# Patient Record
Sex: MALE | Race: WHITE | NOT HISPANIC OR LATINO | Employment: OTHER | URBAN - METROPOLITAN AREA
[De-identification: names, ages, dates, MRNs, and addresses within clinical notes are randomized per-mention and may not be internally consistent; named-entity substitution may affect disease eponyms.]

---

## 2017-02-24 DIAGNOSIS — I25.10 CORONARY ARTERY DISEASE INVOLVING NATIVE CORONARY ARTERY OF NATIVE HEART WITHOUT ANGINA PECTORIS: ICD-10-CM

## 2017-02-24 RX ORDER — NITROGLYCERIN 0.4 MG/1
0.4 TABLET SUBLINGUAL EVERY 5 MIN PRN
Qty: 25 TABLET | Refills: 3 | Status: SHIPPED | OUTPATIENT
Start: 2017-02-24 | End: 2017-08-28

## 2017-02-24 NOTE — TELEPHONE ENCOUNTER
Received refill request for:  Nitro  Last OV was: 6/22/2016 with Dr. Jolley  F/U scheduled: orders in Epic for 6/2017  New script sent to: [facility]

## 2017-05-12 DIAGNOSIS — I25.118 ATHEROSCLEROSIS OF NATIVE CORONARY ARTERY OF NATIVE HEART WITH OTHER FORM OF ANGINA PECTORIS (H): ICD-10-CM

## 2017-05-12 RX ORDER — EZETIMIBE 10 MG/1
10 TABLET ORAL DAILY
Qty: 90 TABLET | Refills: 0 | Status: SHIPPED | OUTPATIENT
Start: 2017-05-12 | End: 2017-08-11

## 2017-05-12 NOTE — TELEPHONE ENCOUNTER
LOV 6/22/16. Future apt 6/29/17. RN refilled rx for sufficient amount of medication until apt on 6/29/17 with Dr. Jolley for further refills.

## 2017-05-31 ENCOUNTER — TRANSFERRED RECORDS (OUTPATIENT)
Dept: HEALTH INFORMATION MANAGEMENT | Facility: CLINIC | Age: 72
End: 2017-05-31

## 2017-06-21 ENCOUNTER — TRANSFERRED RECORDS (OUTPATIENT)
Dept: HEALTH INFORMATION MANAGEMENT | Facility: CLINIC | Age: 72
End: 2017-06-21

## 2017-06-28 ENCOUNTER — PRE VISIT (OUTPATIENT)
Dept: CARDIOLOGY | Facility: CLINIC | Age: 72
End: 2017-06-28

## 2017-06-29 ENCOUNTER — OFFICE VISIT (OUTPATIENT)
Dept: CARDIOLOGY | Facility: CLINIC | Age: 72
End: 2017-06-29
Attending: INTERNAL MEDICINE
Payer: COMMERCIAL

## 2017-06-29 VITALS
WEIGHT: 241 LBS | HEART RATE: 59 BPM | HEIGHT: 70 IN | DIASTOLIC BLOOD PRESSURE: 70 MMHG | BODY MASS INDEX: 34.5 KG/M2 | SYSTOLIC BLOOD PRESSURE: 120 MMHG | OXYGEN SATURATION: 95 %

## 2017-06-29 DIAGNOSIS — E78.5 HYPERLIPIDEMIA LDL GOAL <70: ICD-10-CM

## 2017-06-29 DIAGNOSIS — I10 BENIGN ESSENTIAL HYPERTENSION: Primary | ICD-10-CM

## 2017-06-29 DIAGNOSIS — I25.10 CORONARY ARTERY DISEASE INVOLVING NATIVE CORONARY ARTERY OF NATIVE HEART WITHOUT ANGINA PECTORIS: ICD-10-CM

## 2017-06-29 DIAGNOSIS — E66.09 NON MORBID OBESITY DUE TO EXCESS CALORIES: Chronic | ICD-10-CM

## 2017-06-29 LAB
ALT SERPL W P-5'-P-CCNC: 29 U/L (ref 0–70)
ANION GAP SERPL CALCULATED.3IONS-SCNC: 3 MMOL/L (ref 3–14)
BUN SERPL-MCNC: 17 MG/DL (ref 7–30)
CALCIUM SERPL-MCNC: 8.6 MG/DL (ref 8.5–10.1)
CHLORIDE SERPL-SCNC: 108 MMOL/L (ref 94–109)
CHOLEST SERPL-MCNC: 107 MG/DL
CO2 SERPL-SCNC: 28 MMOL/L (ref 20–32)
CREAT SERPL-MCNC: 0.69 MG/DL (ref 0.66–1.25)
GFR SERPL CREATININE-BSD FRML MDRD: NORMAL ML/MIN/1.7M2
GLUCOSE SERPL-MCNC: 99 MG/DL (ref 70–99)
HDLC SERPL-MCNC: 51 MG/DL
LDLC SERPL CALC-MCNC: 34 MG/DL
NONHDLC SERPL-MCNC: 56 MG/DL
POTASSIUM SERPL-SCNC: 4.4 MMOL/L (ref 3.4–5.3)
SODIUM SERPL-SCNC: 139 MMOL/L (ref 133–144)
TRIGL SERPL-MCNC: 111 MG/DL

## 2017-06-29 PROCEDURE — 80048 BASIC METABOLIC PNL TOTAL CA: CPT | Performed by: INTERNAL MEDICINE

## 2017-06-29 PROCEDURE — 84460 ALANINE AMINO (ALT) (SGPT): CPT | Performed by: INTERNAL MEDICINE

## 2017-06-29 PROCEDURE — 99214 OFFICE O/P EST MOD 30 MIN: CPT | Performed by: INTERNAL MEDICINE

## 2017-06-29 PROCEDURE — 80061 LIPID PANEL: CPT | Performed by: INTERNAL MEDICINE

## 2017-06-29 PROCEDURE — 36415 COLL VENOUS BLD VENIPUNCTURE: CPT | Performed by: INTERNAL MEDICINE

## 2017-06-29 NOTE — MR AVS SNAPSHOT
After Visit Summary   6/29/2017    Terrence Murillo    MRN: [id number]           Patient Information     Date Of Birth          1945        Visit Information        Provider Department      6/29/2017 9:00 AM Eagle Jolley MD [facility] [place]        Today's Diagnoses     Benign essential hypertension    -  1    Coronary artery disease involving native coronary artery of native heart without angina pectoris        Hyperlipidemia LDL goal <70        Non morbid obesity due to excess calories           Follow-ups after your visit        Additional Services     Follow-Up with Cardiologist                 Your next 10 appointments already scheduled     Jul 18, 2017  1:30 PM CDT   NEW NEURO with Kendell Herndon MD   Eye Clinic ([facility])    [address]  [address]  [address]  [facility] [facility] [address] [phone]              Future tests that were ordered for you today     Open Future Orders        Priority Expected Expires Ordered    Lipid Profile Routine 6/29/2018 8/3/2018 6/29/2017    Follow-Up with Cardiologist Routine 6/29/2018 11/11/2018 6/29/2017    Basic metabolic panel Routine 6/29/2018 8/3/2018 6/29/2017            Who to contact     If you have questions or need follow up information about today's clinic visit or your schedule please contact [facility] directly at [phone].  Normal or non-critical lab and imaging results will be communicated to you by MyChart, letter or phone within 4 business days after the clinic has received the results. If you do not hear from us within 7 days, please contact the clinic through MyChart or phone. If you have a critical or abnormal lab result, we will notify you by phone as soon as possible.  Submit refill requests through [employer] or call your pharmacy and they will forward the refill request to us. Please allow 3 business days for  "your refill to be completed.          Additional Information About Your Visit        [employer] Information     [employer] lets you send messages to your doctor, view your test results, renew your prescriptions, schedule appointments and more. To sign up, go to [email or web address] . Click on \"Log in\" on the left side of the screen, which will take you to the Welcome page. Then click on \"Sign up Now\" on the right side of the page.     You will be asked to enter the access code listed below, as well as some personal information. Please follow the directions to create your username and password.     Your access code is: [email or web address]  Expires: 2017 10:26 AM     Your access code will  in 90 days. If you need help or a new code, please call your [place] clinic or [phone].        Care EveryWhere ID     This is your Care EveryWhere ID. This could be used by other organizations to access your [place] medical records  [id number]        Your Vitals Were     Pulse Height Pulse Oximetry BMI (Body Mass Index)          59 1.778 m (5' 10\") 95% 34.58 kg/m2         Blood Pressure from Last 3 Encounters:   17 120/70   16 126/66   16 116/68    Weight from Last 3 Encounters:   17 109.3 kg (241 lb)   16 102.5 kg (226 lb)   16 107 kg (236 lb)              We Performed the Following     Follow-Up with Cardiologist        Primary Care Provider Office Phone # Fax #    Alexy Hernandez [phone]       [facility] [address] [address]        Equal Access to Services     [facility] : Jorden Johnson, linh rodriguez, ce kaalmakirk chicas, faustino banks. So [facility] [phone].    ATENCIÓN: Si habla español, tiene a herndon disposición servicios gratuitos de asistencia lingüística. Llame al [phone].    We comply with applicable federal civil rights laws and [place] laws. We do not " discriminate on the basis of race, color, national origin, age, disability sex, sexual orientation or gender identity.            Thank you!     Thank you for choosing [facility] AT [place]  for your care. Our goal is always to provide you with excellent care. Hearing back from our patients is one way we can continue to improve our services. Please take a few minutes to complete the written survey that you may receive in the mail after your visit with us. Thank you!             Your Updated Medication List - Protect others around you: Learn how to safely use, store and throw away your medicines at www.disposemymeds.org.          This list is accurate as of: 6/29/17 10:26 AM.  Always use your most recent med list.                   Brand Name Dispense Instructions for use Diagnosis    acetaminophen 500 MG tablet    TYLENOL     Take 500 mg by mouth daily        aspirin 81 MG tablet      Take 1 tablet by mouth daily.        ezetimibe 10 MG tablet    ZETIA    90 tablet    Take 1 tablet (10 mg) by mouth daily    Atherosclerosis of native coronary artery of native heart with other form of angina pectoris (H)       glucosamine 500 MG Caps      Take 500 mg by mouth 2 times daily        ibuprofen 200 MG tablet    ADVIL/MOTRIN     Take 400 mg by mouth At Bedtime        metoprolol 25 MG tablet    LOPRESSOR    180 tablet    Take 1 tablet (25 mg) by mouth 2 times daily    CAD (coronary artery disease), Hyperlipidemia LDL goal <70, Benign essential hypertension, Coronary artery disease involving native coronary artery of native heart without angina pectoris       Multi-vitamin Tabs tablet   Generic drug:  multivitamin, therapeutic with minerals      Take 1 tablet by mouth daily.        nitroGLYcerin 0.4 MG sublingual tablet    NITROSTAT    25 tablet    Place 1 tablet (0.4 mg) under the tongue every 5 minutes as needed May repeat X 2. If no relief after 3 tablets call 911    Coronary artery disease  involving native coronary artery of native heart without angina pectoris       ramipril 5 MG capsule    ALTACE    90 capsule    Take 1 capsule (5 mg) by mouth daily    Benign essential hypertension, CAD (coronary artery disease), Hyperlipidemia LDL goal <70, Coronary artery disease involving native coronary artery of native heart without angina pectoris       rosuvastatin 40 MG tablet    CRESTOR    90 tablet    Take 1 tablet (40 mg) by mouth daily    CAD (coronary artery disease), Hyperlipidemia LDL goal <70, Benign essential hypertension, Coronary artery disease involving native coronary artery of native heart without angina pectoris       SAW PALMETTO COMPLEX PO      Take 1 tablet by mouth daily

## 2017-06-29 NOTE — LETTER
2017    Alexy Hernandez MD  [facility]   [address] [place] [address]    RE: Terrence Murillo       Dear Colleague,    I had the pleasure of seeing Terrence Murillo in the [facility].    HPI and Plan:   Terrence is a very nice 71-year-old gentleman with past medical history significant for a DCA atherectomy in .  He developed restenosis and underwent 2 more interventions by balloon angioplasty.   In  two Xience drug-eluting stents were placed in his proximal and mid left anterior descending artery.       Psychosocially, Terrence has had a tough go of it.  His son, Doug,  suddenly in  in what sounds like a cardiac arrest, possible heart attack. Interestingly his dad  suddenly of apparent myocardial infarction at age 44. Also his grandmother  suddenly at age 21 of uncertain reasons.  His wife is dealing with pulmonary hypertension followed through the [facility] system.  He has arthritis problems which have interfered with his ability to exercise.  Over time, Terrence has gained a significant amount weight     is unfortunately not exercising at all, previously he was an avid runner.   In 2016, Terrence was having some exertional chest discomfort.  Stress nuclear scan did not appear to demonstrate any significant area of ischemia.  He continued to have symptoms.  We brought him to the Cardiac Catheterization Lab  and this demonstrated an 80% stenosis with JADE grade 2 flow in a small ramus intermedius branch, new from .  He also had a jailed diagonal that had a 30%-50% ostial narrowing.  There was a tiny branch.  This vessel had also had an 80%-90% stenosis, although this was present in .  He had mild anterior wall hypokinesia.  Ejection fraction was estimated to be 50%-55% with a left ventricular end-diastolic pressure of 13.  we decided to treat him medically and he did quite well with medical management.    Terrence returns to clinic and  continues to do well he has no exertional chest, arm,  neck or jaw discomfort. No dyspnea on exertion, orthopnea or PND, no palpitations, lightheadedness, syncope or near-syncope. He unfortunately is not exercising and gained back all the weight he had lost the previous year.  He notes no side effects or problems with his current medical regimen. We again talked about son Doug and his family as well as his family history of cardiac sudden death. He reports that all of Doug's kids have been seen by a pediatric cardiologist.    Assessment and plan. Terrence has no symptoms to suggest angina. He has no symptoms to suggest heart failure or significant arrhythmia.    Blood pressure is very well controlled today. Next    Fasting lipid profiles outstanding on the combination of Crestor and Zetia with an LDL of 31 and HDL of 55.    His weight unfortunately is up to 241 pounds. Body mass index of 34.7. We again talked about importance of getting back to his regular sinus regiment portion control and weight loss. I've given him referral to my wife for health coaching.    I will plan on seeing him back next year.    Orders Placed This Encounter   Procedures     Basic metabolic panel     Lipid Profile     Follow-Up with Cardiologist       No orders of the defined types were placed in this encounter.      There are no discontinued medications.      Encounter Diagnoses   Name Primary?     Coronary artery disease involving native coronary artery of native heart without angina pectoris      Benign essential hypertension Yes     Hyperlipidemia LDL goal <70      Non morbid obesity due to excess calories        CURRENT MEDICATIONS:  Current Outpatient Prescriptions   Medication Sig Dispense Refill     ezetimibe (ZETIA) 10 MG tablet Take 1 tablet (10 mg) by mouth daily 90 tablet 0     nitroglycerin (NITROSTAT) 0.4 MG sublingual tablet Place 1 tablet (0.4 mg) under the tongue every 5 minutes as needed May repeat X 2. If no relief after 3  tablets call 911 25 tablet 3     metoprolol (LOPRESSOR) 25 MG tablet Take 1 tablet (25 mg) by mouth 2 times daily 180 tablet 3     rosuvastatin (CRESTOR) 40 MG tablet Take 1 tablet (40 mg) by mouth daily 90 tablet 3     ramipril (ALTACE) 5 MG capsule Take 1 capsule (5 mg) by mouth daily 90 capsule 3     acetaminophen (TYLENOL) 500 MG tablet Take 500 mg by mouth daily       ibuprofen (ADVIL,MOTRIN) 200 MG tablet Take 400 mg by mouth At Bedtime       Zn-Pyg Afri-Nettle-Saw Palmet (SAW PALMETTO COMPLEX PO) Take 1 tablet by mouth daily       aspirin 81 MG tablet Take 1 tablet by mouth daily.       Glucosamine 500 MG CAPS Take 500 mg by mouth 2 times daily        Multiple Vitamin (MULTI-VITAMIN) per tablet Take 1 tablet by mouth daily.         ALLERGIES     Allergies   Allergen Reactions     Cardizem [Diltiazem Hcl] Itching       PAST MEDICAL HISTORY:  Past Medical History:   Diagnosis Date     Chest pain      Coronary artery disease     cardiac cath 2016: medical management; cath 2010: SUSANA x2 to LAD; cath 1993: PTCA to LAD; cath 1992: PTCA to LAD, 1992: atherectomy of LAD     Hx of angiography 4-    no stenosis greater than 25%-rec. medical management     Hyperlipidaemia      Hypertension      Obese      Sleep apnea        PAST SURGICAL HISTORY:  Past Surgical History:   Procedure Laterality Date     HEART CATH, ANGIOPLASTY  1992    LAD  atherectomy with a DVI device      HEART CATH, ANGIOPLASTY  4-28-10    PTCA and stent proximal and mid LAD (2) stents Xience       FAMILY HISTORY:  Family History   Problem Relation Age of Onset     C.A.D. Father      HEART DISEASE Son 37     enlarged heart     HEART DISEASE Mother      Heart Surgery Mother      open heart, passed 10 days after     Family History Negative Maternal Grandmother      Family History Negative Maternal Grandfather      Family History Negative Paternal Grandmother      Family History Negative Paternal Grandfather      Family History Negative Brother   "    Alzheimer Disease Brother      Family History Negative Sister      Family History Negative Daughter      Family History Negative Son      Family History Negative Daughter        SOCIAL HISTORY:  Social History     Social History     Marital status:      Spouse name: N/A     Number of children: N/A     Years of education: N/A     Social History Main Topics     Smoking status: Former Smoker     Types: Cigarettes     Quit date: 1/1/1970     Smokeless tobacco: None      Comment: Social smoking only     Alcohol use Yes      Comment: 1-2 cocktails daily      Drug use: None     Sexual activity: Not Asked     Other Topics Concern     Parent/Sibling W/ Cabg, Mi Or Angioplasty Before 65f 55m? No     Caffeine Concern Yes     coffee      Sleep Concern No     Stress Concern No     Weight Concern Yes     Weight decrease 10 lbs     Special Diet Yes     Mediterranean diet     Exercise Yes     Walking 5 days per week 35- 60 minutes     Seat Belt Yes     Social History Narrative       Review of Systems:  Skin:  Negative       Eyes:  Positive for glasses Hx of cataracts currently has issues seeing the far outside of vision appointment Neuro-Opthamologist   ENT:  Positive for hearing loss hearing aids  Respiratory:  Positive for sleep apnea;CPAP     Cardiovascular:  Negative      Gastroenterology: Negative      Genitourinary:  Positive for incontinence    Musculoskeletal:  Positive for back pain right lower back pain from sitting long periods of time   Neurologic:  Negative      Psychiatric:  Negative      Heme/Lymph/Imm:  Negative      Endocrine:  Negative        Physical Exam:  Vitals: /70 (BP Location: Right arm, Patient Position: Sitting, Cuff Size: Adult Regular)  Pulse 59  Ht 1.778 m (5' 10\")  Wt 109.3 kg (241 lb)  SpO2 95%  BMI 34.58 kg/m2    Constitutional:  cooperative, alert and oriented, well developed, well nourished, in no acute distress obese      Skin:  warm and dry to the touch, no apparent skin " lesions or masses noted        Head:  normocephalic, no masses or lesions        Eyes:  pupils equal and round;conjunctivae and lids unremarkable;sclera white;no xanthalasma;no nystagmus        ENT:  no pallor or cyanosis, dentition good        Neck:  carotid pulses are full and equal bilaterally;no carotid bruit        Chest:  normal breath sounds, clear to auscultation, normal A-P diameter, normal symmetry, normal respiratory excursion, no use of accessory muscles          Cardiac: regular rhythm;normal S1 and S2;no S3 or S4;no murmurs, gallops or rubs detected                  Abdomen:    obese      Vascular: pulses full and equal                                        Extremities and Back:  no edema;no spinal abnormalities noted;normal muscle strength and tone              Neurological:  affect appropriate, oriented to time, person and place;no gross motor deficits;affect appropriate        Thank you for allowing me to participate in the care of your patient.    Sincerely,     Eagle Jolley MD     [facility]

## 2017-06-29 NOTE — PROGRESS NOTES
HPI and Plan:   Terrence is a very nice 71-year-old gentleman with past medical history significant for a DCA atherectomy in .  He developed restenosis and underwent 2 more interventions by balloon angioplasty.   In  two Xience drug-eluting stents were placed in his proximal and mid left anterior descending artery.       Psychosocially, Terrence has had a tough go of it.  His son, Doug,  suddenly in  in what sounds like a cardiac arrest, possible heart attack. Interestingly his dad  suddenly of apparent myocardial infarction at age 44. Also his grandmother  suddenly at age 21 of uncertain reasons.  His wife is dealing with pulmonary hypertension followed through the [facility] system.  He has arthritis problems which have interfered with his ability to exercise.  Over time, Terrence has gained a significant amount weight    is unfortunately not exercising at all, previously he was an avid runner.   In 2016, Terrence was having some exertional chest discomfort.  Stress nuclear scan did not appear to demonstrate any significant area of ischemia.  He continued to have symptoms.  We brought him to the Cardiac Catheterization Lab  and this demonstrated an 80% stenosis with JADE grade 2 flow in a small ramus intermedius branch, new from .  He also had a jailed diagonal that had a 30%-50% ostial narrowing.  There was a tiny branch.  This vessel had also had an 80%-90% stenosis, although this was present in .  He had mild anterior wall hypokinesia.  Ejection fraction was estimated to be 50%-55% with a left ventricular end-diastolic pressure of 13.  we decided to treat him medically and he did quite well with medical management.    Terrence returns to clinic and continues to do well he has no exertional chest, arm,  neck or jaw discomfort. No dyspnea on exertion, orthopnea or PND, no palpitations, lightheadedness, syncope or near-syncope. He unfortunately is not exercising and gained back all the weight he had  lost the previous year.  He notes no side effects or problems with his current medical regimen. We again talked about son Doug and his family as well as his family history of cardiac sudden death. He reports that all of Doug's kids have been seen by a pediatric cardiologist.    Assessment and plan. Terrence has no symptoms to suggest angina. He has no symptoms to suggest heart failure or significant arrhythmia.    Blood pressure is very well controlled today. Next    Fasting lipid profiles outstanding on the combination of Crestor and Zetia with an LDL of 31 and HDL of 55.    His weight unfortunately is up to 241 pounds. Body mass index of 34.7. We again talked about importance of getting back to his regular sinus regiment portion control and weight loss. I've given him referral to my wife for health coaching.    I will plan on seeing him back next year.    Orders Placed This Encounter   Procedures     Basic metabolic panel     Lipid Profile     Follow-Up with Cardiologist       No orders of the defined types were placed in this encounter.      There are no discontinued medications.      Encounter Diagnoses   Name Primary?     Coronary artery disease involving native coronary artery of native heart without angina pectoris      Benign essential hypertension Yes     Hyperlipidemia LDL goal <70      Non morbid obesity due to excess calories        CURRENT MEDICATIONS:  Current Outpatient Prescriptions   Medication Sig Dispense Refill     ezetimibe (ZETIA) 10 MG tablet Take 1 tablet (10 mg) by mouth daily 90 tablet 0     nitroglycerin (NITROSTAT) 0.4 MG sublingual tablet Place 1 tablet (0.4 mg) under the tongue every 5 minutes as needed May repeat X 2. If no relief after 3 tablets call 911 25 tablet 3     metoprolol (LOPRESSOR) 25 MG tablet Take 1 tablet (25 mg) by mouth 2 times daily 180 tablet 3     rosuvastatin (CRESTOR) 40 MG tablet Take 1 tablet (40 mg) by mouth daily 90 tablet 3     ramipril (ALTACE) 5 MG capsule Take  1 capsule (5 mg) by mouth daily 90 capsule 3     acetaminophen (TYLENOL) 500 MG tablet Take 500 mg by mouth daily       ibuprofen (ADVIL,MOTRIN) 200 MG tablet Take 400 mg by mouth At Bedtime       Zn-Pyg Afri-Nettle-Saw Palmet (SAW PALMETTO COMPLEX PO) Take 1 tablet by mouth daily       aspirin 81 MG tablet Take 1 tablet by mouth daily.       Glucosamine 500 MG CAPS Take 500 mg by mouth 2 times daily        Multiple Vitamin (MULTI-VITAMIN) per tablet Take 1 tablet by mouth daily.         ALLERGIES     Allergies   Allergen Reactions     Cardizem [Diltiazem Hcl] Itching       PAST MEDICAL HISTORY:  Past Medical History:   Diagnosis Date     Chest pain      Coronary artery disease     cardiac cath 2016: medical management; cath 2010: SUSANA x2 to LAD; cath 1993: PTCA to LAD; cath 1992: PTCA to LAD, 1992: atherectomy of LAD     Hx of angiography 4-    no stenosis greater than 25%-rec. medical management     Hyperlipidaemia      Hypertension      Obese      Sleep apnea        PAST SURGICAL HISTORY:  Past Surgical History:   Procedure Laterality Date     HEART CATH, ANGIOPLASTY  1992    LAD  atherectomy with a DVI device      HEART CATH, ANGIOPLASTY  4-28-10    PTCA and stent proximal and mid LAD (2) stents Xience       FAMILY HISTORY:  Family History   Problem Relation Age of Onset     C.A.D. Father      HEART DISEASE Son 37     enlarged heart     HEART DISEASE Mother      Heart Surgery Mother      open heart, passed 10 days after     Family History Negative Maternal Grandmother      Family History Negative Maternal Grandfather      Family History Negative Paternal Grandmother      Family History Negative Paternal Grandfather      Family History Negative Brother      Alzheimer Disease Brother      Family History Negative Sister      Family History Negative Daughter      Family History Negative Son      Family History Negative Daughter        SOCIAL HISTORY:  Social History     Social History     Marital status:  "     Spouse name: N/A     Number of children: N/A     Years of education: N/A     Social History Main Topics     Smoking status: Former Smoker     Types: Cigarettes     Quit date: 1/1/1970     Smokeless tobacco: None      Comment: Social smoking only     Alcohol use Yes      Comment: 1-2 cocktails daily      Drug use: None     Sexual activity: Not Asked     Other Topics Concern     Parent/Sibling W/ Cabg, Mi Or Angioplasty Before 65f 55m? No     Caffeine Concern Yes     coffee      Sleep Concern No     Stress Concern No     Weight Concern Yes     Weight decrease 10 lbs     Special Diet Yes     Mediterranean diet     Exercise Yes     Walking 5 days per week 35- 60 minutes     Seat Belt Yes     Social History Narrative       Review of Systems:  Skin:  Negative       Eyes:  Positive for glasses Hx of cataracts currently has issues seeing the far outside of vision appointment Neuro-Opthamologist   ENT:  Positive for hearing loss hearing aids  Respiratory:  Positive for sleep apnea;CPAP     Cardiovascular:  Negative      Gastroenterology: Negative      Genitourinary:  Positive for incontinence    Musculoskeletal:  Positive for back pain right lower back pain from sitting long periods of time   Neurologic:  Negative      Psychiatric:  Negative      Heme/Lymph/Imm:  Negative      Endocrine:  Negative        Physical Exam:  Vitals: /70 (BP Location: Right arm, Patient Position: Sitting, Cuff Size: Adult Regular)  Pulse 59  Ht 1.778 m (5' 10\")  Wt 109.3 kg (241 lb)  SpO2 95%  BMI 34.58 kg/m2    Constitutional:  cooperative, alert and oriented, well developed, well nourished, in no acute distress obese      Skin:  warm and dry to the touch, no apparent skin lesions or masses noted        Head:  normocephalic, no masses or lesions        Eyes:  pupils equal and round;conjunctivae and lids unremarkable;sclera white;no xanthalasma;no nystagmus        ENT:  no pallor or cyanosis, dentition good        Neck:  " carotid pulses are full and equal bilaterally;no carotid bruit        Chest:  normal breath sounds, clear to auscultation, normal A-P diameter, normal symmetry, normal respiratory excursion, no use of accessory muscles          Cardiac: regular rhythm;normal S1 and S2;no S3 or S4;no murmurs, gallops or rubs detected                  Abdomen:    obese      Vascular: pulses full and equal                                        Extremities and Back:  no edema;no spinal abnormalities noted;normal muscle strength and tone              Neurological:  affect appropriate, oriented to time, person and place;no gross motor deficits;affect appropriate              CC  Eagle Jolley MD  [facility]  [address]  [place] [address]

## 2017-07-07 DIAGNOSIS — H53.10 SUBJECTIVE VISUAL DISTURBANCE: ICD-10-CM

## 2017-07-07 DIAGNOSIS — H53.40 VISUAL FIELD DEFECT: ICD-10-CM

## 2017-07-18 ENCOUNTER — OFFICE VISIT (OUTPATIENT)
Dept: OPHTHALMOLOGY | Facility: CLINIC | Age: 72
End: 2017-07-18
Attending: OPHTHALMOLOGY
Payer: MEDICARE

## 2017-07-18 DIAGNOSIS — H53.10 SUBJECTIVE VISUAL DISTURBANCE: ICD-10-CM

## 2017-07-18 DIAGNOSIS — H53.40 VISUAL FIELD DEFECT: ICD-10-CM

## 2017-07-18 PROCEDURE — 92133 CPTRZD OPH DX IMG PST SGM ON: CPT | Mod: ZF | Performed by: OPHTHALMOLOGY

## 2017-07-18 PROCEDURE — 99214 OFFICE O/P EST MOD 30 MIN: CPT | Mod: ZF

## 2017-07-18 ASSESSMENT — VISUAL ACUITY
OS_CC: 20/25
METHOD: SNELLEN - LINEAR
OD_CC: 20/20
OS_CC+: +2
CORRECTION_TYPE: GLASSES
OD_CC+: -2

## 2017-07-18 ASSESSMENT — EXTERNAL EXAM - RIGHT EYE: OD_EXAM: NORMAL

## 2017-07-18 ASSESSMENT — CONF VISUAL FIELD
OD_NORMAL: 1
OS_NORMAL: 1

## 2017-07-18 ASSESSMENT — TONOMETRY
OS_IOP_MMHG: 14
OD_IOP_MMHG: 14
IOP_METHOD: TONOPEN

## 2017-07-18 ASSESSMENT — REFRACTION_WEARINGRX
OD_CYLINDER: +0.50
OD_AXIS: 167
OS_ADD: +2.50
OD_ADD: +2.50
OD_SPHERE: -0.25
OS_SPHERE: +0.25

## 2017-07-18 ASSESSMENT — CUP TO DISC RATIO
OS_RATIO: 0.2
OD_RATIO: 0.2

## 2017-07-18 ASSESSMENT — SLIT LAMP EXAM - LIDS
COMMENTS: NORMAL
COMMENTS: NORMAL

## 2017-07-18 NOTE — MR AVS SNAPSHOT
After Visit Summary   7/18/2017    Terrence Murillo    MRN: [id number]           Patient Information     Date Of Birth          1945        Visit Information        Provider Department      7/18/2017 1:30 PM Kendell Herndon MD [facility]        Today's Diagnoses     Subjective visual disturbance        Visual field defect           Follow-ups after your visit        Your next 10 appointments already scheduled     Jul 20, 2017  7:45 AM CDT   MR BRAIN W/O & W CONTRAST with RHMR1   [facility] ([facility])    [address] [facility] [id number] [phone]           Take your medicines as usual, unless your doctor tells you not to. Bring a list of your current medicines to your exam (including vitamins, minerals and over-the-counter drugs).  You will be given intravenous contrast for this exam. To prepare:   The day before your exam, drink extra fluids at least six 8-ounce glasses (unless your doctor tells you to restrict your fluids).   Have a blood test (creatinine test) within 30 days of your exam. Go to your clinic or Diagnostic Imaging Department for this test.  The MRI machine uses a strong magnet. Please wear clothes without metal (snaps, zippers). A sweatsuit works well, or we may give you a hospital gown.  Please remove any body piercings and hair extensions before you arrive. You will also remove watches, jewelry, hairpins, wallets, dentures, partial dental plates and hearing aids. You may wear contact lenses, and you may be able to wear your rings. We have a safe place to keep your personal items, but it is safer to leave them at home.   **IMPORTANT** THE INSTRUCTIONS BELOW ARE ONLY FOR THOSE PATIENTS WHO HAVE BEEN TOLD THEY WILL RECEIVE SEDATION OR GENERAL ANESTHESIA DURING THEIR MRI PROCEDURE:  IF YOU WILL RECEIVE SEDATION (take medicine to help you relax during your exam):   You must get the medicine from your doctor before you arrive. Bring the  medicine to the exam. Do not take it at home.   Arrive one hour early. Bring someone who can take you home after the test. Your medicine will make you sleepy. After the exam, you may not drive, take a bus or take a taxi by yourself.   No eating 8 hours before your exam. You may have clear liquids up until 4 hours before your exam. (Clear liquids include water, clear tea, black coffee and fruit juice without pulp.)  IF YOU WILL RECEIVE ANESTHESIA (be asleep for your exam):   Arrive 1 1/2 hours early. Bring someone who can take you home after the test. You may not drive, take a bus or take a taxi by yourself.   No eating 8 hours before your exam. You may have clear liquids up until 4 hours before your exam. (Clear liquids include water, clear tea, black coffee and fruit juice without pulp.)  Please call the Imaging Department at your exam site with any questions.              Future tests that were ordered for you today     Open Future Orders        Priority Expected Expires Ordered    MR Brain w/o & w Contrast Routine  7/18/2018 7/18/2017            Who to contact     Please call your clinic at [phone] to:    Ask questions about your health    Make or cancel appointments    Discuss your medicines    Learn about your test results    Speak to your doctor   If you have compliments or concerns about an experience at your clinic, or if you wish to file a complaint, please contact [facility] Patient Relations at [phone] or email us at [email or web address]         Additional Information About Your Visit        [employer] Information     [employer] gives you secure access to your electronic health record. If you see a primary care provider, you can also send messages to your care team and make appointments. If you have questions, please call your primary care clinic.  If you do not have a primary care provider, please call [phone] and they will assist you.      [email or web address] is an  electronic gateway that provides easy, online access to your medical records. With [employer], you can request a clinic appointment, read your test results, renew a prescription or communicate with your care team.     To access your existing account, please contact your [facility] or call [phone] for assistance.        Care EveryWhere ID     This is your Care EveryWhere ID. This could be used by other organizations to access your [place] medical records  [id number]         Blood Pressure from Last 3 Encounters:   06/29/17 120/70   06/22/16 126/66   05/04/16 116/68    Weight from Last 3 Encounters:   06/29/17 109.3 kg (241 lb)   06/22/16 102.5 kg (226 lb)   05/04/16 107 kg (236 lb)              We Performed the Following     DILATED FUNDUS EXAM     IOP Measurement     OCT Optic Nerve RNFL Spectralis OU (both eyes)        Primary Care Provider Office Phone # Fax #    Alexy Hernandez [phone]       [facility] [address] [facility] [address]        Equal Access to Services     [facility]: Hadii aad ku hadasho Soomaali, waaxda luqadaha, qaybta kaalmada adeegyada, faustino lopez . So [facility] [phone].    ATENCIÓN: Si habla español, tiene a herndon disposición servicios gratuitos de asistencia lingüística. Llame al [phone].    We comply with applicable federal civil rights laws and [place] laws. We do not discriminate on the basis of race, color, national origin, age, disability sex, sexual orientation or gender identity.            Thank you!     Thank you for choosing [facility]  for your care. Our goal is always to provide you with excellent care. Hearing back from our patients is one way we can continue to improve our services. Please take a few minutes to complete the written survey that you may receive in the mail after your visit with us. Thank you!             Your Updated Medication List - Protect  others around you: Learn how to safely use, store and throw away your medicines at www.disposemymeds.org.          This list is accurate as of: 7/18/17  3:30 PM.  Always use your most recent med list.                   Brand Name Dispense Instructions for use Diagnosis    acetaminophen 500 MG tablet    TYLENOL     Take 500 mg by mouth daily        aspirin 81 MG tablet      Take 1 tablet by mouth daily.        ezetimibe 10 MG tablet    ZETIA    90 tablet    Take 1 tablet (10 mg) by mouth daily    Atherosclerosis of native coronary artery of native heart with other form of angina pectoris (H)       glucosamine 500 MG Caps      Take 500 mg by mouth 2 times daily        ibuprofen 200 MG tablet    ADVIL/MOTRIN     Take 400 mg by mouth At Bedtime        metoprolol 25 MG tablet    LOPRESSOR    180 tablet    Take 1 tablet (25 mg) by mouth 2 times daily    CAD (coronary artery disease), Hyperlipidemia LDL goal <70, Benign essential hypertension, Coronary artery disease involving native coronary artery of native heart without angina pectoris       Multi-vitamin Tabs tablet   Generic drug:  multivitamin, therapeutic with minerals      Take 1 tablet by mouth daily.        nitroGLYcerin 0.4 MG sublingual tablet    NITROSTAT    25 tablet    Place 1 tablet (0.4 mg) under the tongue every 5 minutes as needed May repeat X 2. If no relief after 3 tablets call 911    Coronary artery disease involving native coronary artery of native heart without angina pectoris       ramipril 5 MG capsule    ALTACE    90 capsule    Take 1 capsule (5 mg) by mouth daily    Benign essential hypertension, CAD (coronary artery disease), Hyperlipidemia LDL goal <70, Coronary artery disease involving native coronary artery of native heart without angina pectoris       rosuvastatin 40 MG tablet    CRESTOR    90 tablet    Take 1 tablet (40 mg) by mouth daily    CAD (coronary artery disease), Hyperlipidemia LDL goal <70, Benign essential hypertension, Coronary  artery disease involving native coronary artery of native heart without angina pectoris       SAW PALMETTO COMPLEX PO      Take 1 tablet by mouth daily

## 2017-07-18 NOTE — LETTER
2017         RE:  :  MRN: Terrence Murillo  1945  [id number]     Dear Dr. Trevino,    Thank you for asking me to see your very pleasant patient, Terrence Murillo, in neuro-ophthalmic consultation.  I would like to thank you for sending your records and I have summarized them in the history of present illness. He presented with his spouse who provided additional history.  My assessment and plan are below.  For further details, please see my attached clinic note.        Assessment & Plan     Terrence Murillo is a 72 year old male with the following diagnoses:   1. Subjective visual disturbance    2. 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 No symptoms of pituitary excess or deficiency. The visual field defect is concerning for compression of the chiasm. His ganglion cell layer  Analysis shows nasal loss both eyes suggesting a longstanding bitemporal hemianopia.      Plan for MRI with and without contrast brain and orbits. If this shows compression then will refer to neurosurgery.        Again, thank you for allowing me to participate in the care of your patient.      Sincerely,    Kendell Herndon MD  Professor, Neuro-Ophthalmology  Department of Ophthalmology and Visual Neurosciences  [facility]      CC: Lenard Trevino  [facility]   [facility] [address]  [facility] [address]  VIA Facsimile: [phone]     Alexy Hernandez MD  [facility]  [address] [facility] [address]  VIA Facsimile: [phone]       DX = bitemporal hemianopia, ganglion cell layer loss

## 2017-07-18 NOTE — PROGRESS NOTES
Assessment & Plan     Terrence Murillo is a 72 year old male with the following diagnoses:   1. Subjective visual disturbance    2. 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 No symptoms of pituitary excess or deficiency. The visual field defect is concerning for compression of the chiasm. His ganglion cell layer  Analysis shows nasal loss both eyes suggesting a longstanding bitemporal hemianopia.      Plan for MRI with and without contrast brain and orbits. If this shows compression then will refer to neurosurgery.         Attending Physician Attestation:  Complete documentation of historical and exam elements from today's encounter can be found in the full encounter summary report (not reduplicated in this progress note).  I personally obtained the chief complaint(s) and history of present illness.  I confirmed and edited as necessary the review of systems, past medical/surgical history, family history, social history, and examination findings as documented by others; and I examined the patient myself.  I personally reviewed the relevant tests, images, and reports as documented above.  I formulated and edited as necessary the assessment and plan and discussed the findings and management plan with the patient and family. - Kendell Herndon MD

## 2017-07-18 NOTE — NURSING NOTE
Chief Complaints and History of Present Illnesses   Patient presents with     Neurologic Problem     temporal vision loss     HPI    Symptoms:              Comments:  Terrence is a 72 year old male presenting with a history of:    Closes one eye to read.   Bitemporal field loss OU. Onset 6 months ago. Noticed while reading.   No improvement since onset.   History of coronary artery disease, and is well-controlled with medications.   No hypertension or diabetes.   Has never had a brain MRI done.   No associated headaches or dizziness.     Xander AMIN 2:18 PM July 18, 2017

## 2017-07-20 ENCOUNTER — TELEPHONE (OUTPATIENT)
Dept: OPHTHALMOLOGY | Facility: CLINIC | Age: 72
End: 2017-07-20

## 2017-07-20 ENCOUNTER — HOSPITAL ENCOUNTER (OUTPATIENT)
Dept: MRI IMAGING | Facility: CLINIC | Age: 72
Discharge: HOME | End: 2017-07-20
Attending: OPHTHALMOLOGY | Admitting: OPHTHALMOLOGY
Payer: MEDICARE

## 2017-07-20 DIAGNOSIS — H53.40 VISUAL FIELD DEFECT: ICD-10-CM

## 2017-07-20 DIAGNOSIS — H53.10 SUBJECTIVE VISUAL DISTURBANCE: ICD-10-CM

## 2017-07-20 PROCEDURE — A9585 GADOBUTROL INJECTION: HCPCS | Performed by: RADIOLOGY

## 2017-07-20 PROCEDURE — 25000128 H RX IP 250 OP 636: Performed by: RADIOLOGY

## 2017-07-20 PROCEDURE — 70553 MRI BRAIN STEM W/O & W/DYE: CPT

## 2017-07-20 RX ORDER — GADOBUTROL 604.72 MG/ML
15 INJECTION INTRAVENOUS ONCE
Status: COMPLETED | OUTPATIENT
Start: 2017-07-20 | End: 2017-07-20

## 2017-07-20 RX ADMIN — GADOBUTROL 11 ML: 604.72 INJECTION INTRAVENOUS at 08:37

## 2017-07-20 NOTE — TELEPHONE ENCOUNTER
Patient was seen in clinic on 7/18/17 for concerns of visual field changes. MRI was obtained and called the patient to discuss the results.  He is unavailable until Monday afternoon at the earliest and will call again on Monday to discuss the results of the MRI with him. If he is unavailable on Monday then I will call him again on Tuesday.    Kendell Pace MD  Ophthalmology, PGY-3

## 2017-07-24 ENCOUNTER — TELEPHONE (OUTPATIENT)
Dept: OPHTHALMOLOGY | Facility: CLINIC | Age: 72
End: 2017-07-24

## 2017-07-24 DIAGNOSIS — D35.2 PITUITARY ADENOMA (H): Primary | ICD-10-CM

## 2017-07-24 NOTE — TELEPHONE ENCOUNTER
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 I answered his questions as I was able and deferred specific questions about neurosurgery to the neurosurgeon that will see him in consultation.     Kendell Pace MD  Ophthalmology, PGY-3

## 2017-08-01 ASSESSMENT — ENCOUNTER SYMPTOMS
MYALGIAS: 1
LOSS OF CONSCIOUSNESS: 0
SPEECH CHANGE: 0
NECK PAIN: 0
BACK PAIN: 1
DISTURBANCES IN COORDINATION: 0
NUMBNESS: 1
MUSCLE CRAMPS: 0
ARTHRALGIAS: 0
HEADACHES: 0
WEAKNESS: 0
PARALYSIS: 0
DIZZINESS: 0
MEMORY LOSS: 0
STIFFNESS: 0
TINGLING: 1
SEIZURES: 0
MUSCLE WEAKNESS: 0
TREMORS: 0
JOINT SWELLING: 0

## 2017-08-03 ENCOUNTER — OFFICE VISIT (OUTPATIENT)
Dept: NEUROSURGERY | Facility: CLINIC | Age: 72
End: 2017-08-03

## 2017-08-03 ENCOUNTER — OFFICE VISIT (OUTPATIENT)
Dept: OTOLARYNGOLOGY | Facility: CLINIC | Age: 72
End: 2017-08-03

## 2017-08-03 VITALS — WEIGHT: 243 LBS | BODY MASS INDEX: 34.79 KG/M2 | HEIGHT: 70 IN

## 2017-08-03 VITALS — HEIGHT: 70 IN

## 2017-08-03 DIAGNOSIS — D49.7 PITUITARY TUMOR: Primary | ICD-10-CM

## 2017-08-03 DIAGNOSIS — D49.7 PITUITARY NEOPLASM: Primary | ICD-10-CM

## 2017-08-03 LAB
ANION GAP SERPL CALCULATED.3IONS-SCNC: 8 MMOL/L (ref 3–14)
BUN SERPL-MCNC: 18 MG/DL (ref 7–30)
CALCIUM SERPL-MCNC: 9.2 MG/DL (ref 8.5–10.1)
CHLORIDE SERPL-SCNC: 104 MMOL/L (ref 94–109)
CO2 SERPL-SCNC: 24 MMOL/L (ref 20–32)
CORTIS SERPL-MCNC: 6.6 UG/DL (ref 4–22)
CREAT SERPL-MCNC: 0.79 MG/DL (ref 0.66–1.25)
FSH SERPL-ACNC: 9.4 IU/L (ref 0.7–10.8)
GFR SERPL CREATININE-BSD FRML MDRD: NORMAL ML/MIN/1.7M2
GLUCOSE SERPL-MCNC: 93 MG/DL (ref 70–99)
LH SERPL-ACNC: 4.2 IU/L (ref 3.1–34.6)
POTASSIUM SERPL-SCNC: 4.5 MMOL/L (ref 3.4–5.3)
PROLACTIN SERPL-MCNC: 6 UG/L (ref 2–18)
SODIUM SERPL-SCNC: 136 MMOL/L (ref 133–144)
T4 FREE SERPL-MCNC: 0.69 NG/DL (ref 0.76–1.46)
TSH SERPL DL<=0.005 MIU/L-ACNC: 2.54 MU/L (ref 0.4–4)

## 2017-08-03 ASSESSMENT — PAIN SCALES - GENERAL
PAINLEVEL: NO PAIN (0)
PAINLEVEL: NO PAIN (0)

## 2017-08-03 NOTE — MR AVS SNAPSHOT
After Visit Summary   8/3/2017    Terrence Murillo    MRN: [id number]           Patient Information     Date Of Birth          1945        Visit Information        Provider Department      8/3/2017 3:00 PM Lara Mckeon MD [facility] Ear Nose and Throat        Care Instructions    1.  You were seen in the ENT Clinic today by Dr. Mckeon.  If you have questions or concerns after your appointment please call, [phone].  Press option #1 for scheduling related needs.  Press option #3 for Nurse advice.  2.  Plan is to move forward with surgery. You will be called with dates and times for this procedure.  3. RN Care Coordinator is Lyela, [phone]  4. After surgery, please follow the restrictions below.    ENDOSCOPIC SINUS SURGERY PATIENT INSTRUCTIONS    1.  The typical hospital stay is 3-5 days.  Expect nasal packing for 3-4 days, this will be removed in the hospital.    2. You will have nasal splints in place for 3-4 weeks after surgery. These will be removed at your first post op appointment in the ENT Clinic.  Use nasal saline as much as needed at home to keep your sinuses clear.    3.  Nasal precautions will remain in place for 4-6 weeks after surgery.  No nose blowing, no lifting greater than 10 pounds, no straws when drinking liquids and bend with your knees when picking something up.  If you have to sneeze, try to open your mouth when doing so. Use stool softeners while on narcotics.  NO CPAP until you are cleared by Dr. Muniz  4.  Normal nasal drainage will be mucous or blood tinged. If you experience clear, watery drainage that drips consistently from your nose please call the clinic. Phone numbers are below.  5.  Other reasons to call the clinic are fever, increased pain, uncontrolled headaches or visual changes.    Contact information during business hours is [phone]. Option #3 for the Triage Nurse.    After hours call the hospital  at  "[phone] and ask to speak with the ENT Resident on call.                 Follow-ups after your visit        Who to contact     Please call your clinic at [phone] to:    Ask questions about your health    Make or cancel appointments    Discuss your medicines    Learn about your test results    Speak to your doctor   If you have compliments or concerns about an experience at your clinic, or if you wish to file a complaint, please contact [facility] Patient Relations at [phone] or email us at [email or web address]         Additional Information About Your Visit        [employer] Information     [employer] gives you secure access to your electronic health record. If you see a primary care provider, you can also send messages to your care team and make appointments. If you have questions, please call your primary care clinic.  If you do not have a primary care provider, please call [phone] and they will assist you.      [employer] is an electronic gateway that provides easy, online access to your medical records. With [employer], you can request a clinic appointment, read your test results, renew a prescription or communicate with your care team.     To access your existing account, please contact your [facility] Clinic or call [phone] for assistance.        Care EveryWhere ID     This is your Care EveryWhere ID. This could be used by other organizations to access your [place] medical records  [id number]        Your Vitals Were     Height BMI (Body Mass Index)                1.778 m (5' 10\") 34.87 kg/m2           Blood Pressure from Last 3 Encounters:   06/29/17 120/70   06/22/16 126/66   05/04/16 116/68    Weight from Last 3 Encounters:   08/03/17 110.2 kg (243 lb)   06/29/17 109.3 kg (241 lb)   06/22/16 102.5 kg (226 lb)              Today, you had the following     No orders found for display       Primary Care Provider Office Phone # Fax #    " Alexy Hernandez [phone]       [facility] [address] [place] [address]        Equal Access to Services     PRANAVTHERESA ELE : Hadzeeshan amado varghese praful Johnson, waaxda luqadaha, qaybta kaalmada ademariahda, faustino collado laJanahipolito banks. So [facility] [phone].    ATENCIÓN: Si habla español, tiene a hrendon disposición servicios gratuitos de asistencia lingüística. Llame al [phone].    We comply with applicable federal civil rights laws and [place] laws. We do not discriminate on the basis of race, color, national origin, age, disability sex, sexual orientation or gender identity.            Thank you!     Thank you for choosing [facility] EAR [facility]SE [facility]  for your care. Our goal is always to provide you with excellent care. Hearing back from our patients is one way we can continue to improve our services. Please take a few minutes to complete the written survey that you may receive in the mail after your visit with us. Thank you!             Your Updated Medication List - Protect others around you: Learn how to safely use, store and throw away your medicines at www.disposemymeds.org.          This list is accurate as of: 8/3/17  3:18 PM.  Always use your most recent med list.                   Brand Name Dispense Instructions for use Diagnosis    acetaminophen 500 MG tablet    TYLENOL     Take 500 mg by mouth daily        aspirin 81 MG tablet      Take 1 tablet by mouth daily.        ezetimibe 10 MG tablet    ZETIA    90 tablet    Take 1 tablet (10 mg) by mouth daily    Atherosclerosis of native coronary artery of native heart with other form of angina pectoris (H)       glucosamine 500 MG Caps      Take 500 mg by mouth 2 times daily        ibuprofen 200 MG tablet    ADVIL/MOTRIN     Take 400 mg by mouth At Bedtime        metoprolol 25 MG tablet    LOPRESSOR    180 tablet    Take 1 tablet (25 mg) by mouth 2 times daily    CAD (coronary artery disease),  Hyperlipidemia LDL goal <70, Benign essential hypertension, Coronary artery disease involving native coronary artery of native heart without angina pectoris       Multi-vitamin Tabs tablet   Generic drug:  multivitamin, therapeutic with minerals      Take 1 tablet by mouth daily.        nitroGLYcerin 0.4 MG sublingual tablet    NITROSTAT    25 tablet    Place 1 tablet (0.4 mg) under the tongue every 5 minutes as needed May repeat X 2. If no relief after 3 tablets call 911    Coronary artery disease involving native coronary artery of native heart without angina pectoris       ramipril 5 MG capsule    ALTACE    90 capsule    Take 1 capsule (5 mg) by mouth daily    Benign essential hypertension, CAD (coronary artery disease), Hyperlipidemia LDL goal <70, Coronary artery disease involving native coronary artery of native heart without angina pectoris       rosuvastatin 40 MG tablet    CRESTOR    90 tablet    Take 1 tablet (40 mg) by mouth daily    CAD (coronary artery disease), Hyperlipidemia LDL goal <70, Benign essential hypertension, Coronary artery disease involving native coronary artery of native heart without angina pectoris       SAW PALMETTO COMPLEX PO      Take 1 tablet by mouth daily

## 2017-08-03 NOTE — LETTER
8/3/2017      RE: Terrence Murillo  [address] [address]       Dear Dr. Hernandez:    We saw Mr. Terrence Murillo today in Pituitary Clinic today at Dr. Herndon's request. He is a right-handed 72 year old man with longstanding bitemporal hemianopsia diagnosed on a recent examination by Dr. Herndon. In retrospect, he believes he has had peripheral vision loss dating back at least two years. He noticed being unable to see kwaku pigeons when trap shooting with his grandson. Based on the visual field deficit, he had an MRI of the brain on 2017 which showed a pituitary mass. Currently, he is doing well otherwise. He seems to drive without difficulty. His visual field deficits are more obvious to him when he closes one eye while reading. He believes his right eye is worse than his left. He thinks his hearing is also worsening. He has dysgeusia where food occasionally tastes more bitter than normal. He has not had any alteration in smell, but he does have increased congestion. He has been gaining weight and his appetite remains normal. His energy-level is not like it used to be, but he is not abnormally fatigued. He continues to hunt and fish. His libido is at baseline and he has had erectile dysfunction for about 10 years. He feels like his balance is worsening but he does not have numbness or tingling in his legs. He does not experience light-headedness or vertigo. He remains on daily aspirin.     MEDICAL HISTORY:  1. Multiple stent placements in the coronary arteries - no MI  2. Left knee and right shoulder arthroplasty  3. Appendectomy  4. Hearing aids for 13 years  5. Bilateral cataract surgery    SOCIAL HISTORY: He is  with three living children. His youngest son  suddenly six years ago at the age of 37 from hypertrophic cardiomyopathy. They live in [place]. 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 He consumes 2 alcoholic drinks daily.     PHYSICAL EXAM: On exam, he  appears well. He is obese. Extraocular movements intact. We did not repeat confrontation visual fields since he had formal visual field testing recently with Dr. Herndon. He moves upper and lower extremities equally and with good coordination. Fluent and coherent speech. Gross neurological examination is otherwise normal.    REVIEW OF STUDIES: We reviewed his recent MRI. This shows a 3 cm enhancing mass in the sella with suprasellar extension. There is mild extension into the right parasellar space. There is expansion of the sellar floor, consistent with pituitary macroadenoma. The pituitary gland appears displaced superiorly, posteriorly and to the left.    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 We agree with Dr. Herndon that surgical resection is indicated. The majority of this 60 minute visit was spent discussing the management of pituitary tumors. The primary goal of surgery is to prevent further decline in vision. Perhaps he will regain some of his lost vision. We dicussed the endoscopic transnasal approach in detail. We went over the risks of death, carotid injury (stroke), hypopituitarism, CSF leak, infection, hemorrhage, blindness, or need for reoperation and he agrees to proceed. For completion, we will check a pituitary panel on his way out and arrange for him to meet with our skull base surgery partner from ENT, Dr. Muniz, to discuss the sinonasal aspects of surgery. We will plan on surgery in the next few weeks and work around some hunting trips that he has planned in the middle of Fall.     Please do not hesitate to contact us with questions. We will keep you informed of his progress.     MD LYLA MARSHALL, Guillaume Nichols, am serving as a scribe to document services personally performed by Amanda Bates MD, based upon my observations and the provider's statements to me. All documentation has been reviewed by the aforementioned doctor prior  to being entered into the official medical record.    I, Amanda Bates, attest that above named individual is acting in scribe capacity, has observed my performance of the services and has documented them in accordance with my direction. The documentation recorded by the scribe accurately reflects the service I personally performed and the decisions made by me.    Amanda Bates MD

## 2017-08-03 NOTE — NURSING NOTE
Relevant Diagnosis: pituitary adenoma  Teaching Topic: pre op teaching for EEA of pituitary adenoma    Person(s) involved in teaching:  Patient and his wife     Teaching Concerns Addressed:  Pre op teaching included the need for an H&P, NPO status pre op, hospital routines, expected recovery, activity  restrictions, antimicrobial scrub, s/s of infection, pain control methods and the importance of follow up appointments.  The patient voiced an understanding of all instructions and will call with questions.     Motivation Level:  Asks Questions:   Yes  Eager to Learn:   Yes  Cooperative:   Yes  Receptive (willing/able to accept information):   Yes     Patient  demonstrates understanding of the following:  Reason for the appointment, diagnosis and treatment plan:   Yes  Knowledge of proper use of medications and conditions for which they are ordered (with special attention to potential side effects or drug interactions):   Yes  Which situations necessitate calling provider and whom to contact:   Yes        Proper use and care of  (medical equip, care aids, etc.):   NA  Nutritional needs and diet plan:   Yes  Pain management techniques:   Yes  Patient instructed on hand hygiene:  Yes  How and/when to access community resources:   NA     Infection Prevention:  Patient   demonstrates understanding of the following:  Surgical procedure site care taught   Signs and symptoms of infection taught Yes  Wound care taught Yes     Instructional Materials Used/Given: Pre op booklet.

## 2017-08-03 NOTE — NURSING NOTE
Chief Complaint   Patient presents with     Consult     PIUITARY ADENOMA    Cosmo Grimaldo, CMA

## 2017-08-03 NOTE — PATIENT INSTRUCTIONS
1. Please complete you lab work today on the first floor.     2. Dr. Muniz in ENT will see you in clinic today around 2:30pm. He is located on the 4th floor.     3. We will plan for surgery and have Emily contact you to arrange a date and time.     4. Please call JORDY Ortiz Care Coordinator with further questions or concerns. [phone] sukhwinder

## 2017-08-03 NOTE — PROGRESS NOTES
Dear Dr. Hernandez:    We saw Mr. Terrence Murillo today in Pituitary Clinic today at Dr. Herndon's request. He is a right-handed 72 year old man with longstanding bitemporal hemianopsia diagnosed on a recent examination by Dr. Herndon. In retrospect, he believes he has had peripheral vision loss dating back at least two years. He noticed being unable to see kwaku pigeons when trap shooting with his grandson. Based on the visual field deficit, he had an MRI of the brain on 2017 which showed a pituitary mass. Currently, he is doing well otherwise. He seems to drive without difficulty. His visual field deficits are more obvious to him when he closes one eye while reading. He believes his right eye is worse than his left. He thinks his hearing is also worsening. He has dysgeusia where food occasionally tastes more bitter than normal. He has not had any alteration in smell, but he does have increased congestion. He has been gaining weight and his appetite remains normal. His energy-level is not like it used to be, but he is not abnormally fatigued. He continues to hunt and fish. His libido is at baseline and he has had erectile dysfunction for about 10 years. He feels like his balance is worsening but he does not have numbness or tingling in his legs. He does not experience light-headedness or vertigo. He remains on daily aspirin.     MEDICAL HISTORY:  1. Multiple stent placements in the coronary arteries - no MI  2. Left knee and right shoulder arthroplasty  3. Appendectomy  4. Hearing aids for 13 years  5. Bilateral cataract surgery    SOCIAL HISTORY: He is  with three living children. His youngest son  suddenly six years ago at the age of 37 from hypertrophic cardiomyopathy. They live in [place]. 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 He consumes 2 alcoholic drinks daily.     PHYSICAL EXAM: On exam, he appears well. He is obese. Extraocular movements intact. We did not repeat confrontation  visual fields since he had formal visual field testing recently with Dr. Herndon. He moves upper and lower extremities equally and with good coordination. Fluent and coherent speech. Gross neurological examination is otherwise normal.    REVIEW OF STUDIES: We reviewed his recent MRI. This shows a 3 cm enhancing mass in the sella with suprasellar extension. There is mild extension into the right parasellar space. There is expansion of the sellar floor, consistent with pituitary macroadenoma. The pituitary gland appears displaced superiorly, posteriorly and to the left.    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 We agree with Dr. Herndon that surgical resection is indicated. The majority of this 60 minute visit was spent discussing the management of pituitary tumors. The primary goal of surgery is to prevent further decline in vision. Perhaps he will regain some of his lost vision. We dicussed the endoscopic transnasal approach in detail. We went over the risks of death, carotid injury (stroke), hypopituitarism, CSF leak, infection, hemorrhage, blindness, or need for reoperation and he agrees to proceed. For completion, we will check a pituitary panel on his way out and arrange for him to meet with our skull base surgery partner from ENT, Dr. Muniz, to discuss the sinonasal aspects of surgery. We will plan on surgery in the next few weeks and work around some hunting trips that he has planned in the middle of Fall.     Please do not hesitate to contact us with questions. We will keep you informed of his progress.     MD LYLA MARSHALL Tim Suek, am serving as a scribe to document services personally performed by Amanda Bates MD, based upon my observations and the provider's statements to me. All documentation has been reviewed by the aforementioned doctor prior to being entered into the official medical record.    I, Amanda Bates, attest that  above named individual is acting in scribe capacity, has observed my performance of the services and has documented them in accordance with my direction. The documentation recorded by the scribe accurately reflects the service I personally performed and the decisions made by me.

## 2017-08-03 NOTE — NURSING NOTE
Chief Complaint   Patient presents with     Consult     pituitary adenoma     Delia Lofton Medical Assistant

## 2017-08-03 NOTE — LETTER
8/3/2017       RE: Terrence Murillo  [address] [facility] [address]     Dear Colleague,    Thank you for referring your patient, Terrence Murillo, to the [facility] EAR NOSE AND THROAT at [facility]. Please see a copy of my visit note below.    Dear Dr. Bates:    I had the pleasure of meeting Terrence Murillo in consultation today at the [facility] Otolaryngology [facility] at your request.     History of Present Illness: 72-year-old gentleman. The patient is here in the otolaryngology clinic for evaluation of recently diagnosed pituitary microadenoma. The patient states that for the last 2 years he has been noticing a decrease of his visual fields. This has been progressively getting worse. Because of previous he saw his optometrist which refer him to Dr. Herndon in the neuro ophthalmology service for further evaluation. MRI demonstrated a large pituitary microadenoma with extensive suprasellar extension and compression of the chiasm. The patient denies headaches. No diplopia. The patient was evaluated by Dr. Bates in the neurosurgery clinic in his recommendation was to resect this adenoma.    MEDICATIONS:     Current Outpatient Prescriptions   Medication Sig Dispense Refill     ezetimibe (ZETIA) 10 MG tablet Take 1 tablet (10 mg) by mouth daily 90 tablet 0     nitroglycerin (NITROSTAT) 0.4 MG sublingual tablet Place 1 tablet (0.4 mg) under the tongue every 5 minutes as needed May repeat X 2. If no relief after 3 tablets call 911 25 tablet 3     metoprolol (LOPRESSOR) 25 MG tablet Take 1 tablet (25 mg) by mouth 2 times daily 180 tablet 3     rosuvastatin (CRESTOR) 40 MG tablet Take 1 tablet (40 mg) by mouth daily 90 tablet 3     ramipril (ALTACE) 5 MG capsule Take 1 capsule (5 mg) by mouth daily 90 capsule 3     acetaminophen (TYLENOL) 500 MG tablet Take 500 mg by mouth daily       ibuprofen (ADVIL,MOTRIN) 200 MG tablet Take 400 mg by mouth At Bedtime       Zn-Pyg  Afri-Nettle-Saw Palmet (SAW PALMETTO COMPLEX PO) Take 1 tablet by mouth daily       aspirin 81 MG tablet Take 1 tablet by mouth daily.       Glucosamine 500 MG CAPS Take 500 mg by mouth 2 times daily        Multiple Vitamin (MULTI-VITAMIN) per tablet Take 1 tablet by mouth daily.         ALLERGIES:    Allergies   Allergen Reactions     Cardizem [Diltiazem Hcl] Itching       HABITS/SOCIAL HISTORY: Patient is retired, he doen't smoke. He drinks 2 glasses of wine every day    PAST MEDICAL HISTORY:   Past Medical History:   Diagnosis Date     Chest pain      Coronary artery disease     cardiac cath 2016: medical management; cath 2010: SUSANA x2 to LAD; cath 1993: PTCA to LAD; cath 1992: PTCA to LAD, 1992: atherectomy of LAD     Hx of angiography 4-    no stenosis greater than 25%-rec. medical management     Hyperlipidaemia      Hypertension      Obese      Sleep apnea         FAMILY HISTORY:    Family History   Problem Relation Age of Onset     C.A.D. Father      HEART DISEASE Son 37     enlarged heart     HEART DISEASE Mother      Heart Surgery Mother      open heart, passed 10 days after     Family History Negative Maternal Grandmother      Family History Negative Maternal Grandfather      Family History Negative Paternal Grandmother      Family History Negative Paternal Grandfather      Family History Negative Brother      Alzheimer Disease Brother      Family History Negative Sister      Family History Negative Daughter      Family History Negative Son      Family History Negative Daughter        REVIEW OF SYSTEMS:  12 point ROS was negative other than the symptoms noted above in the HPI.    PHYSICAL EXAMINATION:      Constitutional:  The patient was  well-groomed, and in no acute distress.     Skin: Normal:  warm and pink without rash   Neurologic: Alert and oriented x 3.  CN's III-XII within normal limits.  Voice normal.    Psychiatric: The patient's affect was calm, cooperative, and appropriate.      Communication:  Normal; communicates verbally, normal voice quality.   Respiratory: Breathing comfortably without stridor or exertion of accessory muscles.    Head/Face:  Normocephalic and atraumatic.  No lesions or scars. No sinus tenderness.    Salivary glands -  Normal size, no tenderness, swelling, or palpable masses   Eyes: Pupils were equal and reactive.  Extraocular movement intact.     Ears: Pinnae and tragus non-tender. Patient is wearing bilateral hearing aids.   Nose: Sinuses were non-tender.  Anterior rhinoscopy revealed midline septum and absence of purulence or polyps.     Oral Cavity: Normal tongue, floor of mouth, buccal mucosa, and palate.  No lesions or masses on inspection or palpation.     Oropharynx: Normal mucosa, palate symmetric with normal elevation. No abnormal lymph tissue in the oropharynx.             Neck: Supple with normal laryngeal and tracheal landmarks.  The parotid beds were without masses.  No palpable thyroid.  Normal range of motion   Lymphatic: There is no palpable lymphadenopathy in the neck.              Nasal Endoscopy:  Consent for fiberoptic laryngoscopy was obtained, and we confirmed correctness of procedure and identity of patient. I used a 0  nasal endoscope. A sprayed the nose with lidocaine. I advanced the scope through the left nostril. The septum is in the midline inferior and middle turbinate are normal. The nasopharynx is normal. I do not see any evidence of masses or  lesions . Next I advanced the scope through the right nostril. The septum is in the midline, inferior and middle turbinate are normal. No masses or lesions.      MRI Brain: IMPRESSION:  1. Mass in the sella and extending into the suprasellar region. This  has the appearance of a pituitary adenoma.  2. 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 We discussed  today the transnasal ectopic approach to resect this tumor. I answered all his questions. I explained to him the risks and benefits of the procedure. We will be scheduling him for surgery in the next few weeks.    Thank you very much for the opportunity to participate in the care of your patient.      Lara Mckeon M.D.  Otolaryngology- Head & Neck Surgery  [phone]

## 2017-08-03 NOTE — PATIENT INSTRUCTIONS
1.  You were seen in the ENT Clinic today by Dr. Mckeon.  If you have questions or concerns after your appointment please call, [phone].  Press option #1 for scheduling related needs.  Press option #3 for Nurse advice.  2.  Plan is to move forward with surgery. You will be called with dates and times for this procedure.  3. RN Care Coordinator is Leyla, [phone]  4. After surgery, please follow the restrictions below.    ENDOSCOPIC SINUS SURGERY PATIENT INSTRUCTIONS    1.  The typical hospital stay is 3-5 days.  Expect nasal packing for 3-4 days, this will be removed in the hospital.    2. You will have nasal splints in place for 3-4 weeks after surgery. These will be removed at your first post op appointment in the ENT Clinic.  Use nasal saline as much as needed at home to keep your sinuses clear.    3.  Nasal precautions will remain in place for 4-6 weeks after surgery.  No nose blowing, no lifting greater than 10 pounds, no straws when drinking liquids and bend with your knees when picking something up.  If you have to sneeze, try to open your mouth when doing so. Use stool softeners while on narcotics.  NO CPAP until you are cleared by Dr. Muniz  4.  Normal nasal drainage will be mucous or blood tinged. If you experience clear, watery drainage that drips consistently from your nose please call the clinic. Phone numbers are below.  5.  Other reasons to call the clinic are fever, increased pain, uncontrolled headaches or visual changes.    Contact information during business hours is [phone]. Option #3 for the Triage Nurse.    After hours call the hospital  at [phone] and ask to speak with the ENT Resident on call.

## 2017-08-03 NOTE — MR AVS SNAPSHOT
After Visit Summary   8/3/2017    Terrence Murillo    MRN: [id number]           Patient Information     Date Of Birth          1945        Visit Information        Provider Department      8/3/2017 11:30 AM Amanda Bates MD [facility] Neurosurgery        Today's Diagnoses     Pituitary tumor    -  1      Care Instructions    1. Please complete you lab work today on the first floor.     2. Dr. Muniz in ENT will see you in clinic today around 2:30pm. He is located on the 4th floor.     3. We will plan for surgery and have Emily contact you to arrange a date and time.     4. Please call JORDY Ortiz Care Coordinator with further questions or concerns. [phone] sukhwinder          Follow-ups after your visit        Who to contact     Please call your clinic at [phone] to:    Ask questions about your health    Make or cancel appointments    Discuss your medicines    Learn about your test results    Speak to your doctor   If you have compliments or concerns about an experience at your clinic, or if you wish to file a complaint, please contact [facility] Patient Relations at [phone] or email us at [email or web address]         Additional Information About Your Visit        MyChart Information     [employer] gives you secure access to your electronic health record. If you see a primary care provider, you can also send messages to your care team and make appointments. If you have questions, please call your primary care clinic.  If you do not have a primary care provider, please call [phone] and they will assist you.      [employer] is an electronic gateway that provides easy, online access to your medical records. With [employer], you can request a clinic appointment, read your test results, renew a prescription or communicate with your care team.     To access your existing account, please contact your [facility] Clinic or call  "[phone] for assistance.        Care EveryWhere ID     This is your Care EveryWhere ID. This could be used by other organizations to access your [place] medical records  [id number]        Your Vitals Were     Height                   1.778 m (5' 10\")            Blood Pressure from Last 3 Encounters:   06/29/17 120/70   06/22/16 126/66   05/04/16 116/68    Weight from Last 3 Encounters:   08/03/17 110.2 kg (243 lb)   06/29/17 109.3 kg (241 lb)   06/22/16 102.5 kg (226 lb)              We Performed the Following     Adrenal corticotropin (ACTH) [ZMI318]     Alpha Subunit Pituitary Tumor Marker [VLO3373]     Basic metabolic panel [LAB15]     Cortisol [LAB61]     Follicle stimulating hormone (FSH) [LAB86]     Human growth hormone [IKY998]     Insulin growth factor 1     Luteinizing hormone (LH) [LAB87]     Prolactin [CXX911]     T4 free [YYJ205]     Testosterone total [HKL7806]     TSH [DZI468]        Primary Care Provider Office Phone # Fax #    Alexy Hernandez [phone]       [facility] [address] [facility] [address]        Equal Access to Services     KENNETH MARLOW : Hadii aad ku hadasho Soomaali, waaxda luqadaha, qaybta kaalmada adeegyada, faustino banks. So [facility] [phone].    ATENCIÓN: Si habla español, tiene a herndon disposición servicios gratuitos de asistencia lingüística. Llame al [phone].    We comply with applicable federal civil rights laws and [place] laws. We do not discriminate on the basis of race, color, national origin, age, disability sex, sexual orientation or gender identity.            Thank you!     Thank you for choosing [facility]  for your care. Our goal is always to provide you with excellent care. Hearing back from our patients is one way we can continue to improve our services. Please take a few minutes to complete the written survey that you may receive in the mail after your visit with us. " Thank you!             Your Updated Medication List - Protect others around you: Learn how to safely use, store and throw away your medicines at www.disposemymeds.org.          This list is accurate as of: 8/3/17  8:17 PM.  Always use your most recent med list.                   Brand Name Dispense Instructions for use Diagnosis    acetaminophen 500 MG tablet    TYLENOL     Take 500 mg by mouth daily        aspirin 81 MG tablet      Take 1 tablet by mouth daily.        ezetimibe 10 MG tablet    ZETIA    90 tablet    Take 1 tablet (10 mg) by mouth daily    Atherosclerosis of native coronary artery of native heart with other form of angina pectoris (H)       glucosamine 500 MG Caps      Take 500 mg by mouth 2 times daily        ibuprofen 200 MG tablet    ADVIL/MOTRIN     Take 400 mg by mouth At Bedtime        metoprolol 25 MG tablet    LOPRESSOR    180 tablet    Take 1 tablet (25 mg) by mouth 2 times daily    CAD (coronary artery disease), Hyperlipidemia LDL goal <70, Benign essential hypertension, Coronary artery disease involving native coronary artery of native heart without angina pectoris       Multi-vitamin Tabs tablet   Generic drug:  multivitamin, therapeutic with minerals      Take 1 tablet by mouth daily.        nitroGLYcerin 0.4 MG sublingual tablet    NITROSTAT    25 tablet    Place 1 tablet (0.4 mg) under the tongue every 5 minutes as needed May repeat X 2. If no relief after 3 tablets call 911    Coronary artery disease involving native coronary artery of native heart without angina pectoris       ramipril 5 MG capsule    ALTACE    90 capsule    Take 1 capsule (5 mg) by mouth daily    Benign essential hypertension, CAD (coronary artery disease), Hyperlipidemia LDL goal <70, Coronary artery disease involving native coronary artery of native heart without angina pectoris       rosuvastatin 40 MG tablet    CRESTOR    90 tablet    Take 1 tablet (40 mg) by mouth daily    CAD (coronary artery disease),  Hyperlipidemia LDL goal <70, Benign essential hypertension, Coronary artery disease involving native coronary artery of native heart without angina pectoris       SAW PALMETTO COMPLEX PO      Take 1 tablet by mouth daily

## 2017-08-04 LAB
A-PGH SER-MCNC: 0.2
ACTH PLAS-MCNC: 33 PG/ML
GH SERPL-MCNC: <0.1 UG/L (ref 0–3)
IGF-I BLD-MCNC: 108 NG/ML (ref 24–236)

## 2017-08-04 NOTE — PROGRESS NOTES
Dear Dr. Bates:    I had the pleasure of meeting Terrence Murillo in consultation today at the [facility] Otolaryngology Clinic at your request.     History of Present Illness: 72-year-old gentleman. The patient is here in the otolaryngology clinic for evaluation of recently diagnosed pituitary microadenoma. The patient states that for the last 2 years he has been noticing a decrease of his visual fields. This has been progressively getting worse. Because of previous he saw his optometrist which refer him to Dr. Herndon in the neuro ophthalmology service for further evaluation. MRI demonstrated a large pituitary microadenoma with extensive suprasellar extension and compression of the chiasm. The patient denies headaches. No diplopia. The patient was evaluated by Dr. Bates in the neurosurgery clinic in his recommendation was to resect this adenoma.    MEDICATIONS:     Current Outpatient Prescriptions   Medication Sig Dispense Refill     ezetimibe (ZETIA) 10 MG tablet Take 1 tablet (10 mg) by mouth daily 90 tablet 0     nitroglycerin (NITROSTAT) 0.4 MG sublingual tablet Place 1 tablet (0.4 mg) under the tongue every 5 minutes as needed May repeat X 2. If no relief after 3 tablets call 911 25 tablet 3     metoprolol (LOPRESSOR) 25 MG tablet Take 1 tablet (25 mg) by mouth 2 times daily 180 tablet 3     rosuvastatin (CRESTOR) 40 MG tablet Take 1 tablet (40 mg) by mouth daily 90 tablet 3     ramipril (ALTACE) 5 MG capsule Take 1 capsule (5 mg) by mouth daily 90 capsule 3     acetaminophen (TYLENOL) 500 MG tablet Take 500 mg by mouth daily       ibuprofen (ADVIL,MOTRIN) 200 MG tablet Take 400 mg by mouth At Bedtime       Zn-Pyg Afri-Nettle-Saw Palmet (SAW PALMETTO COMPLEX PO) Take 1 tablet by mouth daily       aspirin 81 MG tablet Take 1 tablet by mouth daily.       Glucosamine 500 MG CAPS Take 500 mg by mouth 2 times daily        Multiple Vitamin (MULTI-VITAMIN) per tablet Take 1 tablet by mouth daily.          ALLERGIES:    Allergies   Allergen Reactions     Cardizem [Diltiazem Hcl] Itching       HABITS/SOCIAL HISTORY: Patient is retired, he doen't smoke. He drinks 2 glasses of wine every day    PAST MEDICAL HISTORY:   Past Medical History:   Diagnosis Date     Chest pain      Coronary artery disease     cardiac cath 2016: medical management; cath 2010: SUSANA x2 to LAD; cath 1993: PTCA to LAD; cath 1992: PTCA to LAD, 1992: atherectomy of LAD     Hx of angiography 4-    no stenosis greater than 25%-rec. medical management     Hyperlipidaemia      Hypertension      Obese      Sleep apnea         FAMILY HISTORY:    Family History   Problem Relation Age of Onset     C.A.D. Father      HEART DISEASE Son 37     enlarged heart     HEART DISEASE Mother      Heart Surgery Mother      open heart, passed 10 days after     Family History Negative Maternal Grandmother      Family History Negative Maternal Grandfather      Family History Negative Paternal Grandmother      Family History Negative Paternal Grandfather      Family History Negative Brother      Alzheimer Disease Brother      Family History Negative Sister      Family History Negative Daughter      Family History Negative Son      Family History Negative Daughter        REVIEW OF SYSTEMS:  12 point ROS was negative other than the symptoms noted above in the HPI.    PHYSICAL EXAMINATION:      Constitutional:  The patient was  well-groomed, and in no acute distress.     Skin: Normal:  warm and pink without rash   Neurologic: Alert and oriented x 3.  CN's III-XII within normal limits.  Voice normal.    Psychiatric: The patient's affect was calm, cooperative, and appropriate.     Communication:  Normal; communicates verbally, normal voice quality.   Respiratory: Breathing comfortably without stridor or exertion of accessory muscles.    Head/Face:  Normocephalic and atraumatic.  No lesions or scars. No sinus tenderness.    Salivary glands -  Normal size, no  tenderness, swelling, or palpable masses   Eyes: Pupils were equal and reactive.  Extraocular movement intact.     Ears: Pinnae and tragus non-tender. Patient is wearing bilateral hearing aids.   Nose: Sinuses were non-tender.  Anterior rhinoscopy revealed midline septum and absence of purulence or polyps.     Oral Cavity: Normal tongue, floor of mouth, buccal mucosa, and palate.  No lesions or masses on inspection or palpation.     Oropharynx: Normal mucosa, palate symmetric with normal elevation. No abnormal lymph tissue in the oropharynx.             Neck: Supple with normal laryngeal and tracheal landmarks.  The parotid beds were without masses.  No palpable thyroid.  Normal range of motion   Lymphatic: There is no palpable lymphadenopathy in the neck.              Nasal Endoscopy:  Consent for fiberoptic laryngoscopy was obtained, and we confirmed correctness of procedure and identity of patient. I used a 0  nasal endoscope. A sprayed the nose with lidocaine. I advanced the scope through the left nostril. The septum is in the midline inferior and middle turbinate are normal. The nasopharynx is normal. I do not see any evidence of masses or  lesions . Next I advanced the scope through the right nostril. The septum is in the midline, inferior and middle turbinate are normal. No masses or lesions.      MRI Brain: IMPRESSION:  1. Mass in the sella and extending into the suprasellar region. This  has the appearance of a pituitary adenoma.  2. 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 We discussed today the transnasal ectopic approach to resect this tumor. I answered all his questions. I explained to him the risks and benefits of the procedure. We will be scheduling him for surgery in the next few weeks.    Thank you very much for the opportunity to participate in the care of  your patient.      Lara Mckeon M.D.  Otolaryngology- Head & Neck Surgery  [phone]

## 2017-08-05 LAB — TESTOST SERPL-MCNC: 132 NG/DL (ref 240–950)

## 2017-08-09 DIAGNOSIS — D35.3 BENIGN NEOPLASM OF PITUITARY GLAND AND CRANIOPHARYNGEAL DUCT (POUCH) (H): Primary | ICD-10-CM

## 2017-08-09 DIAGNOSIS — D35.2 BENIGN NEOPLASM OF PITUITARY GLAND AND CRANIOPHARYNGEAL DUCT (POUCH) (H): Primary | ICD-10-CM

## 2017-08-11 DIAGNOSIS — D49.7 PITUITARY TUMOR: Primary | ICD-10-CM

## 2017-08-11 DIAGNOSIS — I25.118 ATHEROSCLEROSIS OF NATIVE CORONARY ARTERY OF NATIVE HEART WITH OTHER FORM OF ANGINA PECTORIS (H): ICD-10-CM

## 2017-08-11 RX ORDER — EZETIMIBE 10 MG/1
10 TABLET ORAL DAILY
Qty: 90 TABLET | Refills: 3 | Status: SHIPPED | OUTPATIENT
Start: 2017-08-11 | End: 2017-08-14

## 2017-08-14 DIAGNOSIS — I25.118 ATHEROSCLEROSIS OF NATIVE CORONARY ARTERY OF NATIVE HEART WITH OTHER FORM OF ANGINA PECTORIS (H): ICD-10-CM

## 2017-08-14 RX ORDER — EZETIMIBE 10 MG/1
10 TABLET ORAL DAILY
Qty: 90 TABLET | Refills: 3 | Status: SHIPPED | OUTPATIENT
Start: 2017-08-14 | End: 2018-07-31

## 2017-08-22 RX ORDER — CEFAZOLIN SODIUM 1 G/3ML
1 INJECTION, POWDER, FOR SOLUTION INTRAMUSCULAR; INTRAVENOUS SEE ADMIN INSTRUCTIONS
Status: CANCELLED | OUTPATIENT
Start: 2017-08-22

## 2017-08-22 RX ORDER — CEFAZOLIN SODIUM 2 G/100ML
2 INJECTION, SOLUTION INTRAVENOUS
Status: CANCELLED | OUTPATIENT
Start: 2017-08-22

## 2017-08-28 DIAGNOSIS — I25.10 CORONARY ARTERY DISEASE INVOLVING NATIVE CORONARY ARTERY OF NATIVE HEART WITHOUT ANGINA PECTORIS: ICD-10-CM

## 2017-08-28 RX ORDER — NITROGLYCERIN 0.4 MG/1
0.4 TABLET SUBLINGUAL EVERY 5 MIN PRN
Qty: 25 TABLET | Refills: 1 | Status: SHIPPED | OUTPATIENT
Start: 2017-08-28 | End: 2018-05-21

## 2017-09-07 DIAGNOSIS — E78.5 HYPERLIPIDEMIA LDL GOAL <70: ICD-10-CM

## 2017-09-07 DIAGNOSIS — I10 BENIGN ESSENTIAL HYPERTENSION: ICD-10-CM

## 2017-09-07 DIAGNOSIS — I25.10 CORONARY ARTERY DISEASE INVOLVING NATIVE CORONARY ARTERY OF NATIVE HEART WITHOUT ANGINA PECTORIS: Chronic | ICD-10-CM

## 2017-09-07 RX ORDER — RAMIPRIL 5 MG/1
5 CAPSULE ORAL DAILY
Qty: 90 CAPSULE | Refills: 3 | Status: SHIPPED | OUTPATIENT
Start: 2017-09-07 | End: 2018-09-04

## 2017-09-07 NOTE — TELEPHONE ENCOUNTER
Received refill request for:  Ramipril   Last OV was: 6/29/17 w/   Labs/EKG: BMP on 8/3/17   F/U scheduled: Orders in EPIC for 6/2018 f/u w/    New script sent to: [facility]

## 2017-09-11 DIAGNOSIS — E78.5 HYPERLIPIDEMIA LDL GOAL <70: ICD-10-CM

## 2017-09-11 DIAGNOSIS — I10 BENIGN ESSENTIAL HYPERTENSION: ICD-10-CM

## 2017-09-11 DIAGNOSIS — I25.10 CORONARY ARTERY DISEASE INVOLVING NATIVE CORONARY ARTERY OF NATIVE HEART WITHOUT ANGINA PECTORIS: Chronic | ICD-10-CM

## 2017-09-11 DIAGNOSIS — I25.10 CAD (CORONARY ARTERY DISEASE): ICD-10-CM

## 2017-09-11 RX ORDER — METOPROLOL TARTRATE 25 MG/1
25 TABLET, FILM COATED ORAL 2 TIMES DAILY
Qty: 180 TABLET | Refills: 3 | Status: SHIPPED | OUTPATIENT
Start: 2017-09-11 | End: 2018-09-04

## 2017-09-11 NOTE — TELEPHONE ENCOUNTER
Received refill request for:  Metoprolol tartrate  Last OV was: 6/29/2017 with Dr. Jolley  Labs/EKG: n/a  F/U scheduled: orders in Epic for 6/2018  New script sent to: [facility]

## 2017-09-26 DIAGNOSIS — E78.5 HYPERLIPIDEMIA LDL GOAL <70: ICD-10-CM

## 2017-09-26 DIAGNOSIS — I25.10 CAD (CORONARY ARTERY DISEASE): ICD-10-CM

## 2017-09-26 DIAGNOSIS — I25.10 CORONARY ARTERY DISEASE INVOLVING NATIVE CORONARY ARTERY OF NATIVE HEART WITHOUT ANGINA PECTORIS: Chronic | ICD-10-CM

## 2017-09-26 DIAGNOSIS — I10 BENIGN ESSENTIAL HYPERTENSION: ICD-10-CM

## 2017-09-26 RX ORDER — ROSUVASTATIN CALCIUM 40 MG/1
40 TABLET, COATED ORAL DAILY
Qty: 90 TABLET | Refills: 2 | Status: SHIPPED | OUTPATIENT
Start: 2017-09-26 | End: 2018-03-23

## 2017-10-23 ENCOUNTER — ALLIED HEALTH/NURSE VISIT (OUTPATIENT)
Dept: SURGERY | Facility: CLINIC | Age: 72
End: 2017-10-23

## 2017-10-23 ENCOUNTER — ANESTHESIA EVENT (OUTPATIENT)
Dept: SURGERY | Facility: CLINIC | Age: 72
DRG: 614 | End: 2017-10-23
Payer: MEDICARE

## 2017-10-23 ENCOUNTER — OFFICE VISIT (OUTPATIENT)
Dept: SURGERY | Facility: CLINIC | Age: 72
End: 2017-10-23

## 2017-10-23 VITALS
DIASTOLIC BLOOD PRESSURE: 78 MMHG | RESPIRATION RATE: 16 BRPM | SYSTOLIC BLOOD PRESSURE: 154 MMHG | TEMPERATURE: 98.5 F | HEIGHT: 70 IN | BODY MASS INDEX: 34.86 KG/M2 | WEIGHT: 243.5 LBS | HEART RATE: 58 BPM | OXYGEN SATURATION: 94 %

## 2017-10-23 DIAGNOSIS — Z01.818 PREOP GENERAL PHYSICAL EXAM: Primary | ICD-10-CM

## 2017-10-23 DIAGNOSIS — Z01.818 PREOP GENERAL PHYSICAL EXAM: ICD-10-CM

## 2017-10-23 LAB
ANION GAP SERPL CALCULATED.3IONS-SCNC: 5 MMOL/L (ref 3–14)
BUN SERPL-MCNC: 20 MG/DL (ref 7–30)
CALCIUM SERPL-MCNC: 8.9 MG/DL (ref 8.5–10.1)
CHLORIDE SERPL-SCNC: 105 MMOL/L (ref 94–109)
CO2 SERPL-SCNC: 27 MMOL/L (ref 20–32)
CREAT SERPL-MCNC: 0.86 MG/DL (ref 0.66–1.25)
ERYTHROCYTE [DISTWIDTH] IN BLOOD BY AUTOMATED COUNT: 12.1 % (ref 10–15)
GFR SERPL CREATININE-BSD FRML MDRD: 88 ML/MIN/1.7M2
GLUCOSE SERPL-MCNC: 83 MG/DL (ref 70–99)
HCT VFR BLD AUTO: 37.4 % (ref 40–53)
HGB BLD-MCNC: 12.3 G/DL (ref 13.3–17.7)
INR PPP: 1 (ref 0.86–1.14)
MCH RBC QN AUTO: 31.9 PG (ref 26.5–33)
MCHC RBC AUTO-ENTMCNC: 32.9 G/DL (ref 31.5–36.5)
MCV RBC AUTO: 97 FL (ref 78–100)
PLATELET # BLD AUTO: 268 10E9/L (ref 150–450)
POTASSIUM SERPL-SCNC: 4.6 MMOL/L (ref 3.4–5.3)
RBC # BLD AUTO: 3.85 10E12/L (ref 4.4–5.9)
SODIUM SERPL-SCNC: 137 MMOL/L (ref 133–144)
WBC # BLD AUTO: 6 10E9/L (ref 4–11)

## 2017-10-23 NOTE — ANESTHESIA PREPROCEDURE EVALUATION
Anesthesia Evaluation     . Pt has had prior anesthetic. Type: General and MAC           ROS/MED HX    ENT/Pulmonary:     (+)sleep apnea, uses CPAP , . .    Neurologic:  - neg neurologic ROS     Cardiovascular:     (+) hypertension--CAD, --stent,2010  2 Drug Eluting Stent .. Pt has received instructions: Instructions Given to patient: hold asa. . . :. . Previous cardiac testing date:results:Stress Testdate:3/2016 results:ECG reviewed date:10/23/2017 results:Cath date: 4/20/2016 results:          METS/Exercise Tolerance:  >4 METS   Hematologic:  - neg hematologic  ROS       Musculoskeletal:  - neg musculoskeletal ROS       GI/Hepatic:  - neg GI/hepatic ROS       Renal/Genitourinary:  - ROS Renal section negative       Endo:     (+) Obesity, .      Psychiatric:  - neg psychiatric ROS       Infectious Disease:  - neg infectious disease ROS       Malignancy:      - no malignancy   Other:    (+) No chance of pregnancy C-spine cleared: N/A, no H/O Chronic Pain,no other significant disability   - neg other ROS                 Physical Exam  Normal systems: cardiovascular, pulmonary and dental    Airway   Mallampati: II  TM distance: >3 FB  Neck ROM: full    Dental     Cardiovascular   Rhythm and rate: regular and normal      Pulmonary     Other findings:     Stress test 3/23/2016  Impression  1.  Myocardial perfusion imaging using single isotope technique  demonstrated normal perfusion.   2. Gated images demonstrated normal LV cavity size and systolic  function.  The left ventricular systolic function is 64%.  3. Compared to the prior study from November 16, 2010, no clinically  important changes.        Cath 4/20/2016  Conclusion:   1. Scattered mild disease in a dominant right coronary artery. No  stenosis greater than 25%.  2. Previously placed LAD stents are widely patent. There is a jailed  diagonal that has a 30-50% ostial narrowing. There is a tiny branch of  this diagonal that has an 80-90% stenosis, this was  present in 2010.  3. 30-50% first obtuse marginal stenosis with an FFR of 0.92.  4. Tiny ramus intermedius branch with 80% stenosis with JADE grade 2  flow. This is new from 2010.  5. Mild distal anterior wall hypokinesia. Overall ejection fraction  estimated to be 50-55%. Left ventricular end-diastolic pressure of 13  mmHg.      Lab Results      Component                Value               Date                      WBC                      6.0                 10/23/2017            Lab Results      Component                Value               Date                      RBC                      3.85                10/23/2017            Lab Results      Component                Value               Date                      HGB                      12.3                10/23/2017            Lab Results      Component                Value               Date                      HCT                      37.4                10/23/2017            No components found for: MCT  Lab Results      Component                Value               Date                      MCV                      97                  10/23/2017            Lab Results      Component                Value               Date                      MCH                      31.9                10/23/2017            Lab Results      Component                Value               Date                      MCHC                     32.9                10/23/2017            Lab Results      Component                Value               Date                      RDW                      12.1                10/23/2017            Lab Results      Component                Value               Date                      PLT                      268                 10/23/2017              Last Basic Metabolic Panel:  Lab Results      Component                Value               Date                      NA                       137                 10/23/2017             Lab  Results      Component                Value               Date                      POTASSIUM                4.6                 10/23/2017            Lab Results      Component                Value               Date                      CHLORIDE                 105                 10/23/2017            Lab Results      Component                Value               Date                      ELPIDIO                      8.9                 10/23/2017            Lab Results      Component                Value               Date                      CO2                      27                  10/23/2017            Lab Results      Component                Value               Date                      BUN                      20                  10/23/2017            Lab Results      Component                Value               Date                      CR                       0.86                10/23/2017            Lab Results      Component                Value               Date                      GLC                      83                  10/23/2017                INR   1.00    10/23/2017           PAC Discussion and Assessment    ASA Classification: 3  Case is suitable for: [place]  Anesthetic techniques and relevant risks discussed:   Invasive monitoring and risk discussed: Yes  Types:   Possibility and Risk of blood transfusion discussed: Yes  NPO instructions given:   Additional anesthetic preparation and risks discussed:   Needs early admission to pre-op area:   Other:     PAC Resident/NP Anesthesia Assessment:  Terrence Murillo is a 70 yo male scheduled for Stealth Guided Endoscopic Transnasal Craniotomy for Tumor, Possible Lumbar Drain on 11/3/2017 by Dr. Bates and Mike. PAC referral by Dr. Bates for assessment and optimization of anesthesia. Previous anesthesia without complications.    1) Cardiac: DCA atherectomy in 1992. He developed restenosis and underwent 2 more interventions by balloon  angioplasty. SUSANA to proximal and mid left anterior descending artery in 2010. Cath 2016 with medical management of CAD. He has mild anterior wall hypokinesia.  Ejection fraction was estimated to be 50%-55% with a left ventricular end-diastolic pressure of 13. Negative stress test 3/2016.  2) Pulmonary: Light smoker for a few years in 1970s, but has quit for over 40 years. DANISH and uses CPAP.  3) Endo: BMI 35. Pituitary microadenoma, he has been noticing a decrease of his visual fields for the last couple years. This has been progressively getting worse. Optometrist referred him to neuro ophthalmology and MRI demonstrated a large pituitary microadenoma with extensive suprasellar extension and compression of the chiasm. The patient denies headaches. No diplopia.        Pt also evaluated by Dr. Braswell. Please see recommendations below. Labs drawn today.  I spent 20 minutes face to face with pt, assessing, examining, and educating        Reviewed and Signed by PAC Mid-Level Provider/Resident  Mid-Level Provider/Resident: IAN Tamez  Date: 10/23/2017  Time: 1230    Attending Anesthesiologist Anesthesia Assessment:  I have examined the patient and reviewed the medical record.  I have discussed the patient with the LUISA and concur with her assessment  The patient is scheduled for stealth guided trans nasal pituitary tumor resection.  The tumor has presented with central visual deficits.  It is not humerally active.  The patient has a cardiac history.  He had PCI of LAD lesions which re stenosed and were subsequently treated with deployment in 2010 of 2 SUSANA in the LAD.  After c/o mild exertional chest pain he had a negative stress test in 4/2016 followed by a coronary angiogram demonstrating widely patent stents, normal LF function and mild anterior wall hypokinesis.  He was placed on aggressive medical management and has done well since.  He is active 35 minutes a day over 4 METs without symptoms  His medical history  is o/w remarkable for DANISH for which he uses CPAP  He has had GA for right shoulder and left knee surgery without problems.    PE:  Pleasant male who is markedly [place]. MPC 2 with thick neck, 4 FBTMD and limited extension.  Lungs clear.  CVS  RRR without murmur    No further testing necessary per protocol.  Final plan per attending anesthesiologist the day of surgery.      Reviewed and Signed by PAC Anesthesiologist  Anesthesiologist: Prasanth Walker MD  Date: 10/23/2017  Time:   Pass/Fail:   Disposition:     PAC Pharmacist Assessment:        Pharmacist:   Date:   Time:      Anesthesia Plan      History & Physical Review  History and physical reviewed and following examination; no interval change.    ASA Status:  3 .    NPO Status:  > 8 hours    Plan for General and ETT with Intravenous induction. Maintenance will be Balanced.    PONV prophylaxis:  Ondansetron (or other 5HT-3) and Dexamethasone or Solumedrol  Additional equipment: Arterial Line and 2nd IV      Postoperative Care  Postoperative pain management:  IV analgesics and Oral pain medications.      Consents  Anesthetic plan, risks, benefits and alternatives discussed with:  Patient.  Use of blood products discussed: Yes.   Use of blood products discussed with Patient.  Consented to blood products.  .                          .

## 2017-10-23 NOTE — MR AVS SNAPSHOT
After Visit Summary   10/23/2017    Terrence Murillo    MRN: [id number]           Patient Information     Date Of Birth          1945        Visit Information        Provider Department      10/23/2017 12:00 PM Rn, [facility]        Care Instructions    Preparing for Your Surgery      Name:  Terrence Murillo   MRN:  [id number]   :  1945   Today's Date:  10/23/2017     Arriving for surgery:  Surgery date:  11/3/17  Arrival time:  5:00 am  Please come to:       [facility] [address]  [address] [place]  [address]    -   parking is available in front of the hospital from 5:15 am to 8:00 pm    -  Stop at the Information Desk in the lobby    -   Inform the information person that you are here for surgery. An escort to 3c will be provided. If you would not like an escort, please proceed to 3C on the 3rd floor. [phone]     What can I eat or drink?  -  You may have solid food or milk products until 8 hours prior to your surgery.  Stop 11:00 pm  17  -  You may have water, apple juice or 7up/Sprite until 2 hours prior to your surgery.  Stop 5:00 am  11/3/17    Which medicines can I take?         Stop aspirin 5 days before surgery, stop advil, ibuprofen 1 - 2 days before surgery.       Stop vitamins and supplements 7 days before surgery.    -  Do NOT take these medications in the morning, the day of surgery:  11/3/17         altace         -  Please take these medications the day of surgery: 11/3/17         metoprolol  zetia    How do I prepare myself?  -  Take two showers: one the night before surgery; and one the morning of surgery.         Use Scrubcare or Hibiclens to wash from neck down.  You may use your own shampoo and conditioner. No other hair products.   -  Do NOT use lotion, powder, deodorant, or antiperspirant the day of your surgery.  -  Do NOT wear any makeup, fingernail polish or jewelry.  -Do not  bring your own medications to the hospital, except for inhalers and eye drops.  -  Bring your ID and insurance card.    Questions or Concerns:  If you have questions or concerns, please call the  Preoperative Assessment Center, Monday-Friday 7AM-7PM:  [phone]  AFTER YOUR SURGERY  Breathing exercises   Breathing exercises help you recover faster. Take deep breaths and let the air out slowly. This will:     Help you wake up after surgery.    Help prevent complications like pneumonia.  Preventing complications will help you go home sooner.   We may give you a breathing device (incentive spirometer) to encourage you to breathe deeply.   Nausea and vomiting   You may feel sick to your stomach after surgery; if so, let your nurse know.    Pain control:  After surgery, you may have pain. Our goal is to help you manage your pain. Pain medicine will help you feel comfortable enough to do activities that will help you heal.  These activities may include breathing exercises, walking and physical therapy.   To help your health care team treat your pain we will ask: 1) If you have pain  2) where it is located 3) describe your pain in your words  Methods of pain control include medications given by mouth, vein or by nerve block for some surgeries.  We may give you a pain control pump that will:  1) Deliver the medicine through a tube placed in your vein  2) Control the amount of medicine you receive  3) Allow you to push a button to deliver a dose of pain medicine  Sequential Compression Device (SCD) or Pneumo Boots:  You may need to wear SCD S on your legs or feet. These are wraps connected to a machine that pumps in air and releases it. The repeated pumping helps prevent blood clots from forming.                     Follow-ups after your visit        Your next 10 appointments already scheduled     Oct 23, 2017 12:00 PM CDT   (Arrive by 11:45 AM)   PAC RN ASSESSMENT with Lisha Pac Rn   [facility]  ([facility])    [address]  [address] [facility] [address]   [phone]            Oct 23, 2017 12:30 PM CDT   (Arrive by 12:15 PM)   PAC Anesthesia Consult with  Pac Anesthesiologist   [facility] Preoperative [facility] Center ([facility])    [address] [place] [address]  [address] [facility] [address]   [phone]            Oct 23, 2017 12:45 PM CDT   LAB with  LAB   [facility] ([facility])    [address] [place] [address]  [address] [facility] [address]   [phone]           Patient must bring picture ID. Patient should be prepared to give a urine specimen  Please do not eat 10-12 hours before your appointment if you are coming in fasting for labs on lipids, cholesterol, or glucose (sugar). Pregnant women should follow their Care Team instructions. Water with medications is okay. Do not drink coffee or other fluids. If you have concerns about taking  your medications, please ask at office or if scheduling via [employer], send a message by clicking on Secure Messaging, Message Your Care Team.            Nov 03, 2017  6:00 AM CDT   CT HEAD W/O CONTRAST with UUCT1   [facility], [place], [place] ([facility], [facility])    [address] [facility] [address] [phone]           Please bring any scans or X-rays taken at other hospitals, if similar tests were done. Also bring a list of your medicines, including vitamins, minerals and over-the-counter drugs. It is safest to leave personal items at home.  Be sure to tell your doctor:   If you have any allergies.   If there s any chance you are pregnant.   If you are breastfeeding.   If you have any special needs.  You do not need to do anything special to prepare.  Please wear loose clothing, such as a sweat suit or jogging clothes. Avoid snaps, zippers and other metal. We may ask you to undress and put on a hospital gown.            Nov  03, 2017  6:30 AM CDT   MR BRAIN W CONTRAST with UUMR1   [facility], [place], [place] ([facility])    [address] [facility] [address] [phone]           Take your medicines as usual, unless your doctor tells you not to. Bring a list of your current medicines to your exam (including vitamins, minerals and over-the-counter drugs).  You will be given intravenous contrast for this exam. To prepare:   The day before your exam, drink extra fluids at least six 8-ounce glasses (unless your doctor tells you to restrict your fluids).   Have a blood test (creatinine test) within 30 days of your exam. Go to your clinic or Diagnostic Imaging Department for this test.  The MRI machine uses a strong magnet. Please wear clothes without metal (snaps, zippers). A sweatsuit works well, or we may give you a hospital gown.  Please remove any body piercings and hair extensions before you arrive. You will also remove watches, jewelry, hairpins, wallets, dentures, partial dental plates and hearing aids. You may wear contact lenses, and you may be able to wear your rings. We have a safe place to keep your personal items, but it is safer to leave them at home.   **IMPORTANT** THE INSTRUCTIONS BELOW ARE ONLY FOR THOSE PATIENTS WHO HAVE BEEN TOLD THEY WILL RECEIVE SEDATION OR GENERAL ANESTHESIA DURING THEIR MRI PROCEDURE:  IF YOU WILL RECEIVE SEDATION (take medicine to help you relax during your exam):   You must get the medicine from your doctor before you arrive. Bring the medicine to the exam. Do not take it at home.   Arrive one hour early. Bring someone who can take you home after the test. Your medicine will make you sleepy. After the exam, you may not drive, take a bus or take a taxi by yourself.   No eating 8 hours before your exam. You may have clear liquids up until 4 hours before your exam. (Clear liquids include water, clear tea, black coffee and fruit juice without  pulp.)  IF YOU WILL RECEIVE ANESTHESIA (be asleep for your exam):   Arrive 1 1/2 hours early. Bring someone who can take you home after the test. You may not drive, take a bus or take a taxi by yourself.   No eating 8 hours before your exam. You may have clear liquids up until 4 hours before your exam. (Clear liquids include water, clear tea, black coffee and fruit juice without pulp.)  Please call the Imaging Department at your exam site with any questions.            Nov 03, 2017   Procedure with Amanda Bates MD   [facility], [place], Same Day Surgery (--)    [address] [facility] [id number] [phone]            Nov 30, 2017  2:20 PM CST   (Arrive by 2:05 PM)   Return Visit with Lara Mckeon MD   [facility] ([facility])    [address] [address] [phone]              Future tests that were ordered for you today     Open Future Orders        Priority Expected Expires Ordered    ABO/Rh type and screen Routine 10/23/2017 11/22/2017 10/23/2017    Basic metabolic panel Routine 10/23/2017 11/22/2017 10/23/2017    CBC with platelets Routine 10/23/2017 11/22/2017 10/23/2017    INR Routine 10/23/2017 11/22/2017 10/23/2017            Who to contact     Please call your clinic at [phone] to:    Ask questions about your health    Make or cancel appointments    Discuss your medicines    Learn about your test results    Speak to your doctor   If you have compliments or concerns about an experience at your clinic, or if you wish to file a complaint, please contact [facility] Patient Relations at [phone] or email us at [email or web address]         Additional Information About Your Visit        MyChart Information     [employer] gives you secure access to your electronic health record. If you see a primary care provider, you can also send messages to your care team and  make appointments. If you have questions, please call your primary care clinic.  If you do not have a primary care provider, please call [phone] and they will assist you.      [employer] is an electronic gateway that provides easy, online access to your medical records. With [employer], you can request a clinic appointment, read your test results, renew a prescription or communicate with your care team.     To access your existing account, please contact your [facility] or call [phone] for assistance.        Care EveryWhere ID     This is your Care EveryWhere ID. This could be used by other organizations to access your [facility] medical records  [id number]         Blood Pressure from Last 3 Encounters:   10/23/17 154/78   06/29/17 120/70   06/22/16 126/66    Weight from Last 3 Encounters:   10/23/17 110.5 kg (243 lb 8 oz)   08/03/17 110.2 kg (243 lb)   06/29/17 109.3 kg (241 lb)              Today, you had the following     No orders found for display         Today's Medication Changes          These changes are accurate as of: 10/23/17 11:57 AM.  If you have any questions, ask your nurse or doctor.               These medicines have changed or have updated prescriptions.        Dose/Directions    ezetimibe 10 MG tablet   Commonly known as:  ZETIA   This may have changed:  when to take this   Used for:  Atherosclerosis of native coronary artery of native heart with other form of angina pectoris (H)        Dose:  10 mg   Take 1 tablet (10 mg) by mouth daily   Quantity:  90 tablet   Refills:  3       ramipril 5 MG capsule   Commonly known as:  ALTACE   This may have changed:  when to take this   Used for:  Benign essential hypertension, Hyperlipidemia LDL goal <70, Coronary artery disease involving native coronary artery of native heart without angina pectoris        Dose:  5 mg   Take 1 capsule (5 mg) by mouth daily   Quantity:  90 capsule   Refills:  3       rosuvastatin 40 MG  tablet   Commonly known as:  CRESTOR   This may have changed:  when to take this   Used for:  CAD (coronary artery disease), Hyperlipidemia LDL goal <70, Benign essential hypertension, Coronary artery disease involving native coronary artery of native heart without angina pectoris        Dose:  40 mg   Take 1 tablet (40 mg) by mouth daily   Quantity:  90 tablet   Refills:  2                Primary Care Provider Office Phone # Fax #    Alexy Hernandez [phone]       [facility] [address] [facility] [address]        Equal Access to Services     [facility]: Hadii aad ku hadasho Soomaali, waaxda luqadaha, qaybta kaalmada adeegyada, waxay lindain hayaaivis lopez . So [facility] [phone].    ATENCIÓN: Si habla español, tiene a herndon disposición servicios gratuitos de asistencia lingüística. [facility] [phone].    We comply with applicable federal civil rights laws and [place] laws. We do not discriminate on the basis of race, color, national origin, age, disability, sex, sexual orientation, or gender identity.            Thank you!     Thank you for choosing [facility] PREOPERATIVE [facility]ESSMENT [facility]  for your care. Our goal is always to provide you with excellent care. Hearing back from our patients is one way we can continue to improve our services. Please take a few minutes to complete the written survey that you may receive in the mail after your visit with us. Thank you!             Your Updated Medication List - Protect others around you: Learn how to safely use, store and throw away your medicines at www.disposemymeds.org.          This list is accurate as of: 10/23/17 11:57 AM.  Always use your most recent med list.                   Brand Name Dispense Instructions for use Diagnosis    acetaminophen 500 MG tablet    TYLENOL     Take 500 mg by mouth every morning        aspirin 81 MG tablet      Take 1 tablet by mouth every evening        ezetimibe 10  MG tablet    ZETIA    90 tablet    Take 1 tablet (10 mg) by mouth daily    Atherosclerosis of native coronary artery of native heart with other form of angina pectoris (H)       glucosamine 500 MG Caps      Take 500 mg by mouth 2 times daily        ibuprofen 200 MG tablet    ADVIL/MOTRIN     Take 400 mg by mouth At Bedtime        metoprolol 25 MG tablet    LOPRESSOR    180 tablet    Take 1 tablet (25 mg) by mouth 2 times daily    CAD (coronary artery disease), Hyperlipidemia LDL goal <70, Benign essential hypertension, Coronary artery disease involving native coronary artery of native heart without angina pectoris       Multi-vitamin Tabs tablet   Generic drug:  multivitamin, therapeutic with minerals      Take 1 tablet by mouth every morning        nitroGLYcerin 0.4 MG sublingual tablet    NITROSTAT    25 tablet    Place 1 tablet (0.4 mg) under the tongue every 5 minutes as needed May repeat X 2. If no relief after 3 tablets call 911    Coronary artery disease involving native coronary artery of native heart without angina pectoris       ramipril 5 MG capsule    ALTACE    90 capsule    Take 1 capsule (5 mg) by mouth daily    Benign essential hypertension, Hyperlipidemia LDL goal <70, Coronary artery disease involving native coronary artery of native heart without angina pectoris       rosuvastatin 40 MG tablet    CRESTOR    90 tablet    Take 1 tablet (40 mg) by mouth daily    CAD (coronary artery disease), Hyperlipidemia LDL goal <70, Benign essential hypertension, Coronary artery disease involving native coronary artery of native heart without angina pectoris       SAW PALMETTO COMPLEX PO      Take 1 tablet by mouth 2 times daily

## 2017-10-23 NOTE — H&P
Pre-Operative H & P     CC:  Preoperative exam to assess for increased cardiopulmonary risk while undergoing surgery and anesthesia.    Date of Encounter: 10/23/2017  Primary Care Physician:  Alexy Hernandez    HPI  Terrence Murillo is a 72 year old male who presents for pre-operative H & P in preparation for Stealth Guided Endoscopic Transnasal Craniotomy for Tumor, Possible Lumbar Drain with Dr. Bates and Mike on 11/3/2017 at [facility].     History is obtained from the patient.   Pituitary microadenoma, he has been noticing a decrease of his visual fields for the last couple years. This has been progressively getting worse. Optometrist referred him to neuro ophthalmology and MRI demonstrated a large pituitary microadenoma with extensive suprasellar extension and compression of the chiasm. The patient denies headaches. No diplopia. Eavaluated by Dr. Bates and above procedure planned.      Past Medical History  Past Medical History:   Diagnosis Date     Coronary artery disease     cardiac cath 2016: medical management; cath 2010: SUSANA x2 to LAD; cath 1993: PTCA to LAD; cath 1992: PTCA to LAD, 1992: atherectomy of LAD     Hx of angiography 4-    no stenosis greater than 25%-rec. medical management     Hyperlipidaemia      Hypertension      Obese      Sleep apnea     CPAP       Past Surgical History  Past Surgical History:   Procedure Laterality Date     APPENDECTOMY OPEN       ARTHROPLASTY KNEE Left      ARTHROPLASTY SHOULDER Right      HEART CATH, ANGIOPLASTY  1992    LAD  atherectomy with a DVI device      HEART CATH, ANGIOPLASTY  4-28-10    PTCA and stent proximal and mid LAD (2) stents Xience       Hx of Blood transfusions/reactions: none    Hx of abnormal bleeding or anti-platelet use: asa, hold for 5 days    Menstrual history: No LMP for male patient.:     Steroid use in the last year: none    Personal or FH with difficulty with  Anesthesia:  None        Prior to Admission Medications  Current Outpatient Prescriptions   Medication Sig Dispense Refill     rosuvastatin (CRESTOR) 40 MG tablet Take 1 tablet (40 mg) by mouth daily (Patient taking differently: Take 40 mg by mouth every evening ) 90 tablet 2     metoprolol (LOPRESSOR) 25 MG tablet Take 1 tablet (25 mg) by mouth 2 times daily 180 tablet 3     ramipril (ALTACE) 5 MG capsule Take 1 capsule (5 mg) by mouth daily (Patient taking differently: Take 5 mg by mouth every morning ) 90 capsule 3     ezetimibe (ZETIA) 10 MG tablet Take 1 tablet (10 mg) by mouth daily (Patient taking differently: Take 10 mg by mouth every morning ) 90 tablet 3     acetaminophen (TYLENOL) 500 MG tablet Take 500 mg by mouth every morning        ibuprofen (ADVIL,MOTRIN) 200 MG tablet Take 400 mg by mouth At Bedtime       Zn-Pyg Afri-Nettle-Saw Palmet (SAW PALMETTO COMPLEX PO) Take 1 tablet by mouth 2 times daily        aspirin 81 MG tablet Take 1 tablet by mouth every evening        Glucosamine 500 MG CAPS Take 500 mg by mouth 2 times daily        Multiple Vitamin (MULTI-VITAMIN) per tablet Take 1 tablet by mouth every morning        nitroGLYcerin (NITROSTAT) 0.4 MG sublingual tablet Place 1 tablet (0.4 mg) under the tongue every 5 minutes as needed May repeat X 2. If no relief after 3 tablets call 911 25 tablet 1       Allergies  Allergies   Allergen Reactions     Cardizem [Diltiazem Hcl] Itching     No Clinical Screening - See Comments Hives       Social History  Social History     Social History     Marital status:      Spouse name: N/A     Number of children: N/A     Years of education: N/A     Occupational History     Not on file.     Social History Main Topics     Smoking status: Former Smoker     Packs/day: 0.20     Years: 5.00     Types: Cigarettes     Quit date: 1/1/1960     Smokeless tobacco: Never Used      Comment: Social smoking only     Alcohol use Yes      Comment: 1-2 cocktails daily       Drug use: Not on file     Sexual activity: Not on file     Other Topics Concern     Parent/Sibling W/ Cabg, Mi Or Angioplasty Before 65f 55m? No     Caffeine Concern Yes     coffee      Sleep Concern No     Stress Concern No     Weight Concern Yes     Weight decrease 10 lbs     Special Diet Yes     Mediterranean diet     Exercise Yes     Walking 5 days per week 35- 60 minutes     Seat Belt Yes     Social History Narrative       Family History  Family History   Problem Relation Age of Onset     C.A.D. Father      HEART DISEASE Son 37     enlarged heart     HEART DISEASE Mother      Heart Surgery Mother      open heart, passed 10 days after     Family History Negative Maternal Grandmother      Family History Negative Maternal Grandfather      Family History Negative Paternal Grandmother      Family History Negative Paternal Grandfather      Family History Negative Brother      Alzheimer Disease Brother      Family History Negative Sister      Family History Negative Daughter      Family History Negative Son      Family History Negative Daughter                Anesthesia Evaluation     . Pt has had prior anesthetic. Type: General and MAC           ROS/MED HX    ENT/Pulmonary:     (+)sleep apnea, uses CPAP , . .    Neurologic:  - neg neurologic ROS     Cardiovascular:     (+) hypertension--CAD, --stent,2010  2 Drug Eluting Stent .. Pt has received instructions: Instructions Given to patient: hold asa. . . :. . Previous cardiac testing       METS/Exercise Tolerance:  >4 METS   Hematologic:  - neg hematologic  ROS       Musculoskeletal:  - neg musculoskeletal ROS       GI/Hepatic:  - neg GI/hepatic ROS       Renal/Genitourinary:  - ROS Renal section negative       Endo:     (+) Obesity, .      Psychiatric:  - neg psychiatric ROS       Infectious Disease:  - neg infectious disease ROS       Malignancy:      - no malignancy   Other:    (+) No chance of pregnancy C-spine cleared: N/A, no H/O Chronic Pain,no other significant  "disability   - neg other ROS           Physical Exam  Normal systems: cardiovascular, pulmonary and dental    Airway   Mallampati: II  TM distance: >3 FB  Neck ROM: full    Dental   Normal    Cardiovascular   Rhythm and rate: regular and normal      Pulmonary   Clear to auscultation             The complete review of systems is negative other than noted in the HPI or here.   Temp: 98.5  F (36.9  C) Temp src: Oral BP: 154/78 Pulse: 58   Resp: 16 SpO2: 94 %         243 lbs 8 oz  5' 10\"   Body mass index is 34.94 kg/(m^2).       Physical Exam  Constitutional: Awake, alert, cooperative, no apparent distress, and appears stated age.  Eyes: Pupils equal, round and reactive to light, extra ocular muscles intact, sclera clear, conjunctiva normal.  HENT: Normocephalic, oral pharynx with moist mucus membranes, good dentition. [place], bilateral hearing aides. No goiter appreciated.   Respiratory: Clear to auscultation bilaterally, no crackles or wheezing.  Cardiovascular: Regular rate and rhythm, normal S1 and S2, and no murmur noted.  Carotids +2, no bruits. No edema. Palpable pulses to radial  DP and PT arteries.   GI: Normal bowel sounds, soft, non-distended, non-tender, no masses palpated, no hepatosplenomegaly.    Lymph/Hematologic: No cervical lymphadenopathy and no supraclavicular lymphadenopathy.  Genitourinary: deferred   Skin: Warm and dry.  No rashes at anticipated surgical site.   Musculoskeletal: Full ROM of neck. There is no redness, warmth, or swelling of the joints. Gross motor strength is normal.    Neurologic: Awake, alert, oriented to name, place and time. Cranial nerves II-XII are grossly intact. Gait is normal.   Neuropsychiatric: Calm, cooperative. Normal affect.     Labs: (personally reviewed)  Lab Results   Component Value Date    WBC 6.0 10/23/2017     Lab Results   Component Value Date    RBC 3.85 10/23/2017     Lab Results   Component Value Date    HGB 12.3 10/23/2017     Lab Results   Component Value " Date    HCT 37.4 10/23/2017     No components found for: MCT  Lab Results   Component Value Date    MCV 97 10/23/2017     Lab Results   Component Value Date    MCH 31.9 10/23/2017     Lab Results   Component Value Date    MCHC 32.9 10/23/2017     Lab Results   Component Value Date    RDW 12.1 10/23/2017     Lab Results   Component Value Date     10/23/2017     Last Basic Metabolic Panel:  Lab Results   Component Value Date     10/23/2017      Lab Results   Component Value Date    POTASSIUM 4.6 10/23/2017     Lab Results   Component Value Date    CHLORIDE 105 10/23/2017     Lab Results   Component Value Date    ELPIDIO 8.9 10/23/2017     Lab Results   Component Value Date    CO2 27 10/23/2017     Lab Results   Component Value Date    BUN 20 10/23/2017     Lab Results   Component Value Date    CR 0.86 10/23/2017     Lab Results   Component Value Date    GLC 83 10/23/2017       INR    1.00     10/23/2017        EKG: Personally reviewed but formal cardiology read pending: 10/23/2017 sinus bradycardia with 1st degree AV block        Stress test 3/23/2016  Impression  1.  Myocardial perfusion imaging using single isotope technique  demonstrated normal perfusion.   2. Gated images demonstrated normal LV cavity size and systolic  function.  The left ventricular systolic function is 64%.  3. Compared to the prior study from November 16, 2010, no clinically  important changes.      Cath 4/20/2016  Conclusion:   1. Scattered mild disease in a dominant right coronary artery. No  stenosis greater than 25%.  2. Previously placed LAD stents are widely patent. There is a jailed  diagonal that has a 30-50% ostial narrowing. There is a tiny branch of  this diagonal that has an 80-90% stenosis, this was present in 2010.  3. 30-50% first obtuse marginal stenosis with an FFR of 0.92.  4. Tiny ramus intermedius branch with 80% stenosis with JADE grade 2  flow. This is new from 2010.  5. Mild distal anterior wall hypokinesia.  Overall ejection fraction  estimated to be 50-55%. Left ventricular end-diastolic pressure of 13  mmHg.    ASSESSMENT and PLAN  Terrence Murillo is a 72 year old male scheduled to undergo Stealth Guided Endoscopic Transnasal Craniotomy for Tumor, Possible Lumbar Drain. He has the following specific operative considerations:   - RCRI : Intermediate serious cardiac risks.  0.9% risk of major adverse cardiac event.   - Anesthesia considerations:  Refer to PAC assessment in anesthesia records  - VTE risk: 3%  - DANISH # of risks = known DANISH with CPAP use.  - Post-op delirium risk: high risk due to age  - Risk of PONV score = 2.  If > 2, anti-emetic intervention recommended.      Previous anesthesia without complications.  1) Cardiac: DCA atherectomy in 1992. He developed restenosis and underwent 2 more interventions by balloon angioplasty. SUSANA to proximal and mid left anterior descending artery in 2010. Cath 2016 with medical management of CAD. He has mild anterior wall hypokinesia.  Ejection fraction was estimated to be 50%-55% with a left ventricular end-diastolic pressure of 13. Negative stress test 3/2016.  2) Pulmonary: Light smoker for a few years in 1970s, but has quit for over 40 years. DANISH and uses CPAP.  3) Endo: BMI 35. Pituitary microadenoma, he has been noticing a decrease of his visual fields for the last couple years. This has been progressively getting worse. Optometrist referred him to neuro ophthalmology and MRI demonstrated a large pituitary microadenoma with extensive suprasellar extension and compression of the chiasm. The patient denies headaches. No diplopia.  Pt optimized for surgery. AVS with information on surgery time/arrival time, meds and NPO status given by nursing staff    Patient was discussed with Dr Walker.    IAN Hu CNS  Preoperative Assessment Center  [facility]  Clinic and Surgery Center  Phone: [phone]  Fax: [phone]    Terrence Murillo is a  "72 year old male patient born on 1945.  1. Preop general physical exam      Past Medical History:   Diagnosis Date     Coronary artery disease     cardiac cath 2016: medical management; cath 2010: SUSANA x2 to LAD; cath 1993: PTCA to LAD; cath 1992: PTCA to LAD, 1992: atherectomy of LAD     Hx of angiography 4-    no stenosis greater than 25%-rec. medical management     Hyperlipidaemia      Hypertension      Obese      Sleep apnea     CPAP     Allergies   Allergen Reactions     Cardizem [Diltiazem Hcl] Itching     No Clinical Screening - See Comments Hives     Active Problems:    * No active hospital problems. *    Blood pressure 154/78, pulse 58, temperature 98.5  F (36.9  C), temperature source Oral, resp. rate 16, height 1.778 m (5' 10\"), weight 110.5 kg (243 lb 8 oz), SpO2 94 %.    NICU Admission  Maternal Medical History  Assessment & Plan    Praveen Tapia  10/23/2017    "

## 2017-10-23 NOTE — PATIENT INSTRUCTIONS
Preparing for Your Surgery      Name:  Terrence Murillo   MRN:  [id number]   :  1945   Today's Date:  10/23/2017     Arriving for surgery:  Surgery date:  11/3/17  Arrival time:  5:00 am  Please come to:       [facility] [address]  [address] [place]  [address]    -   parking is available in front of the hospital from 5:15 am to 8:00 pm    -  Stop at the Information Desk in the lobby    -   Inform the information person that you are here for surgery. An escort to 3c will be provided. If you would not like an escort, please proceed to 3C on the 3rd floor. [phone]     What can I eat or drink?  -  You may have solid food or milk products until 8 hours prior to your surgery.  Stop 11:00 pm  17  -  You may have water, apple juice or 7up/Sprite until 2 hours prior to your surgery.  Stop 5:00 am  11/3/17    Which medicines can I take?         Stop aspirin 5 days before surgery, stop advil, ibuprofen 1 - 2 days before surgery.       Stop vitamins and supplements 7 days before surgery.    -  Do NOT take these medications in the morning, the day of surgery:  11/3/17         altace         -  Please take these medications the day of surgery: 11/3/17         metoprolol  zetia    How do I prepare myself?  -  Take two showers: one the night before surgery; and one the morning of surgery.         Use Scrubcare or Hibiclens to wash from neck down.  You may use your own shampoo and conditioner. No other hair products.   -  Do NOT use lotion, powder, deodorant, or antiperspirant the day of your surgery.  -  Do NOT wear any makeup, fingernail polish or jewelry.  -Do not bring your own medications to the hospital, except for inhalers and eye drops.  -  Bring your ID and insurance card.    Questions or Concerns:  If you have questions or concerns, please call the  Preoperative Assessment Center, Monday-Friday 7AM-7PM:  [phone]  AFTER YOUR SURGERY  Breathing exercises    Breathing exercises help you recover faster. Take deep breaths and let the air out slowly. This will:     Help you wake up after surgery.    Help prevent complications like pneumonia.  Preventing complications will help you go home sooner.   We may give you a breathing device (incentive spirometer) to encourage you to breathe deeply.   Nausea and vomiting   You may feel sick to your stomach after surgery; if so, let your nurse know.    Pain control:  After surgery, you may have pain. Our goal is to help you manage your pain. Pain medicine will help you feel comfortable enough to do activities that will help you heal.  These activities may include breathing exercises, walking and physical therapy.   To help your health care team treat your pain we will ask: 1) If you have pain  2) where it is located 3) describe your pain in your words  Methods of pain control include medications given by mouth, vein or by nerve block for some surgeries.  We may give you a pain control pump that will:  1) Deliver the medicine through a tube placed in your vein  2) Control the amount of medicine you receive  3) Allow you to push a button to deliver a dose of pain medicine  Sequential Compression Device (SCD) or Pneumo Boots:  You may need to wear SCD S on your legs or feet. These are wraps connected to a machine that pumps in air and releases it. The repeated pumping helps prevent blood clots from forming.

## 2017-10-24 LAB
ABO + RH BLD: NORMAL
ABO + RH BLD: NORMAL
BLD GP AB SCN SERPL QL: NORMAL
BLOOD BANK CMNT PATIENT-IMP: NORMAL
BLOOD BANK CMNT PATIENT-IMP: NORMAL
INTERPRETATION ECG - MUSE: NORMAL
SPECIMEN EXP DATE BLD: NORMAL

## 2017-10-26 ENCOUNTER — CARE COORDINATION (OUTPATIENT)
Dept: NEUROSURGERY | Facility: CLINIC | Age: 72
End: 2017-10-26

## 2017-10-26 NOTE — PATIENT INSTRUCTIONS
Dear Terrence,     Enclosed you will find information you will need to prepare for your upcoming surgery at the [facility].  At this time, your surgery is scheduled for:    November 3, 2017 @ 7:30 AM                         You MUST arrive on the Surgery Admission Unit 3C at the [facility] N[facility] 5:30 AM that same day      You will receive a call from the Pre-op Staff 1-2 days prior to your surgery date to confirm the details of your surgery.       There are several important things you must do before that date.Please call Dr. Bates's RN Care Coordinator Amanda Metz @ [phone] within one or two days of receiving this information.  She will go over its contents and assist you in coordinating the necessary tasks prior to your scheduled surgery date.  Please be aware, several of these tasks are REQUIRED before surgery.  If not done in a timely manner, your surgery may be cancelled or delayed.           Feel free to call our clinic at the [facility] ([phone]) if you have questions.                                            Sincerely,        Amanda Metz, RN Care Coordinator  Department of Neurosurgery  [facility]

## 2017-10-30 ENCOUNTER — TELEPHONE (OUTPATIENT)
Dept: NEUROSURGERY | Facility: CLINIC | Age: 72
End: 2017-10-30

## 2017-10-31 ENCOUNTER — TELEPHONE (OUTPATIENT)
Dept: NEUROSURGERY | Facility: CLINIC | Age: 72
End: 2017-10-31

## 2017-11-01 ENCOUNTER — TELEPHONE (OUTPATIENT)
Dept: NEUROSURGERY | Facility: CLINIC | Age: 72
End: 2017-11-01

## 2017-11-01 NOTE — PATIENT INSTRUCTIONS
Pre-op Teaching    Procedure:endoscopic transnasal resection pituitary, possible lumbar drain   Date:11/3/17  Surgeon:Amanda Bates MD     Type and Screen Date:n/a  Type and Cross Date:(Needs to be within 3 days of surgery-otherwise order stat day of)        Pre-op folder with specific written instructions      Discussed pre-op routine and requirements to include:      Surgical procedure, post-op recovery and expectations, need for H&P, NPO prior to OR, pre-op antibacterial showers, pain control and importance of follow-up visits.  Surgery scheduling will coordinate OR time/date and update patient as appropriate.  3C will call with more instructions pre-op.   Ample time was provided for patient questions and in-depth discussion of topics of heightened interest.     A four ounce bottle of antibacterial soap solution was given to patient as well as specific instructions for use. Terrence verbalized understanding of instructions.  Approximately 20 minutes spent with patient and family discussing and reviewing.

## 2017-11-03 ENCOUNTER — HOSPITAL ENCOUNTER (OUTPATIENT)
Dept: CT IMAGING | Facility: CLINIC | Age: 72
DRG: 614 | End: 2017-11-03
Attending: NEUROLOGICAL SURGERY | Admitting: NEUROLOGICAL SURGERY
Payer: MEDICARE

## 2017-11-03 ENCOUNTER — ANESTHESIA (OUTPATIENT)
Dept: SURGERY | Facility: CLINIC | Age: 72
DRG: 614 | End: 2017-11-03
Payer: MEDICARE

## 2017-11-03 ENCOUNTER — HOSPITAL ENCOUNTER (OUTPATIENT)
Dept: MRI IMAGING | Facility: CLINIC | Age: 72
DRG: 614 | End: 2017-11-03
Attending: NEUROLOGICAL SURGERY | Admitting: NEUROLOGICAL SURGERY
Payer: MEDICARE

## 2017-11-03 ENCOUNTER — HOSPITAL ENCOUNTER (INPATIENT)
Facility: CLINIC | Age: 72
LOS: 3 days | Discharge: HOME | DRG: 614 | End: 2017-11-06
Attending: NEUROLOGICAL SURGERY | Admitting: NEUROLOGICAL SURGERY
Payer: MEDICARE

## 2017-11-03 DIAGNOSIS — D35.3 BENIGN NEOPLASM OF PITUITARY GLAND AND CRANIOPHARYNGEAL DUCT (POUCH) (H): ICD-10-CM

## 2017-11-03 DIAGNOSIS — I25.10 CORONARY ARTERY DISEASE INVOLVING NATIVE CORONARY ARTERY OF NATIVE HEART WITHOUT ANGINA PECTORIS: Chronic | ICD-10-CM

## 2017-11-03 DIAGNOSIS — D35.2 BENIGN NEOPLASM OF PITUITARY GLAND AND CRANIOPHARYNGEAL DUCT (POUCH) (H): ICD-10-CM

## 2017-11-03 DIAGNOSIS — D49.6 INTRACRANIAL TUMOR (H): Primary | ICD-10-CM

## 2017-11-03 LAB
BASE DEFICIT BLDA-SCNC: 0.8 MMOL/L
BASE DEFICIT BLDA-SCNC: 1.4 MMOL/L
BASE DEFICIT BLDA-SCNC: 2.3 MMOL/L
CA-I BLD-MCNC: 4.6 MG/DL (ref 4.4–5.2)
CA-I BLD-MCNC: 4.7 MG/DL (ref 4.4–5.2)
CA-I BLD-MCNC: 4.8 MG/DL (ref 4.4–5.2)
GLUCOSE BLD-MCNC: 123 MG/DL (ref 70–99)
GLUCOSE BLD-MCNC: 131 MG/DL (ref 70–99)
GLUCOSE BLD-MCNC: 141 MG/DL (ref 70–99)
GLUCOSE BLDC GLUCOMTR-MCNC: 112 MG/DL (ref 70–99)
GLUCOSE BLDC GLUCOMTR-MCNC: 127 MG/DL (ref 70–99)
GLUCOSE BLDC GLUCOMTR-MCNC: 139 MG/DL (ref 70–99)
HCO3 BLD-SCNC: 23 MMOL/L (ref 21–28)
HCO3 BLD-SCNC: 23 MMOL/L (ref 21–28)
HCO3 BLD-SCNC: 24 MMOL/L (ref 21–28)
HGB BLD-MCNC: 11.9 G/DL (ref 13.3–17.7)
HGB BLD-MCNC: 12 G/DL (ref 13.3–17.7)
HGB BLD-MCNC: 12.4 G/DL (ref 13.3–17.7)
MRSA DNA SPEC QL NAA+PROBE: NEGATIVE
O2/TOTAL GAS SETTING VFR VENT: 33 %
O2/TOTAL GAS SETTING VFR VENT: 48 %
O2/TOTAL GAS SETTING VFR VENT: 49 %
PCO2 BLD: 38 MM HG (ref 35–45)
PCO2 BLD: 41 MM HG (ref 35–45)
PCO2 BLD: 42 MM HG (ref 35–45)
PH BLD: 7.36 PH (ref 7.35–7.45)
PH BLD: 7.38 PH (ref 7.35–7.45)
PH BLD: 7.4 PH (ref 7.35–7.45)
PO2 BLD: 110 MM HG (ref 80–105)
PO2 BLD: 153 MM HG (ref 80–105)
PO2 BLD: 90 MM HG (ref 80–105)
POTASSIUM BLD-SCNC: 4.1 MMOL/L (ref 3.4–5.3)
POTASSIUM BLD-SCNC: 4.2 MMOL/L (ref 3.4–5.3)
POTASSIUM BLD-SCNC: 4.4 MMOL/L (ref 3.4–5.3)
SODIUM BLD-SCNC: 139 MMOL/L (ref 133–144)
SODIUM SERPL-SCNC: 140 MMOL/L (ref 133–144)
SPECIMEN SOURCE: NORMAL

## 2017-11-03 PROCEDURE — 70450 CT HEAD/BRAIN W/O DYE: CPT

## 2017-11-03 PROCEDURE — 25000128 H RX IP 250 OP 636: Performed by: STUDENT IN AN ORGANIZED HEALTH CARE EDUCATION/TRAINING PROGRAM

## 2017-11-03 PROCEDURE — 25000132 ZZH RX MED GY IP 250 OP 250 PS 637: Mod: GY | Performed by: STUDENT IN AN ORGANIZED HEALTH CARE EDUCATION/TRAINING PROGRAM

## 2017-11-03 PROCEDURE — 25000128 H RX IP 250 OP 636: Performed by: ANESTHESIOLOGY

## 2017-11-03 PROCEDURE — 25000566 ZZH SEVOFLURANE, EA 15 MIN: Performed by: NEUROLOGICAL SURGERY

## 2017-11-03 PROCEDURE — 71000016 ZZH RECOVERY PHASE 1 LEVEL 3 FIRST HR: Performed by: NEUROLOGICAL SURGERY

## 2017-11-03 PROCEDURE — 82330 ASSAY OF CALCIUM: CPT | Performed by: NEUROLOGICAL SURGERY

## 2017-11-03 PROCEDURE — 37000008 ZZH ANESTHESIA TECHNICAL FEE, 1ST 30 MIN: Performed by: NEUROLOGICAL SURGERY

## 2017-11-03 PROCEDURE — 25000125 ZZHC RX 250: Performed by: STUDENT IN AN ORGANIZED HEALTH CARE EDUCATION/TRAINING PROGRAM

## 2017-11-03 PROCEDURE — 87640 STAPH A DNA AMP PROBE: CPT | Performed by: NEUROLOGICAL SURGERY

## 2017-11-03 PROCEDURE — 36000074 ZZH SURGERY LEVEL 6 1ST 30 MIN - UMMC: Performed by: NEUROLOGICAL SURGERY

## 2017-11-03 PROCEDURE — 27210995 ZZH RX 272: Performed by: NEUROLOGICAL SURGERY

## 2017-11-03 PROCEDURE — 25000128 H RX IP 250 OP 636: Performed by: NEUROLOGICAL SURGERY

## 2017-11-03 PROCEDURE — 84295 ASSAY OF SERUM SODIUM: CPT | Performed by: NEUROLOGICAL SURGERY

## 2017-11-03 PROCEDURE — 25000125 ZZHC RX 250: Performed by: NEUROLOGICAL SURGERY

## 2017-11-03 PROCEDURE — 84295 ASSAY OF SERUM SODIUM: CPT | Performed by: STUDENT IN AN ORGANIZED HEALTH CARE EDUCATION/TRAINING PROGRAM

## 2017-11-03 PROCEDURE — 82803 BLOOD GASES ANY COMBINATION: CPT | Performed by: NEUROLOGICAL SURGERY

## 2017-11-03 PROCEDURE — 00000146 ZZHCL STATISTIC GLUCOSE BY METER IP

## 2017-11-03 PROCEDURE — 40000170 ZZH STATISTIC PRE-PROCEDURE ASSESSMENT II: Performed by: NEUROLOGICAL SURGERY

## 2017-11-03 PROCEDURE — A9270 NON-COVERED ITEM OR SERVICE: HCPCS | Mod: GY | Performed by: STUDENT IN AN ORGANIZED HEALTH CARE EDUCATION/TRAINING PROGRAM

## 2017-11-03 PROCEDURE — 82947 ASSAY GLUCOSE BLOOD QUANT: CPT | Performed by: NEUROLOGICAL SURGERY

## 2017-11-03 PROCEDURE — 40000275 ZZH STATISTIC RCP TIME EA 10 MIN

## 2017-11-03 PROCEDURE — 00U20JZ SUPPLEMENT DURA MATER WITH SYNTHETIC SUBSTITUTE, OPEN APPROACH: ICD-10-PCS | Performed by: NEUROLOGICAL SURGERY

## 2017-11-03 PROCEDURE — 88305 TISSUE EXAM BY PATHOLOGIST: CPT | Performed by: NEUROLOGICAL SURGERY

## 2017-11-03 PROCEDURE — 87641 MR-STAPH DNA AMP PROBE: CPT | Performed by: NEUROLOGICAL SURGERY

## 2017-11-03 PROCEDURE — A9270 NON-COVERED ITEM OR SERVICE: HCPCS | Performed by: NEUROLOGICAL SURGERY

## 2017-11-03 PROCEDURE — 0GB00ZZ EXCISION OF PITUITARY GLAND, OPEN APPROACH: ICD-10-PCS | Performed by: NEUROLOGICAL SURGERY

## 2017-11-03 PROCEDURE — 36000076 ZZH SURGERY LEVEL 6 EA 15 ADDTL MIN - UMMC: Performed by: NEUROLOGICAL SURGERY

## 2017-11-03 PROCEDURE — 84132 ASSAY OF SERUM POTASSIUM: CPT | Performed by: NEUROLOGICAL SURGERY

## 2017-11-03 PROCEDURE — 37000009 ZZH ANESTHESIA TECHNICAL FEE, EACH ADDTL 15 MIN: Performed by: NEUROLOGICAL SURGERY

## 2017-11-03 PROCEDURE — 88305 TISSUE EXAM BY PATHOLOGIST: CPT | Mod: 26 | Performed by: NEUROLOGICAL SURGERY

## 2017-11-03 PROCEDURE — C1762 CONN TISS, HUMAN(INC FASCIA): HCPCS | Performed by: NEUROLOGICAL SURGERY

## 2017-11-03 PROCEDURE — 27210794 ZZH OR GENERAL SUPPLY STERILE: Performed by: NEUROLOGICAL SURGERY

## 2017-11-03 PROCEDURE — 40000014 ZZH STATISTIC ARTERIAL MONITORING DAILY

## 2017-11-03 PROCEDURE — A9585 GADOBUTROL INJECTION: HCPCS | Performed by: NEUROLOGICAL SURGERY

## 2017-11-03 PROCEDURE — 70552 MRI BRAIN STEM W/DYE: CPT

## 2017-11-03 PROCEDURE — 71000017 ZZH RECOVERY PHASE 1 LEVEL 3 EA ADDTL HR: Performed by: NEUROLOGICAL SURGERY

## 2017-11-03 PROCEDURE — 20000004 ZZH R&B ICU UMMC

## 2017-11-03 DEVICE — GRAFT DUREPAIR DURA MATRIX 2X2" 62100: Type: IMPLANTABLE DEVICE | Site: BRAIN | Status: FUNCTIONAL

## 2017-11-03 RX ORDER — SODIUM CHLORIDE, SODIUM LACTATE, POTASSIUM CHLORIDE, AND CALCIUM CHLORIDE .6; .31; .03; .02 G/100ML; G/100ML; G/100ML; G/100ML
IRRIGANT IRRIGATION PRN
Status: DISCONTINUED | OUTPATIENT
Start: 2017-11-03 | End: 2017-11-03 | Stop reason: HOSPADM

## 2017-11-03 RX ORDER — HYDRALAZINE HYDROCHLORIDE 20 MG/ML
10-20 INJECTION INTRAMUSCULAR; INTRAVENOUS EVERY 30 MIN PRN
Status: DISCONTINUED | OUTPATIENT
Start: 2017-11-03 | End: 2017-11-06 | Stop reason: HOSPADM

## 2017-11-03 RX ORDER — GADOBUTROL 604.72 MG/ML
10 INJECTION INTRAVENOUS ONCE
Status: COMPLETED | OUTPATIENT
Start: 2017-11-03 | End: 2017-11-03

## 2017-11-03 RX ORDER — ONDANSETRON 2 MG/ML
4 INJECTION INTRAMUSCULAR; INTRAVENOUS EVERY 6 HOURS PRN
Status: DISCONTINUED | OUTPATIENT
Start: 2017-11-03 | End: 2017-11-04

## 2017-11-03 RX ORDER — SODIUM CHLORIDE, SODIUM LACTATE, POTASSIUM CHLORIDE, CALCIUM CHLORIDE 600; 310; 30; 20 MG/100ML; MG/100ML; MG/100ML; MG/100ML
INJECTION, SOLUTION INTRAVENOUS CONTINUOUS
Status: DISCONTINUED | OUTPATIENT
Start: 2017-11-03 | End: 2017-11-03 | Stop reason: HOSPADM

## 2017-11-03 RX ORDER — HYDROCORTISONE 20 MG/1
20 TABLET ORAL EVERY MORNING
Status: DISCONTINUED | OUTPATIENT
Start: 2017-11-05 | End: 2017-11-06 | Stop reason: HOSPADM

## 2017-11-03 RX ORDER — ONDANSETRON 4 MG/1
4 TABLET, ORALLY DISINTEGRATING ORAL EVERY 30 MIN PRN
Status: DISCONTINUED | OUTPATIENT
Start: 2017-11-03 | End: 2017-11-03 | Stop reason: HOSPADM

## 2017-11-03 RX ORDER — ONDANSETRON 4 MG/1
4 TABLET, ORALLY DISINTEGRATING ORAL EVERY 6 HOURS PRN
Status: DISCONTINUED | OUTPATIENT
Start: 2017-11-03 | End: 2017-11-04

## 2017-11-03 RX ORDER — LABETALOL HYDROCHLORIDE 5 MG/ML
10 INJECTION, SOLUTION INTRAVENOUS
Status: DISCONTINUED | OUTPATIENT
Start: 2017-11-03 | End: 2017-11-03 | Stop reason: HOSPADM

## 2017-11-03 RX ORDER — SODIUM CHLORIDE, SODIUM LACTATE, POTASSIUM CHLORIDE, CALCIUM CHLORIDE 600; 310; 30; 20 MG/100ML; MG/100ML; MG/100ML; MG/100ML
INJECTION, SOLUTION INTRAVENOUS CONTINUOUS PRN
Status: DISCONTINUED | OUTPATIENT
Start: 2017-11-03 | End: 2017-11-03

## 2017-11-03 RX ORDER — HYDROCORTISONE 20 MG/1
20 TABLET ORAL ONCE
Status: DISCONTINUED | OUTPATIENT
Start: 2017-11-05 | End: 2017-11-03

## 2017-11-03 RX ORDER — LABETALOL HYDROCHLORIDE 5 MG/ML
10-40 INJECTION, SOLUTION INTRAVENOUS EVERY 10 MIN PRN
Status: DISCONTINUED | OUTPATIENT
Start: 2017-11-03 | End: 2017-11-06 | Stop reason: HOSPADM

## 2017-11-03 RX ORDER — POLYETHYLENE GLYCOL 3350 17 G/17G
17 POWDER, FOR SOLUTION ORAL 2 TIMES DAILY
Status: DISCONTINUED | OUTPATIENT
Start: 2017-11-03 | End: 2017-11-06 | Stop reason: HOSPADM

## 2017-11-03 RX ORDER — LIDOCAINE 40 MG/G
CREAM TOPICAL
Status: DISCONTINUED | OUTPATIENT
Start: 2017-11-03 | End: 2017-11-06 | Stop reason: HOSPADM

## 2017-11-03 RX ORDER — HYDROCORTISONE 20 MG/1
40 TABLET ORAL ONCE
Status: COMPLETED | OUTPATIENT
Start: 2017-11-04 | End: 2017-11-04

## 2017-11-03 RX ORDER — HYDROCORTISONE 10 MG/1
10 TABLET ORAL EVERY EVENING
Status: DISCONTINUED | OUTPATIENT
Start: 2017-11-05 | End: 2017-11-06 | Stop reason: HOSPADM

## 2017-11-03 RX ORDER — LABETALOL HYDROCHLORIDE 5 MG/ML
INJECTION, SOLUTION INTRAVENOUS PRN
Status: DISCONTINUED | OUTPATIENT
Start: 2017-11-03 | End: 2017-11-03

## 2017-11-03 RX ORDER — LEVOTHYROXINE SODIUM ANHYDROUS 100 UG/5ML
25 INJECTION, POWDER, LYOPHILIZED, FOR SOLUTION INTRAVENOUS ONCE
Status: COMPLETED | OUTPATIENT
Start: 2017-11-03 | End: 2017-11-03

## 2017-11-03 RX ORDER — HYDRALAZINE HYDROCHLORIDE 20 MG/ML
INJECTION INTRAMUSCULAR; INTRAVENOUS PRN
Status: DISCONTINUED | OUTPATIENT
Start: 2017-11-03 | End: 2017-11-03

## 2017-11-03 RX ORDER — HYDRALAZINE HYDROCHLORIDE 20 MG/ML
5 INJECTION INTRAMUSCULAR; INTRAVENOUS ONCE
Status: COMPLETED | OUTPATIENT
Start: 2017-11-03 | End: 2017-11-03

## 2017-11-03 RX ORDER — PANTOPRAZOLE SODIUM 40 MG/1
40 TABLET, DELAYED RELEASE ORAL
Status: DISCONTINUED | OUTPATIENT
Start: 2017-11-04 | End: 2017-11-06 | Stop reason: HOSPADM

## 2017-11-03 RX ORDER — AMOXICILLIN 250 MG
2-3 CAPSULE ORAL 2 TIMES DAILY
Status: DISCONTINUED | OUTPATIENT
Start: 2017-11-04 | End: 2017-11-06 | Stop reason: HOSPADM

## 2017-11-03 RX ORDER — SODIUM CHLORIDE 9 MG/ML
INJECTION, SOLUTION INTRAVENOUS CONTINUOUS
Status: DISPENSED | OUTPATIENT
Start: 2017-11-03 | End: 2017-11-04

## 2017-11-03 RX ORDER — FENTANYL CITRATE 50 UG/ML
INJECTION, SOLUTION INTRAMUSCULAR; INTRAVENOUS PRN
Status: DISCONTINUED | OUTPATIENT
Start: 2017-11-03 | End: 2017-11-03

## 2017-11-03 RX ORDER — CEFTRIAXONE 1 G/1
1 INJECTION, POWDER, FOR SOLUTION INTRAMUSCULAR; INTRAVENOUS
Status: COMPLETED | OUTPATIENT
Start: 2017-11-03 | End: 2017-11-03

## 2017-11-03 RX ORDER — OXYCODONE HYDROCHLORIDE 5 MG/1
5-10 TABLET ORAL EVERY 4 HOURS PRN
Status: DISCONTINUED | OUTPATIENT
Start: 2017-11-03 | End: 2017-11-06 | Stop reason: HOSPADM

## 2017-11-03 RX ORDER — HYDROCORTISONE 20 MG/1
20 TABLET ORAL EVERY MORNING
Status: DISCONTINUED | OUTPATIENT
Start: 2017-11-06 | End: 2017-11-03

## 2017-11-03 RX ORDER — HYDROMORPHONE HYDROCHLORIDE 1 MG/ML
.3-.5 INJECTION, SOLUTION INTRAMUSCULAR; INTRAVENOUS; SUBCUTANEOUS EVERY 5 MIN PRN
Status: DISCONTINUED | OUTPATIENT
Start: 2017-11-03 | End: 2017-11-03 | Stop reason: HOSPADM

## 2017-11-03 RX ORDER — RAMIPRIL 5 MG/1
5 CAPSULE ORAL DAILY
Status: DISCONTINUED | OUTPATIENT
Start: 2017-11-03 | End: 2017-11-06 | Stop reason: HOSPADM

## 2017-11-03 RX ORDER — HYDROCORTISONE 10 MG/1
10 TABLET ORAL EVERY EVENING
Status: DISCONTINUED | OUTPATIENT
Start: 2017-11-06 | End: 2017-11-03

## 2017-11-03 RX ORDER — OXYMETAZOLINE HYDROCHLORIDE 0.05 G/100ML
SPRAY NASAL PRN
Status: DISCONTINUED | OUTPATIENT
Start: 2017-11-03 | End: 2017-11-03 | Stop reason: HOSPADM

## 2017-11-03 RX ORDER — ACETAMINOPHEN 325 MG/1
650 TABLET ORAL EVERY 4 HOURS PRN
Status: DISCONTINUED | OUTPATIENT
Start: 2017-11-06 | End: 2017-11-06 | Stop reason: HOSPADM

## 2017-11-03 RX ORDER — LIDOCAINE HYDROCHLORIDE 20 MG/ML
INJECTION, SOLUTION INFILTRATION; PERINEURAL PRN
Status: DISCONTINUED | OUTPATIENT
Start: 2017-11-03 | End: 2017-11-03

## 2017-11-03 RX ORDER — ROSUVASTATIN CALCIUM 10 MG/1
40 TABLET, COATED ORAL EVERY EVENING
Status: DISCONTINUED | OUTPATIENT
Start: 2017-11-03 | End: 2017-11-06 | Stop reason: HOSPADM

## 2017-11-03 RX ORDER — EZETIMIBE 10 MG/1
10 TABLET ORAL DAILY
Status: DISCONTINUED | OUTPATIENT
Start: 2017-11-04 | End: 2017-11-06 | Stop reason: HOSPADM

## 2017-11-03 RX ORDER — METOPROLOL TARTRATE 25 MG/1
25 TABLET, FILM COATED ORAL 2 TIMES DAILY
Status: DISCONTINUED | OUTPATIENT
Start: 2017-11-03 | End: 2017-11-06 | Stop reason: HOSPADM

## 2017-11-03 RX ORDER — PROPOFOL 10 MG/ML
INJECTION, EMULSION INTRAVENOUS CONTINUOUS PRN
Status: DISCONTINUED | OUTPATIENT
Start: 2017-11-03 | End: 2017-11-03

## 2017-11-03 RX ORDER — ONDANSETRON 2 MG/ML
4 INJECTION INTRAMUSCULAR; INTRAVENOUS EVERY 30 MIN PRN
Status: DISCONTINUED | OUTPATIENT
Start: 2017-11-03 | End: 2017-11-03 | Stop reason: HOSPADM

## 2017-11-03 RX ORDER — SODIUM CHLORIDE 9 MG/ML
INJECTION, SOLUTION INTRAVENOUS CONTINUOUS PRN
Status: DISCONTINUED | OUTPATIENT
Start: 2017-11-03 | End: 2017-11-03

## 2017-11-03 RX ORDER — NALOXONE HYDROCHLORIDE 0.4 MG/ML
.1-.4 INJECTION, SOLUTION INTRAMUSCULAR; INTRAVENOUS; SUBCUTANEOUS
Status: DISCONTINUED | OUTPATIENT
Start: 2017-11-03 | End: 2017-11-06 | Stop reason: HOSPADM

## 2017-11-03 RX ORDER — LIDOCAINE 40 MG/G
CREAM TOPICAL
Status: DISCONTINUED | OUTPATIENT
Start: 2017-11-03 | End: 2017-11-03 | Stop reason: HOSPADM

## 2017-11-03 RX ORDER — BUPIVACAINE HYDROCHLORIDE AND EPINEPHRINE 5; 5 MG/ML; UG/ML
INJECTION, SOLUTION PERINEURAL PRN
Status: DISCONTINUED | OUTPATIENT
Start: 2017-11-03 | End: 2017-11-03 | Stop reason: HOSPADM

## 2017-11-03 RX ORDER — PROPOFOL 10 MG/ML
INJECTION, EMULSION INTRAVENOUS PRN
Status: DISCONTINUED | OUTPATIENT
Start: 2017-11-03 | End: 2017-11-03

## 2017-11-03 RX ORDER — FENTANYL CITRATE 50 UG/ML
25-50 INJECTION, SOLUTION INTRAMUSCULAR; INTRAVENOUS
Status: DISCONTINUED | OUTPATIENT
Start: 2017-11-03 | End: 2017-11-03 | Stop reason: HOSPADM

## 2017-11-03 RX ORDER — HYDROCORTISONE 20 MG/1
40 TABLET ORAL ONCE
Status: DISCONTINUED | OUTPATIENT
Start: 2017-11-05 | End: 2017-11-03

## 2017-11-03 RX ORDER — HYDROMORPHONE HCL/0.9% NACL/PF 0.2MG/0.2
0.2 SYRINGE (ML) INTRAVENOUS
Status: DISCONTINUED | OUTPATIENT
Start: 2017-11-03 | End: 2017-11-06 | Stop reason: HOSPADM

## 2017-11-03 RX ORDER — AMOXICILLIN 250 MG
2 CAPSULE ORAL 2 TIMES DAILY
Status: DISCONTINUED | OUTPATIENT
Start: 2017-11-03 | End: 2017-11-03

## 2017-11-03 RX ORDER — HYDROCORTISONE 20 MG/1
20 TABLET ORAL ONCE
Status: COMPLETED | OUTPATIENT
Start: 2017-11-04 | End: 2017-11-04

## 2017-11-03 RX ORDER — ACETAMINOPHEN 325 MG/1
975 TABLET ORAL EVERY 8 HOURS
Status: DISCONTINUED | OUTPATIENT
Start: 2017-11-03 | End: 2017-11-06 | Stop reason: HOSPADM

## 2017-11-03 RX ADMIN — METOPROLOL TARTRATE 25 MG: 25 TABLET ORAL at 20:27

## 2017-11-03 RX ADMIN — SODIUM CHLORIDE: 9 INJECTION, SOLUTION INTRAVENOUS at 08:08

## 2017-11-03 RX ADMIN — SODIUM CHLORIDE: 9 INJECTION, SOLUTION INTRAVENOUS at 19:39

## 2017-11-03 RX ADMIN — LABETALOL HYDROCHLORIDE 10 MG: 5 INJECTION, SOLUTION INTRAVENOUS at 14:23

## 2017-11-03 RX ADMIN — HUMAN INSULIN 0.5 UNITS/HR: 100 INJECTION, SOLUTION SUBCUTANEOUS at 11:18

## 2017-11-03 RX ADMIN — ROSUVASTATIN CALCIUM 40 MG: 20 TABLET, FILM COATED ORAL at 19:39

## 2017-11-03 RX ADMIN — ROCURONIUM BROMIDE 10 MG: 10 INJECTION INTRAVENOUS at 12:01

## 2017-11-03 RX ADMIN — HYDRALAZINE HYDROCHLORIDE 5 MG: 20 INJECTION INTRAMUSCULAR; INTRAVENOUS at 16:52

## 2017-11-03 RX ADMIN — Medication 20 MG: at 21:03

## 2017-11-03 RX ADMIN — SODIUM CHLORIDE: 9 INJECTION, SOLUTION INTRAVENOUS at 10:45

## 2017-11-03 RX ADMIN — FENTANYL CITRATE 50 MCG: 50 INJECTION, SOLUTION INTRAMUSCULAR; INTRAVENOUS at 10:04

## 2017-11-03 RX ADMIN — SODIUM CHLORIDE, POTASSIUM CHLORIDE, SODIUM LACTATE AND CALCIUM CHLORIDE: 600; 310; 30; 20 INJECTION, SOLUTION INTRAVENOUS at 11:12

## 2017-11-03 RX ADMIN — LABETALOL HYDROCHLORIDE 15 MG: 5 INJECTION, SOLUTION INTRAVENOUS at 10:55

## 2017-11-03 RX ADMIN — LABETALOL HYDROCHLORIDE 20 MG: 5 INJECTION, SOLUTION INTRAVENOUS at 15:08

## 2017-11-03 RX ADMIN — ROCURONIUM BROMIDE 20 MG: 10 INJECTION INTRAVENOUS at 10:28

## 2017-11-03 RX ADMIN — HYDROMORPHONE HYDROCHLORIDE 0.4 MG: 1 INJECTION, SOLUTION INTRAMUSCULAR; INTRAVENOUS; SUBCUTANEOUS at 17:23

## 2017-11-03 RX ADMIN — HYDROMORPHONE HYDROCHLORIDE 0.6 MG: 1 INJECTION, SOLUTION INTRAMUSCULAR; INTRAVENOUS; SUBCUTANEOUS at 13:54

## 2017-11-03 RX ADMIN — LIDOCAINE HYDROCHLORIDE 100 MG: 20 INJECTION, SOLUTION INFILTRATION; PERINEURAL at 07:57

## 2017-11-03 RX ADMIN — LABETALOL HYDROCHLORIDE 10 MG: 5 INJECTION, SOLUTION INTRAVENOUS at 11:47

## 2017-11-03 RX ADMIN — SODIUM CHLORIDE, POTASSIUM CHLORIDE, SODIUM LACTATE AND CALCIUM CHLORIDE: 600; 310; 30; 20 INJECTION, SOLUTION INTRAVENOUS at 16:04

## 2017-11-03 RX ADMIN — PROPOFOL 25 MCG/KG/MIN: 10 INJECTION, EMULSION INTRAVENOUS at 15:02

## 2017-11-03 RX ADMIN — SODIUM CHLORIDE, POTASSIUM CHLORIDE, SODIUM LACTATE AND CALCIUM CHLORIDE: 600; 310; 30; 20 INJECTION, SOLUTION INTRAVENOUS at 07:26

## 2017-11-03 RX ADMIN — FENTANYL CITRATE 100 MCG: 50 INJECTION, SOLUTION INTRAMUSCULAR; INTRAVENOUS at 09:27

## 2017-11-03 RX ADMIN — GADOBUTROL 10 ML: 604.72 INJECTION INTRAVENOUS at 07:03

## 2017-11-03 RX ADMIN — ROCURONIUM BROMIDE 80 MG: 10 INJECTION INTRAVENOUS at 07:57

## 2017-11-03 RX ADMIN — ROCURONIUM BROMIDE 20 MG: 10 INJECTION INTRAVENOUS at 08:49

## 2017-11-03 RX ADMIN — SODIUM CHLORIDE: 9 INJECTION, SOLUTION INTRAVENOUS at 16:57

## 2017-11-03 RX ADMIN — ROCURONIUM BROMIDE 10 MG: 10 INJECTION INTRAVENOUS at 14:01

## 2017-11-03 RX ADMIN — PROPOFOL 50 MG: 10 INJECTION, EMULSION INTRAVENOUS at 09:27

## 2017-11-03 RX ADMIN — ROCURONIUM BROMIDE 20 MG: 10 INJECTION INTRAVENOUS at 11:26

## 2017-11-03 RX ADMIN — PROPOFOL 200 MG: 10 INJECTION, EMULSION INTRAVENOUS at 07:57

## 2017-11-03 RX ADMIN — HYDRALAZINE HYDROCHLORIDE 20 MG: 20 INJECTION INTRAMUSCULAR; INTRAVENOUS at 10:14

## 2017-11-03 RX ADMIN — ROCURONIUM BROMIDE 20 MG: 10 INJECTION INTRAVENOUS at 09:44

## 2017-11-03 RX ADMIN — FENTANYL CITRATE 100 MCG: 50 INJECTION, SOLUTION INTRAMUSCULAR; INTRAVENOUS at 07:57

## 2017-11-03 RX ADMIN — HYDROMORPHONE HYDROCHLORIDE 0.2 MG: 1 INJECTION, SOLUTION INTRAMUSCULAR; INTRAVENOUS; SUBCUTANEOUS at 18:25

## 2017-11-03 RX ADMIN — Medication 40 MG: at 22:03

## 2017-11-03 RX ADMIN — RAMIPRIL 5 MG: 5 CAPSULE ORAL at 20:27

## 2017-11-03 RX ADMIN — LABETALOL HYDROCHLORIDE 10 MG: 5 INJECTION, SOLUTION INTRAVENOUS at 10:41

## 2017-11-03 RX ADMIN — FENTANYL CITRATE 50 MCG: 50 INJECTION, SOLUTION INTRAMUSCULAR; INTRAVENOUS at 16:27

## 2017-11-03 RX ADMIN — LEVOTHYROXINE SODIUM ANHYDROUS 25 MCG: 100 INJECTION, POWDER, LYOPHILIZED, FOR SOLUTION INTRAVENOUS at 08:57

## 2017-11-03 RX ADMIN — FENTANYL CITRATE 25 MCG: 50 INJECTION, SOLUTION INTRAMUSCULAR; INTRAVENOUS at 16:03

## 2017-11-03 RX ADMIN — ONDANSETRON 4 MG: 2 INJECTION INTRAMUSCULAR; INTRAVENOUS at 17:00

## 2017-11-03 RX ADMIN — ROCURONIUM BROMIDE 20 MG: 10 INJECTION INTRAVENOUS at 12:36

## 2017-11-03 RX ADMIN — LABETALOL HYDROCHLORIDE 20 MG: 5 INJECTION, SOLUTION INTRAVENOUS at 15:15

## 2017-11-03 RX ADMIN — FENTANYL CITRATE 25 MCG: 50 INJECTION, SOLUTION INTRAMUSCULAR; INTRAVENOUS at 15:43

## 2017-11-03 RX ADMIN — PROCHLORPERAZINE EDISYLATE 5 MG: 5 INJECTION INTRAMUSCULAR; INTRAVENOUS at 18:10

## 2017-11-03 RX ADMIN — HYDRALAZINE HYDROCHLORIDE 5 MG: 20 INJECTION INTRAMUSCULAR; INTRAVENOUS at 16:40

## 2017-11-03 RX ADMIN — HYDROCORTISONE SODIUM SUCCINATE 100 MG: 100 INJECTION, POWDER, FOR SOLUTION INTRAMUSCULAR; INTRAVENOUS at 08:47

## 2017-11-03 RX ADMIN — FENTANYL CITRATE 100 MCG: 50 INJECTION, SOLUTION INTRAMUSCULAR; INTRAVENOUS at 09:39

## 2017-11-03 RX ADMIN — LABETALOL HYDROCHLORIDE 10 MG: 5 INJECTION, SOLUTION INTRAVENOUS at 14:11

## 2017-11-03 RX ADMIN — OXYCODONE HYDROCHLORIDE 10 MG: 5 TABLET ORAL at 19:40

## 2017-11-03 RX ADMIN — ACETAMINOPHEN 975 MG: 325 TABLET, FILM COATED ORAL at 19:38

## 2017-11-03 RX ADMIN — Medication 0.2 MG: at 21:40

## 2017-11-03 RX ADMIN — PROPOFOL 50 MG: 10 INJECTION, EMULSION INTRAVENOUS at 09:38

## 2017-11-03 RX ADMIN — LABETALOL HYDROCHLORIDE 10 MG: 5 INJECTION, SOLUTION INTRAVENOUS at 08:59

## 2017-11-03 RX ADMIN — HYDROMORPHONE HYDROCHLORIDE 0.3 MG: 1 INJECTION, SOLUTION INTRAMUSCULAR; INTRAVENOUS; SUBCUTANEOUS at 16:54

## 2017-11-03 RX ADMIN — CEFTRIAXONE 1 G: 1 INJECTION, POWDER, FOR SOLUTION INTRAMUSCULAR; INTRAVENOUS at 08:40

## 2017-11-03 ASSESSMENT — PAIN DESCRIPTION - DESCRIPTORS: DESCRIPTORS: ACHING;DISCOMFORT;POUNDING

## 2017-11-03 ASSESSMENT — VISUAL ACUITY
OU: GLASSES

## 2017-11-03 NOTE — IP AVS SNAPSHOT
MRN:[id number]                      After Visit Summary   11/3/2017    Terrence Murillo    MRN: [id number]           Thank you!     Thank you for choosing [facility] for your care. Our goal is always to provide you with excellent care. Hearing back from our patients is one way we can continue to improve our services. Please take a few minutes to complete the written survey that you may receive in the mail after you visit with us. Thank you!        Patient Information     Date Of Birth          1945        Designated Caregiver       Most Recent Value    Caregiver    Will someone help with your care after discharge? yes    Name of designated caregiver Xi    Phone number of caregiver [phone]    Caregiver address [address] [address]      About your hospital stay     You were admitted on:  November 3, 2017 You last received care in the:  [address] [facility]    You were discharged on:  2017        Reason for your hospital stay       Pituitary tumor resection                  Who to Call     For medical emergencies, please call 911.  For non-urgent questions about your medical care, please call your primary care provider or clinic, [phone]  For questions related to your surgery, please call your surgery clinic        Attending Provider     Provider Amanda Rodrigues MD Neurosurgery       Primary Care Provider Office Phone # Fax #    Alexy Hernandez [phone]       When to contact your care team       Call if you have any of the followin. Temperature greater than 101.5 F.   2. Any clear discharge from your nose or down the back of your throat  3. Any new weakness, numbness or altered mental status.  4. Increased urinary frequency or increased thirst.  5.  Worsening pain that is not improving with the pain medications you were prescribed.     Call [phone] or after 5:00 pm or on weekends call  [phone] and ask for the  neurosurgery resident on call. Thank You.                  After Care Instructions     Activity       After discharge, your activity restrictions are:   -We encourage short frequent walks, increasing as tolerated.  - No driving until you are seen in clinic and cleared by your neurosurgeon.   - No strenuous activity.  - No lifting more than 10 pounds until you are seen in clinic (a gallon of milk weighs approximately 8 pounds)  - Do not blow your nose until seen and evaluated in the ENT clinic.   -Nasal precautions: no straining, no nose blowing, sneeze and cough with mouth open   - Avoid straining and/or constipation. Please take the medicines you were prescribed to help prevent constipation.   - You are ok to shower, but do not soak your incisions. Do not submerge your head in water. Pat them dry if they get damp.   - No baths, hot tubs or pools for 4-6 weeks after surgery.     Nasal precautions: no straining, no nose blowing. Sneeze and cough with mouth open. Avoid straining. Please take medications prescribed for constipation. No use of straws.            Diet       Follow this diet upon discharge: Orders Placed This Encounter      Advance Diet as Tolerated: Regular Diet Adult                  Follow-up Appointments     Adult [facility] Follow-up and recommended labs and tests       You underwent surgery to remove a pituitary tumor by  Amanda Bates MD   - If you have not received the results of the pathology (diagnosis) at the time of discharge, our office will call you with these results as soon as they become available.     - You should follow up with Dr. Muniz in ENT clinic in 3 weeks for wound check and general post-operative care. You should call [phone] if you have not heard from the hospital within 7 days of discharge regarding your follow-up appointment.    - You should follow up with Dr. Santoro in Endocrinology clinic in 4 weeks for evaluation and management of  your hormones. You should call [phone] if you have not heard from the hospital within 7 days of discharge regarding your follow-up appointment.     - You should follow up with  Amanda Bates MD  in Neurosurgery clinic in 3 months for follow-up regarding your pituitary resection. You will need an MRI on the day of your follow-up appointment. You should call [phone] if you have not heard from the hospital within 7 days of discharge regarding your follow-up appointment.    -If you have a stitch in your lower back from the drain that was placed during surgery, this can be removed by your primary care doctor or in the ENT clinic at your first appointment at 2 weeks.     - If you are on a steroid medication (hydrocortisone or Cortef) please follow the detailed instructions with the prescription to slowly decrease the amount you are taking. Do not stop taking this medication unless instructed by your physicians.                  Your next 10 appointments already scheduled     Nov 16, 2017  1:20 PM CST   (Arrive by 1:05 PM)   Return Visit with Lara Mckeon MD   [facility] ([facility])    [address] [place] [address] [phone]              Future tests that were ordered for you     Sodium       Patient to have sodium blood draw on POD 7            MRI Brain w & w/o contrast                 Pending Results     Date and Time Order Name Status Description    11/3/2017 1242 Surgical pathology exam In process             Statement of Approval     Ordered          11/05/17 1418  I have reviewed and agree with all the recommendations and orders detailed in this document.  EFFECTIVE NOW     Approved and electronically signed by:  Alan Garcias MD             Admission Information     Date & Time Provider Department Dept. Phone    11/3/2017 Amanda Bates MD [address] [facility] [phone]      Your Vitals  "Were     Blood Pressure Pulse Temperature Respirations Height Weight    172/79 (BP Location: Left arm) 60 96.8  F (36  C) (Oral) 16 1.78 m (5' 10.08\") 110 kg (242 lb 8.1 oz)    Pulse Oximetry BMI (Body Mass Index)                93% 34.72 kg/m2          [facility] Information     [employer] gives you secure access to your electronic health record. If you see a primary care provider, you can also send messages to your care team and make appointments. If you have questions, please call your primary care clinic.  If you do not have a primary care provider, please call [phone] and they will assist you.        Care EveryWhere ID     This is your Care EveryWhere ID. This could be used by other organizations to access your [employer] medical records  [id number]        Equal Access to Services     KENNETH MARLOW : Jorden Johnson, linh rodriguez, ce chicas, faustino lopez . So [facility] [phone].    ATENCIÓN: Si habla español, tiene a herndon disposición servicios gratuitos de asistencia lingüística. Llame al [phone].    We comply with applicable federal civil rights laws and [place] laws. We do not discriminate on the basis of race, color, national origin, age, disability, sex, sexual orientation, or gender identity.               Review of your medicines      START taking        Dose / Directions    * hydrocortisone 20 MG tablet   Commonly known as:  CORTEF        Dose:  20 mg   Take 1 tablet (20 mg) by mouth every morning   Quantity:  1 tablet   Refills:  0       * hydrocortisone 10 MG tablet   Commonly known as:  CORTEF        Dose:  10 mg   Take 1 tablet (10 mg) by mouth every evening   Quantity:  1 tablet   Refills:  0       ondansetron 4 MG ODT tab   Commonly known as:  ZOFRAN-ODT        Dose:  4 mg   Take 1 tablet (4 mg) by mouth every 6 hours as needed for nausea or vomiting   Quantity:  40 tablet   Refills:  0       oxyCODONE IR 5 MG tablet   Commonly known " as:  ROXICODONE        Dose:  5 mg   Take 1 tablet (5 mg) by mouth every 6 hours as needed for moderate to severe pain   Quantity:  24 tablet   Refills:  0       pantoprazole 40 MG EC tablet   Commonly known as:  PROTONIX        Dose:  40 mg   Take 1 tablet (40 mg) by mouth every morning (before breakfast)   Quantity:  10 tablet   Refills:  0       polyethylene glycol Packet   Commonly known as:  MIRALAX/GLYCOLAX        Dose:  17 g   Take 17 g by mouth 2 times daily   Quantity:  21 packet   Refills:  0       senna-docusate 8.6-50 MG per tablet   Commonly known as:  SENOKOT-S;PERICOLACE        Dose:  2 tablet   Take 2 tablets by mouth 2 times daily   Quantity:  100 tablet   Refills:  0       * Notice:  This list has 2 medication(s) that are the same as other medications prescribed for you. Read the directions carefully, and ask your doctor or other care provider to review them with you.      CONTINUE these medicines which may have CHANGED, or have new prescriptions. If we are uncertain of the size of tablets/capsules you have at home, strength may be listed as something that might have changed.        Dose / Directions    acetaminophen 325 MG tablet   Commonly known as:  TYLENOL   This may have changed:    - medication strength  - how much to take  - when to take this  - reasons to take this        Dose:  650 mg   Take 2 tablets (650 mg) by mouth every 4 hours as needed for other (surgical pain)   Quantity:  100 tablet   Refills:  0       ezetimibe 10 MG tablet   Commonly known as:  ZETIA   This may have changed:  when to take this   Used for:  Atherosclerosis of native coronary artery of native heart with other form of angina pectoris (H)        Dose:  10 mg   Take 1 tablet (10 mg) by mouth daily   Quantity:  90 tablet   Refills:  3       ramipril 5 MG capsule   Commonly known as:  ALTACE   This may have changed:  when to take this   Used for:  Benign essential hypertension, Hyperlipidemia LDL goal <70, Coronary  artery disease involving native coronary artery of native heart without angina pectoris        Dose:  5 mg   Take 1 capsule (5 mg) by mouth daily   Quantity:  90 capsule   Refills:  3       rosuvastatin 40 MG tablet   Commonly known as:  CRESTOR   This may have changed:  when to take this   Used for:  CAD (coronary artery disease), Hyperlipidemia LDL goal <70, Benign essential hypertension, Coronary artery disease involving native coronary artery of native heart without angina pectoris        Dose:  40 mg   Take 1 tablet (40 mg) by mouth daily   Quantity:  90 tablet   Refills:  2         CONTINUE these medicines which have NOT CHANGED        Dose / Directions    aspirin 81 MG tablet   Used for:  Coronary artery disease involving native coronary artery of native heart without angina pectoris        Dose:  81 mg   Take 1 tablet (81 mg) by mouth every evening   Quantity:  30 tablet   Refills:  0       glucosamine 500 MG Caps        Dose:  500 mg   Take 500 mg by mouth 2 times daily   Refills:  0       metoprolol 25 MG tablet   Commonly known as:  LOPRESSOR   Used for:  CAD (coronary artery disease), Hyperlipidemia LDL goal <70, Benign essential hypertension, Coronary artery disease involving native coronary artery of native heart without angina pectoris        Dose:  25 mg   Take 1 tablet (25 mg) by mouth 2 times daily   Quantity:  180 tablet   Refills:  3       Multi-vitamin Tabs tablet   Generic drug:  multivitamin, therapeutic with minerals        Dose:  1 tablet   Take 1 tablet by mouth every morning   Refills:  0       nitroGLYcerin 0.4 MG sublingual tablet   Commonly known as:  NITROSTAT   Used for:  Coronary artery disease involving native coronary artery of native heart without angina pectoris        Dose:  0.4 mg   Place 1 tablet (0.4 mg) under the tongue every 5 minutes as needed May repeat X 2. If no relief after 3 tablets call 911   Quantity:  25 tablet   Refills:  1         STOP taking     ibuprofen 200 MG  tablet   Commonly known as:  ADVIL/MOTRIN           SAW PALMETTO COMPLEX PO                Where to get your medicines      These medications were sent to [facility] - [place], [place] - [address]  [address], [facility] [address]     Phone:  [phone]     acetaminophen 325 MG tablet    aspirin 81 MG tablet    hydrocortisone 10 MG tablet    hydrocortisone 20 MG tablet    ondansetron 4 MG ODT tab    pantoprazole 40 MG EC tablet    polyethylene glycol Packet    senna-docusate 8.6-50 MG per tablet         Some of these will need a paper prescription and others can be bought over the counter. Ask your nurse if you have questions.     Bring a paper prescription for each of these medications     oxyCODONE IR 5 MG tablet                Protect others around you: Learn how to safely use, store and throw away your medicines at www.disposemymeds.org.             Medication List: This is a list of all your medications and when to take them. Check marks below indicate your daily home schedule. Keep this list as a reference.      Medications           Morning Afternoon Evening Bedtime As Needed    acetaminophen 325 MG tablet   Commonly known as:  TYLENOL   Take 2 tablets (650 mg) by mouth every 4 hours as needed for other (surgical pain)   Last time this was given:  650 mg on 11/6/2017  9:23 AM                                aspirin 81 MG tablet   Take 1 tablet (81 mg) by mouth every evening                                ezetimibe 10 MG tablet   Commonly known as:  ZETIA   Take 1 tablet (10 mg) by mouth daily   Last time this was given:  10 mg on 11/6/2017  9:00 AM                                glucosamine 500 MG Caps   Take 500 mg by mouth 2 times daily                                * hydrocortisone 20 MG tablet   Commonly known as:  CORTEF   Take 1 tablet (20 mg) by mouth every morning   Last time this was given:  20 mg on 11/6/2017  9:00 AM                                *  hydrocortisone 10 MG tablet   Commonly known as:  CORTEF   Take 1 tablet (10 mg) by mouth every evening   Last time this was given:  20 mg on 11/6/2017  9:00 AM                                metoprolol 25 MG tablet   Commonly known as:  LOPRESSOR   Take 1 tablet (25 mg) by mouth 2 times daily   Last time this was given:  25 mg on 11/6/2017  9:00 AM                                Multi-vitamin Tabs tablet   Take 1 tablet by mouth every morning   Generic drug:  multivitamin, therapeutic with minerals                                nitroGLYcerin 0.4 MG sublingual tablet   Commonly known as:  NITROSTAT   Place 1 tablet (0.4 mg) under the tongue every 5 minutes as needed May repeat X 2. If no relief after 3 tablets call 911                                ondansetron 4 MG ODT tab   Commonly known as:  ZOFRAN-ODT   Take 1 tablet (4 mg) by mouth every 6 hours as needed for nausea or vomiting                                oxyCODONE IR 5 MG tablet   Commonly known as:  ROXICODONE   Take 1 tablet (5 mg) by mouth every 6 hours as needed for moderate to severe pain   Last time this was given:  10 mg on 11/6/2017  6:38 AM                                pantoprazole 40 MG EC tablet   Commonly known as:  PROTONIX   Take 1 tablet (40 mg) by mouth every morning (before breakfast)   Last time this was given:  40 mg on 11/6/2017  9:00 AM                                polyethylene glycol Packet   Commonly known as:  MIRALAX/GLYCOLAX   Take 17 g by mouth 2 times daily   Last time this was given:  17 g on 11/6/2017  9:01 AM                                ramipril 5 MG capsule   Commonly known as:  ALTACE   Take 1 capsule (5 mg) by mouth daily   Last time this was given:  5 mg on 11/6/2017  9:23 AM                                rosuvastatin 40 MG tablet   Commonly known as:  CRESTOR   Take 1 tablet (40 mg) by mouth daily   Last time this was given:  40 mg on 11/5/2017  7:36 PM                                senna-docusate 8.6-50 MG per  tablet   Commonly known as:  SENOKOT-S;PERICOLACE   Take 2 tablets by mouth 2 times daily   Last time this was given:  2 tablets on 11/6/2017  9:00 AM                                * Notice:  This list has 2 medication(s) that are the same as other medications prescribed for you. Read the directions carefully, and ask your doctor or other care provider to review them with you.

## 2017-11-03 NOTE — ANESTHESIA POSTPROCEDURE EVALUATION
Patient: Terrence Murillo    Procedure(s):  Stealth Guided Endoscopic Transnasal Craniotomy for Pituitary Tumor - Wound Class: I-Clean    Diagnosis:Pituitary Tumor   Diagnosis Additional Information: No value filed.    Anesthesia Type:  General, ETT    Note:  Anesthesia Post Evaluation    Patient location during evaluation: PACU  Patient participation: Able to fully participate in evaluation  Level of consciousness: sleepy but conscious and awake  Pain management: adequate  Airway patency: patent  Cardiovascular status: acceptable  Respiratory status: acceptable  Hydration status: acceptable  PONV: controlled     Anesthetic complications: yes  Specific anesthetic complications: Reintubation   Additional complication comments:Other QI - See Comment  Comments: Patient awake to voice, clear a/w.  Stable VS after BP controlled with Labetalol and hydralazine.  No new or current issues evident at this time.  OK out per PACU protocol.  --rcp    QI NOTE Only  Patient was unexpectedly EXTUBATED at the end of the surgical procedure, while surgeons were manipulating the head to remove the head romo pins. No etCO2, and no chest rise.  Ventilation was secured with bag, valve, mask ventilation while cMAC brought in.  Patient reintubated with benefit of a bougie.  + chest rise and + etCO2.  Taken to the PACU intubated to ensure restoration of stable signs.  Patient extubated electively and smoothly about 20 min later in PACU.   ---rcp          Last vitals:  Vitals:    11/03/17 1515 11/03/17 1530 11/03/17 1545   BP: 110/56 112/60 118/63   Pulse:      Resp: 18 12 12   Temp:   36.4  C (97.5  F)   SpO2: 98% 98% 97%         Electronically Signed By: Tristian Kirk MD  November 3, 2017  4:24 PM

## 2017-11-03 NOTE — OR NURSING
Text page sent to neurosurgery team for post-op orders and Transfer order. Seen at bedside by Dr Gutierrez for brief neuro exam; he stated orders will be entered.

## 2017-11-03 NOTE — ANESTHESIA CARE TRANSFER NOTE
Patient: Terrence Murillo    Procedure(s):  Stealth Guided Endoscopic Transnasal Craniotomy for Pituitary Tumor - Wound Class: I-Clean    Diagnosis: Pituitary Tumor   Diagnosis Additional Information: No value filed.    Anesthesia Type:   General, ETT     Note:  Airway :ETT  Patient transferred to:PACU  Comments: Patient extubated while head was being repositioned out of the [place] pins. Staff and extra support called for, patient was easily mask ventilated and re-intubated at the bedside and subsequently transported to the PACU in stable condition. Sedated temporarily with propofol which was discontinued in the pacu, reversal agent given (200mg sugammadex), and the patient was extubated without issue. Patient responsive to verbal commands, able to demonstrate good  strength, and a 5 second sustained head lift. Sign out given to nursing staff. Patient remained hemodynamically stable throughout.     Sheldon Wilkinson MD  CA2  [id number] Report: Identifed the Patient, Identified the Reponsible Provider, Reviewed the pertinent medical history, Discussed the surgical course, Reviewed Intra-OP anesthesia mangement and issues during anesthesia, Set expectations for post-procedure period and Allowed opportunity for questions and acknowledgement of understanding      Vitals: (Last set prior to Anesthesia Care Transfer)    CRNA VITALS  11/3/2017 1432 - 11/3/2017 1531      11/3/2017             Pulse: 69    Temp: 36.7  C (98.1  F)    SpO2: 98 %    Resp Rate (observed): (!)  1                Electronically Signed By: Sheldon Wilkinson MD  November 3, 2017  3:31 PM

## 2017-11-03 NOTE — IP AVS SNAPSHOT
[address] [address]    Phone:  [phone]                                       After Visit Summary   11/3/2017    Terrence Murillo    MRN: [id number]           After Visit Summary Signature Page     I have received my discharge instructions, and my questions have been answered. I have discussed any challenges I see with this plan with the nurse or doctor.    ..........................................................................................................................................  Patient/Patient Representative Signature      ..........................................................................................................................................  Patient Representative Print Name and Relationship to Patient    ..................................................               ................................................  Date                                            Time    ..........................................................................................................................................  Reviewed by Signature/Title    ...................................................              ..............................................  Date                                                            Time

## 2017-11-03 NOTE — BRIEF OP NOTE
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 Small CSF leak indentified. Dura repair placed under and nasoseptal flap per ENT..  Complications: None.  Implants: None.    Plan:  PACU --> Neuro ICU  Pituitary steroid protocol  Aggressive bowel regimen    Andres Cox  PGY-7, Neurosurgery    Please dial * [phone] and enter job code [phone] to reach the neurosurgery team.

## 2017-11-03 NOTE — OP NOTE
DATE OF PROCEDURE:  11/03/2017      PREOPERATIVE DIAGNOSIS:  Nonsecreting pituitary macroadenoma.      POSTOPERATIVE DIAGNOSIS:  Nonsecreting pituitary macroadenoma.        PROCEDURES:   1.  Frameless stereotactic guided endoscopic transnasa resection of pituitary adenoma.   2.  Use of intraoperative frameless stereotactic navigation.      SURGEON:  Dr. Leo Bates.      RESIDENT SURGEON:  Andres Cox MD.      ANESTHESIA:  General endotracheal anesthesia.      URINE OUTPUT:  750 mL.      ESTIMATED BLOOD LOSS:  400 mL.      ANESTHESIA:  General endotracheal anesthesia.     INDICATIONS FOR PROCEDURE:  Mr. Terrence Murillo is a 72-year-old gentleman who presented to the Pituitary Clinic at the [facility] with concerns of progressive bitemporal hemianopsia for a prolonged period of time.  MRI imaging was obtained by referring physician that showed a large pituitary macroadenoma.  On evaluation, it was found to be nonsecreting. Because of his vision loss, he presents for endoscopic, transnasal resection of this tumor.      DESCRIPTION OF PROCEDURE:  Mr. Murillo was brought to the operating room by our Anesthesia colleagues.  Prior to the surgery, he underwent preoperative imaging, and the images were loaded into the Stealth neuronavigation system.  He underwent general endotracheal anesthesia and a Davidson catheter and arterial line was placed and the bed was turned 180 degrees and his head was fixed in a Rodgers headholder and with his neck slightly extended. His head was registered to the frameless stereotactic system and the ENT team then proceeded with the timeout and endoscopic, transnasal access to the sella. Please see Dr. Muniz s operative note for complete details.     We returned at this point. The floor of the sella was expanded and thin. It was removed with curettes and Kerrison punches. The underlying dura was tense.  On the right side, part of the carotid artery was exposed but the left  side there was bone over the carotid artery.  Neuronavigation was used to define the bony limits of exposure. The dura was opened in a cruciate fashion with bayoneted 11 blades.  Then, using a curved ring curette, a plane was created between the dura and the tumor pseudocapsule.  Then, inferiorly, the tumor capsule was opened with bayoneted 11 blade and the tumor entered.  Suctioning of the tumor was performed as the ring curettes delivered it.  Specimens were sent for final pathology.  Then, after significant debulking was performed inferiorly and posteriorly, neuronavigation was used to confirm that we reached the dorsum sellae. Then with a combination of suction and side angle and up angled ring curettes, further debulking was performed.  Then, using a Blakesley forceps, the firm tumor pseudocapsule was pulled downwards such that there was arachnoid plane identified.  The arachnoid began to descend, and the tumor was slowly dissected off the arachnoid in all directions on the left and right side as well as superiorly. We removed tumor laterally towards the parasellar space on both sides.    There was a small CSF leak noted on the right side where this tumor was adherent to the arachnoid.  Then, exploration of the sella was performed on the right side where the wall of the cavernous sinus was noted.  There is a small bit of tumor pseudocapsule attached to the cavernous sinus wall, which was coagulated and could not be removed due to its adherence to the carotid artery. Then, there was no obvious tumor identified inferiorly on the left side.  There arachnoid layer had descended into the sella.  On the left side superiorly, a small firm tissue fragment, thought to be normal pituitary gland, was left in place. It appeared that we achieved a near total resection of the tumor.    Then, irrigation was performed and under the dural edges, a small piece of Durepair which was sized appropriately was placed as an inlay.   Between the dura and the bony edges, Surgicel was laid and then the ENT team proceeded with closure of the surgical wound with nasal septal flap which will be dictated by them separately again. Because the closure appeared adequate, we decided against lumbar drain placement.    At the end of the operation, the headholder was removed. The patient was extubated and transported to the recovery room without incident.      Dr. Stahl was present for the entire neurosurgery portion and immediately available for the entire operation.     FLOR STAHL MD       As dictated by LYNDA OCAMPO MD      ATTESTATION: My signature attests that I was present for the entire neurosurgery portion and immediately available for the entire operation described above. I agree with the findings above.  FLOR STAHL MD           D: 2017 14:13   T: 2017 15:16   MT: TD      Name:     DANIEL RIGGINS   MRN:      [id number]        Account:        [id number]   :      1945           Procedure Date: 2017      Document: [id number]

## 2017-11-03 NOTE — ANESTHESIA PROCEDURE NOTES
Arterial Line Procedure Note  Staff:     Anesthesiologist:  STEPHANIE MARIE    Resident/CRNA:  EDDA GARCIA    Arterial line performed by resident/CRNA in presence of a teaching physician    Location: In OR After Induction  Procedure Start/Stop Times:     patient identified, IV checked, site marked, risks and benefits discussed, informed consent, monitors and equipment checked, pre-op evaluation and at physician/surgeon's request      Correct Patient: Yes      Correct Position: Yes      Correct Site: Yes      Correct Procedure: Yes      Correct Laterality:  Yes    Site Marked:  Yes  Line Placement:     Procedure:  Arterial Line    Insertion Site:  Radial    Insertion laterality:  Left    Skin Prep: Chloraprep      Patient Prep: patient draped, mask, sterile gloves and hat      Local skin infiltration:  None    Ultrasound Guided?: Yes      Artery evaluated via ultrasound confirming patency.   Using realtime imaging, the artery was punctured and the needle was observed entering the artery.      A permanent image is entered into patient's chart.      Catheter size:  20 gauge, Quick cath    Cath secured with: suture      Dressing:  Tegaderm    Complications:  None obvious    Arterial waveform: Yes      IBP within 10% of NIBP: Yes

## 2017-11-03 NOTE — OR NURSING
Pt on pressure support via ETT; anesthesia resident at bedside, propofol off. Given labetolol for BP per MD. Not respinding to commands.

## 2017-11-04 ENCOUNTER — APPOINTMENT (OUTPATIENT)
Dept: OCCUPATIONAL THERAPY | Facility: CLINIC | Age: 72
DRG: 614 | End: 2017-11-04
Attending: NEUROLOGICAL SURGERY
Payer: MEDICARE

## 2017-11-04 LAB
ANION GAP SERPL CALCULATED.3IONS-SCNC: 9 MMOL/L (ref 3–14)
BUN SERPL-MCNC: 15 MG/DL (ref 7–30)
CA-I BLD-MCNC: 4.3 MG/DL (ref 4.4–5.2)
CALCIUM SERPL-MCNC: 8 MG/DL (ref 8.5–10.1)
CHLORIDE SERPL-SCNC: 109 MMOL/L (ref 94–109)
CO2 SERPL-SCNC: 24 MMOL/L (ref 20–32)
CREAT SERPL-MCNC: 0.76 MG/DL (ref 0.66–1.25)
ERYTHROCYTE [DISTWIDTH] IN BLOOD BY AUTOMATED COUNT: 13.1 % (ref 10–15)
GFR SERPL CREATININE-BSD FRML MDRD: >90 ML/MIN/1.7M2
GLUCOSE BLDC GLUCOMTR-MCNC: 140 MG/DL (ref 70–99)
GLUCOSE BLDC GLUCOMTR-MCNC: 150 MG/DL (ref 70–99)
GLUCOSE SERPL-MCNC: 151 MG/DL (ref 70–99)
HCT VFR BLD AUTO: 34.2 % (ref 40–53)
HGB BLD-MCNC: 11.1 G/DL (ref 13.3–17.7)
MAGNESIUM SERPL-MCNC: 1.9 MG/DL (ref 1.6–2.3)
MCH RBC QN AUTO: 32 PG (ref 26.5–33)
MCHC RBC AUTO-ENTMCNC: 32.5 G/DL (ref 31.5–36.5)
MCV RBC AUTO: 99 FL (ref 78–100)
PHOSPHATE SERPL-MCNC: 3.6 MG/DL (ref 2.5–4.5)
PLATELET # BLD AUTO: 229 10E9/L (ref 150–450)
POTASSIUM SERPL-SCNC: 3.9 MMOL/L (ref 3.4–5.3)
RBC # BLD AUTO: 3.47 10E12/L (ref 4.4–5.9)
SODIUM SERPL-SCNC: 136 MMOL/L (ref 133–144)
SODIUM SERPL-SCNC: 137 MMOL/L (ref 133–144)
SODIUM SERPL-SCNC: 140 MMOL/L (ref 133–144)
SODIUM SERPL-SCNC: 142 MMOL/L (ref 133–144)
WBC # BLD AUTO: 14.7 10E9/L (ref 4–11)

## 2017-11-04 PROCEDURE — 82330 ASSAY OF CALCIUM: CPT | Performed by: STUDENT IN AN ORGANIZED HEALTH CARE EDUCATION/TRAINING PROGRAM

## 2017-11-04 PROCEDURE — 25000128 H RX IP 250 OP 636: Performed by: STUDENT IN AN ORGANIZED HEALTH CARE EDUCATION/TRAINING PROGRAM

## 2017-11-04 PROCEDURE — 00000146 ZZHCL STATISTIC GLUCOSE BY METER IP

## 2017-11-04 PROCEDURE — 40000014 ZZH STATISTIC ARTERIAL MONITORING DAILY

## 2017-11-04 PROCEDURE — A9270 NON-COVERED ITEM OR SERVICE: HCPCS | Mod: GY | Performed by: STUDENT IN AN ORGANIZED HEALTH CARE EDUCATION/TRAINING PROGRAM

## 2017-11-04 PROCEDURE — 97535 SELF CARE MNGMENT TRAINING: CPT | Mod: GO | Performed by: OCCUPATIONAL THERAPIST

## 2017-11-04 PROCEDURE — 83735 ASSAY OF MAGNESIUM: CPT | Performed by: STUDENT IN AN ORGANIZED HEALTH CARE EDUCATION/TRAINING PROGRAM

## 2017-11-04 PROCEDURE — 80048 BASIC METABOLIC PNL TOTAL CA: CPT | Performed by: STUDENT IN AN ORGANIZED HEALTH CARE EDUCATION/TRAINING PROGRAM

## 2017-11-04 PROCEDURE — 25000132 ZZH RX MED GY IP 250 OP 250 PS 637: Mod: GY | Performed by: STUDENT IN AN ORGANIZED HEALTH CARE EDUCATION/TRAINING PROGRAM

## 2017-11-04 PROCEDURE — 36415 COLL VENOUS BLD VENIPUNCTURE: CPT | Performed by: NEUROLOGICAL SURGERY

## 2017-11-04 PROCEDURE — 40000133 ZZH STATISTIC OT WARD VISIT: Performed by: OCCUPATIONAL THERAPIST

## 2017-11-04 PROCEDURE — 84100 ASSAY OF PHOSPHORUS: CPT | Performed by: STUDENT IN AN ORGANIZED HEALTH CARE EDUCATION/TRAINING PROGRAM

## 2017-11-04 PROCEDURE — 40000275 ZZH STATISTIC RCP TIME EA 10 MIN

## 2017-11-04 PROCEDURE — 12000001 ZZH R&B MED SURG/OB UMMC

## 2017-11-04 PROCEDURE — 97165 OT EVAL LOW COMPLEX 30 MIN: CPT | Mod: GO | Performed by: OCCUPATIONAL THERAPIST

## 2017-11-04 PROCEDURE — 84295 ASSAY OF SERUM SODIUM: CPT | Performed by: NEUROLOGICAL SURGERY

## 2017-11-04 PROCEDURE — 85027 COMPLETE CBC AUTOMATED: CPT | Performed by: STUDENT IN AN ORGANIZED HEALTH CARE EDUCATION/TRAINING PROGRAM

## 2017-11-04 RX ORDER — ONDANSETRON 4 MG/1
4-8 TABLET, ORALLY DISINTEGRATING ORAL EVERY 6 HOURS PRN
Status: DISCONTINUED | OUTPATIENT
Start: 2017-11-04 | End: 2017-11-06 | Stop reason: HOSPADM

## 2017-11-04 RX ORDER — ONDANSETRON 2 MG/ML
4-8 INJECTION INTRAMUSCULAR; INTRAVENOUS EVERY 6 HOURS PRN
Status: DISCONTINUED | OUTPATIENT
Start: 2017-11-04 | End: 2017-11-06 | Stop reason: HOSPADM

## 2017-11-04 RX ORDER — PROCHLORPERAZINE MALEATE 5 MG
5 TABLET ORAL EVERY 6 HOURS PRN
Status: DISCONTINUED | OUTPATIENT
Start: 2017-11-04 | End: 2017-11-06 | Stop reason: HOSPADM

## 2017-11-04 RX ORDER — PROCHLORPERAZINE 25 MG
12.5 SUPPOSITORY, RECTAL RECTAL EVERY 12 HOURS PRN
Status: DISCONTINUED | OUTPATIENT
Start: 2017-11-04 | End: 2017-11-06 | Stop reason: HOSPADM

## 2017-11-04 RX ADMIN — ONDANSETRON 8 MG: 2 INJECTION INTRAMUSCULAR; INTRAVENOUS at 13:11

## 2017-11-04 RX ADMIN — ROSUVASTATIN CALCIUM 40 MG: 20 TABLET, FILM COATED ORAL at 19:58

## 2017-11-04 RX ADMIN — ONDANSETRON 4 MG: 2 INJECTION INTRAMUSCULAR; INTRAVENOUS at 00:10

## 2017-11-04 RX ADMIN — HYDRALAZINE HYDROCHLORIDE 10 MG: 20 INJECTION INTRAMUSCULAR; INTRAVENOUS at 13:13

## 2017-11-04 RX ADMIN — EZETIMIBE 10 MG: 10 TABLET ORAL at 08:42

## 2017-11-04 RX ADMIN — PANTOPRAZOLE SODIUM 40 MG: 40 TABLET, DELAYED RELEASE ORAL at 08:39

## 2017-11-04 RX ADMIN — ACETAMINOPHEN 975 MG: 325 TABLET, FILM COATED ORAL at 03:25

## 2017-11-04 RX ADMIN — METOPROLOL TARTRATE 25 MG: 25 TABLET ORAL at 19:58

## 2017-11-04 RX ADMIN — SENNOSIDES AND DOCUSATE SODIUM 2 TABLET: 8.6; 5 TABLET ORAL at 19:59

## 2017-11-04 RX ADMIN — SENNOSIDES AND DOCUSATE SODIUM 2 TABLET: 8.6; 5 TABLET ORAL at 08:40

## 2017-11-04 RX ADMIN — OXYCODONE HYDROCHLORIDE 10 MG: 5 TABLET ORAL at 00:06

## 2017-11-04 RX ADMIN — Medication 40 MG: at 00:04

## 2017-11-04 RX ADMIN — ACETAMINOPHEN 975 MG: 325 TABLET, FILM COATED ORAL at 12:39

## 2017-11-04 RX ADMIN — OXYCODONE HYDROCHLORIDE 10 MG: 5 TABLET ORAL at 08:39

## 2017-11-04 RX ADMIN — HYDROCORTISONE 20 MG: 20 TABLET ORAL at 18:39

## 2017-11-04 RX ADMIN — POLYETHYLENE GLYCOL 3350 17 G: 17 POWDER, FOR SOLUTION ORAL at 10:59

## 2017-11-04 RX ADMIN — RAMIPRIL 5 MG: 5 CAPSULE ORAL at 08:43

## 2017-11-04 RX ADMIN — POLYETHYLENE GLYCOL 3350 17 G: 17 POWDER, FOR SOLUTION ORAL at 19:59

## 2017-11-04 RX ADMIN — PROCHLORPERAZINE EDISYLATE 5 MG: 5 INJECTION INTRAMUSCULAR; INTRAVENOUS at 07:56

## 2017-11-04 RX ADMIN — HYDROCORTISONE 40 MG: 20 TABLET ORAL at 08:39

## 2017-11-04 RX ADMIN — ACETAMINOPHEN 975 MG: 325 TABLET, FILM COATED ORAL at 19:58

## 2017-11-04 RX ADMIN — OXYCODONE HYDROCHLORIDE 10 MG: 5 TABLET ORAL at 03:26

## 2017-11-04 ASSESSMENT — ACTIVITIES OF DAILY LIVING (ADL)
COGNITION: 0 - NO COGNITION ISSUES REPORTED
FALL_HISTORY_WITHIN_LAST_SIX_MONTHS: NO
SWALLOWING: 0-->SWALLOWS FOODS/LIQUIDS WITHOUT DIFFICULTY
TOILETING: 0-->INDEPENDENT
RETIRED_EATING: 0-->INDEPENDENT
RETIRED_COMMUNICATION: 0-->UNDERSTANDS/COMMUNICATES WITHOUT DIFFICULTY
BATHING: 0-->INDEPENDENT
TRANSFERRING: 0-->INDEPENDENT
AMBULATION: 0-->INDEPENDENT
DRESS: 0-->INDEPENDENT

## 2017-11-04 ASSESSMENT — VISUAL ACUITY
OU: GLASSES

## 2017-11-04 ASSESSMENT — PAIN DESCRIPTION - DESCRIPTORS: DESCRIPTORS: HEADACHE

## 2017-11-04 NOTE — PLAN OF CARE
Problem: Patient Care Overview  Goal: Plan of Care/Patient Progress Review  Outcome: Improving  Transfer  Transferred to: 6A  Via: wheelchair  Reason for transfer: Pt appropriate for 6A  Family: Aware of transfer  Belongings: Sent with pt  Chart: Sent with pt  Medications: Meds from bin sent with pt  Report called to: JORDY Finch @ [id number].               D/I/A:     Dx: POD #1 from stealth guided endoscopic craniotomy for pituitary tumor resection     Neuro: Intact except for some confusion with RN's name and role upon waking up from nap(able to reorient, but continue to monitor LOC) and L visual field cuts.   CV: Sinus Justin in 50-Sinus 60-70s. Vasovagal response w/HR down to 30s with no other s/s with nausea. Continue to monitor. BP WNL. No prns needed.   Resp: LS clear. O2 sats in mid 90s on RA, OxiPlus on 4LPM when sleeping.   GI/: BS+. BM -. Last BM on 11/2 prior to surgery. Reg diet. Some N/V(small clear emesis in AM). Compazine given. Continue to monitor. UO low-continue to monitor. 200 mL kiko urine prior to transfer. Oncoming RN aware.  Incision/Skin:  Intact except small skin tear on L lateral side of head per primary team. 2 nasal dressings per team-no strings, no need for nasal strap. Pt educated on nasal percautions   Other:Family updated on transfer.      P: Continue to monitor. Contact Neurosurgery with changes. Maintain SBP less than 140. Continue to monitor fluid status. Discussed UO with oncoming RN. Na checks q6hr.Family updated of transfer.

## 2017-11-04 NOTE — OP NOTE
DATE OF SURGERY:  11/03/2017      PREOPERATIVE DIAGNOSIS:  Pituitary macroadenoma with suprasellar extension and visual field cuts.      POSTOPERATIVE DIAGNOSIS:  Pituitary macroadenoma with suprasellar extension and visual field cuts.      PROCEDURE:  Transnasal endoscopic approach to resect pituitary macroadenoma.      SURGEONS:  Lara Mckeon MD and Amanda Bates MD      RESIDENT SURGEON:  Johnny Mead MD      ANESTHESIA:  General with orotracheal tube.      ESTIMATED BLOOD LOSS:  150 mL.      COMPLICATIONS:  The patient accidentally extubated at the end of the case and required reintubation.  The sats did not drop lower than 96%.      INDICATIONS:  Briefly, Mr. Terrence Murillo is a 72-year-old gentleman who was found to have a very large pituitary macroadenoma after an eye examination.  He was found to have visual field cuts.  He was advised to undergo resection of this pituitary mAcroadenoma.        FINDINGS:  Nasoseptum in the midline.  The inferior and middle turbinates bilaterally are within normal limits.  He has sphenoid sinus that had multiple septations.  Please see Dr. Bates for the characteristics of the tumor.        PROCEDURE:  Risks and benefits of the procedure were explained to the patient and informed consent was obtained.  The patient was taken to the operating room today, 11/03/2017.  He was placed in the supine position.  General anesthesia was induced and orotracheal tube was placed with no difficulties.  The operating table was turned 180 degrees and the patient was placed in the Rodgers head romo by the Neurosurgery team.  Stealth-guided system was set up and found to be very accurate.  It was imperative to have the Stealth guidance system due to a need for critical anatomy as the carotid arteries.  Following this, the patient was prepped and draped in sterile fashion for a transnasal endoscopic approach to the sella region.  Following this, a timeout pause was  performed by myself and the operating room staff.  Next, I used a 0-degree nasal endoscope to gain access into the nasal cavity.  Initially we elevated a right-sided nasoseptal flap in the usual fashion.  This was stored in the nasopharynx for further use.  Next, I elevated the left posterior septal flap based inferiorly and rotated down into the floor of the sinonasal passages.  Next, I did a posterior septectomy.  Next, I entered the sphenoid sinus on the right.  I enlarged the natural ostium of the sphenoid sinus medially and inferiorly.  Next, I used a 4 mm eric drill to drill the sphenoid rostrum.  We gained access very widely into the right sphenoid sinus.  Next, I removed the right superior turbinate with Bryce-Cuts.  Following this, I removed the mucosa of the right sphenoid sinus.  Next, I removed the intersinus septum and gained access into the left sphenoid sinus.  I removed the mucosa of the left sphenoid sinus in its entirety.  I used the drill to complete wider opening at the level of the sphenoid sinus rostrum and also took down all the septations flush to the anterior sellar wall.  I also removed the left superior turbinate and I removed some portions of the left posterior ethmoid mucosa in order to gain more space to place the endoscope.  Next, I drilled the anterior sellar wall,  then I used Kerrison to remove the anterior sellar wall to expose the dura.  The dura was exposed from 1 carotid artery to the other and from the planum all the way down to the clival recess.  At this time, the neurosurgery team was called into the room and please see Dr. Bates's dictation for further information.  After the tumor was resected, the closure was as followed:  Saline solution irrigation was performed to the nasal cavity.  Following this, I applied a piece of Durepair as an under layer fashion and I placed small pieces of Surgicel around the defect.  Next, I recovered the flap from the nasopharynx and this  flap was placed as an over layer fashion and secured with most small pieces of Surgicel, Gelfoam and DuraSeal.  Then, I applied pieces of Gelfoam.  Next, I repaired a small opening that was caused  by the electrocautery on the anterior portion of the septum.  This was repaired with a small piece of mucosa obtained from the posterior septal mucosa.  This was placed as a free graft secured with pieces of Surgicel and the DuraSeal.  Next, I applied bilateral Cole splints to the septum.  I secured the Cole splints with 2-0 nylon.  Next, I advanced orogastric tube to suction out the gastric contents.  At this time, the procedure was ended.  The Rodgers head romo was removed.  Unfortunately, the patient got extubated accidentally.  We had to emergently intubate him with the anesthesia team in the room.  The patient never desaturated lower than 96%.  He was successfully intubated with Z-MAC.  At this time, the procedure was ended.  The patient was extubated and transferred to recovery room in stable condition.         SINCERE MCFARLANE MD             D: 2017 15:31   T: 2017 23:02   MT:       Name:     DANIEL RIGGINS   MRN:      [id number]        Account:        [id number]   :      1945           Procedure Date: 2017      Document: [id number]

## 2017-11-04 NOTE — PROGRESS NOTES
[facility]  Neurosurgery Daily ICU Note:          Assessment   Terrence Murillo is a 72 year old male who is postoperative day #1 from stealth guided endoscopic craniotomy for pituitary tumor resection.           Plan 1.   Neuro: s/p endoscopic resection of pituitary tumor    Continue frequent neuro exams while in ICU. Notify MD for acute changes in exam.    Pain control    Nasal precautions    Cole splints in place  2. CVS: hemodynamically stable    Maintain SBP < 140    Hydralazine and labetolol PRN    Continuous cardiac monitoring while in ICU    PTA metoprolol  3. Pulmonary: no issues    Continuous pulse oximetry    Supplemental oxygen PRN    Incentive spirometry Q1H while awake  4. GI:     Advance diet as tolerated to regular    Bowel regimen. PRN anti-emetics.    Protonix for ulcer ppx  5. Renal: no issues    mIVF -- TKO when patient is tolerating good PO intake    Davidson out, maintain strict I/O    Electrolyte replacement protocol    Continue to monitor intake/output  6. ID: afebrile, normal WBC count    Continue to monitor for fevers and/or signs of infection  7. Endocrine: s/p pituitary tumor resection    Continue glucose checks    Cortef taper    Endocrinology consult, appreciate recs    Postop levothyroxine given  8. Heme: no issues    Platelets > 100,000    INR < 1.5    Hemoglobin > 8  9. Prophylaxis    DVT: SCDs while in bed    GI: Protonix  10. Disposition: TTF    PT/OT    Above patient and plan has been discussed with the neurosurgery chief resident.     Please dial [phone] and enter [phone] to reach the on-call neurosurgery resident if you have questions.          Subjective     Surgery tolerated well. No leakage from nose post op. No acute overnight events.          Objective   Temp:  [95.4  F (35.2  C)-98.7  F (37.1  C)] 98.6  F (37  C)  Pulse:  [59] 59  Heart Rate:  [57-65] 62  Resp:  [12-18] 16  BP: (110-151)/(53-93) 140/62  MAP:  [64 mmHg-85 mmHg] 76  mmHg  Arterial Line BP: (117-150)/(39-69) 122/63  FiO2 (%):  [40 %] 40 %  SpO2:  [93 %-100 %] 98 %    FiO2 (%): 40 %  Resp: 16    I/O last 3 completed shifts:  In: 2605 [P.O.:400; I.V.:2205]  Out: 1275 [Urine:1275]    Physical Exam  General: NAD, lying comfortably in bed  Incision: clean, dry, dressing intact  Neurologic    Mental Status:       -- Awake; Alert; oriented x 3      -- Follows commands       -- Speech fluent, spontaneous. No aphasia or dysarthria.      -- no gaze preference. No apparent hemineglect.    Cranial Nerves:      -- mild L>R bitemporal hemianopsia, PERRL 3-2mm bilat and brisk      -- extraocular movements intact      -- face symmetrical, tongue midline      -- sensory V1-V3 intact bilaterally      -- palate elevates symmetrically, uvula midline      -- hearing grossly intact bilat    Motor:    Delt Bi Tri WE WF    R 5 5 5 5 5 5   L 5 5 5 5 5 5    IP Quad Ham DF PF EHL   R 5 5 5 5 5 5   L 5 5 5 5 5 5     Sensory: symmetrically intact to light touch x4 extremities.     Reflexes: 2+ bilaterally and symmetric in biceps, triceps, brachioradialis, and patellae; no ankle clonus bilaterally; negative Babinski bilaterally; negative White's bilaterally      Labs and Imaging   BMP  Recent Labs  Lab 11/03/17  1750 11/03/17  1409 11/03/17  1110 11/03/17  0906    139 139 139   POTASSIUM  --  4.1 4.4 4.2       CBC  Recent Labs  Lab 11/03/17  1409 11/03/17  1110 11/03/17  0906   HGB 11.9* 12.4* 12.0*       ABG  Recent Labs  Lab 11/03/17  1409 11/03/17  1110 11/03/17  0906   PH 7.40 7.36 7.38   PCO2 38 41 42   PO2 110* 90 153*   HCO3 23 23 24       IMAGING: no post-op images       Please contact the neurosurgery resident on call with questions by dialing [phone], then entering [phone] when prompted

## 2017-11-04 NOTE — PLAN OF CARE
Problem: Patient Care Overview  Goal: Plan of Care/Patient Progress Review  Discharge Planner OT   Patient plan for discharge: home with A from family  Current status: Mod A for LE dressing, CGA for walking, SBA for simple g/h tasks. VSS while on RA  Barriers to return to prior living situation: craniotomy/sinus precautions, pain, fatigue  Recommendations for discharge: home with A from family  Rationale for recommendations: anticipate pt will progress with therapy to safely discharge home.        Entered by: Nathen Warren 11/04/2017 2:50 PM

## 2017-11-04 NOTE — PROGRESS NOTES
SPIRITUAL HEALTH SERVICES  SPIRITUAL ASSESSMENT Progress Note  [facility] ([place]) 6A   ON-CALL VISIT    REFERRAL SOURCE: Hospital  Request    Patient said he had asked for a pre-surgery  visit yesterday. His surgery was at 5:30 a.m. November 3. We spoke briefly as Terrence said he did not have his hearing aide in. Terrence said he had a tumor removed in his head and had a tough night sleeping and feels like taking a nap. He shared that he lost his son Justin six year ago.  in his sleep from an enlarged heart. Terrence said he tells everyone that if they have children, enjoy every moment you have with them, especially the current moment. He really likes Benjy Cerna and it was connected to a hymn he sang in the [employer] choir.     PLAN: Terrence asked if I could visit him again this coming week as he should be here for five days. Will coordinate with the unit .     Barndyn Ingram  Chaplain Resident  Pager [phone]

## 2017-11-04 NOTE — PROGRESS NOTES
SPIRITUAL HEALTH SERVICES  SPIRITUAL ASSESSMENT Progress Note  [facility] ([place]) 6A   ON-CALL VISIT    REFERRAL SOURCE: Hospital  Request    Patient was moved from 4A to 6A. Went to visit and he was sound asleep.     PLAN: Will attempted later today or triage for tomorrow.     Brandyn Ingram  Chaplain Resident  Pager [phone]

## 2017-11-04 NOTE — PROGRESS NOTES
11/04/17 1400   Quick Adds   Type of Visit Initial Occupational Therapy Evaluation   Living Environment   Lives With spouse   Living Arrangements house   Home Accessibility stairs to enter home;grab bars present (bathtub)   Number of Stairs to Enter Home 2   Number of Stairs Within Home (all needs on main level)   Transportation Available car;family or friend will provide   Living Environment Comment wife is home to A prn   Self-Care   Dominant Hand right   Usual Activity Tolerance good   Current Activity Tolerance moderate   Regular Exercise yes   Activity/Exercise Type walking   Exercise Amount/Frequency 5-7 times/wk   Equipment Currently Used at Home cane, straight;walker, rolling;shower chair;raised toilet   Activity/Exercise/Self-Care Comment Pt was Ind and active   Functional Level Prior   Ambulation 0-->independent   Transferring 0-->independent   Toileting 0-->independent   Bathing 0-->independent   Dressing 0-->independent   Eating 0-->independent   Communication 0-->understands/communicates without difficulty   Swallowing 0-->swallows foods/liquids without difficulty   Cognition 0 - no cognition issues reported   Fall history within last six months yes   Number of times patient has fallen within last six months 3   Which of the above functional risks had a recent onset or change? fall history   General Information   Onset of Illness/Injury or Date of Surgery - Date 11/03/17   Referring Physician Alan Garcias MD   Patient/Family Goals Statement To get back home and be Ind   Additional Occupational Profile Info/Pertinent History of Current Problem POD #1 from stealth guided endoscopic craniotomy for pituitary tumor resection   Precautions/Limitations (craniotomy)   Cognitive Status Examination   Orientation orientation to person, place and time   Level of Consciousness alert   Visual Perception   Visual Perception Wears glasses;No deficits were identified   Visual Perception Comments unable to read in  periphery with one eye occulded   Sensory Examination   Sensory Comments Pt denies deficits   Pain Assessment   Patient Currently in Pain No   Integumentary/Edema   Integumentary/Edema no deficits were identifed   Range of Motion (ROM)   ROM Comment WFL, some end range shoulder deficits- baseline   Strength   Strength Comments NT 2/2 precautions   Muscle Tone Assessment   Muscle Tone Comments WNL   Coordination   Upper Extremity Coordination No deficits were identified   Transfer Skill: Sit to Stand   Level of [place]: Sit/Stand stand-by assist   Upper Body Dressing   Level of [place]: Dress Upper Body stand-by assist   Lower Body Dressing   Level of [place]: Dress Lower Body moderate assist (50% patients effort)   Toileting   Level of [place]: Toilet minimum assist (75% patients effort)   Grooming   Level of [place]: Grooming stand-by assist   Instrumental Activities of Daily Living (IADL)   Previous Responsibilities (Pt was Ind, wife A)   Activities of Daily Living Analysis   Impairments Contributing to Impaired Activities of Daily Living pain;post surgical precautions  (deconditioning)   General Therapy Interventions   Planned Therapy Interventions ADL retraining;progressive activity/exercise;home program guidelines   Clinical Impression   Criteria for Skilled Therapeutic Interventions Met yes, treatment indicated   OT Diagnosis decreased ADL I and tolerance post craniotomy   Influenced by the following impairments craniotomy, pain, fatigue   Assessment of Occupational Performance 1-3 Performance Deficits   Identified Performance Deficits LE dressing, home mgmt, leisure   Clinical Decision Making (Complexity) Low complexity   Therapy Frequency daily   Predicted Duration of Therapy Intervention (days/wks) 11/11/17   Anticipated Equipment Needs at Discharge (none)   Anticipated Discharge Disposition Home with Assist   Risks and Benefits of Treatment have been explained. Yes   Patient,  "Family & other staff in agreement with plan of care Yes   Clinical Impression Comments Pt would benefit from skilled OT to help increase ADL I and tolerance post craniotomy   [facility] [place] AM-PAC TM \"6 Clicks\"   2016, Trustees of [facility] [place], under license to [employer].  All rights reserved.   6 Clicks Short Forms Daily Activity Inpatient Short Form   [facility] [place] AM-PAC  \"6 Clicks\" Daily Activity Inpatient Short Form   1. Putting on and taking off regular lower body clothing? 3 - A Little   2. Bathing (including washing, rinsing, drying)? 3 - A Little   3. Toileting, which includes using toilet, bedpan or urinal? 3 - A Little   4. Putting on and taking off regular upper body clothing? 4 - None   5. Taking care of personal grooming such as brushing teeth? 4 - None   6. Eating meals? 4 - None   Daily Activity Raw Score (Score out of 24.Lower scores equate to lower levels of function) 21   Total Evaluation Time   Total Evaluation Time (Minutes) 10     "

## 2017-11-04 NOTE — CONSULTS
Reason for visit/consult: Post op evaluation and management of pituitary resection.    Primary care provider: Alexy Hernandez    HPI:  The is a pleasant 72 year old man with complicated cardiac history who initially presented with bitemporal hemianopsia diagnosed on a recent examination by Dr. Herndon. In retrospect, he believes he has had peripheral vision loss dating back at least two years. His vision deficit was mostly noticeable upon reading . Based on the visual field deficit, he had an MRI of the brain on 07/20/2017 which showed a 3 cm enhancing mass in the sella with suprasellar extension. There is mild extension into the right parasellar space. There is expansion of the sellar floor, consistent with pituitary macroadenoma. The pituitary gland appears displaced superiorly, posteriorly and to the left.  He reports his shoes size have increased about 1/2 size over the last 5 years. His weight has been stable. He also thinks his hearing has gotten worse over the last few years. His energy-level is not like it used to be, but he is not abnormally fatigued. His libido is at baseline and he has had erectile dysfunction for about 10 years. He feels like his balance is worsening but he does not have numbness or tingling in his legs. He does not experience light-headedness or vertigo.   He denies history of constipation, cold intolerance or dry skin.   His pre-op hormonal evaluation included gordy subunit pituitary tumor marker, ACTH, cortisol, LH, FSH, Testosterone, GH, IGF-1, Prolactin, TSH and FT4. Work up was suggestive of non functional pituitary macroadenoma.  He denies family history of pituitary or thyroid disorders. No past history of diabetes.   He underwent a stealth guided endoscopic craniotomy for pituitary tumor resection on 11/3/17. He currently reports nose stuffiness, but denies headache. He feels his vision has not changed yet after surgery.      Past Medical/Surgical History:  Past Medical  History:   Diagnosis Date     Coronary artery disease     cardiac cath 2016: medical management; cath 2010: SUSANA x2 to LAD; cath 1993: PTCA to LAD; cath 1992: PTCA to LAD, 1992: atherectomy of LAD     Hx of angiography 4-    no stenosis greater than 25%-rec. medical management     Hyperlipidaemia      Hypertension      Obese      Sleep apnea     CPAP     Past Surgical History:   Procedure Laterality Date     APPENDECTOMY OPEN       ARTHROPLASTY KNEE Left      ARTHROPLASTY SHOULDER Right      HEART CATH, ANGIOPLASTY  1992    LAD  atherectomy with a DVI device      HEART CATH, ANGIOPLASTY  4-28-10    PTCA and stent proximal and mid LAD (2) stents Xience       Allergies:  Allergies   Allergen Reactions     Cardizem [Diltiazem Hcl] Itching     No Clinical Screening - See Comments Hives       Current Medications   Current Facility-Administered Medications   Medication     prochlorperazine (COMPAZINE) injection 5 mg    Or     prochlorperazine (COMPAZINE) tablet 5 mg    Or     prochlorperazine (COMPAZINE) Suppository 12.5 mg     ondansetron (ZOFRAN-ODT) ODT tab 4-8 mg    Or     ondansetron (ZOFRAN) injection 4-8 mg     lidocaine 1 % 1 mL     lidocaine (LMX4) kit     sodium chloride (PF) 0.9% PF flush 3 mL     sodium chloride (PF) 0.9% PF flush 3 mL     labetalol (NORMODYNE/TRANDATE) injection 10-40 mg     hydrALAZINE (APRESOLINE) injection 10-20 mg     naloxone (NARCAN) injection 0.1-0.4 mg     acetaminophen (TYLENOL) tablet 975 mg     [START ON 11/6/2017] acetaminophen (TYLENOL) tablet 650 mg     oxyCODONE IR (ROXICODONE) tablet 5-10 mg     HYDROmorphone (DILAUDID) injection 0.2 mg     pantoprazole (PROTONIX) EC tablet 40 mg     metoprolol (LOPRESSOR) tablet 25 mg     ezetimibe (ZETIA) tablet 10 mg     ramipril (ALTACE) capsule 5 mg     rosuvastatin (CRESTOR) tablet 40 mg     polyethylene glycol (MIRALAX/GLYCOLAX) Packet 17 g     hydrocortisone (CORTEF) tablet 20 mg    And     [START ON 11/5/2017] hydrocortisone  "(CORTEF) tablet 20 mg    And     [START ON 11/5/2017] hydrocortisone (CORTEF) tablet 10 mg     senna-docusate (SENOKOT-S;PERICOLACE) 8.6-50 MG per tablet 2-3 tablet       Family History:  Family History   Problem Relation Age of Onset     C.A.D. Father      HEART DISEASE Son 37     enlarged heart     HEART DISEASE Mother      Heart Surgery Mother      open heart, passed 10 days after     Family History Negative Maternal Grandmother      Family History Negative Maternal Grandfather      Family History Negative Paternal Grandmother      Family History Negative Paternal Grandfather      Family History Negative Brother      Alzheimer Disease Brother      Family History Negative Sister      Family History Negative Daughter      Family History Negative Son      Family History Negative Daughter        Social History:  Social History   Substance Use Topics     Smoking status: Former Smoker     Packs/day: 0.20     Years: 5.00     Types: Cigarettes     Quit date: 1/1/1960     Smokeless tobacco: Never Used      Comment: Social smoking only     Alcohol use Yes      Comment: 1-2 cocktails daily        ROS:  10 point negative except as mentioned in HPI    Exam  Blood pressure 115/50, pulse 59, temperature 98.6  F (37  C), temperature source Axillary, resp. rate 18, height 1.78 m (5' 10.08\"), weight 110 kg (242 lb 8.1 oz), SpO2 98 %.  Gen: hard of hearing, pleasant and conversant  HEENT: anicteric, EOMI, no proptosis or lid lag  Thyroid: no thyroid goiter or cervical LAD  Cardio: RRR, no m/r/g  Resp: CTAB. No wheezing or crackles  Abd: soft, NT/ND. Normal BS  Skin: Warm and dry. No rash or lesions.   Ext: no swelling or edema  Feet: no deformities or ulcers, 2+ DP pulses  Neuro: A&Ox3, relaxation phase of reflexes normal, no tremor on outstretched arms  Psych: Normal affect and mood.  + [place] cath    Labs/Imaging  Results for DANIEL RIGGINS (MRN [id number]) as of 11/4/2017 08:58   Ref. Range 6/29/2017 08:15 8/3/2017 13:27 8/3/2017 " 13:30   Adrenal Corticotropin Latest Ref Range: <47 pg/mL   33   Alpha Subunit Pituitary Tumor Marker Unknown  0.2    Cholesterol Latest Ref Range: <200 mg/dL 107     Cortisol Serum Latest Ref Range: 4 - 22 ug/dL   6.6   FSH Latest Ref Range: 0.7 - 10.8 IU/L  9.4    HDL Cholesterol Latest Ref Range: >39 mg/dL 51     Growth Hormone Latest Ref Range: 0 - 3.0 ug/L  <0.1    LDL Cholesterol Calculated Latest Ref Range: <100 mg/dL 34     Non HDL Cholesterol Latest Ref Range: <130 mg/dL 56     Prolactin Latest Ref Range: 2 - 18 ug/L  6    T4 Free Latest Ref Range: 0.76 - 1.46 ng/dL  0.69 (L)    Triglycerides Latest Ref Range: <150 mg/dL 111     TSH Latest Ref Range: 0.40 - 4.00 mU/L  2.54      TSH   Date Value Ref Range Status   08/03/2017 2.54 0.40 - 4.00 mU/L Final     T4 Free   Date Value Ref Range Status   08/03/2017 0.69 (L) 0.76 - 1.46 ng/dL Final   ]  No results found for: A1C      Assessment and Plan  Terrence Murillo is a 72 year old male who is postoperative day #1 from trans nasal pituitary tumor resection. 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   He did have low testosterone and FT4 on pre-op evaluation. But no indications of severe hypogonadism or hypothyroidism clinically. He is currently placed on a tapering dose of hydrocortisone by the neurosurgical team.   The main goal now post-op is to monitor for signs and symptoms of DI    1- Monitor urine output and Check NA Q6H  2-if Na>145 or urine out put is >300 ml in 3 consecutive--> send urine specific gravity  3-If urine specific gravity <1.002 give 1 mcg IV DDAVP and cont to monitor  4-Cont with hydrocortisone taper per primary team protocol  5-No need for levothyroxine or testosterone therapy at this time  6-Schedule a follow up with endocrinology in 4 weeks with our pituitary specialist Dr Santoro      Patient seen and examined with staff endocrinologist Dr Reza Sesay,  MD  Diabetes, Metabolism and Endocrinology Fellow  Pager: [phone]        Patient seen by me with fellow Dr Sesay.  Pt with pit macroadenoma dx secondary to visual sx related to tumor mass.  No strong clinical indications of functioning tumor or generalized hypopituitarism preop.  No sx preop to suggest preexisting DI.  Preop endocrine eval with sl low FT4, low testosterone with nl gonadotropins.  On exam appears nl virilized for age, no gynecomastia, testes nl size.  No bradycardia, hypothermia, DTR with nl relax phase so no clinical signs of severe/longstanding hypogonadism or central hypothyroidism.    Recommend adrenal replacement for now but can hold off any other pituitary replacement until we can reassess as outpt in clinic.  Monitor for postop DI as you are doing   Findings and plan as above.  Will fu while inpt.    Micah Cobb MD   [phone]

## 2017-11-04 NOTE — PLAN OF CARE
"Problem: Patient Care Overview  Goal: Plan of Care/Patient Progress Review  Outcome: Improving                                                                                                                                      Progress Note      D/A:  Terrence Murillo is a 72 year old male who is postoperative day #1 from stealth guided endoscopic craniotomy for pituitary tumor resection.  Blood pressure 103/46, pulse 59, temperature 98.4  F (36.9  C), temperature source Oral, resp. rate 16, height 1.78 m (5' 10.08\"), weight 110 kg (242 lb 8.1 oz), SpO2 98 %.    Neuro: Pt is alert and oriented and able to make his needs known, Left sided vision cut is improving per pt  CV: NSR with no ectopy noted, did vasal x2 with bouts of nausa, SBP <140  Pulm: LS are diminished and pt placed on 4L via oxi plus mask  GI: Bowel sounds active, pt placed on regular diet  : Davidson removed at 0600  Skin: No issues noted  Gtts: N/A  Lines: PIV x2, Right radial artline  Other: Davidson removed and PIV saline locked at 0600, nasal precautions maintained      R: Pt resting between cares  P: Continue with POC and contact N. Surgery if any issues                                        "

## 2017-11-04 NOTE — PLAN OF CARE
Problem: Patient Care Overview  Goal: Plan of Care/Patient Progress Review     Pt 4A tx at 1315, POD1 from stealth guided endoscopic craniotomy for pituitary tumor resection. Nasal packing in place, scant serosang/bloody drainage noted on pillowcase from R nare, denies PND or metallic taste. Neuros intact except L visual field cut with reading, 5/5 all extremities. Intermittent nausea, noted with dry heaving, treated with zofran 8mg, seaband and cool washcloth to forehead. Pt reports HA with positional changes but others comfortable at rest. Hydralazine 10mg x1 for BP > 140, OVSS. Pt with h/o DANISH and uses bipap but cannot use because of nasal packing; will need O2 @ NOC. Voiding at bedside using urinal, and having retention; last void 200 with 406 PVR. PIV x2 sl'd. Fair po intake. Up SBA, calls appropriately. Continue to monitor and follow POC.

## 2017-11-04 NOTE — PROGRESS NOTES
"Otolaryngology Progress Note  November 4, 2017    S: No acute events overnight. Patient reports bitemporal hemianopsia but denies diplopia and blurry vision. Patient had nausea and vomiting that responded to zofran. Patient reports postnasal drainage but denies salty and metallic taste in his mouth. Patient is tolerating a PO diet.    O: /67  Pulse 59  Temp 98.6  F (37  C) (Axillary)  Resp 18  Ht 1.78 m (5' 10.08\")  Wt 110 kg (242 lb 8.1 oz)  SpO2 95%  BMI 34.72 kg/m2  General: alert and oriented; sitting comfortably in recliner; not in acute distress  HEENT:  - No anterior nasal bleeding or drainage  - No blood clots, active bleeding, or other drainage noted from nasopharynx into oropharynx  - Pupils equal, round, and reactive to light  - Visual acuity intact  - Extraocular muscles intact in all directions  - Sensation intact in V1-V3 distributions bilaterally  Pulmonary: breathing comfortably on room air; no stridor      Intake/Output Summary (Last 24 hours) at 11/04/17 1117  Last data filed at 11/04/17 1000   Gross per 24 hour   Intake             3005 ml   Output             1425 ml   Net             1580 ml       LABS:  ROUTINE IP LABS (Last four results)  BMP  Recent Labs  Lab 11/04/17  0953 11/04/17  0320 11/03/17  1750 11/03/17  1409 11/03/17  1110 11/03/17  0906    142 140 139 139 139   POTASSIUM  --  3.9  --  4.1 4.4 4.2   CHLORIDE  --  109  --   --   --   --    ELPIDIO  --  8.0*  --   --   --   --    CO2  --  24  --   --   --   --    BUN  --  15  --   --   --   --    CR  --  0.76  --   --   --   --    GLC  --  151*  --  131* 141* 123*     CBC  Recent Labs  Lab 11/04/17  0320 11/03/17  1409 11/03/17  1110 11/03/17  0906   WBC 14.7*  --   --   --    RBC 3.47*  --   --   --    HGB 11.1* 11.9* 12.4* 12.0*   HCT 34.2*  --   --   --    MCV 99  --   --   --    MCH 32.0  --   --   --    MCHC 32.5  --   --   --    RDW 13.1  --   --   --      --   --   --      INRNo lab results found in last 7 " days.    A/P: Terrence Murillo is a 72-year-old male with a PMH significant for DANISH, HTN, HLD, and CAD who is now POD#1 s/p transnasal endoscopic resection of pituitary macroadenoma. Patient is recovering well postoperatively and has no signs or symptoms of CSF leak.    Neuro:  - Alert and oriented; pain well-controlled  - Pain control: per NSG- tylenol 975mg po q8h; tylenol 650mg po q4h PRN; dilaudid 0.2mg q2h PRN; oxycodone 5-10mg po q4h PRN    HEENT:  - S/p endoscopic transnasal resection of pituitary macroadenoma  - Sinus precautions: no nose-blowing, no straining, no straws; avoid straining; sneeze and cough with mouth open; no incentive spirometry; no positive-pressure respiratory treatments  - Recommend good bowel regimen to prevent straining  - Cole splints will be removed in clinic (no antibiotics necessary for splints)  - No nasal saline spray while patient is in-house    Respiratory:  - Saturating well on room air  - Supplemental O2 PRN to keep sats >92%    CV/heme:  - Hypertensive to 151 overnight  - Hx of HTN- PTA metoprolol 25mg BID; PTA ramipril 5mg qAM  - Hx of HLD- PTA rosuvastatin 40mg qd; ezetimibe 10mg qAM  - Hx of CAD- PTA aspirin 81mg; nitroglycerin 0.4mg sublingual q5m PRN  - Postoperative Hgb 11.1    FEN/GI:  - Regular diet with good PO intake  - Bowel regimen: miralax 17g BIDl senna-docusate 2-3 tabs po BID  - BMP- all electrolytes within normal limits    :  - Voiding independently    Endo:  - Blood glucose 140-151    ID:  - Afebrile; leukocytosis to 14.7  - No signs or symptoms of infection    PPX:  - SCDs    Dispo: okay to transfer to floor from ENT perspective    -- Patient and above plan discussed with staff, Dr. Cristy Joseph MD  Otolaryngology-Head & Neck Surgery  Please contact ENT with questions by dialing [phone] and entering job code [phone] when prompted.

## 2017-11-05 ENCOUNTER — APPOINTMENT (OUTPATIENT)
Dept: PHYSICAL THERAPY | Facility: CLINIC | Age: 72
DRG: 614 | End: 2017-11-05
Attending: NEUROLOGICAL SURGERY
Payer: MEDICARE

## 2017-11-05 LAB
SODIUM SERPL-SCNC: 140 MMOL/L (ref 133–144)
SODIUM SERPL-SCNC: 141 MMOL/L (ref 133–144)
SODIUM SERPL-SCNC: 142 MMOL/L (ref 133–144)
SODIUM SERPL-SCNC: 142 MMOL/L (ref 133–144)

## 2017-11-05 PROCEDURE — 25000132 ZZH RX MED GY IP 250 OP 250 PS 637: Mod: GY | Performed by: STUDENT IN AN ORGANIZED HEALTH CARE EDUCATION/TRAINING PROGRAM

## 2017-11-05 PROCEDURE — 97116 GAIT TRAINING THERAPY: CPT | Mod: GP

## 2017-11-05 PROCEDURE — A9270 NON-COVERED ITEM OR SERVICE: HCPCS | Mod: GY | Performed by: STUDENT IN AN ORGANIZED HEALTH CARE EDUCATION/TRAINING PROGRAM

## 2017-11-05 PROCEDURE — 40000193 ZZH STATISTIC PT WARD VISIT

## 2017-11-05 PROCEDURE — 36415 COLL VENOUS BLD VENIPUNCTURE: CPT | Performed by: NEUROLOGICAL SURGERY

## 2017-11-05 PROCEDURE — 97161 PT EVAL LOW COMPLEX 20 MIN: CPT | Mod: GP

## 2017-11-05 PROCEDURE — 12000001 ZZH R&B MED SURG/OB UMMC

## 2017-11-05 PROCEDURE — 84295 ASSAY OF SERUM SODIUM: CPT | Performed by: NEUROLOGICAL SURGERY

## 2017-11-05 RX ORDER — AMOXICILLIN 250 MG
2 CAPSULE ORAL 2 TIMES DAILY
Qty: 100 TABLET | Refills: 0 | Status: SHIPPED | OUTPATIENT
Start: 2017-11-05 | End: 2018-07-24

## 2017-11-05 RX ORDER — ACETAMINOPHEN 325 MG/1
650 TABLET ORAL EVERY 4 HOURS PRN
Qty: 100 TABLET | Refills: 0 | Status: SHIPPED | OUTPATIENT
Start: 2017-11-06 | End: 2018-07-24

## 2017-11-05 RX ORDER — OXYCODONE HYDROCHLORIDE 5 MG/1
5 TABLET ORAL EVERY 6 HOURS PRN
Qty: 24 TABLET | Refills: 0 | Status: SHIPPED | OUTPATIENT
Start: 2017-11-05 | End: 2018-07-24

## 2017-11-05 RX ORDER — POLYETHYLENE GLYCOL 3350 17 G/17G
17 POWDER, FOR SOLUTION ORAL 2 TIMES DAILY
Qty: 21 PACKET | Refills: 0 | Status: SHIPPED | OUTPATIENT
Start: 2017-11-05 | End: 2018-02-06

## 2017-11-05 RX ORDER — PANTOPRAZOLE SODIUM 40 MG/1
40 TABLET, DELAYED RELEASE ORAL
Qty: 10 TABLET | Refills: 0 | Status: SHIPPED | OUTPATIENT
Start: 2017-11-05 | End: 2018-07-24

## 2017-11-05 RX ORDER — ONDANSETRON 4 MG/1
4 TABLET, ORALLY DISINTEGRATING ORAL EVERY 6 HOURS PRN
Qty: 40 TABLET | Refills: 0 | Status: SHIPPED | OUTPATIENT
Start: 2017-11-05 | End: 2018-02-06

## 2017-11-05 RX ORDER — HYDROCORTISONE 20 MG/1
20 TABLET ORAL EVERY MORNING
Qty: 1 TABLET | Refills: 0 | Status: SHIPPED | OUTPATIENT
Start: 2017-11-05 | End: 2017-11-20

## 2017-11-05 RX ORDER — HYDROCORTISONE 10 MG/1
10 TABLET ORAL EVERY EVENING
Qty: 1 TABLET | Refills: 0 | Status: SHIPPED | OUTPATIENT
Start: 2017-11-05 | End: 2017-11-20

## 2017-11-05 RX ADMIN — HYDROCORTISONE 20 MG: 20 TABLET ORAL at 09:16

## 2017-11-05 RX ADMIN — SENNOSIDES AND DOCUSATE SODIUM 3 TABLET: 8.6; 5 TABLET ORAL at 09:16

## 2017-11-05 RX ADMIN — ACETAMINOPHEN 975 MG: 325 TABLET, FILM COATED ORAL at 12:09

## 2017-11-05 RX ADMIN — POLYETHYLENE GLYCOL 3350 17 G: 17 POWDER, FOR SOLUTION ORAL at 09:20

## 2017-11-05 RX ADMIN — EZETIMIBE 10 MG: 10 TABLET ORAL at 11:10

## 2017-11-05 RX ADMIN — METOPROLOL TARTRATE 25 MG: 25 TABLET ORAL at 19:37

## 2017-11-05 RX ADMIN — ACETAMINOPHEN 975 MG: 325 TABLET, FILM COATED ORAL at 04:00

## 2017-11-05 RX ADMIN — OXYCODONE HYDROCHLORIDE 10 MG: 5 TABLET ORAL at 04:02

## 2017-11-05 RX ADMIN — PANTOPRAZOLE SODIUM 40 MG: 40 TABLET, DELAYED RELEASE ORAL at 09:15

## 2017-11-05 RX ADMIN — OXYCODONE HYDROCHLORIDE 10 MG: 5 TABLET ORAL at 12:09

## 2017-11-05 RX ADMIN — ACETAMINOPHEN 975 MG: 325 TABLET, FILM COATED ORAL at 19:36

## 2017-11-05 RX ADMIN — RAMIPRIL 5 MG: 5 CAPSULE ORAL at 11:09

## 2017-11-05 RX ADMIN — ROSUVASTATIN CALCIUM 40 MG: 20 TABLET, FILM COATED ORAL at 19:36

## 2017-11-05 RX ADMIN — SENNOSIDES AND DOCUSATE SODIUM 2 TABLET: 8.6; 5 TABLET ORAL at 19:38

## 2017-11-05 RX ADMIN — HYDROCORTISONE 10 MG: 10 TABLET ORAL at 16:49

## 2017-11-05 RX ADMIN — POLYETHYLENE GLYCOL 3350 17 G: 17 POWDER, FOR SOLUTION ORAL at 19:38

## 2017-11-05 RX ADMIN — OXYCODONE HYDROCHLORIDE 10 MG: 5 TABLET ORAL at 00:14

## 2017-11-05 RX ADMIN — OXYCODONE HYDROCHLORIDE 10 MG: 5 TABLET ORAL at 08:18

## 2017-11-05 ASSESSMENT — VISUAL ACUITY
OU: GLASSES

## 2017-11-05 ASSESSMENT — PAIN DESCRIPTION - DESCRIPTORS: DESCRIPTORS: HEADACHE

## 2017-11-05 NOTE — PLAN OF CARE
Problem: Patient Care Overview  Goal: Plan of Care/Patient Progress Review     Pt Ox4, hypertensive times but OVSS, neuros intact except L field cut when reading. HA controlled with PRN oxycodone, scheduled tylenol and use of ice packs. Cole splints in place, no drainage noted; denies PND or metallic taste. PIV sl'd, poor appetite but denies nausea, adequate fluid intake. Mod PVRs, doing well with double voiding. Na+ q6hrs WNL. No BM since 11/2/17, passing flatus. Up SBA, ambulated halls x3. Pt had orders to discharge to home today but pt reports he was unaware about this plan as he was told possible discharge tomorrow or Tuesday; also reported he would not have a ride available to pick him up. On coming nurse and CN aware; NSG notified. Continue to monitor and follow POC.

## 2017-11-05 NOTE — PLAN OF CARE
Problem: Patient Care Overview  Goal: Plan of Care/Patient Progress Review  PT 6A:  Eval and treatment completed.  Discharge Planner PT   Patient plan for discharge: Home with assist  Current status:  Intermittent supervision for gait >300ft.  Mod I with stairs.  (I) transfers and bed mobility.  Field cut impairments do not appear to significantly impact home mobility, had been compensating pre-op.  Barriers to return to prior living situation: None from mobility standpoint.  Medical stability.  Recommendations for discharge: Home with assist, walk 4x/day while in hospital at once at home  Rationale for recommendations: Pt eugenie's intact balance and strength, has insight into his precautions and needs.  Does not have further needs requiring skilled PT intervention.       Entered by: Sheila Mchugh 11/05/2017 11:56 AM         Pt is safe and appropriate to ambulate with nursing staff with Standby assist.  To prevent deconditioning and promote safe discharge, recommend pt ambulate with nursing or family 4x/day. OT to follow for skilled therapy per POC.  PT will sign off.        Physical Therapy Discharge Summary     Reason for therapy discharge:    All goals and outcomes met, no further needs identified.     Progress towards therapy goal(s). See goals on Care Plan in [facility] electronic health record for goal details.  Goals met     Therapy recommendation(s):    No further therapy is recommended.  Continue daily walking program to promote circulation, prevent DVT, prevent post-op deconditioning.

## 2017-11-05 NOTE — PROGRESS NOTES
[facility]  Neurosurgery Daily ICU Note:          Assessment   Terrence Murillo is a 72 year old male who is postoperative day #2 from stealth guided endoscopic craniotomy for pituitary tumor resection.           Plan     Pain control    Nasal precautions    Cole splints in place    PTA metoprolol    Incentive spirometry Q1H while awake    Regular diet     Continue to monitor strict intake/output    Cortef taper    Endocrinology consult, appreciate recs    Postop levothyroxine given  1. Heme: no issues    Platelets > 100,000    INR < 1.5    Hemoglobin > 8  2. Prophylaxis    DVT: SCDs while in bed    GI: Protonix  3. Disposition: stable on 6A pending therapy evaluation    PT/OT    Above patient and plan has been discussed with the neurosurgery chief resident.     Please dial [phone] and enter job code [phone] to reach the on-call neurosurgery resident if you have questions.          Subjective     Transferred out of the ICU yesterday.  Minor episode of bloody drainage from the nose yesterday.  No clear, continuous drainage from nose or in back of throat.           Objective   Temp:  [96.3  F (35.7  C)-98.6  F (37  C)] 97.5  F (36.4  C)  Heart Rate:  [51-69] 62  Resp:  [12-18] 16  BP: ()/(38-70) 106/40  MAP:  [42 mmHg-79 mmHg] 79 mmHg  Arterial Line BP: ()/(25-59) 137/55  SpO2:  [91 %-99 %] 96 %    Resp: 16    I/O last 3 completed shifts:  In: 2306 [P.O.:1500; I.V.:806]  Out: 2100 [Urine:2100]    Physical Exam  General: NAD, lying comfortably in bed  Neurologic    Mental Status:       -- Awake; Alert; oriented x 3      -- Follows commands       -- Speech fluent, spontaneous. No aphasia or dysarthria.      -- no gaze preference. No apparent hemineglect.    Cranial Nerves:      -- mild L>R bitemporal hemianopsia, PERRL 3-2mm bilat and brisk      -- extraocular movements intact      -- face symmetrical, tongue midline      -- sensory V1-V3 intact bilaterally      -- palate elevates  symmetrically, uvula midline      -- hearing grossly intact bilat    Motor:    Delt Bi Tri WE WF    R 5 5 5 5 5 5   L 5 5 5 5 5 5    IP Quad Ham DF PF EHL   R 5 5 5 5 5 5   L 5 5 5 5 5 5     Sensory: symmetrically intact to light touch x4 extremities.     Reflexes: 2+ bilaterally and symmetric in biceps, triceps, brachioradialis, and patellae; no ankle clonus bilaterally; negative Babinski bilaterally; negative White's bilaterally      Labs and Imaging   BMP  Recent Labs  Lab 11/04/17  2139 11/04/17  1648 11/04/17  0953 11/04/17  0320  11/03/17  1409 11/03/17  1110 11/03/17  0906    136 137 142  < > 139 139 139   POTASSIUM  --   --   --  3.9  --  4.1 4.4 4.2   CHLORIDE  --   --   --  109  --   --   --   --    CO2  --   --   --  24  --   --   --   --    BUN  --   --   --  15  --   --   --   --    CR  --   --   --  0.76  --   --   --   --    ELPIDIO  --   --   --  8.0*  --   --   --   --    < > = values in this interval not displayed.    CBC    Recent Labs  Lab 11/04/17  0320 11/03/17  1409 11/03/17  1110 11/03/17  0906   WBC 14.7*  --   --   --    HGB 11.1* 11.9* 12.4* 12.0*     --   --   --        ABG    Recent Labs  Lab 11/03/17  1409 11/03/17  1110 11/03/17  0906   PH 7.40 7.36 7.38   PCO2 38 41 42   PO2 110* 90 153*   HCO3 23 23 24       IMAGING: no post-op images       Please contact the neurosurgery resident on call with questions by dialing [phone], then entering [phone] when prompted

## 2017-11-05 NOTE — PROGRESS NOTES
"Otolaryngology Progress Note  November 5, 2017    S: No acute events overnight. Tolerating more PO intake. Denies vision changes, salty/metallic taste in the back of his throat, nausea, vomiting. Ambulating without issues.    O: /64 (BP Location: Left arm)  Pulse 59  Temp 96  F (35.6  C) (Axillary)  Resp 16  Ht 1.78 m (5' 10.08\")  Wt 110 kg (242 lb 8.1 oz)  SpO2 92%  BMI 34.72 kg/m2  General: alert and oriented; sitting comfortably in recliner; not in acute distress  HEENT:  - No anterior nasal bleeding or drainage  - No blood clots, active bleeding, or other drainage noted from nasopharynx into oropharynx  - Pupils equal, round, and reactive to light  - Visual acuity intact  - Extraocular muscles intact in all directions  - Sensation intact in V1-V3 distributions bilaterally  Pulmonary: breathing comfortably on room air; no stridor      Intake/Output Summary (Last 24 hours) at 11/05/17 1132  Last data filed at 11/05/17 0800   Gross per 24 hour   Intake              766 ml   Output             2325 ml   Net            -1559 ml       LABS:  ROUTINE IP LABS (Last four results)  BMP  Recent Labs  Lab 11/05/17  1040 11/05/17  0421 11/04/17  2139 11/04/17  1648  11/04/17  0320  11/03/17  1409 11/03/17  1110 11/03/17  0906    141 140 136  < > 142  < > 139 139 139   POTASSIUM  --   --   --   --   --  3.9  --  4.1 4.4 4.2   CHLORIDE  --   --   --   --   --  109  --   --   --   --    ELPIDIO  --   --   --   --   --  8.0*  --   --   --   --    CO2  --   --   --   --   --  24  --   --   --   --    BUN  --   --   --   --   --  15  --   --   --   --    CR  --   --   --   --   --  0.76  --   --   --   --    GLC  --   --   --   --   --  151*  --  131* 141* 123*   < > = values in this interval not displayed.  CBC    Recent Labs  Lab 11/04/17  0320 11/03/17  1409 11/03/17  1110 11/03/17  0906   WBC 14.7*  --   --   --    RBC 3.47*  --   --   --    HGB 11.1* 11.9* 12.4* 12.0*   HCT 34.2*  --   --   --    MCV 99  --   " --   --    MCH 32.0  --   --   --    MCHC 32.5  --   --   --    RDW 13.1  --   --   --      --   --   --      INRNo lab results found in last 7 days.    A/P: Terrence Murillo is a 72-year-old male with a PMH significant for DANISH, HTN, HLD, and CAD who is now POD#2 s/p transnasal endoscopic resection of pituitary macroadenoma. Patient is recovering well postoperatively and has no signs or symptoms of CSF leak.      - S/p endoscopic transnasal resection of pituitary macroadenoma  - Sinus precautions: no nose-blowing, no straining, no straws; avoid straining; sneeze and cough with mouth open; no incentive spirometry; no positive-pressure respiratory treatments  - Recommend good bowel regimen to prevent straining  - Cole splints will be removed in clinic (no antibiotics necessary for splints) in 3 weeks.  - No nasal saline spray while patient is in-house    Dispo: per Neurosurgery colleagues. Follow up with ENT in ~3 weeks.     -- Patient and above plan discussed with staff, Dr. Cristy Sanchez MD  PGY 2 [facility]

## 2017-11-05 NOTE — PROGRESS NOTES
"Endocrinology Progress Note    Daniel Riggins is a 72 year old male who is postoperative day #2 from trans nasal pituitary tumor resection. 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     Subjectively, he is feeling well today. He denies headache or vision changes. He reports drinking adequate liquid.     Assessment and Plan  Post op pituitary macroadenoma resection  No evidence of DI so far.     1- Cont to monitor urine output and Check NA Q6H  2-if Na>145 or urine out put is >300 ml in 3 consecutive--> send urine specific gravity  3-If urine specific gravity <1.002 give 1 mcg IV DDAVP and cont to monitor  4-Cont with hydrocortisone taper per primary team protocol  5-No need for levothyroxine or testosterone therapy at this time  6-Schedule a follow up with endocrinology in 4 weeks with our pituitary specialist Dr Santoro      Patient seen and examined with staff endocrinologist Dr Reza Sesay MD  Diabetes, Metabolism and Endocrinology Fellow  Pager: [phone]      Patient seen by me with fellow Dr Sesay.  Pt stable - UOP appears appropriate with no evidence of DI.  Clinically no sx adrenal insuff on hydrocortisone replacement.  Holding on other pituitary replacement for now in order to reassess as outpt as to need.  Findings and plan as above.    Micah Cobb MD   [phone]    Exam  Blood pressure 142/64, pulse 59, temperature 96  F (35.6  C), temperature source Axillary, resp. rate 16, height 1.78 m (5' 10.08\"), weight 110 kg (242 lb 8.1 oz), SpO2 92 %.  Gen: hard of hearing, pleasant and conversant  Abd: soft, NT/ND. Normal BS  Skin: Warm and dry. No rash or lesions.   Ext: no swelling or edema  Feet: no deformities or ulcers, 2+ DP pulses  Neuro: A&Ox3  Psych: Normal affect and mood.    Labs/Imaging  Results for DANIEL RIGGINS (MRN [id number]) as of 11/4/2017 08:58   Ref. Range 6/29/2017 08:15 8/3/2017 13:27 8/3/2017 " 13:30   Adrenal Corticotropin Latest Ref Range: <47 pg/mL   33   Alpha Subunit Pituitary Tumor Marker Unknown  0.2    Cholesterol Latest Ref Range: <200 mg/dL 107     Cortisol Serum Latest Ref Range: 4 - 22 ug/dL   6.6   FSH Latest Ref Range: 0.7 - 10.8 IU/L  9.4    HDL Cholesterol Latest Ref Range: >39 mg/dL 51     Growth Hormone Latest Ref Range: 0 - 3.0 ug/L  <0.1    LDL Cholesterol Calculated Latest Ref Range: <100 mg/dL 34     Non HDL Cholesterol Latest Ref Range: <130 mg/dL 56     Prolactin Latest Ref Range: 2 - 18 ug/L  6    T4 Free Latest Ref Range: 0.76 - 1.46 ng/dL  0.69 (L)    Triglycerides Latest Ref Range: <150 mg/dL 111     TSH Latest Ref Range: 0.40 - 4.00 mU/L  2.54      TSH   Date Value Ref Range Status   08/03/2017 2.54 0.40 - 4.00 mU/L Final     T4 Free   Date Value Ref Range Status   08/03/2017 0.69 (L) 0.76 - 1.46 ng/dL Final   ]  No results found for: A1C

## 2017-11-05 NOTE — PLAN OF CARE
Problem: Patient Care Overview  Goal: Plan of Care/Patient Progress Review  Outcome: No Change  Pt POD#2 of transnasal endoscopic crani for resection of pituitary macroadenoma. Pt is A&Ox4. L visual field cut and [place]. Denies drainage from nares,salty or metallic taste, or drainage from ears. Nasal precautions in place and pt compliant. Face mask at night in place of CPAP or nasal cannula. Headache pain controlled with Oxycodone 10mg. Voiding frequently- see flowsheet. Q 6 hour Na+, last result was 141 at 0430.  Continue plan of care.

## 2017-11-05 NOTE — PLAN OF CARE
Problem: Patient Care Overview  Goal: Plan of Care/Patient Progress Review  Outcome: Improving  Pt is POD#1 of transnasal endoscopic crani for resection of pituitary macroadenoma. Pt is A&Ox4. L visual field cut. Other neuros intact. Scant, blood-tinged drainage from nares. Denies salty or metallic taste, post-nasal drip or drainage from ears. Nasal precautions in place and pt education reinforced. Face mask at night in place of CPAP or nasal cannula. Lung sounds clear. Pt denied nausea this evening, but has poor appetite, in part due to fear of nausea. Also reports decrease sense of smell and taste as contributing factor. Positive bowel sounds. Pt reports passing flatus. Voiding spontaneously.  Last Na+ was 140. PIV SL. Negligible pain. Continue plan of care.

## 2017-11-05 NOTE — DISCHARGE SUMMARY
[facility] Discharge Summary and Instructions    Terrence Murillo MRN# [id number]   Age: 72 year old YOB: 1945     Date of Admission:  11/3/2017  Date of Discharge::  11/5/2017  Admitting Physician:  Amanda Bates MD  Discharge Physician:  Amanda Bates MD          Admission Diagnoses:   Intracranial tumor (H) [D49.6]  Pituitary mass (H) [E23.7]          Discharge Diagnosis:   Intracranial tumor (H) [D49.6]  Pituitary mass (H) [E23.7]  Final pathology pending at the time of this note.           Procedures:   11/3/17: Stealth guided endoscopic resection of endoscopic transnasal, transsphenoidal resection of pituitary adenoma.            Brief History of Illness:   Mr. Terrence Murillo is a 72-year-old gentleman who presented to the Neurosurgery Clinic at the [facility] with concerns of progressive bitemporal hemianopsia for a prolonged period of time. 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  On evaluation, it was found to be nonfunctioning although the patient was having compressive vision symptoms.  He was therefore recommended resection for this and he gave informed consent for the same. Patient has elected to undergo above-mentioned procedure.           Hospital Course:   Patient underwent above-mentioned procedure on 11/3/17. The operation was uncomplicated and he was admitted to the surgical ICU for routine post operative cares. On post operative day 1 he was doing well and transferred to the floor. On post operative day 2, he was ambulating, voiding without a coto, eating a regular diet, pain was well controlled and therefore he was discharged. ENT was also involved in the patient's care. Endocrinology was consulted with recommendation for frequent monitoring of urine output and serum sodium checks. There was no evidence of diabetes insipidus during the admission and no desmopressin was given during this admission. Physical  therapy and occupational therapy both recommended home with assist and frequent ambulation.   Follow-up instructions:  1) follow-up with ENT (Dr. Muniz) in 3 weeks for wound check and Cole Splint removal  2) follow-up with Endocrinology (Dr. Santoro) in 4 weeks   3) follow-up with Neurosurgery (Dr. Bates) in 3 months. Please obtain a repeat MRI on the day of your appointment.   Physical exam on discharge:   General: NAD, lying comfortably in bed  Neurologic    Mental Status:       -- Awake; Alert; oriented x 3      -- Follows commands       -- Speech fluent, spontaneous. No aphasia or dysarthria.      -- no gaze preference. No apparent hemineglect.    Cranial Nerves:      -- mild L>R bitemporal hemianopsia, PERRL 3-2mm bilat and brisk      -- extraocular movements intact      -- face symmetrical, tongue midline      -- sensory V1-V3 intact bilaterally      -- palate elevates symmetrically, uvula midline      -- hearing grossly intact bilat    Motor:     Delt Bi Tri WE WF    R 5 5 5 5 5 5   L 5 5 5 5 5 5     IP Quad Ham DF PF EHL   R 5 5 5 5 5 5   L 5 5 5 5 5 5     Sensory: symmetrically intact to light touch x4 extremities.     Reflexes: 2+ bilaterally and symmetric in biceps, triceps, brachioradialis, and patellae; no ankle clonus bilaterally; negative Babinski bilaterally; negative White's bilaterally            Discharge Medications:     Current Discharge Medication List      START taking these medications    Details   oxyCODONE IR (ROXICODONE) 5 MG tablet Take 1 tablet (5 mg) by mouth every 6 hours as needed for moderate to severe pain  Qty: 24 tablet, Refills: 0    Associated Diagnoses: Intracranial tumor (H)      ondansetron (ZOFRAN-ODT) 4 MG ODT tab Take 1 tablet (4 mg) by mouth every 6 hours as needed for nausea or vomiting  Qty: 40 tablet, Refills: 0    Associated Diagnoses: Intracranial tumor (H)      !! hydrocortisone (CORTEF) 20 MG tablet Take 1 tablet (20 mg) by mouth every morning  Qty: 1  tablet, Refills: 0    Associated Diagnoses: Intracranial tumor (H)      !! hydrocortisone (CORTEF) 10 MG tablet Take 1 tablet (10 mg) by mouth every evening  Qty: 1 tablet, Refills: 0    Associated Diagnoses: Intracranial tumor (H)      polyethylene glycol (MIRALAX/GLYCOLAX) Packet Take 17 g by mouth 2 times daily  Qty: 21 packet, Refills: 0    Associated Diagnoses: Intracranial tumor (H)      senna-docusate (SENOKOT-S;PERICOLACE) 8.6-50 MG per tablet Take 2 tablets by mouth 2 times daily  Qty: 100 tablet, Refills: 0    Associated Diagnoses: Intracranial tumor (H)      pantoprazole (PROTONIX) 40 MG EC tablet Take 1 tablet (40 mg) by mouth every morning (before breakfast)  Qty: 10 tablet, Refills: 0    Associated Diagnoses: Intracranial tumor (H)       !! - Potential duplicate medications found. Please discuss with provider.      CONTINUE these medications which have CHANGED    Details   acetaminophen (TYLENOL) 325 MG tablet Take 2 tablets (650 mg) by mouth every 4 hours as needed for other (surgical pain)  Qty: 100 tablet, Refills: 0    Associated Diagnoses: Intracranial tumor (H)      aspirin 81 MG tablet Take 1 tablet (81 mg) by mouth every evening  Qty: 30 tablet, Refills: 0    Associated Diagnoses: Coronary artery disease involving native coronary artery of native heart without angina pectoris         CONTINUE these medications which have NOT CHANGED    Details   rosuvastatin (CRESTOR) 40 MG tablet Take 1 tablet (40 mg) by mouth daily  Qty: 90 tablet, Refills: 2    Associated Diagnoses: CAD (coronary artery disease); Hyperlipidemia LDL goal <70; Benign essential hypertension; Coronary artery disease involving native coronary artery of native heart without angina pectoris      metoprolol (LOPRESSOR) 25 MG tablet Take 1 tablet (25 mg) by mouth 2 times daily  Qty: 180 tablet, Refills: 3    Associated Diagnoses: CAD (coronary artery disease); Hyperlipidemia LDL goal <70; Benign essential hypertension; Coronary  artery disease involving native coronary artery of native heart without angina pectoris      ramipril (ALTACE) 5 MG capsule Take 1 capsule (5 mg) by mouth daily  Qty: 90 capsule, Refills: 3    Associated Diagnoses: Benign essential hypertension; Hyperlipidemia LDL goal <70; Coronary artery disease involving native coronary artery of native heart without angina pectoris      ezetimibe (ZETIA) 10 MG tablet Take 1 tablet (10 mg) by mouth daily  Qty: 90 tablet, Refills: 3    Associated Diagnoses: Atherosclerosis of native coronary artery of native heart with other form of angina pectoris (H)      Glucosamine 500 MG CAPS Take 500 mg by mouth 2 times daily       Multiple Vitamin (MULTI-VITAMIN) per tablet Take 1 tablet by mouth every morning       nitroGLYcerin (NITROSTAT) 0.4 MG sublingual tablet Place 1 tablet (0.4 mg) under the tongue every 5 minutes as needed May repeat X 2. If no relief after 3 tablets call 911  Qty: 25 tablet, Refills: 1    Associated Diagnoses: Coronary artery disease involving native coronary artery of native heart without angina pectoris         STOP taking these medications       ibuprofen (ADVIL,MOTRIN) 200 MG tablet Comments:   Reason for Stopping:         Zn-Pyg Afri-Nettle-Saw Palmet (SAW PALMETTO COMPLEX PO) Comments:   Reason for Stopping:                    Discharge Instructions and Follow-Up:   Discharge diet: Regular   Discharge activity: After discharge, your activity restrictions are:   -We encourage short frequent walks, increasing as tolerated.   - No driving until you are seen in clinic and cleared by your neurosurgeon.   - No strenuous activity.   - No lifting more than 10 pounds until you are seen in clinic (a gallon of milk weighs approximately 8 pounds)   - Do not blow your nose until seen and evaluated in the ENT clinic.   -Nasal precautions: no straining, no nose blowing, sneeze and cough with mouth open   - Avoid straining and/or constipation. Please take the medicines you  were prescribed to help prevent constipation.   - You are ok to shower, but do not soak your incisions. Do not submerge your head in water. Pat them dry if they get damp.   - No baths, hot tubs or pools for 4-6 weeks after surgery.     Nasal precautions: no straining, no nose blowing. Sneeze and cough with mouth open. Avoid straining. Please take medications prescribed for constipation. No use of straws.    Discharge follow-up: You underwent surgery to remove a pituitary tumor by  Amanda Bates MD   - If you have not received the results of the pathology (diagnosis) at the time of discharge, our office will call you with these results as soon as they become available.     - You should follow up with Dr. Muniz in ENT clinic in 3 weeks for wound check and general post-operative care. You should call [phone] if you have not heard from the hospital within 7 days of discharge regarding your follow-up appointment.     - You should follow up with Dr. Santoro in Endocrinology clinic in 4 weeks for evaluation and management of your hormones. You should call [phone] if you have not heard from the hospital within 7 days of discharge regarding your follow-up appointment.     - You should follow up with  Amanda Bates MD  in Neurosurgery clinic in 3 months for follow-up regarding your pituitary resection. You will need an MRI on the day of your follow-up appointment. You should call [phone] if you have not heard from the hospital within 7 days of discharge regarding your follow-up appointment.     -If you have a stitch in your lower back from the drain that was placed during surgery, this can be removed by your primary care doctor or in the ENT clinic at your first appointment at 2 weeks.     - If you are on a steroid medication (hydrocortisone or Cortef) please follow the detailed instructions with the prescription to slowly decrease the amount you are taking. Do not stop taking this medication  unless instructed by your physicians.    Wound care: You should follow up with Dr. Muniz in ENT clinic in 3 weeks for wound check and general post-operative care. You should call [phone] if you have not heard from the hospital within 7 days of discharge regarding your follow-up appointment. Please observe nasal precautions: no straining, no nose blowing. Sneeze and cough with mouth open. Avoid straining. Please take medications prescribed for constipation. No use of straws.      Call if you have any of the followin. Temperature greater than 101.5 F.   2. Any clear discharge from your nose or down the back of your throat   3. Any new weakness, numbness or altered mental status.   4. Increased urinary frequency or increased thirst.   5.  Worsening pain that is not improving with the pain medications you were prescribed.     Call [phone] or after 5:00 pm or on weekends call [phone] and ask for the   neurosurgery resident on call. Thank You.          Discharge Disposition:   Discharged to home

## 2017-11-06 ENCOUNTER — CARE COORDINATION (OUTPATIENT)
Dept: CARE COORDINATION | Facility: CLINIC | Age: 72
End: 2017-11-06

## 2017-11-06 ENCOUNTER — APPOINTMENT (OUTPATIENT)
Dept: OCCUPATIONAL THERAPY | Facility: CLINIC | Age: 72
DRG: 614 | End: 2017-11-06
Attending: NEUROLOGICAL SURGERY
Payer: MEDICARE

## 2017-11-06 VITALS
OXYGEN SATURATION: 93 % | HEART RATE: 60 BPM | RESPIRATION RATE: 16 BRPM | BODY MASS INDEX: 34.72 KG/M2 | DIASTOLIC BLOOD PRESSURE: 79 MMHG | SYSTOLIC BLOOD PRESSURE: 172 MMHG | TEMPERATURE: 96.8 F | HEIGHT: 70 IN | WEIGHT: 242.51 LBS

## 2017-11-06 PROCEDURE — 84295 ASSAY OF SERUM SODIUM: CPT | Performed by: NEUROLOGICAL SURGERY

## 2017-11-06 PROCEDURE — 25000132 ZZH RX MED GY IP 250 OP 250 PS 637: Mod: GY | Performed by: STUDENT IN AN ORGANIZED HEALTH CARE EDUCATION/TRAINING PROGRAM

## 2017-11-06 PROCEDURE — 97530 THERAPEUTIC ACTIVITIES: CPT | Mod: GO

## 2017-11-06 PROCEDURE — A9270 NON-COVERED ITEM OR SERVICE: HCPCS | Mod: GY | Performed by: STUDENT IN AN ORGANIZED HEALTH CARE EDUCATION/TRAINING PROGRAM

## 2017-11-06 PROCEDURE — 97535 SELF CARE MNGMENT TRAINING: CPT | Mod: GO

## 2017-11-06 PROCEDURE — 40000133 ZZH STATISTIC OT WARD VISIT

## 2017-11-06 RX ORDER — MAGNESIUM CARB/ALUMINUM HYDROX 105-160MG
45 TABLET,CHEWABLE ORAL ONCE
Status: COMPLETED | OUTPATIENT
Start: 2017-11-06 | End: 2017-11-06

## 2017-11-06 RX ORDER — BISACODYL 10 MG
10 SUPPOSITORY, RECTAL RECTAL ONCE
Status: COMPLETED | OUTPATIENT
Start: 2017-11-06 | End: 2017-11-06

## 2017-11-06 RX ADMIN — POLYETHYLENE GLYCOL 3350 17 G: 17 POWDER, FOR SOLUTION ORAL at 09:01

## 2017-11-06 RX ADMIN — METOPROLOL TARTRATE 25 MG: 25 TABLET ORAL at 09:00

## 2017-11-06 RX ADMIN — RAMIPRIL 5 MG: 5 CAPSULE ORAL at 09:23

## 2017-11-06 RX ADMIN — ACETAMINOPHEN 650 MG: 325 TABLET, FILM COATED ORAL at 09:23

## 2017-11-06 RX ADMIN — HYDROCORTISONE 20 MG: 20 TABLET ORAL at 09:00

## 2017-11-06 RX ADMIN — OXYCODONE HYDROCHLORIDE 10 MG: 5 TABLET ORAL at 06:38

## 2017-11-06 RX ADMIN — PANTOPRAZOLE SODIUM 40 MG: 40 TABLET, DELAYED RELEASE ORAL at 09:00

## 2017-11-06 RX ADMIN — SENNOSIDES AND DOCUSATE SODIUM 2 TABLET: 8.6; 5 TABLET ORAL at 09:00

## 2017-11-06 RX ADMIN — BISACODYL 10 MG: 10 SUPPOSITORY RECTAL at 09:03

## 2017-11-06 RX ADMIN — EZETIMIBE 10 MG: 10 TABLET ORAL at 09:00

## 2017-11-06 RX ADMIN — MINERAL OIL 45 ML: 99.9 LIQUID ORAL; TOPICAL at 09:01

## 2017-11-06 ASSESSMENT — VISUAL ACUITY
OU: GLASSES

## 2017-11-06 NOTE — PLAN OF CARE
Problem: Patient Care Overview  Goal: Plan of Care/Patient Progress Review  Outcome: Improving  Pt alert and oriented x4. BUE/BLE equal strength. Pupils equal, round and reactive. No visual deficits noted/reported. [place] (baseline). Voided x 1- PVR 31 ml. Had 2 BMs after supp/senna/miralx and mineral oil. Pt has steady gait. Pt continues to have dried brown drainage in rt nostril, no new drainage noted. Reported headached, received tylenol at 0900- stated that pain decreased mildly. Pt stated overall pain is much better than Friday. Discharged to home with wife and daughter. Educated about nasal packing precautions and medications. IV removed. Pt off the floor at approximately 1210 pm.

## 2017-11-06 NOTE — PLAN OF CARE
Problem: Patient Care Overview  Goal: Plan of Care/Patient Progress Review  Outcome: No Change  Pt POD#3 of transnasal endoscopic crani for resection of pituitary macroadenoma. Pt is A&Ox4. L visual field cut basically resolved, [place]. Very scant amount brown  drainage from right nare,at 0330. Denies salty or metallic taste, or drainage from ears. Nasal precautions in place and pt compliant. Denies pain. Voiding without difficulty. SL in place.  Plan: Continue plan of care, pt had d/c orders for yesterday but he was unable to get home and his wife is having a procedure today. Per report last golden, he may not be able to get home until later this afternoon once she is done.

## 2017-11-06 NOTE — PROGRESS NOTES
"SPIRITUAL HEALTH SERVICES  SPIRITUAL ASSESSMENT Progress Note  [facility] 6A    REFERRAL SOURCE: Follow up to On-Call's visit of 11/4.    Mr Murillo was up and in street clothes when I stopped in to visit today. He expressed that he was waiting for his wife and daughter, who are coming to pick him up for discharge, \"They said I could go home yesterday, but I didn't have a caregiver at home then.\" Mr Murillo offered several significant health related events over the last few years, \"but what overshadows all of that is the death of our son 6 years ago.\"    Visit was brought to a close with the arrival of family, to pursue discharge process.    PLAN: No follow up necessary.                                                                                                                                           Benjie Cooper   Intern  Pager [phone]      "

## 2017-11-06 NOTE — PROGRESS NOTES
"Neurosurgery Daily Progress Note  11/06/2017    Overnight events/subjective: No acute overnight events.    O/ /46 (BP Location: Right arm)  Pulse 60  Temp 97.3  F (36.3  C) (Axillary)  Resp 16  Ht 1.78 m (5' 10.08\")  Wt 110 kg (242 lb 8.1 oz)  SpO2 92%  BMI 34.72 kg/m2    Exam:   Gen: Laying in bed, not in acute distress  MS: A&Ox3, Speech fluent and conversant   CN: Pupils round and reactive, bitemporal hemianopsia, extraocular movements intact, face symmetric, tongue midline, uvula & palate elevate symmetrically   Motor: 5/5 in b/l UE& LEs  Sensory: intact to light touch   No drift    Non labored breathing    A/P: Terrence Murillo is a 72 year old POD#3 s/p stealth guided endoscopic craniotomy for pituitary tumor resection.    - PTA metoprolol  - Nasal precautions  - Cole splints in place, ENT to remove in clinic in 3wks  - Regular diet  - Cortef  - Endocrinology following, appreciate recs  - Aggressive bowel regiment  - SCDs for DVT ppx  - Protonix for GI ppx    Dispo: Patient is medically ready for discharge. He stayed the night due to not having a ride established for tomorrow. He has a ride setup for noon today. Passing flatus, but is requesting a BM prior to d/c, aggressive bowel reg ordered. Planning d/c today at noon.      Please contact the neurosurgery resident on call with questions by dialing * [phone], then entering [phone] when prompted        I have reviewed the history above and agree with the resident's assessment and plan.  RHONDA Bates MD  "

## 2017-11-06 NOTE — PLAN OF CARE
Problem: Patient Care Overview  Goal: Plan of Care/Patient Progress Review  Discharge Planner OT   Patient plan for discharge: Home with assist  Current status: Patient seen for total body dressing within precautions, performs with min verbal cues. Patient able to verbalize precautions without assist, 1 cue for adapting performance in grooming tasks and how to conserve energy.  Barriers to return to prior living situation: None  Recommendations for discharge: Home with assist  Rationale for recommendations: Assist for higher level IADLs.       Entered by: May Gregory 11/06/2017 12:46 PM         Occupational Therapy Discharge Summary     Reason for therapy discharge:    Discharged to home.     Progress towards therapy goal(s). See goals on Care Plan in [facility] electronic health record for goal details.  Goals partially met.  Barriers to achieving goals:   discharge from facility.     Therapy recommendation(s):    No further therapy is recommended.

## 2017-11-06 NOTE — PROGRESS NOTES
Discussed discharge ride with patient. Patient states that they are receiving a ride from his wife. Patient is aware to notify nursing if my assistance is needed in arranging a ride home.      Communicated with charge nurse.

## 2017-11-06 NOTE — PROGRESS NOTES
"Otolaryngology Progress Note  November 6, 2017    S: No acute events overnight. Feels that he has some sensitivity to light, but it is tolerable. No blurry vision or double vision. Had some intermittent drainage from the front of the nose and postnasally, but nothing consistent. No salty taste.     O: /79 (BP Location: Left arm)  Pulse 60  Temp 96.8  F (36  C) (Oral)  Resp 16  Ht 1.78 m (5' 10.08\")  Wt 110 kg (242 lb 8.1 oz)  SpO2 93%  BMI 34.72 kg/m2  General: Alert and oriented; sitting comfortably in the bed; not in acute distress  HEENT:  - No anterior nasal bleeding or drainage  - No blood clots, active bleeding, or other drainage  - Pupils equal, round, and reactive to light  - Visual acuity intact  - Extraocular muscles intact in all directions  Pulmonary: breathing comfortably on room air; no stridor      Intake/Output Summary (Last 24 hours) at 11/06/17 0908  Last data filed at 11/05/17 1700   Gross per 24 hour   Intake              680 ml   Output              850 ml   Net             -170 ml     A/P: Terrence Murillo is a 72-year-old male with a PMH significant for DANISH, HTN, HLD, and CAD who is now POD#3 s/p transnasal endoscopic resection of pituitary macroadenoma. Patient is recovering well postoperatively and has no signs or symptoms of CSF leak.  -OK to DC home from ENT standpoint  -Sinus precautions: no nose-blowing, no straining, no straws; avoid straining; sneeze and cough with mouth open; no incentive spirometry; no positive-pressure respiratory treatments  -Monitor for visual changes  -No BM yet - bowel regimen increased  -Cole splints will be removed in clinic (no antibiotics necessary for splints) in 3 weeks  -No nasal saline spray while patient is in-house; may discharge home with saline spray  -Appreciate NSGY cares    Dispo: Per Neurosurgery. Patient states he could possibly get a ride home later today. Follow up with ENT in ~3 weeks.     -- Patient and above plan discussed with " staff, Dr. Cristy Mead MD  PGY-4, Otolaryngology

## 2017-11-06 NOTE — PLAN OF CARE
Problem: Patient Care Overview  Goal: Plan of Care/Patient Progress Review  Outcome: Improving  Patient is doing well post-operatively, S/P trans nasal pituitary tumor resection Day # 2:     -denies HA, on scheduled Tylenol  -ambulated on hallways total of  4x today with SBA  -on Regular diet with poor appetite, only ate vanilla pudding and apple sauce  -denies nausea  -bilateral ear [place] ( baseline ) and wears hearing aids  -scheduled to be discharged today but patient stated that he was not aware of it and no one will  be available to assist him until tomorrow afternoon. MD notified.     Plan: To be discharged to home tomorrow.  Continue with plan of care.

## 2017-11-07 ENCOUNTER — TELEPHONE (OUTPATIENT)
Dept: NEUROSURGERY | Facility: CLINIC | Age: 72
End: 2017-11-07

## 2017-11-07 LAB — COPATH REPORT: NORMAL

## 2017-11-07 NOTE — TELEPHONE ENCOUNTER
Neurosurgery Discharge Coordination Note       Responsible Attending physician: Leo Bates MD    Operation performed: endoscopic transnasal resection of pituitary tumor    Date of Discharge: 11/5/17    Discharge to: Home     Current status: Patient states he feels good.  Denies redness, swelling,increased tenderness or elevated temp. Reports Incision CDI without signs of infection.   Denies current bowel or bladder issues.      Discharge instructions and medications reviewed with patient.  Follow up appointments/imaging/testes needed:     RN triage/on call number given: [phone]/ [phone]    He got up Q2H to void last noc.  They will update me in the morning to let me know if he is voiding excessively.  Sodium due to be checked on POD #10

## 2017-11-07 NOTE — PROGRESS NOTES
"[facility]: Post-Discharge Note  SITUATION                                                      Admission:    Admission Date: 11/03/17   Reason for Admission: Intracranial tumor  Discharge:    Discharge Date: 11/05/17   Discharge Diagnosis: Intracranial tumor   Discharge Service: Nerosurgery     Discharge Plan: - Pt should follow up with Dr. Muniz in ENT clinic in 3 weeks for wound check and general post-operative care. Pt should follow up with Dr. Santoro in Endocrinology clinic in 4 weeks for evaluation and management of your hormones. Pt should follow up with  Amanda Bates MD  in Neurosurgery clinic in 3 months for follow-up regarding pituitary resection.        BACKGROUND                                                      Mr. Terrence Murillo is a 72-year-old gentleman who presented to the Neurosurgery Clinic at the [facility] with concerns of progressive bitemporal hemianopsia for a prolonged period of time. 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  On evaluation, it was found to be nonfunctioning although the patient was having compressive vision symptoms.  He was therefore recommended resection for this and he gave informed consent for the same. Patient has elected to undergo above-mentioned procedure.    ASSESSMENT      Patient reports symptoms are: New (Patient reports that he was peeing a lot and was thirsty, he thinks they are getting better but it is #4  on list of \"call if you have any of the following\" so he is reporting it)  Does the patient have all of their medications?: Yes  Does patient know what their new medications are for?: Yes  Does paient have a follow-up appointment scheduled?: Yes  Does patient have any other questions or concerns?: No      PLAN                                                      Outpatient Plan:  Routing to neurosurgery and ENT for f/u and/or FYI        Future Appointments  Date Time Provider Department " Center   11/16/2017 1:20 PM Lara Mckeon MD [facility]           Anjali Sewell RN

## 2017-11-16 ENCOUNTER — OFFICE VISIT (OUTPATIENT)
Dept: OTOLARYNGOLOGY | Facility: CLINIC | Age: 72
End: 2017-11-16

## 2017-11-16 DIAGNOSIS — D35.2 PITUITARY ADENOMA (H): Primary | ICD-10-CM

## 2017-11-16 RX ORDER — ECHINACEA PURPUREA EXTRACT 125 MG
2 TABLET ORAL 3 TIMES DAILY PRN
Qty: 6 ML | Refills: 3 | Status: SHIPPED | OUTPATIENT
Start: 2017-11-16 | End: 2017-12-16

## 2017-11-16 ASSESSMENT — PAIN SCALES - GENERAL: PAINLEVEL: NO PAIN (0)

## 2017-11-16 NOTE — PATIENT INSTRUCTIONS
1.  You were seen in the ENT Clinic today by Dr. Mckeon.  If you have questions or concerns after your appointment please call, [phone].  Press option #1 for scheduling related needs.  Press option #3 for Nurse advice.  2.  Plan is to return to clinic in 3 weeks for another assessment.  3. RN Care Coordinator is Leyla, [phone]  4. Continue to keep your nasal and activity restrictions for now.  5.  Continue to use nasal saline multiple times daily.  Prescription was sent to your local pharmacy, they may direct you to buy it over the counter.  6.  Do not use your CPAP machine until you see Dr. Muniz next.  7. Continue to follow up with your other care providers.

## 2017-11-16 NOTE — LETTER
11/16/2017       RE: Terrence Murillo  [address] [facility] [place] [address]     Dear Colleague,    Thank you for referring your patient, Terrence Murillo, to the [facility] EAR NOSE AND THROAT at [facility]. Please see a copy of my visit note below.    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      TREATMENT:  The patient underwent transnasal endoscopic resection of pituitary macroadenoma on 11/03/2017.  Reconstruction was done with right nasal septal flap.      HISTORY OF PRESENT ILLNESS:  Mr. Murillo is back to the Otolaryngology Clinic for his first postoperative visit.  The patient still complains of some headaches that he is managing with Tylenol.  He denies clear rhinorrhea.  He denies diplopia.  His peripheral vision is still not improved.      PHYSICAL EXAMINATION:    Constitutional: The patient was well-groomed and in no acute distress.    Skin: Warm and pink.    Neurologic: Alert and oriented x3. Cranial nerves III-XII within normal limits. Voice quality is normal.    Psychiatric: The patient's affect was calm, cooperative and appropriate.    Respiratory: Breathing comfortably without stridor or exertion of accessory muscles.    Eyes: Pupils were equal and reactive. Extraocular movements are intact.    Head: Normocephalic and atraumatic. No lesions or scars.    Ears: External auditory canals and tympanic membranes were clear.    Nose: Sinuses were nontender. Anterior rhinoscopy revealed midline septum and absence of purulence or polyps.    Oral cavity/Oropharynx: Normal tongue, floor of mouth, buccal mucosa and palate. No lesions or masses on inspection or palpation. No abnormal lymph tissue in the oropharynx.    Neck: The parotids are soft without masses. Supple with normal laryngeal and tracheal landmarks.    Lymphatic: There is no palpable lymphadenopathy or other masses in the neck or parotids.       PROCEDURE: Nasal endoscopy:  The risks and benefits of the procedure were discussed, and written consent was obtained. A timeout was performed. The patient's nose was sprayed with lidocaine and Afrin, and a 0-degree nasal endoscope was used to gain access into the nasal cavity.  I removed the Cole splints.  I did suction abundant clots, dried blood, and pieces of Gelfoam from the sphenoid sinus.  The right-sided septal cartilage is exposed.      ASSESSMENT AND PLAN:  Mr. Murillo is a 72-year-old gentleman status post transnasal endoscopic excision of pituitary macroadenoma.  He is doing well.  I would like to see him back in three weeks.  He should continue the sinonasal precautions.  I am telling him not to start his CPAP until I give him the okay.         Again, thank you for allowing me to participate in the care of your patient.      Sincerely,    Lara Mckeon MD

## 2017-11-16 NOTE — MR AVS SNAPSHOT
After Visit Summary   11/16/2017    Terrence Murillo    MRN: [id number]           Patient Information     Date Of Birth          1945        Visit Information        Provider Department      11/16/2017 3:00 PM Lara Mckeon MD [facility] Ear Nose and Throat        Today's Diagnoses     Pituitary adenoma (H)    -  1      Care Instructions    1.  You were seen in the ENT Clinic today by Dr. Mckeon.  If you have questions or concerns after your appointment please call, [phone].  Press option #1 for scheduling related needs.  Press option #3 for Nurse advice.  2.  Plan is to return to clinic in 3 weeks for another assessment.  3. RN Care Coordinator is Leyla [phone]  4. Continue to keep your nasal and activity restrictions for now.  5.  Continue to use nasal saline multiple times daily.  Prescription was sent to your local pharmacy, they may direct you to buy it over the counter.  6.  Do not use your CPAP machine until you see Dr. Muniz next.  7. Continue to follow up with your other care providers.            Follow-ups after your visit        Your next 10 appointments already scheduled     Nov 20, 2017 11:00 AM CST   (Arrive by 10:45 AM)   NEW ENDOCRINE with Gris Santoro MD   [facility] Endocrinology ([facility])    [address] [place] [address] [phone]            Dec 07, 2017  1:40 PM CST   (Arrive by 1:25 PM)   Return Visit with Lara Mckeon MD   [facility] Ear Nose and Throat ([facility])    [address] [place] [address] [phone]              Who to contact     Please call your clinic at [phone] to:    Ask questions about your health    Make or cancel appointments    Discuss your medicines    Learn about your test results    Speak to your doctor   If you have compliments or concerns about an experience at your clinic, or  if you wish to file a complaint, please contact [facility] Patient Relations at [phone] or email us at [email or web address]         Additional Information About Your Visit        [employer] Information     [employer] gives you secure access to your electronic health record. If you see a primary care provider, you can also send messages to your care team and make appointments. If you have questions, please call your primary care clinic.  If you do not have a primary care provider, please call [phone] and they will assist you.      [employer] is an electronic gateway that provides easy, online access to your medical records. With [employer], you can request a clinic appointment, read your test results, renew a prescription or communicate with your care team.     To access your existing account, please contact your [facility] Clinic or call [phone] for assistance.        Care EveryWhere ID     This is your Care EveryWhere ID. This could be used by other organizations to access your [facility] medical records  [id number]         Blood Pressure from Last 3 Encounters:   11/06/17 172/79   10/23/17 154/78   06/29/17 120/70    Weight from Last 3 Encounters:   11/04/17 110 kg (242 lb 8.1 oz)   10/23/17 110.5 kg (243 lb 8 oz)   08/03/17 110.2 kg (243 lb)              Today, you had the following     No orders found for display         Today's Medication Changes          These changes are accurate as of: 11/16/17  3:52 PM.  If you have any questions, ask your nurse or doctor.               Start taking these medicines.        Dose/Directions    sodium chloride 0.65 % nasal spray   Commonly known as:  OCEAN   Used for:  Pituitary adenoma (H)   Started by:  Lara Mckeon MD        Dose:  2 spray   Spray 2 sprays in nostril 3 times daily as needed for congestion   Quantity:  6 mL   Refills:  3         These medicines have changed or have updated  prescriptions.        Dose/Directions    ezetimibe 10 MG tablet   Commonly known as:  ZETIA   This may have changed:  when to take this   Used for:  Atherosclerosis of native coronary artery of native heart with other form of angina pectoris (H)        Dose:  10 mg   Take 1 tablet (10 mg) by mouth daily   Quantity:  90 tablet   Refills:  3       ramipril 5 MG capsule   Commonly known as:  ALTACE   This may have changed:  when to take this   Used for:  Benign essential hypertension, Hyperlipidemia LDL goal <70, Coronary artery disease involving native coronary artery of native heart without angina pectoris        Dose:  5 mg   Take 1 capsule (5 mg) by mouth daily   Quantity:  90 capsule   Refills:  3       rosuvastatin 40 MG tablet   Commonly known as:  CRESTOR   This may have changed:  when to take this   Used for:  CAD (coronary artery disease), Hyperlipidemia LDL goal <70, Benign essential hypertension, Coronary artery disease involving native coronary artery of native heart without angina pectoris        Dose:  40 mg   Take 1 tablet (40 mg) by mouth daily   Quantity:  90 tablet   Refills:  2            Where to get your medicines      [facility] [id number] [place] [address] AT [facility] [address] [address], [facility] [address]    Hours:  24-hours Phone:  [phone]     sodium chloride 0.65 % nasal spray                Primary Care Provider Office Phone # Fax #    Alexy Hernandez [phone]       [facility] [address] [facility] [address]        Equal Access to Services     THERESA MARLOW AH: Hadii amado varghese hadkatyo Sogayla, waaxda luqadaha, qaybta kaalmada adeegyada, faustino banks. So [place] -[phone].    ATENCIÓN: Si habla español, tiene a herndon disposición servicios gratuitos de asistencia lingüística. Llame al [phone].    We comply with applicable federal  civil rights laws and [place] laws. We do not discriminate on the basis of race, color, national origin, age, disability, sex, sexual orientation, or gender identity.            Thank you!     Thank you for choosing [facility] EAR NOSE AND THROAT  for your care. Our goal is always to provide you with excellent care. Hearing back from our patients is one way we can continue to improve our services. Please take a few minutes to complete the written survey that you may receive in the mail after your visit with us. Thank you!             Your Updated Medication List - Protect others around you: Learn how to safely use, store and throw away your medicines at www.disposemymeds.org.          This list is accurate as of: 11/16/17  3:52 PM.  Always use your most recent med list.                   Brand Name Dispense Instructions for use Diagnosis    acetaminophen 325 MG tablet    TYLENOL    100 tablet    Take 2 tablets (650 mg) by mouth every 4 hours as needed for other (surgical pain)    Intracranial tumor (H)       aspirin 81 MG tablet     30 tablet    Take 1 tablet (81 mg) by mouth every evening    Coronary artery disease involving native coronary artery of native heart without angina pectoris       ezetimibe 10 MG tablet    ZETIA    90 tablet    Take 1 tablet (10 mg) by mouth daily    Atherosclerosis of native coronary artery of native heart with other form of angina pectoris (H)       glucosamine 500 MG Caps      Take 500 mg by mouth 2 times daily        * hydrocortisone 20 MG tablet    CORTEF    1 tablet    Take 1 tablet (20 mg) by mouth every morning    Intracranial tumor (H)       * hydrocortisone 10 MG tablet    CORTEF    1 tablet    Take 1 tablet (10 mg) by mouth every evening    Intracranial tumor (H)       metoprolol 25 MG tablet    LOPRESSOR    180 tablet    Take 1 tablet (25 mg) by mouth 2 times daily    CAD (coronary artery disease), Hyperlipidemia LDL goal <70, Benign essential hypertension, Coronary artery  disease involving native coronary artery of native heart without angina pectoris       Multi-vitamin Tabs tablet   Generic drug:  multivitamin, therapeutic with minerals      Take 1 tablet by mouth every morning        nitroGLYcerin 0.4 MG sublingual tablet    NITROSTAT    25 tablet    Place 1 tablet (0.4 mg) under the tongue every 5 minutes as needed May repeat X 2. If no relief after 3 tablets call 911    Coronary artery disease involving native coronary artery of native heart without angina pectoris       ondansetron 4 MG ODT tab    ZOFRAN-ODT    40 tablet    Take 1 tablet (4 mg) by mouth every 6 hours as needed for nausea or vomiting    Intracranial tumor (H)       oxyCODONE IR 5 MG tablet    ROXICODONE    24 tablet    Take 1 tablet (5 mg) by mouth every 6 hours as needed for moderate to severe pain    Intracranial tumor (H)       pantoprazole 40 MG EC tablet    PROTONIX    10 tablet    Take 1 tablet (40 mg) by mouth every morning (before breakfast)    Intracranial tumor (H)       polyethylene glycol Packet    MIRALAX/GLYCOLAX    21 packet    Take 17 g by mouth 2 times daily    Intracranial tumor (H)       ramipril 5 MG capsule    ALTACE    90 capsule    Take 1 capsule (5 mg) by mouth daily    Benign essential hypertension, Hyperlipidemia LDL goal <70, Coronary artery disease involving native coronary artery of native heart without angina pectoris       rosuvastatin 40 MG tablet    CRESTOR    90 tablet    Take 1 tablet (40 mg) by mouth daily    CAD (coronary artery disease), Hyperlipidemia LDL goal <70, Benign essential hypertension, Coronary artery disease involving native coronary artery of native heart without angina pectoris       senna-docusate 8.6-50 MG per tablet    SENOKOT-S;PERICOLACE    100 tablet    Take 2 tablets by mouth 2 times daily    Intracranial tumor (H)       sodium chloride 0.65 % nasal spray    OCEAN    6 mL    Spray 2 sprays in nostril 3 times daily as needed for congestion    Pituitary  adenoma (H)       * Notice:  This list has 2 medication(s) that are the same as other medications prescribed for you. Read the directions carefully, and ask your doctor or other care provider to review them with you.

## 2017-11-16 NOTE — NURSING NOTE
Chief Complaint   Patient presents with     RECHECK     Return Post op 11/3/2017 Pituitary Tumor nasal splint removal. Pt states no pain today.        AMBAR Jackson LPN

## 2017-11-20 ENCOUNTER — OFFICE VISIT (OUTPATIENT)
Dept: ENDOCRINOLOGY | Facility: CLINIC | Age: 72
End: 2017-11-20

## 2017-11-20 VITALS
DIASTOLIC BLOOD PRESSURE: 70 MMHG | HEIGHT: 70 IN | HEART RATE: 64 BPM | SYSTOLIC BLOOD PRESSURE: 131 MMHG | WEIGHT: 232.2 LBS | BODY MASS INDEX: 33.24 KG/M2

## 2017-11-20 DIAGNOSIS — E03.8 SECONDARY HYPOTHYROIDISM: ICD-10-CM

## 2017-11-20 DIAGNOSIS — D35.2 PITUITARY ADENOMA (H): ICD-10-CM

## 2017-11-20 DIAGNOSIS — E29.1 MALE HYPOGONADISM: ICD-10-CM

## 2017-11-20 DIAGNOSIS — D35.2 PITUITARY ADENOMA (H): Primary | ICD-10-CM

## 2017-11-20 LAB
CORTIS SERPL-MCNC: 9.3 UG/DL (ref 4–22)
SODIUM SERPL-SCNC: 135 MMOL/L (ref 133–144)
T4 FREE SERPL-MCNC: 0.72 NG/DL (ref 0.76–1.46)
TSH SERPL DL<=0.005 MIU/L-ACNC: 1.6 MU/L (ref 0.4–4)

## 2017-11-20 ASSESSMENT — ENCOUNTER SYMPTOMS
TROUBLE SWALLOWING: 0
SORE THROAT: 0
SMELL DISTURBANCE: 1
SINUS PAIN: 0
POLYDIPSIA: 0
EYE IRRITATION: 0
FATIGUE: 1
HALLUCINATIONS: 0
SINUS CONGESTION: 0
FEVER: 0
INCREASED ENERGY: 0
ALTERED TEMPERATURE REGULATION: 0
TASTE DISTURBANCE: 1
CHILLS: 0
EYE PAIN: 0
DECREASED APPETITE: 0
NIGHT SWEATS: 0
POLYPHAGIA: 0
WEIGHT LOSS: 1
WEIGHT GAIN: 0
HOARSE VOICE: 0
NECK MASS: 0
EYE WATERING: 0
DOUBLE VISION: 0
EYE REDNESS: 0

## 2017-11-20 ASSESSMENT — PAIN SCALES - GENERAL: PAINLEVEL: NO PAIN (0)

## 2017-11-20 NOTE — LETTER
11/20/2017       RE: Terrence Murillo  [address]  [facility] [address]     Dear Colleague,    Thank you for referring your patient, Terrence Murillo, to the [facility] ENDOCRINOLOGY at [facility]. Please see a copy of my visit note below.    Endocrinology and Diabetes Clinic Initial Visit  Date of Service: November 20, 2017    Consulting provider: Lara Mckeon MD  [address]  [place] [address]    Reason for consultation: pituitary macroadenoma    Assessment:    Pituitary Macroadenoma - non-function, s/p resection. Doing well. No evidence of DI post-op. He is on hydrocortisone 15 mg AM, 5 mg PM. We will taper further to 10/5 for 5 days, then have him stop. We will check labs Monday, Nov 27th once he is off steroids to assess his HPA axis.  Depending on the results, will plan follow-up in 6 months.    Discussed and examined patient with Dr. Santoro.     Plan:   1) check TSH, free T4, Sodium today  2) complete 10/5 mg hydrocortisone taper (5 days, then stop)  3) check cortisol, total testosterone Nov 27th  4) RTC 6 months (sooner if abnormal labs)    Subjective:   Terrence Murillo is a 72 year old male with a past medical history of pituitary macroadenoma who presents post-operative follow-up after endoscopic, transphenoidal resection of pituitary adenoma on 11/3/17. The mass was found to be non-secretory on initial labs. He was initially found to have the tumor on MRI after he presented to his PCP with complaints of vision problems. The tumor was 3.2 x 2.5 x 2.0 cm in size and compressed the optic chiasm. Pathology consistent with benign adenoma. He was started on prophylactic hydrocortisone prior to discharge. Since d/c, he has been doing well. Energy level is improved. He is walking more. Has occasional headaches behind his eyes but seems to be getting better. Denies increased urinary frequency or excessive thirst. Reports his libido is fine but is  not currently sexually active with his wife due to medical issues that she has.  Denies nipple discharge. Feels his vision is about the same as before the surgery. Still has difficulty reading small print.     Current Problem List:   Patient Active Problem List   Diagnosis     Sleep apnea     Coronary artery disease involving native coronary artery of native heart without angina pectoris     Benign essential hypertension     Hyperlipidemia LDL goal <70     Non morbid obesity due to excess calories     Chest pain     MORENO (dyspnea on exertion)     Status post coronary angiogram     Intracranial tumor (H)       Past Medical and Past Surgical History:  Past Medical History:   Diagnosis Date     Coronary artery disease     cardiac cath 2016: medical management; cath 2010: SUSANA x2 to LAD; cath 1993: PTCA to LAD; cath 1992: PTCA to LAD, 1992: atherectomy of LAD     Hearing problem      Hx of angiography 4-    no stenosis greater than 25%-rec. medical management     Hyperlipidaemia      Hypertension      Obese      Obstructive sleep apnea      Reduced vision      Sleep apnea     CPAP       Past Surgical History:   Procedure Laterality Date     APPENDECTOMY OPEN       ARTHROPLASTY KNEE Left      ARTHROPLASTY SHOULDER Right      ENT SURGERY       HEART CATH, ANGIOPLASTY  1992    LAD  atherectomy with a DVI device      HEART CATH, ANGIOPLASTY  4-28-10    PTCA and stent proximal and mid LAD (2) stents Xience     OPTICAL TRACKING SYSTEM ENDOSCOPIC RESECTION TUMOR CRANIAL N/A 11/3/2017    Procedure: OPTICAL TRACKING SYSTEM ENDOSCOPIC RESECTION TUMOR CRANIAL;  Stealth Guided Endoscopic Transnasal Resection of Pituitary Tumor;  Surgeon: Amanda Bates MD;  Location:  OR       Medications:   Current Outpatient Prescriptions   Medication Sig Dispense Refill     sodium chloride (OCEAN) 0.65 % nasal spray Spray 2 sprays in nostril 3 times daily as needed for congestion 6 mL 3     oxyCODONE IR (ROXICODONE) 5 MG  tablet Take 1 tablet (5 mg) by mouth every 6 hours as needed for moderate to severe pain 24 tablet 0     acetaminophen (TYLENOL) 325 MG tablet Take 2 tablets (650 mg) by mouth every 4 hours as needed for other (surgical pain) 100 tablet 0     aspirin 81 MG tablet Take 1 tablet (81 mg) by mouth every evening 30 tablet 0     ondansetron (ZOFRAN-ODT) 4 MG ODT tab Take 1 tablet (4 mg) by mouth every 6 hours as needed for nausea or vomiting (Patient not taking: Reported on 11/16/2017) 40 tablet 0     hydrocortisone (CORTEF) 20 MG tablet Take 1 tablet (20 mg) by mouth every morning 1 tablet 0     hydrocortisone (CORTEF) 10 MG tablet Take 1 tablet (10 mg) by mouth every evening 1 tablet 0     polyethylene glycol (MIRALAX/GLYCOLAX) Packet Take 17 g by mouth 2 times daily 21 packet 0     senna-docusate (SENOKOT-S;PERICOLACE) 8.6-50 MG per tablet Take 2 tablets by mouth 2 times daily 100 tablet 0     pantoprazole (PROTONIX) 40 MG EC tablet Take 1 tablet (40 mg) by mouth every morning (before breakfast) 10 tablet 0     rosuvastatin (CRESTOR) 40 MG tablet Take 1 tablet (40 mg) by mouth daily (Patient taking differently: Take 40 mg by mouth every evening ) 90 tablet 2     metoprolol (LOPRESSOR) 25 MG tablet Take 1 tablet (25 mg) by mouth 2 times daily 180 tablet 3     ramipril (ALTACE) 5 MG capsule Take 1 capsule (5 mg) by mouth daily (Patient taking differently: Take 5 mg by mouth every morning ) 90 capsule 3     nitroGLYcerin (NITROSTAT) 0.4 MG sublingual tablet Place 1 tablet (0.4 mg) under the tongue every 5 minutes as needed May repeat X 2. If no relief after 3 tablets call 911 25 tablet 1     ezetimibe (ZETIA) 10 MG tablet Take 1 tablet (10 mg) by mouth daily (Patient taking differently: Take 10 mg by mouth every morning ) 90 tablet 3     Glucosamine 500 MG CAPS Take 500 mg by mouth 2 times daily        Multiple Vitamin (MULTI-VITAMIN) per tablet Take 1 tablet by mouth every morning          Allergies:   Allergies  "  Allergen Reactions     Cardizem [Diltiazem Hcl] Itching     No Clinical Screening - See Comments Hives       Social History:  Social History   Substance Use Topics     Smoking status: Former Smoker     Packs/day: 0.20     Years: 5.00     Types: Cigarettes     Quit date: 1960     Smokeless tobacco: Never Used      Comment: Social smoking only     Alcohol use Yes      Comment: 1-2 cocktails daily          Family History:  Family History   Problem Relation Age of Onset     C.A.D. Father      HEART DISEASE Father       at age 44     HEART DISEASE Son 37     enlarged heart     HEART DISEASE Mother       at age 80 after heart surgery     Heart Surgery Mother      open heart, passed 10 days after     DIABETES Mother      Family History Negative Maternal Grandmother      Family History Negative Maternal Grandfather      Family History Negative Paternal Grandmother      Family History Negative Paternal Grandfather      Family History Negative Brother      Alzheimer Disease Brother      Family History Negative Sister      Family History Negative Daughter      Family History Negative Son      Family History Negative Daughter      HEART DISEASE Son       at age 37       Review of Systems:  A 10-point ROS is otherwise negative except as noted in HPI.     Physical Examination:  Blood pressure 131/70, pulse 64, height 1.778 m (5' 10\"), weight 105.3 kg (232 lb 3.2 oz).  Body mass index is 33.32 kg/(m^2).  Wt Readings from Last 4 Encounters:   17 110 kg (242 lb 8.1 oz)   10/23/17 110.5 kg (243 lb 8 oz)   17 110.2 kg (243 lb)   17 109.3 kg (241 lb)     GEN: Alert, no distress.  HEENT: EOMI.  NECK: Thyroid normal to palpation, non-tender. No cervical/clavicular LAD.   CV: RRR with no murmur.   RESP: CTA bilaterally.   EXT: No peripheral edema.    SKIN: Normal skin temperature and turgor with no lesions.   MSK: Normal muscle bulk, no abnormal appearing joints.   NEURO: Cranial nerves intact. " Non-focal.      Labs and Studies:     Component      Latest Ref Rng & Units 8/3/2017 11/4/2017   Sodium      133 - 144 mmol/L  142   Potassium      3.4 - 5.3 mmol/L  3.9   Chloride      94 - 109 mmol/L  109   Carbon Dioxide      20 - 32 mmol/L  24   Anion Gap      3 - 14 mmol/L  9   Glucose      70 - 99 mg/dL  151 (H)   Urea Nitrogen      7 - 30 mg/dL  15   Creatinine      0.66 - 1.25 mg/dL  0.76   GFR Estimate      >60 mL/min/1.7m2  >90   GFR Estimate If Black      >60 mL/min/1.7m2  >90   Calcium      8.5 - 10.1 mg/dL  8.0 (L)   Cortisol Serum      4 - 22 ug/dL 6.6    Prolactin      2 - 18 ug/L 6    Ins Growth Factor 1      24 - 236 ng/ml 108    Testosterone Total      240 - 950 ng/dL 132 (L)    T4 Free      0.76 - 1.46 ng/dL 0.69 (L)    TSH      0.40 - 4.00 mU/L 2.54    Growth Hormone      0 - 3.0 ug/L <0.1    Adrenal Corticotropin      <47 pg/mL 33    FSH      0.7 - 10.8 IU/L 9.4    Lutropin      3.1 - 34.6 IU/L 4.2          Ezio Zavala MD (PGY-4)  Diabetes and Endocrinology Fellow  [facility]  Pager: [phone]     --- Addendum----  I saw the patient with endocrine fellow Dr. Zavala and directly examined patient and discussed. Agree above note and plan.   Assessment:    Pituitary Macroadenoma - non-function, s/p resection. Doing well. No evidence of DI post-op. He is on hydrocortisone 15 mg AM, 5 mg PM. We will taper further to 10/5 for 5 days, then have him stop. We will check labs Monday, Nov 27th once he is off steroids to assess his HPA axis.  Depending on the results, will plan follow-up in 6 months.      Plan:   1) check TSH, free T4, Sodium today  2) complete 10/5 mg hydrocortisone taper (5 days, then stop)  3) check am cortisol, total testosterone Nov 27th      4) Will also start LT4 75 mcg   11/20/2017 12:20   Sodium 135        8/3/2017 13:27 11/20/2017 12:20   T4 Free 0.69 (L) 0.72 (L)   TSH 2.54 1.60         We spent 60 minutes with this patient face to face and explained the  conditions and plans (more than 50% of time was counseling/coordination of care, plan for steroid taper, plan for other axis assessment) . The patient understood and is satisfied with today's visit. Return to clinic with me in 3-6 month with Neurosurg appointment.     Gris Santoro MD  Staff Physician  Endocrinology and Metabolism  [facility]  License: [place] [id number]  Pager: [phone]

## 2017-11-20 NOTE — PROGRESS NOTES
Endocrinology and Diabetes Clinic Initial Visit  Date of Service: November 20, 2017    Consulting provider: Lara Mckeon MD  [address]  [place] [address]    Reason for consultation: pituitary macroadenoma    Assessment:    Pituitary Macroadenoma - non-function, s/p resection. Doing well. No evidence of DI post-op. He is on hydrocortisone 15 mg AM, 5 mg PM. We will taper further to 10/5 for 5 days, then have him stop. We will check labs Monday, Nov 27th once he is off steroids to assess his HPA axis.  Depending on the results, will plan follow-up in 6 months.    Discussed and examined patient with Dr. Santoro.     Plan:   1) check TSH, free T4, Sodium today  2) complete 10/5 mg hydrocortisone taper (5 days, then stop)  3) check cortisol, total testosterone Nov 27th  4) RTC 6 months (sooner if abnormal labs)    Subjective:   Terrence Murillo is a 72 year old male with a past medical history of pituitary macroadenoma who presents post-operative follow-up after endoscopic, transphenoidal resection of pituitary adenoma on 11/3/17. The mass was found to be non-secretory on initial labs. He was initially found to have the tumor on MRI after he presented to his PCP with complaints of vision problems. The tumor was 3.2 x 2.5 x 2.0 cm in size and compressed the optic chiasm. Pathology consistent with benign adenoma. He was started on prophylactic hydrocortisone prior to discharge. Since d/c, he has been doing well. Energy level is improved. He is walking more. Has occasional headaches behind his eyes but seems to be getting better. Denies increased urinary frequency or excessive thirst. Reports his libido is fine but is not currently sexually active with his wife due to medical issues that she has.  Denies nipple discharge. Feels his vision is about the same as before the surgery. Still has difficulty reading small print.     Current Problem List:   Patient Active Problem List   Diagnosis      Sleep apnea     Coronary artery disease involving native coronary artery of native heart without angina pectoris     Benign essential hypertension     Hyperlipidemia LDL goal <70     Non morbid obesity due to excess calories     Chest pain     MORENO (dyspnea on exertion)     Status post coronary angiogram     Intracranial tumor (H)       Past Medical and Past Surgical History:  Past Medical History:   Diagnosis Date     Coronary artery disease     cardiac cath 2016: medical management; cath 2010: SUSANA x2 to LAD; cath 1993: PTCA to LAD; cath 1992: PTCA to LAD, 1992: atherectomy of LAD     Hearing problem      Hx of angiography 4-    no stenosis greater than 25%-rec. medical management     Hyperlipidaemia      Hypertension      Obese      Obstructive sleep apnea      Reduced vision      Sleep apnea     CPAP       Past Surgical History:   Procedure Laterality Date     APPENDECTOMY OPEN       ARTHROPLASTY KNEE Left      ARTHROPLASTY SHOULDER Right      ENT SURGERY       HEART CATH, ANGIOPLASTY  1992    LAD  atherectomy with a DVI device      HEART CATH, ANGIOPLASTY  4-28-10    PTCA and stent proximal and mid LAD (2) stents Xience     OPTICAL TRACKING SYSTEM ENDOSCOPIC RESECTION TUMOR CRANIAL N/A 11/3/2017    Procedure: OPTICAL TRACKING SYSTEM ENDOSCOPIC RESECTION TUMOR CRANIAL;  Stealth Guided Endoscopic Transnasal Resection of Pituitary Tumor;  Surgeon: Amanda Bates MD;  Location:  OR       Medications:   Current Outpatient Prescriptions   Medication Sig Dispense Refill     sodium chloride (OCEAN) 0.65 % nasal spray Spray 2 sprays in nostril 3 times daily as needed for congestion 6 mL 3     oxyCODONE IR (ROXICODONE) 5 MG tablet Take 1 tablet (5 mg) by mouth every 6 hours as needed for moderate to severe pain 24 tablet 0     acetaminophen (TYLENOL) 325 MG tablet Take 2 tablets (650 mg) by mouth every 4 hours as needed for other (surgical pain) 100 tablet 0     aspirin 81 MG tablet Take 1 tablet (81  mg) by mouth every evening 30 tablet 0     ondansetron (ZOFRAN-ODT) 4 MG ODT tab Take 1 tablet (4 mg) by mouth every 6 hours as needed for nausea or vomiting (Patient not taking: Reported on 11/16/2017) 40 tablet 0     hydrocortisone (CORTEF) 20 MG tablet Take 1 tablet (20 mg) by mouth every morning 1 tablet 0     hydrocortisone (CORTEF) 10 MG tablet Take 1 tablet (10 mg) by mouth every evening 1 tablet 0     polyethylene glycol (MIRALAX/GLYCOLAX) Packet Take 17 g by mouth 2 times daily 21 packet 0     senna-docusate (SENOKOT-S;PERICOLACE) 8.6-50 MG per tablet Take 2 tablets by mouth 2 times daily 100 tablet 0     pantoprazole (PROTONIX) 40 MG EC tablet Take 1 tablet (40 mg) by mouth every morning (before breakfast) 10 tablet 0     rosuvastatin (CRESTOR) 40 MG tablet Take 1 tablet (40 mg) by mouth daily (Patient taking differently: Take 40 mg by mouth every evening ) 90 tablet 2     metoprolol (LOPRESSOR) 25 MG tablet Take 1 tablet (25 mg) by mouth 2 times daily 180 tablet 3     ramipril (ALTACE) 5 MG capsule Take 1 capsule (5 mg) by mouth daily (Patient taking differently: Take 5 mg by mouth every morning ) 90 capsule 3     nitroGLYcerin (NITROSTAT) 0.4 MG sublingual tablet Place 1 tablet (0.4 mg) under the tongue every 5 minutes as needed May repeat X 2. If no relief after 3 tablets call 911 25 tablet 1     ezetimibe (ZETIA) 10 MG tablet Take 1 tablet (10 mg) by mouth daily (Patient taking differently: Take 10 mg by mouth every morning ) 90 tablet 3     Glucosamine 500 MG CAPS Take 500 mg by mouth 2 times daily        Multiple Vitamin (MULTI-VITAMIN) per tablet Take 1 tablet by mouth every morning          Allergies:   Allergies   Allergen Reactions     Cardizem [Diltiazem Hcl] Itching     No Clinical Screening - See Comments Hives       Social History:  Social History   Substance Use Topics     Smoking status: Former Smoker     Packs/day: 0.20     Years: 5.00     Types: Cigarettes     Quit date: 1/1/1960      "Smokeless tobacco: Never Used      Comment: Social smoking only     Alcohol use Yes      Comment: 1-2 cocktails daily          Family History:  Family History   Problem Relation Age of Onset     C.A.D. Father      HEART DISEASE Father       at age 44     HEART DISEASE Son 37     enlarged heart     HEART DISEASE Mother       at age 80 after heart surgery     Heart Surgery Mother      open heart, passed 10 days after     DIABETES Mother      Family History Negative Maternal Grandmother      Family History Negative Maternal Grandfather      Family History Negative Paternal Grandmother      Family History Negative Paternal Grandfather      Family History Negative Brother      Alzheimer Disease Brother      Family History Negative Sister      Family History Negative Daughter      Family History Negative Son      Family History Negative Daughter      HEART DISEASE Son       at age 37       Review of Systems:  A 10-point ROS is otherwise negative except as noted in HPI.     Physical Examination:  Blood pressure 131/70, pulse 64, height 1.778 m (5' 10\"), weight 105.3 kg (232 lb 3.2 oz).  Body mass index is 33.32 kg/(m^2).  Wt Readings from Last 4 Encounters:   17 110 kg (242 lb 8.1 oz)   10/23/17 110.5 kg (243 lb 8 oz)   17 110.2 kg (243 lb)   17 109.3 kg (241 lb)     GEN: Alert, no distress.  HEENT: EOMI.  NECK: Thyroid normal to palpation, non-tender. No cervical/clavicular LAD.   CV: RRR with no murmur.   RESP: CTA bilaterally.   EXT: No peripheral edema.    SKIN: Normal skin temperature and turgor with no lesions.   MSK: Normal muscle bulk, no abnormal appearing joints.   NEURO: Cranial nerves intact. Non-focal.      Labs and Studies:     Component      Latest Ref Rng & Units 8/3/2017 2017   Sodium      133 - 144 mmol/L  142   Potassium      3.4 - 5.3 mmol/L  3.9   Chloride      94 - 109 mmol/L  109   Carbon Dioxide      20 - 32 mmol/L  24   Anion Gap      3 - 14 mmol/L  9   Glucose    "   70 - 99 mg/dL  151 (H)   Urea Nitrogen      7 - 30 mg/dL  15   Creatinine      0.66 - 1.25 mg/dL  0.76   GFR Estimate      >60 mL/min/1.7m2  >90   GFR Estimate If Black      >60 mL/min/1.7m2  >90   Calcium      8.5 - 10.1 mg/dL  8.0 (L)   Cortisol Serum      4 - 22 ug/dL 6.6    Prolactin      2 - 18 ug/L 6    Ins Growth Factor 1      24 - 236 ng/ml 108    Testosterone Total      240 - 950 ng/dL 132 (L)    T4 Free      0.76 - 1.46 ng/dL 0.69 (L)    TSH      0.40 - 4.00 mU/L 2.54    Growth Hormone      0 - 3.0 ug/L <0.1    Adrenal Corticotropin      <47 pg/mL 33    FSH      0.7 - 10.8 IU/L 9.4    Lutropin      3.1 - 34.6 IU/L 4.2          Ezio Zavala MD (PGY-4)  Diabetes and Endocrinology Fellow  [facility]  Pager: [phone]     --- Addendum----  I saw the patient with endocrine fellow Dr. Zavala and directly examined patient and discussed. Agree above note and plan.   Assessment:    Pituitary Macroadenoma - non-function, s/p resection. Doing well. No evidence of DI post-op. He is on hydrocortisone 15 mg AM, 5 mg PM. We will taper further to 10/5 for 5 days, then have him stop. We will check labs Monday, Nov 27th once he is off steroids to assess his HPA axis.  Depending on the results, will plan follow-up in 6 months.      Plan:   1) check TSH, free T4, Sodium today  2) complete 10/5 mg hydrocortisone taper (5 days, then stop)  3) check am cortisol, total testosterone Nov 27th      4) Will also start LT4 75 mcg   11/20/2017 12:20   Sodium 135        8/3/2017 13:27 11/20/2017 12:20   T4 Free 0.69 (L) 0.72 (L)   TSH 2.54 1.60         We spent 60 minutes with this patient face to face and explained the conditions and plans (more than 50% of time was counseling/coordination of care, plan for steroid taper, plan for other axis assessment) . The patient understood and is satisfied with today's visit. Return to clinic with me in 3-6 month with Neurosurg appointment.     Gris Santoro MD  Staff  Physician  Endocrinology and Metabolism  [facility]  License: [place] [phone]  Pager: [phone]

## 2017-11-20 NOTE — NURSING NOTE
Chief Complaint   Patient presents with     Consult     New Endocrine      Lois Lewis CMA  
Alert and oriented to person, place, time/situation. normal mood and affect. no apparent risk to self or others.

## 2017-11-20 NOTE — PATIENT INSTRUCTIONS
1. Decrease the dose of hydrocortisone to 1 tablet in the morning and 1/2 tablet in the afternoon   2. Take for 4 days, then stop.  3. We will check labs on Monday Nov 27, 2017.  3. Depending on lab results, return to clinic in 6 months.

## 2017-11-20 NOTE — MR AVS SNAPSHOT
After Visit Summary   11/20/2017    Terrence Murillo    MRN: [id number]           Patient Information     Date Of Birth          1945        Visit Information        Provider Department      11/20/2017 11:00 AM Gris Santoro MD [facility]        Today's Diagnoses     Pituitary adenoma (H)    -  1      Care Instructions    1. Decrease the dose of hydrocortisone to 1 tablet in the morning and 1/2 tablet in the afternoon   2. Take for 4 days, then stop.  3. We will check labs on Monday Nov 27, 2017.  3. Depending on lab results, return to clinic in 6 months.            Follow-ups after your visit        Follow-up notes from your care team     Return in about 6 months (around 5/20/2018).      Your next 10 appointments already scheduled     Nov 20, 2017 12:00 PM CST   LAB with  LAB   [facility] Lab ([facility])    [address] [place] [id number] [phone]           Please do not eat 10-12 hours before your appointment if you are coming in fasting for labs on lipids, cholesterol, or glucose (sugar). This does not apply to pregnant women. Water, hot tea and black coffee (with nothing added) are okay. Do not drink other fluids, diet soda or chew gum.            Dec 07, 2017  1:40 PM CST   (Arrive by 1:25 PM)   Return Visit with Lara Mckeon MD   [facility] Ear Nose and Throat ([facility])    [address] [place] [address]  4th [facility] [id number] [phone]            May 23, 2018 10:30 AM CDT   (Arrive by 10:15 AM)   RETURN ENDOCRINE with Gris Santoro MD   [facility] Endocrinology ([facility])    [address] [place] [id number] [phone]              Future tests that were ordered for you today     Open Future Orders        Priority Expected Expires Ordered    TSH Routine  11/20/2018 11/20/2017    T4 free Routine  11/20/2018 11/20/2017  "   Cortisol Routine 11/27/2017 11/20/2018 11/20/2017    Sodium Routine  11/20/2018 11/20/2017    Testosterone total Routine 11/27/2017 11/20/2018 11/20/2017            Who to contact     Please call your clinic at [phone] to:    Ask questions about your health    Make or cancel appointments    Discuss your medicines    Learn about your test results    Speak to your doctor   If you have compliments or concerns about an experience at your clinic, or if you wish to file a complaint, please contact [facility] Patient Relations at [phone] or email us at [email or web address]         Additional Information About Your Visit        [email or web address] Information     [employer] gives you secure access to your electronic health record. If you see a primary care provider, you can also send messages to your care team and make appointments. If you have questions, please call your primary care clinic.  If you do not have a primary care provider, please call [phone] and they will assist you.      [employer] is an electronic gateway that provides easy, online access to your medical records. With [employer], you can request a clinic appointment, read your test results, renew a prescription or communicate with your care team.     To access your existing account, please contact your [facility] or call [phone] for assistance.        Care EveryWhere ID     This is your Care EveryWhere ID. This could be used by other organizations to access your [facility] medical records  [id number]        Your Vitals Were     Pulse Height BMI (Body Mass Index)             64 1.778 m (5' 10\") 33.32 kg/m2          Blood Pressure from Last 3 Encounters:   11/20/17 131/70   11/06/17 172/79   10/23/17 154/78    Weight from Last 3 Encounters:   11/20/17 105.3 kg (232 lb 3.2 oz)   11/04/17 110 kg (242 lb 8.1 oz)   10/23/17 110.5 kg (243 lb 8 oz)                 Today's Medication Changes    "       These changes are accurate as of: 11/20/17 11:53 AM.  If you have any questions, ask your nurse or doctor.               These medicines have changed or have updated prescriptions.        Dose/Directions    ezetimibe 10 MG tablet   Commonly known as:  ZETIA   This may have changed:  when to take this   Used for:  Atherosclerosis of native coronary artery of native heart with other form of angina pectoris (H)        Dose:  10 mg   Take 1 tablet (10 mg) by mouth daily   Quantity:  90 tablet   Refills:  3       ramipril 5 MG capsule   Commonly known as:  ALTACE   This may have changed:  when to take this   Used for:  Benign essential hypertension, Hyperlipidemia LDL goal <70, Coronary artery disease involving native coronary artery of native heart without angina pectoris        Dose:  5 mg   Take 1 capsule (5 mg) by mouth daily   Quantity:  90 capsule   Refills:  3       rosuvastatin 40 MG tablet   Commonly known as:  CRESTOR   This may have changed:  when to take this   Used for:  CAD (coronary artery disease), Hyperlipidemia LDL goal <70, Benign essential hypertension, Coronary artery disease involving native coronary artery of native heart without angina pectoris        Dose:  40 mg   Take 1 tablet (40 mg) by mouth daily   Quantity:  90 tablet   Refills:  2                Primary Care Provider Office Phone # Fax #    Alexy Hernandez [phone]       [facility] [address] [facility] [address]        Equal Access to Services     KENNETH MARLOW AH: Hadii amado varghese hadasho Soomaali, waaxda luqadaha, qaybta kaalmada adeegyada, faustino benton hayhipolito banks. So [facility] [phone].    ATENCIÓN: Si habla español, tiene a herndon disposición servicios gratuitos de asistencia lingüística. Llame al [phone].    We comply with applicable federal civil rights laws and [place] laws. We do not discriminate on the basis of race, color, national origin, age, disability,  sex, sexual orientation, or gender identity.            Thank you!     Thank you for choosing [facility] ENDOCRINOLOGY  for your care. Our goal is always to provide you with excellent care. Hearing back from our patients is one way we can continue to improve our services. Please take a few minutes to complete the written survey that you may receive in the mail after your visit with us. Thank you!             Your Updated Medication List - Protect others around you: Learn how to safely use, store and throw away your medicines at www.disposemymeds.org.          This list is accurate as of: 11/20/17 11:53 AM.  Always use your most recent med list.                   Brand Name Dispense Instructions for use Diagnosis    acetaminophen 325 MG tablet    TYLENOL    100 tablet    Take 2 tablets (650 mg) by mouth every 4 hours as needed for other (surgical pain)    Intracranial tumor (H)       aspirin 81 MG tablet     30 tablet    Take 1 tablet (81 mg) by mouth every evening    Coronary artery disease involving native coronary artery of native heart without angina pectoris       ezetimibe 10 MG tablet    ZETIA    90 tablet    Take 1 tablet (10 mg) by mouth daily    Atherosclerosis of native coronary artery of native heart with other form of angina pectoris (H)       glucosamine 500 MG Caps      Take 500 mg by mouth 2 times daily        * hydrocortisone 20 MG tablet    CORTEF    1 tablet    Take 1 tablet (20 mg) by mouth every morning    Intracranial tumor (H)       * hydrocortisone 10 MG tablet    CORTEF    1 tablet    Take 1 tablet (10 mg) by mouth every evening    Intracranial tumor (H)       metoprolol 25 MG tablet    LOPRESSOR    180 tablet    Take 1 tablet (25 mg) by mouth 2 times daily    CAD (coronary artery disease), Hyperlipidemia LDL goal <70, Benign essential hypertension, Coronary artery disease involving native coronary artery of native heart without angina pectoris       Multi-vitamin Tabs tablet   Generic drug:   multivitamin, therapeutic with minerals      Take 1 tablet by mouth every morning        nitroGLYcerin 0.4 MG sublingual tablet    NITROSTAT    25 tablet    Place 1 tablet (0.4 mg) under the tongue every 5 minutes as needed May repeat X 2. If no relief after 3 tablets call 911    Coronary artery disease involving native coronary artery of native heart without angina pectoris       ondansetron 4 MG ODT tab    ZOFRAN-ODT    40 tablet    Take 1 tablet (4 mg) by mouth every 6 hours as needed for nausea or vomiting    Intracranial tumor (H)       oxyCODONE IR 5 MG tablet    ROXICODONE    24 tablet    Take 1 tablet (5 mg) by mouth every 6 hours as needed for moderate to severe pain    Intracranial tumor (H)       pantoprazole 40 MG EC tablet    PROTONIX    10 tablet    Take 1 tablet (40 mg) by mouth every morning (before breakfast)    Intracranial tumor (H)       polyethylene glycol Packet    MIRALAX/GLYCOLAX    21 packet    Take 17 g by mouth 2 times daily    Intracranial tumor (H)       ramipril 5 MG capsule    ALTACE    90 capsule    Take 1 capsule (5 mg) by mouth daily    Benign essential hypertension, Hyperlipidemia LDL goal <70, Coronary artery disease involving native coronary artery of native heart without angina pectoris       rosuvastatin 40 MG tablet    CRESTOR    90 tablet    Take 1 tablet (40 mg) by mouth daily    CAD (coronary artery disease), Hyperlipidemia LDL goal <70, Benign essential hypertension, Coronary artery disease involving native coronary artery of native heart without angina pectoris       senna-docusate 8.6-50 MG per tablet    SENOKOT-S;PERICOLACE    100 tablet    Take 2 tablets by mouth 2 times daily    Intracranial tumor (H)       sodium chloride 0.65 % nasal spray    OCEAN    6 mL    Spray 2 sprays in nostril 3 times daily as needed for congestion    Pituitary adenoma (H)       * Notice:  This list has 2 medication(s) that are the same as other medications prescribed for you. Read the  directions carefully, and ask your doctor or other care provider to review them with you.

## 2017-11-21 DIAGNOSIS — E29.1 MALE HYPOGONADISM: ICD-10-CM

## 2017-11-21 DIAGNOSIS — D35.2 PITUITARY ADENOMA (H): Primary | ICD-10-CM

## 2017-11-21 LAB — TESTOST SERPL-MCNC: 52 NG/DL (ref 240–950)

## 2017-11-21 RX ORDER — LEVOTHYROXINE SODIUM 75 UG/1
75 TABLET ORAL DAILY
Qty: 90 TABLET | Refills: 3 | Status: SHIPPED | OUTPATIENT
Start: 2017-11-21 | End: 2019-01-15

## 2017-11-21 NOTE — PROGRESS NOTES
Dear Terrence     Here is your results.  As left message, start levothyroxine 75 mcg. Please call nursing line at [phone] if you have any questions.    Take care  Gris Santoro MD

## 2017-11-27 DIAGNOSIS — D35.2 PITUITARY ADENOMA (H): ICD-10-CM

## 2017-11-27 DIAGNOSIS — E29.1 MALE HYPOGONADISM: ICD-10-CM

## 2017-11-27 LAB — CORTIS SERPL-MCNC: 7.9 UG/DL (ref 4–22)

## 2017-11-27 PROCEDURE — 82533 TOTAL CORTISOL: CPT | Performed by: INTERNAL MEDICINE

## 2017-11-27 PROCEDURE — 36415 COLL VENOUS BLD VENIPUNCTURE: CPT | Performed by: INTERNAL MEDICINE

## 2017-11-27 PROCEDURE — 84403 ASSAY OF TOTAL TESTOSTERONE: CPT | Performed by: INTERNAL MEDICINE

## 2017-11-29 LAB — TESTOST SERPL-MCNC: 79 NG/DL (ref 240–950)

## 2017-12-05 ENCOUNTER — CARE COORDINATION (OUTPATIENT)
Dept: OTOLARYNGOLOGY | Facility: CLINIC | Age: 72
End: 2017-12-05

## 2017-12-05 NOTE — PROGRESS NOTES
The patient called today with concerns about a possible sinus infection. He is s/p EEA to resect pituitary adenoma on 11/3/17.  He endorses a 6 day history of increase sinus congestion, eye pain and scalp pain.  He does not have purulent sinus drainage and is afebrile.  Still on sinus precautions. Not using CPAP.  We discussed increasing the frequency of his nasal saline spray from BID to 4-5 times daily.  He is scheduled to be seen by Dr. Muniz on 12/7 and will undergo an assessment at that time.  Terrence voiced an understanding of this information and plan.    Leyla Mckeon R.N., B.S.N.  Nurse Coordinator Head & Neck Surgery  [phone]  12/5/2017 9:04 AM

## 2017-12-07 ENCOUNTER — OFFICE VISIT (OUTPATIENT)
Dept: OTOLARYNGOLOGY | Facility: CLINIC | Age: 72
End: 2017-12-07

## 2017-12-07 VITALS — BODY MASS INDEX: 33.07 KG/M2 | HEIGHT: 70 IN | WEIGHT: 231 LBS

## 2017-12-07 DIAGNOSIS — D35.2 PITUITARY ADENOMA (H): Primary | ICD-10-CM

## 2017-12-07 ASSESSMENT — PAIN SCALES - GENERAL: PAINLEVEL: NO PAIN (0)

## 2017-12-07 NOTE — PROGRESS NOTES
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      TREATMENT:  The patient underwent transnasal endoscopic resection of pituitary macroadenoma on 11/03/2017.  Reconstruction was done with right-sided nasal septal flap.      HISTORY OF PRESENT ILLNESS:  Mr. Murillo is a 72-year-old gentleman who is back to the Otolaryngology Clinic for followup.  The patient's main complaint is nasal congestion and some rhinorrhea.  He is using his saline solution regularly.  He is telling me that he lost his sense of taste again.  He denies headache.  No other symptoms.      PHYSICAL EXAMINATION:    Constitutional: The patient was well-groomed and in no acute distress.    Skin: Warm and pink.    Neurologic: Alert and oriented x3. Cranial nerves III-XII within normal limits. Voice quality is normal.    Psychiatric: The patient's affect was calm, cooperative and appropriate.    Respiratory: Breathing comfortably without stridor or exertion of accessory muscles.    Eyes: Pupils were equal and reactive. Extraocular movements are intact.    Head: Normocephalic and atraumatic. No lesions or scars.    Ears: External auditory canals and tympanic membranes were clear.    Nose: Sinuses were nontender. Anterior rhinoscopy revealed midline septum and absence of purulence or polyps.    Oral cavity/Oropharynx: Normal tongue, floor of mouth, buccal mucosa and palate. No lesions or masses on inspection or palpation. No abnormal lymph tissue in the oropharynx.    Neck: The parotids are soft without masses. Supple with normal laryngeal and tracheal landmarks.    Lymphatic: There is no palpable lymphadenopathy or other masses in the neck or parotids.       PROCEDURE: Nasal endoscopy: The risks and benefits of the procedure were discussed, and written consent was obtained. A timeout was performed. The patient's nose was sprayed with lidocaine and Afrin, and a 0-degree nasal endoscope was used to gain access into the  nasal cavity.  I did remove some crusts, mainly from the right side.  There is still a very good area of cartilage that is exposed.  Unfortunately, there is a small septal perforation anteriorly.  The posterior septectomy actually is clean.  I did remove some debris from the sphenoid sinus.  The nasal septal flap is healing nicely.  There is some granulation tissue on the edges of the right septal cartilage where we elevated the nasal septal flap.      ASSESSMENT AND PLAN:  A 72-year-old male status post transnasal endoscopic resection of pituitary macroadenoma, doing well.  Unfortunately, he has a septal perforation.  I discussed this with the patient today.  I would like to do a little bit more aggressive sinonasal debridements to help the healing on this right side.  I would like to see him back in two weeks.  He is okay to use his CPAP machine.

## 2017-12-07 NOTE — NURSING NOTE
Chief Complaint   Patient presents with     RECHECK     3 week f/u pituitary tumor-nasal splint removal     Delia Lofotn Medical Assistant

## 2017-12-07 NOTE — PATIENT INSTRUCTIONS
1.  You were seen in the ENT Clinic today by Dr. Mckeon.  If you have questions or concerns after your appointment please call, [phone].  Press option #1 for scheduling related needs.  Press option #3 for Nurse advice.  2.  Plan is to return to clinic in 2 weeks for another assessment. 12/21/17 at 12:40.  3. RN Care Coordinator is Leyla, [phone]  4. Please start using a bigger bottle of nasal saline. Instructions for this are listed below.  5. Okay to use your CPAP now.  Use the humidifier on the machine.  6. You will need to follow up with Dr. Bates with an MRI 3 months after your surgery (Early February)    Cleaning of the Nasal or Sinus Cavity    Please follow all steps.  Nasal irrigation (cleaning) should be done 2-4 times/day.    Preparation:  1.  Wash your hands  2.  We suggest that you buy the [employer] Sinus Rinse Kit  3.  Use distilled water or tap water that has been boiled and brought to room temperature.  4.  Fill the irrigation bottle with room temperature water (distilled or boiled tap water) and add mixture (pre made packet or homemade recipe).        If you wish to make a homemade recipe:        Mix 1/4 teaspoon salt with 1/4 teaspoon baking soda in each bottle.    Cleansing Irrigation/Sinus Rinse:    1.  Lean forward over a sink and insert the rinse bottle applicator into the right side of your nose.    2.  Tilt head down and to the left side.  With your mouth open (breathing through your mouth), gently direct the water around the inside of your nose until clear fluid starts to drain from the opposite nostril.  This is called flushing or irrigation.    3.  When you have used 1/4 to 1/2 or the solution, switch to the left nostril.    4.  To irrigate the left nostril, tilt your head down and to the right side.  With your mouth open (breathing through your mouth),  gently direct the water around the inside of your nose until clear fluid starts to drain from the opposite nostril.      Cleaning the Equipment:  1.  Throw away any leftover solution  2.  Clean the rinse bottle and cap with clear water. Air dry.      Call the ENT Clinic if you have any questions or concerns at [phone]

## 2017-12-07 NOTE — MR AVS SNAPSHOT
After Visit Summary   12/7/2017    Terrence Murillo    MRN: [id number]           Patient Information     Date Of Birth          1945        Visit Information        Provider Department      12/7/2017 1:40 PM Lara Mckeon MD [facility] Ear Nose and Throat        Care Instructions    1.  You were seen in the ENT Clinic today by Dr. Mckeon.  If you have questions or concerns after your appointment please call, [phone].  Press option #1 for scheduling related needs.  Press option #3 for Nurse advice.  2.  Plan is to return to clinic in 2 weeks for another assessment. 12/21/17 at 12:40.  3. RN Care Coordinator is Leyla [phone]  4. Please start using a bigger bottle of nasal saline. Instructions for this are listed below.  5. Okay to use your CPAP now.  Use the humidifier on the machine.  6. You will need to follow up with Dr. Bates with an MRI 3 months after your surgery (Early February)    Cleaning of the Nasal or Sinus Cavity    Please follow all steps.  Nasal irrigation (cleaning) should be done 2-4 times/day.    Preparation:  1.  Wash your hands  2.  We suggest that you buy the [employer] Sinus Rinse Kit  3.  Use distilled water or tap water that has been boiled and brought to room temperature.  4.  Fill the irrigation bottle with room temperature water (distilled or boiled tap water) and add mixture (pre made packet or homemade recipe).        If you wish to make a homemade recipe:        Mix 1/4 teaspoon salt with 1/4 teaspoon baking soda in each bottle.    Cleansing Irrigation/Sinus Rinse:    1.  Lean forward over a sink and insert the rinse bottle applicator into the right side of your nose.    2.  Tilt head down and to the left side.  With your mouth open (breathing through your mouth), gently direct the water around the inside of your nose until clear fluid starts to drain from the opposite nostril.  This is called flushing or irrigation.    3.  When you  have used 1/4 to 1/2 or the solution, switch to the left nostril.    4.  To irrigate the left nostril, tilt your head down and to the right side.  With your mouth open (breathing through your mouth),  gently direct the water around the inside of your nose until clear fluid starts to drain from the opposite nostril.     Cleaning the Equipment:  1.  Throw away any leftover solution  2.  Clean the rinse bottle and cap with clear water. Air dry.      Call the ENT Clinic if you have any questions or concerns at [phone]                      Follow-ups after your visit        Your next 10 appointments already scheduled     May 23, 2018 10:30 AM CDT   (Arrive by 10:15 AM)   RETURN ENDOCRINE with Gris Santoro MD   [facility] Endocr[facility]ology ([facility] [place])    [address] [place] [id number] [phone]              Who to contact     Please call your clinic at [phone] to:    Ask questions about your health    Make or cancel appointments    Discuss your medicines    Learn about your test results    Speak to your doctor   If you have compliments or concerns about an experience at your clinic, or if you wish to file a complaint, please contact [facility] Patient Relations at [phone] or email us at [email or web address]         Additional Information About Your Visit        [employer] Information     [employer] gives you secure access to your electronic health record. If you see a primary care provider, you can also send messages to your care team and make appointments. If you have questions, please call your primary care clinic.  If you do not have a primary care provider, please call [phone] and they will assist you.      [employer] is an electronic gateway that provides easy, online access to your medical records. With [employer], you can request a clinic appointment, read your test results, renew a prescription or communicate with  "your care team.     To access your existing account, please contact your [facility] or call [phone] for assistance.        Care EveryWhere ID     This is your Care EveryWhere ID. This could be used by other organizations to access your [facility] medical records  [id number]        Your Vitals Were     Height BMI (Body Mass Index)                1.778 m (5' 10\") 33.15 kg/m2           Blood Pressure from Last 3 Encounters:   11/20/17 131/70   11/06/17 172/79   10/23/17 154/78    Weight from Last 3 Encounters:   12/07/17 104.8 kg (231 lb)   11/20/17 105.3 kg (232 lb 3.2 oz)   11/04/17 110 kg (242 lb 8.1 oz)              Today, you had the following     No orders found for display         Today's Medication Changes          These changes are accurate as of: 12/7/17  2:17 PM.  If you have any questions, ask your nurse or doctor.               These medicines have changed or have updated prescriptions.        Dose/Directions    ezetimibe 10 MG tablet   Commonly known as:  ZETIA   This may have changed:  when to take this   Used for:  Atherosclerosis of native coronary artery of native heart with other form of angina pectoris (H)        Dose:  10 mg   Take 1 tablet (10 mg) by mouth daily   Quantity:  90 tablet   Refills:  3       ramipril 5 MG capsule   Commonly known as:  ALTACE   This may have changed:  when to take this   Used for:  Benign essential hypertension, Hyperlipidemia LDL goal <70, Coronary artery disease involving native coronary artery of native heart without angina pectoris        Dose:  5 mg   Take 1 capsule (5 mg) by mouth daily   Quantity:  90 capsule   Refills:  3       rosuvastatin 40 MG tablet   Commonly known as:  CRESTOR   This may have changed:  when to take this   Used for:  CAD (coronary artery disease), Hyperlipidemia LDL goal <70, Benign essential hypertension, Coronary artery disease involving native coronary artery of native heart without angina pectoris    "     Dose:  40 mg   Take 1 tablet (40 mg) by mouth daily   Quantity:  90 tablet   Refills:  2                Primary Care Provider Office Phone # Fax #    Alexy Hernandez [phone]       [facility] [address]  [place] [address]        Equal Access to Services     [facility] : Hadii aad ku hadasho Soomaali, waaxda luqadaha, qaybta kaalmada adeegyada, waxay idiin hayaan adeeg tobias ladishan . So [facility] [phone].    ATENCIÓN: Si habla español, tiene a herndon disposición servicios gratuitos de asistencia lingüística. Esha al [phone].    We comply with applicable federal civil rights laws and [place] laws. We do not discriminate on the basis of race, color, national origin, age, disability, sex, sexual orientation, or gender identity.            Thank you!     Thank you for choosing [facility] E[facility]AT  for your care. Our goal is always to provide you with excellent care. Hearing back from our patients is one way we can continue to improve our services. Please take a few minutes to complete the written survey that you may receive in the mail after your visit with us. Thank you!             Your Updated Medication List - Protect others around you: Learn how to safely use, store and throw away your medicines at www.disposemymeds.org.          This list is accurate as of: 12/7/17  2:17 PM.  Always use your most recent med list.                   Brand Name Dispense Instructions for use Diagnosis    acetaminophen 325 MG tablet    TYLENOL    100 tablet    Take 2 tablets (650 mg) by mouth every 4 hours as needed for other (surgical pain)    Intracranial tumor (H)       aspirin 81 MG tablet     30 tablet    Take 1 tablet (81 mg) by mouth every evening    Coronary artery disease involving native coronary artery of native heart without angina pectoris       ezetimibe 10 MG tablet    ZETIA    90 tablet    Take 1 tablet (10 mg) by mouth daily    Atherosclerosis of  native coronary artery of native heart with other form of angina pectoris (H)       glucosamine 500 MG Caps      Take 500 mg by mouth 2 times daily        levothyroxine 75 MCG tablet    SYNTHROID/LEVOTHROID    90 tablet    Take 1 tablet (75 mcg) by mouth daily    Pituitary adenoma (H), Secondary hypothyroidism       metoprolol 25 MG tablet    LOPRESSOR    180 tablet    Take 1 tablet (25 mg) by mouth 2 times daily    CAD (coronary artery disease), Hyperlipidemia LDL goal <70, Benign essential hypertension, Coronary artery disease involving native coronary artery of native heart without angina pectoris       Multi-vitamin Tabs tablet   Generic drug:  multivitamin, therapeutic with minerals      Take 1 tablet by mouth every morning        nitroGLYcerin 0.4 MG sublingual tablet    NITROSTAT    25 tablet    Place 1 tablet (0.4 mg) under the tongue every 5 minutes as needed May repeat X 2. If no relief after 3 tablets call 911    Coronary artery disease involving native coronary artery of native heart without angina pectoris       ondansetron 4 MG ODT tab    ZOFRAN-ODT    40 tablet    Take 1 tablet (4 mg) by mouth every 6 hours as needed for nausea or vomiting    Intracranial tumor (H)       oxyCODONE IR 5 MG tablet    ROXICODONE    24 tablet    Take 1 tablet (5 mg) by mouth every 6 hours as needed for moderate to severe pain    Intracranial tumor (H)       pantoprazole 40 MG EC tablet    PROTONIX    10 tablet    Take 1 tablet (40 mg) by mouth every morning (before breakfast)    Intracranial tumor (H)       polyethylene glycol Packet    MIRALAX/GLYCOLAX    21 packet    Take 17 g by mouth 2 times daily    Intracranial tumor (H)       ramipril 5 MG capsule    ALTACE    90 capsule    Take 1 capsule (5 mg) by mouth daily    Benign essential hypertension, Hyperlipidemia LDL goal <70, Coronary artery disease involving native coronary artery of native heart without angina pectoris       rosuvastatin 40 MG tablet    CRESTOR    90  tablet    Take 1 tablet (40 mg) by mouth daily    CAD (coronary artery disease), Hyperlipidemia LDL goal <70, Benign essential hypertension, Coronary artery disease involving native coronary artery of native heart without angina pectoris       senna-docusate 8.6-50 MG per tablet    SENOKOT-S;PERICOLACE    100 tablet    Take 2 tablets by mouth 2 times daily    Intracranial tumor (H)       sodium chloride 0.65 % nasal spray    OCEAN    6 mL    Spray 2 sprays in nostril 3 times daily as needed for congestion    Pituitary adenoma (H)

## 2017-12-07 NOTE — LETTER
12/7/2017       RE: Terrence Murillo  [address] [facility] [address]     Dear Colleague,    Thank you for referring your patient, Terrence Murillo, to the [facility] EAR NOSE AND THROAT at [facility]. Please see a copy of my visit note below.    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      TREATMENT:  The patient underwent transnasal endoscopic resection of pituitary macroadenoma on 11/03/2017.  Reconstruction was done with right-sided nasal septal flap.      HISTORY OF PRESENT ILLNESS:  Mr. Murillo is a 72-year-old gentleman who is back to the Otolaryngology Clinic for followup.  The patient's main complaint is nasal congestion and some rhinorrhea.  He is using his saline solution regularly.  He is telling me that he lost his sense of taste again.  He denies headache.  No other symptoms.      PHYSICAL EXAMINATION:    Constitutional: The patient was well-groomed and in no acute distress.    Skin: Warm and pink.    Neurologic: Alert and oriented x3. Cranial nerves III-XII within normal limits. Voice quality is normal.    Psychiatric: The patient's affect was calm, cooperative and appropriate.    Respiratory: Breathing comfortably without stridor or exertion of accessory muscles.    Eyes: Pupils were equal and reactive. Extraocular movements are intact.    Head: Normocephalic and atraumatic. No lesions or scars.    Ears: External auditory canals and tympanic membranes were clear.    Nose: Sinuses were nontender. Anterior rhinoscopy revealed midline septum and absence of purulence or polyps.    Oral cavity/Oropharynx: Normal tongue, floor of mouth, buccal mucosa and palate. No lesions or masses on inspection or palpation. No abnormal lymph tissue in the oropharynx.    Neck: The parotids are soft without masses. Supple with normal laryngeal and tracheal landmarks.    Lymphatic: There is no palpable lymphadenopathy or other masses in the  neck or parotids.       PROCEDURE: Nasal endoscopy: The risks and benefits of the procedure were discussed, and written consent was obtained. A timeout was performed. The patient's nose was sprayed with lidocaine and Afrin, and a 0-degree nasal endoscope was used to gain access into the nasal cavity.  I did remove some crusts, mainly from the right side.  There is still a very good area of cartilage that is exposed.  Unfortunately, there is a small septal perforation anteriorly.  The posterior septectomy actually is clean.  I did remove some debris from the sphenoid sinus.  The nasal septal flap is healing nicely.  There is some granulation tissue on the edges of the right septal cartilage where we elevated the nasal septal flap.      ASSESSMENT AND PLAN:  A 72-year-old male status post transnasal endoscopic resection of pituitary macroadenoma, doing well.  Unfortunately, he has a septal perforation.  I discussed this with the patient today.  I would like to do a little bit more aggressive sinonasal debridements to help the healing on this right side.  I would like to see him back in two weeks.  He is okay to use his CPAP machine.           Again, thank you for allowing me to participate in the care of your patient.      Sincerely,    Lara Mckeon MD

## 2017-12-13 ENCOUNTER — TELEPHONE (OUTPATIENT)
Dept: ENDOCRINOLOGY | Facility: CLINIC | Age: 72
End: 2017-12-13

## 2017-12-13 DIAGNOSIS — E29.1 SECONDARY MALE HYPOGONADISM: Primary | ICD-10-CM

## 2017-12-13 RX ORDER — TESTOSTERONE 1.62 MG/G
1 GEL TRANSDERMAL DAILY
Qty: 75 G | Refills: 5 | Status: SHIPPED | OUTPATIENT
Start: 2017-12-13 | End: 2018-06-25

## 2017-12-19 ENCOUNTER — TELEPHONE (OUTPATIENT)
Dept: ENDOCRINOLOGY | Facility: CLINIC | Age: 72
End: 2017-12-19

## 2017-12-19 NOTE — TELEPHONE ENCOUNTER
Prior Authorization Approval    Authorization Effective Date: 9/20/2017  Authorization Expiration Date: 12/19/2018  Medication: Androgel-PA Approved  Approved Dose/Quantity: Reference #:     Insurance Company: MEDICA - Phone [phone] Fax [phone]  Expected CoPay: $281.15/60 ds     CoPay Card Available:      Foundation Assistance Needed:    Which Pharmacy is filling the prescription (Not needed for infusion/clinic administered): [facility] [address] [place] [address] [address] AT [facility] [address] & [facility]  Pharmacy Notified: Yes  Patient Notified: Yes    Pharmacy states patient is aware of copay amount. Per [employer], patient is in donut hole.

## 2017-12-19 NOTE — TELEPHONE ENCOUNTER
Central Prior Authorization Team   Phone: [phone]      PA Initiation    Medication: Androgel-PA initiated  Insurance Company: MEDICA - Phone [phone] Fax [phone]  Pharmacy Filling the Rx: [facility] [address] [place] [address] [address] AT [facility] [address] [place]  Filling Pharmacy Phone: [phone]  Filling Pharmacy Fax: [phone]  Start Date: 12/19/2017

## 2017-12-19 NOTE — TELEPHONE ENCOUNTER
Pt called to check on status of Androgel Prior auth. Pt states [facility] in [place] advised him that insurance will not cover.     Pt can be called at [phone].

## 2017-12-21 ENCOUNTER — OFFICE VISIT (OUTPATIENT)
Dept: OTOLARYNGOLOGY | Facility: CLINIC | Age: 72
End: 2017-12-21
Payer: COMMERCIAL

## 2017-12-21 VITALS — WEIGHT: 228 LBS | BODY MASS INDEX: 32.64 KG/M2 | HEIGHT: 70 IN

## 2017-12-21 DIAGNOSIS — D35.2 PITUITARY ADENOMA (H): Primary | ICD-10-CM

## 2017-12-21 RX ORDER — ACETAMINOPHEN 500 MG
500 TABLET ORAL
COMMUNITY
End: 2018-07-24

## 2017-12-21 RX ORDER — POLYETHYLENE GLYCOL 3350 17 G/17G
POWDER, FOR SOLUTION ORAL
Refills: 0 | COMMUNITY
Start: 2017-11-21 | End: 2018-02-06

## 2017-12-21 RX ORDER — HYDROCORTISONE 10 MG/1
TABLET ORAL
COMMUNITY
Start: 2017-11-05 | End: 2018-07-24

## 2017-12-21 ASSESSMENT — PAIN SCALES - GENERAL: PAINLEVEL: NO PAIN (0)

## 2017-12-21 NOTE — PATIENT INSTRUCTIONS
1.  You were seen in the ENT Clinic today by Dr. Mckeon.  If you have questions or concerns after your appointment please call, [phone].  Press option #1 for scheduling related needs.  Press option #3 for Nurse advice.  2.  Plan is to start to blow your nose as you normally would. Released to all of your normal activities.  Start activities slowly.  3. RN Care Coordinator is Leyla [phone]  4. Return to clinic in 6 weeks.

## 2017-12-21 NOTE — MR AVS SNAPSHOT
After Visit Summary   12/21/2017    Terrence Murillo    MRN: [id number]           Patient Information     Date Of Birth          1945        Visit Information        Provider Department      12/21/2017 1:00 PM Lara Mckeon MD [facility] Ear Nose and Throat        Care Instructions    1.  You were seen in the ENT Clinic today by Dr. Mckeon.  If you have questions or concerns after your appointment please call, [phone].  Press option #1 for scheduling related needs.  Press option #3 for Nurse advice.  2.  Plan is to start to blow your nose as you normally would. Released to all of your normal activities.  Start activities slowly.  3. RN Care Coordinator is Leyla [phone]  4. Return to clinic in 6 weeks.            Follow-ups after your visit        Your next 10 appointments already scheduled     Feb 01, 2018 10:45 AM CST   (Arrive by 10:30 AM)   MR BRAIN W/O & W CONTRAST with [address] [facility] [place] [facility] ([facility])    [address] [place] [address] [phone]           Take your medicines as usual, unless your doctor tells you not to. Bring a list of your current medicines to your exam (including vitamins, minerals and over-the-counter drugs).  You will be given intravenous contrast for this exam. To prepare:   The day before your exam, drink extra fluids at least six 8-ounce glasses (unless your doctor tells you to restrict your fluids).   Have a blood test (creatinine test) within 30 days of your exam. Go to your clinic or Diagnostic Imaging Department for this test.  The MRI machine uses a strong magnet. Please wear clothes without metal (snaps, zippers). A sweatsuit works well, or we may give you a hospital gown.  Please remove any body piercings and hair extensions before you arrive. You will also remove watches, jewelry, hairpins, wallets, dentures, partial dental plates and hearing aids. You  may wear contact lenses, and you may be able to wear your rings. We have a safe place to keep your personal items, but it is safer to leave them at home.   **IMPORTANT** THE INSTRUCTIONS BELOW ARE ONLY FOR THOSE PATIENTS WHO HAVE BEEN TOLD THEY WILL RECEIVE SEDATION OR GENERAL ANESTHESIA DURING THEIR MRI PROCEDURE:  IF YOU WILL RECEIVE SEDATION (take medicine to help you relax during your exam):   You must get the medicine from your doctor before you arrive. Bring the medicine to the exam. Do not take it at home.   Arrive one hour early. Bring someone who can take you home after the test. Your medicine will make you sleepy. After the exam, you may not drive, take a bus or take a taxi by yourself.   No eating 8 hours before your exam. You may have clear liquids up until 4 hours before your exam. (Clear liquids include water, clear tea, black coffee and fruit juice without pulp.)  IF YOU WILL RECEIVE ANESTHESIA (be asleep for your exam):   Arrive 1 1/2 hours early. Bring someone who can take you home after the test. You may not drive, take a bus or take a taxi by yourself.   No eating 8 hours before your exam. You may have clear liquids up until 4 hours before your exam. (Clear liquids include water, clear tea, black coffee and fruit juice without pulp.)  Please call the Imaging Department at your exam site with any questions.            Feb 06, 2018 12:30 PM CST   (Arrive by 12:15 PM)   RETURN ENDOCRINE with Gris Santoro MD   [facility] Endocrinology ([facility])    [address] [place] [address]   [phone]            Feb 06, 2018  1:00 PM CST   (Arrive by 12:45 PM)   Return Visit with Amanda Bates MD   [facility] Neurosurgery ([facility])    [address] [place] [id number] [phone]            Feb 08, 2018  3:40 PM CST   (Arrive by 3:25 PM)   Return Visit with Lara Mckeon MD  "  [facility] Ear Nose and Throat ([facility])    [address]  [address] [facility] [address] [phone]              Who to contact     Please call your clinic at [phone] to:    Ask questions about your health    Make or cancel appointments    Discuss your medicines    Learn about your test results    Speak to your doctor   If you have compliments or concerns about an experience at your clinic, or if you wish to file a complaint, please contact [facility] Patient Relations at [phone] or email us at [email or web address]         Additional Information About Your Visit        [employer] Information     Bandwidth gives you secure access to your electronic health record. If you see a primary care provider, you can also send messages to your care team and make appointments. If you have questions, please call your primary care clinic.  If you do not have a primary care provider, please call [phone] and they will assist you.      Bandwidth is an electronic gateway that provides easy, online access to your medical records. With [employer], you can request a clinic appointment, read your test results, renew a prescription or communicate with your care team.     To access your existing account, please contact your [facility] or call [phone] for assistance.        Care EveryWhere ID     This is your Care EveryWhere ID. This could be used by other organizations to access your [place] medical records  [id number]        Your Vitals Were     Height BMI (Body Mass Index)                1.778 m (5' 10\") 32.71 kg/m2           Blood Pressure from Last 3 Encounters:   11/20/17 131/70   11/06/17 172/79   10/23/17 154/78    Weight from Last 3 Encounters:   12/21/17 103.4 kg (228 lb)   12/07/17 104.8 kg (231 lb)   11/20/17 105.3 kg (232 lb 3.2 oz)              Today, you had the following     No orders found for " display         Today's Medication Changes          These changes are accurate as of: 12/21/17  1:27 PM.  If you have any questions, ask your nurse or doctor.               These medicines have changed or have updated prescriptions.        Dose/Directions    ezetimibe 10 MG tablet   Commonly known as:  ZETIA   This may have changed:  when to take this   Used for:  Atherosclerosis of native coronary artery of native heart with other form of angina pectoris (H)        Dose:  10 mg   Take 1 tablet (10 mg) by mouth daily   Quantity:  90 tablet   Refills:  3       ramipril 5 MG capsule   Commonly known as:  ALTACE   This may have changed:  when to take this   Used for:  Benign essential hypertension, Hyperlipidemia LDL goal <70, Coronary artery disease involving native coronary artery of native heart without angina pectoris        Dose:  5 mg   Take 1 capsule (5 mg) by mouth daily   Quantity:  90 capsule   Refills:  3       rosuvastatin 40 MG tablet   Commonly known as:  CRESTOR   This may have changed:  when to take this   Used for:  CAD (coronary artery disease), Hyperlipidemia LDL goal <70, Benign essential hypertension, Coronary artery disease involving native coronary artery of native heart without angina pectoris        Dose:  40 mg   Take 1 tablet (40 mg) by mouth daily   Quantity:  90 tablet   Refills:  2                Primary Care Provider Office Phone # Fax #    Alexy Hernandez [phone]       [facility] [address] [facility] [address]        Equal Access to Services     KENNETH MARLOW AH: Hadii amado varghese hadasho Soomaali, waaxda luqadaha, qaybta kaalmada adeegyada, faustino banks. So [facility] [phone].    ATENCIÓN: Si habla español, tiene a herndon disposición servicios gratuitos de asistencia lingüística. Llame al [phone].    We comply with applicable federal civil rights laws and [place] laws. We do not discriminate on the basis of  race, color, national origin, age, disability, sex, sexual orientation, or gender identity.            Thank you!     Thank you for choosing [facility] EAR NOSE AND THROAT  for your care. Our goal is always to provide you with excellent care. Hearing back from our patients is one way we can continue to improve our services. Please take a few minutes to complete the written survey that you may receive in the mail after your visit with us. Thank you!             Your Updated Medication List - Protect others around you: Learn how to safely use, store and throw away your medicines at www.disposemymeds.org.          This list is accurate as of: 12/21/17  1:27 PM.  Always use your most recent med list.                   Brand Name Dispense Instructions for use Diagnosis    * acetaminophen 500 MG tablet    TYLENOL     Take 500 mg by mouth        * acetaminophen 325 MG tablet    TYLENOL    100 tablet    Take 2 tablets (650 mg) by mouth every 4 hours as needed for other (surgical pain)    Intracranial tumor (H)       aspirin 81 MG tablet     30 tablet    Take 1 tablet (81 mg) by mouth every evening    Coronary artery disease involving native coronary artery of native heart without angina pectoris       ezetimibe 10 MG tablet    ZETIA    90 tablet    Take 1 tablet (10 mg) by mouth daily    Atherosclerosis of native coronary artery of native heart with other form of angina pectoris (H)       glucosamine 500 MG Caps      Take 500 mg by mouth 2 times daily        hydrocortisone 10 MG tablet    CORTEF          levothyroxine 75 MCG tablet    SYNTHROID/LEVOTHROID    90 tablet    Take 1 tablet (75 mcg) by mouth daily    Pituitary adenoma (H), Secondary hypothyroidism       metoprolol 25 MG tablet    LOPRESSOR    180 tablet    Take 1 tablet (25 mg) by mouth 2 times daily    CAD (coronary artery disease), Hyperlipidemia LDL goal <70, Benign essential hypertension, Coronary artery disease involving native coronary artery of native heart  without angina pectoris       Multi-vitamin Tabs tablet   Generic drug:  multivitamin, therapeutic with minerals      Take 1 tablet by mouth every morning        nitroGLYcerin 0.4 MG sublingual tablet    NITROSTAT    25 tablet    Place 1 tablet (0.4 mg) under the tongue every 5 minutes as needed May repeat X 2. If no relief after 3 tablets call 911    Coronary artery disease involving native coronary artery of native heart without angina pectoris       ondansetron 4 MG ODT tab    ZOFRAN-ODT    40 tablet    Take 1 tablet (4 mg) by mouth every 6 hours as needed for nausea or vomiting    Intracranial tumor (H)       oxyCODONE IR 5 MG tablet    ROXICODONE    24 tablet    Take 1 tablet (5 mg) by mouth every 6 hours as needed for moderate to severe pain    Intracranial tumor (H)       pantoprazole 40 MG EC tablet    PROTONIX    10 tablet    Take 1 tablet (40 mg) by mouth every morning (before breakfast)    Intracranial tumor (H)       * polyethylene glycol Packet    MIRALAX/GLYCOLAX    21 packet    Take 17 g by mouth 2 times daily    Intracranial tumor (H)       * polyethylene glycol powder    MIRALAX/GLYCOLAX     MIX 17 GRAMS IN 8 OUNCES OF LIQUID AND DRINK BID        ramipril 5 MG capsule    ALTACE    90 capsule    Take 1 capsule (5 mg) by mouth daily    Benign essential hypertension, Hyperlipidemia LDL goal <70, Coronary artery disease involving native coronary artery of native heart without angina pectoris       rosuvastatin 40 MG tablet    CRESTOR    90 tablet    Take 1 tablet (40 mg) by mouth daily    CAD (coronary artery disease), Hyperlipidemia LDL goal <70, Benign essential hypertension, Coronary artery disease involving native coronary artery of native heart without angina pectoris       senna-docusate 8.6-50 MG per tablet    SENOKOT-S;PERICOLACE    100 tablet    Take 2 tablets by mouth 2 times daily    Intracranial tumor (H)       testosterone 20.25 MG/ACT gel    ANDROGEL 1.62% PUMP    75 g    Place 1 pump  (20.25 mg) onto the skin daily Apply from dispenser to clean, dry, intact skin of the upper arms and shoulders.    Secondary male hypogonadism       * Notice:  This list has 4 medication(s) that are the same as other medications prescribed for you. Read the directions carefully, and ask your doctor or other care provider to review them with you.

## 2017-12-21 NOTE — NURSING NOTE
Chief Complaint   Patient presents with     RECHECK     Pituitary tumor nasal splint removal     Delia Lofton Medical Assistant

## 2017-12-21 NOTE — PROGRESS NOTES
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      TREATMENT:  The patient underwent transnasal endoscopic resection of pituitary macroadenoma on 11/03/2017, reconstruction with right-sided nasal septal flap.      HISTORY OF PRESENT ILLNESS:  A 72-year-old gentleman here for postoperative followup.  The patient is improving with his nasal symptoms.  He is now using saline solution irrigations three to four times a day.  He was able to start using his CPAP machine.  No other symptoms.      PHYSICAL EXAMINATION:   Constitutional: The patient was well-groomed and in no acute distress.    Skin: Warm and pink.    Neurologic: Alert and oriented x3. Cranial nerves III-XII within normal limits. Voice quality is normal.    Psychiatric: The patient's affect was calm, cooperative and appropriate.    Respiratory: Breathing comfortably without stridor or exertion of accessory muscles.    Eyes: Pupils were equal and reactive. Extraocular movements are intact.    Head: Normocephalic and atraumatic. No lesions or scars.    Ears: External auditory canals and tympanic membranes were clear.    Nose: Sinuses were nontender. Anterior rhinoscopy revealed midline septum and absence of purulence or polyps.    Oral cavity/Oropharynx: Normal tongue, floor of mouth, buccal mucosa and palate. No lesions or masses on inspection or palpation. No abnormal lymph tissue in the oropharynx.    Neck: The parotids are soft without masses. Supple with normal laryngeal and tracheal landmarks.    Lymphatic: There is no palpable lymphadenopathy or other masses in the neck or parotids.         PROCEDURE:  On nasal endoscopy, I used a 0-degree nasal endoscope.  On the right side now there is septal perforation on the anterior septum.  The right-sided septum I debrided some very thin crusting on this area.  There is some healthy granulation tissue on the right septum.  The posterior septectomy is clean.  In the sphenoid  sinus area, I removed some crusts from this area, but it is healing very nicely.  On the left side, the septal mucosa is normal.  Inferior turbinate normal and the middle turbinate is normal.      ASSESSMENT:  A 72-year-old gentleman status post transnasal endoscopic excision of pituitary macroadenoma, doing well.      PLAN:  I will see him back in six weeks.  Things are healing appropriately.      ECG/ms

## 2017-12-21 NOTE — LETTER
12/21/2017       RE: Terrence Murillo  [address] [facility] [address]     Dear Colleague,    Thank you for referring your patient, Terrence Murillo, to the [facility] EAR NOSE AND THROAT at [facility]. Please see a copy of my visit note below.    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      TREATMENT:  The patient underwent transnasal endoscopic resection of pituitary macroadenoma on 11/03/2017, reconstruction with right-sided nasal septal flap.      HISTORY OF PRESENT ILLNESS:  A 72-year-old gentleman here for postoperative followup.  The patient is improving with his nasal symptoms.  He is now using saline solution irrigations three to four times a day.  He was able to start using his CPAP machine.  No other symptoms.      PHYSICAL EXAMINATION:   Constitutional: The patient was well-groomed and in no acute distress.    Skin: Warm and pink.    Neurologic: Alert and oriented x3. Cranial nerves III-XII within normal limits. Voice quality is normal.    Psychiatric: The patient's affect was calm, cooperative and appropriate.    Respiratory: Breathing comfortably without stridor or exertion of accessory muscles.    Eyes: Pupils were equal and reactive. Extraocular movements are intact.    Head: Normocephalic and atraumatic. No lesions or scars.    Ears: External auditory canals and tympanic membranes were clear.    Nose: Sinuses were nontender. Anterior rhinoscopy revealed midline septum and absence of purulence or polyps.    Oral cavity/Oropharynx: Normal tongue, floor of mouth, buccal mucosa and palate. No lesions or masses on inspection or palpation. No abnormal lymph tissue in the oropharynx.    Neck: The parotids are soft without masses. Supple with normal laryngeal and tracheal landmarks.    Lymphatic: There is no palpable lymphadenopathy or other masses in the neck or parotids.         PROCEDURE:  On nasal endoscopy, I used a  0-degree nasal endoscope.  On the right side now there is septal perforation on the anterior septum.  The right-sided septum I debrided some very thin crusting on this area.  There is some healthy granulation tissue on the right septum.  The posterior septectomy is clean.  In the sphenoid sinus area, I removed some crusts from this area, but it is healing very nicely.  On the left side, the septal mucosa is normal.  Inferior turbinate normal and the middle turbinate is normal.      ASSESSMENT:  A 72-year-old gentleman status post transnasal endoscopic excision of pituitary macroadenoma, doing well.      PLAN:  I will see him back in six weeks.  Things are healing appropriately.      ECG/ms           Again, thank you for allowing me to participate in the care of your patient.      Sincerely,    Lara Mckeon MD

## 2018-01-29 ENCOUNTER — HOSPITAL ENCOUNTER (OUTPATIENT)
Dept: LAB | Facility: CLINIC | Age: 73
Discharge: HOME | End: 2018-01-29
Attending: INTERNAL MEDICINE | Admitting: INTERNAL MEDICINE
Payer: MEDICARE

## 2018-01-29 DIAGNOSIS — D35.2 PITUITARY ADENOMA (H): Primary | ICD-10-CM

## 2018-01-29 DIAGNOSIS — D35.2 PITUITARY ADENOMA (H): ICD-10-CM

## 2018-01-29 LAB
CREAT SERPL-MCNC: 0.74 MG/DL (ref 0.66–1.25)
GFR SERPL CREATININE-BSD FRML MDRD: >90 ML/MIN/1.7M2

## 2018-01-29 PROCEDURE — 36415 COLL VENOUS BLD VENIPUNCTURE: CPT | Performed by: INTERNAL MEDICINE

## 2018-01-29 PROCEDURE — 82565 ASSAY OF CREATININE: CPT | Performed by: INTERNAL MEDICINE

## 2018-02-01 ENCOUNTER — RADIANT APPOINTMENT (OUTPATIENT)
Dept: MRI IMAGING | Facility: CLINIC | Age: 73
End: 2018-02-01
Payer: COMMERCIAL

## 2018-02-01 DIAGNOSIS — D49.6 INTRACRANIAL TUMOR (H): ICD-10-CM

## 2018-02-01 RX ORDER — GADOBUTROL 604.72 MG/ML
10 INJECTION INTRAVENOUS ONCE
Status: COMPLETED | OUTPATIENT
Start: 2018-02-01 | End: 2018-02-01

## 2018-02-01 RX ADMIN — GADOBUTROL 10 ML: 604.72 INJECTION INTRAVENOUS at 11:48

## 2018-02-01 NOTE — DISCHARGE INSTRUCTIONS
MRI Contrast Discharge Instructions    The IV contrast you received today will pass out of your body in your  urine. This will happen in the next 24 hours. You will not feel this process.  Your urine will not change color.    Drink at least 4 extra glasses of water or juice today (unless your doctor  has restricted your fluids). This reduces the stress on your kidneys.  You may take your regular medicines.    If you are on dialysis: It is best to have dialysis today.    If you have a reaction: Most reactions happen right away. If you have  any new symptoms after leaving the hospital (such as hives or swelling),  call your hospital at the correct number below. Or call your family doctor.  If you have breathing distress or wheezing, call 911.    Special instructions: ***    I have read and understand the above information.    Signature:______________________________________ Date:___________    Staff:__________________________________________ Date:___________     Time:__________    [place] [facility] Departments:    ___[place]: [phone]  ___[facility]: [phone]  ___[facility]: [phone] ___[facility]: [phone]  ___[facility]: [phone]  ___[facility]: [phone]  ___[phone]  ___[facility]: [phone]  ___[phone]

## 2018-02-06 ENCOUNTER — OFFICE VISIT (OUTPATIENT)
Dept: NEUROSURGERY | Facility: CLINIC | Age: 73
End: 2018-02-06
Payer: COMMERCIAL

## 2018-02-06 ENCOUNTER — OFFICE VISIT (OUTPATIENT)
Dept: ENDOCRINOLOGY | Facility: CLINIC | Age: 73
End: 2018-02-06
Payer: COMMERCIAL

## 2018-02-06 VITALS
HEART RATE: 63 BPM | WEIGHT: 229.3 LBS | RESPIRATION RATE: 20 BRPM | SYSTOLIC BLOOD PRESSURE: 125 MMHG | DIASTOLIC BLOOD PRESSURE: 47 MMHG | OXYGEN SATURATION: 95 % | BODY MASS INDEX: 32.83 KG/M2 | TEMPERATURE: 98.1 F | HEIGHT: 70 IN

## 2018-02-06 VITALS
HEIGHT: 70 IN | HEART RATE: 63 BPM | BODY MASS INDEX: 32.96 KG/M2 | SYSTOLIC BLOOD PRESSURE: 125 MMHG | WEIGHT: 230.2 LBS | DIASTOLIC BLOOD PRESSURE: 47 MMHG

## 2018-02-06 DIAGNOSIS — E29.1 SECONDARY MALE HYPOGONADISM: ICD-10-CM

## 2018-02-06 DIAGNOSIS — D35.2 BENIGN NEOPLASM OF PITUITARY GLAND AND CRANIOPHARYNGEAL DUCT (POUCH) (H): Primary | ICD-10-CM

## 2018-02-06 DIAGNOSIS — D35.2 PITUITARY MACROADENOMA (H): Primary | ICD-10-CM

## 2018-02-06 DIAGNOSIS — D35.2 PITUITARY ADENOMA (H): Primary | ICD-10-CM

## 2018-02-06 DIAGNOSIS — E03.8 SECONDARY HYPOTHYROIDISM: ICD-10-CM

## 2018-02-06 DIAGNOSIS — D35.3 BENIGN NEOPLASM OF PITUITARY GLAND AND CRANIOPHARYNGEAL DUCT (POUCH) (H): Primary | ICD-10-CM

## 2018-02-06 ASSESSMENT — PAIN SCALES - GENERAL
PAINLEVEL: NO PAIN (0)
PAINLEVEL: NO PAIN (0)

## 2018-02-06 NOTE — LETTER
2/6/2018       RE: Terrence Murillo  [address]  [place] [facility] [address]     Dear Colleague,    Thank you for referring your patient, Terrence Murillo, to the [facility] NEUROSURGERY at [facility]. Please see a copy of my visit note below.    Dear Dr. Hernandez:    We saw Mr. Terrence Murillo back in Pituitary Clinic today for his three month post-operative follow-up. We saw him along with Dr. Santoro. He underwent endoscopic transnasal resection of a non-secreting pituitary macroadenoma on 11/03/2017. This was causing peripheral vision loss for up to 2 years prior to its removal. He has recovered well from his operation. He believes his vision is unchanged, specifically when reading. He is continues to have congestion and is using saline rinses. He currently uses Androgel, but may be switching to injections. He is also on hydrocortisone and levothyroxine.    On exam, his general appearance is good. Extraocular movements intact. He moves all extremities equally and with good coordination. Gross neurological examination is normal.    REVIEW OF STUDIES: We reviewed his MRI with him. There is a 4 mm hypoenhancment in the right parasellar region that likely represents a small residual tumor. The optic apparatus is completely decompressed. The pituitary gland is displaced to the left side of the sella. The vast majority of the tumor has been resected.    IMPRESSION AND PLAN: We are pleased to see Mr. Murillo has recovered well from his operation and his imaging looks favorable. He will continue to follow with Dr. Santoro in pituitary endocrinology, and we will see him back in 1 year with another MRI. Please do not hesitate to contact us with questions. We will keep you informed of his progress.    MD LYLA MARSHALL, Jamel Nichols, am serving as a scribe to document services personally performed by Amanda Bates MD, based upon my observations and the provider's statements to me.  All documentation has been reviewed by the aforementioned doctor prior to being entered into the official medical record.    I, Amanda Bates, attest that above named individual is acting in scribe capacity, has observed my performance of the services and has documented them in accordance with my direction. The documentation recorded by the scribe accurately reflects the service I personally performed and the decisions made by me.    Amanda Bates MD

## 2018-02-06 NOTE — PROGRESS NOTES
--------- Endocrinology Follow up visit -------------    Reason for consultation: pituitary macroadenoma, hypogonadism    Subjective: A 72 year old male with a past medical history of pituitary macroadenoma who presents post-operative follow-up after endoscopic, transphenoidal resection of pituitary adenoma on 11/3/17. The mass was found to be non-secretory on initial labs. He was initially found to have the tumor on MRI after he presented to his PCP with complaints of vision problems. The tumor was 3.2 x 2.5 x 2.0 cm in size and compressed the optic chiasm. Pathology consistent with benign adenoma. He was started on prophylactic hydrocortisone prior to discharge. Since d/c, he has been doing well. Energy level is improved. He is walking more. Has occasional headaches behind his eyes but seems to be getting better. Denies increased urinary frequency or excessive thirst. Reports his libido is fine but is not currently sexually active with his wife due to medical issues that he has.  Denies nipple discharge. Feels his vision is about the same as before the surgery. Still has difficulty reading small print.     Interval history: Patient has been doing well.  Currently using the testosterone gel daily basis.  No muscle weakness, no fatigue.  He is still taking levothyroxine.  Hydrocortisone was tapered last visit and is not taking right now. He has good appetite and good sleep.  No headache, no dizziness.  No change vision issues after the surgery especially when he reads.  Today he came to follow-up postop MRI on 2/1/2-018  and came here for joint appointment with endocrine and neurosurgery.  He is concerned about price of testosterone gel, and also wondering whether he can wean off testosterone.      Assessment: A 73 yo male post macro adenoma TSS on 11/3/2017, currently doing well, MRI showed small residual 4 mm on the right sellar.     Plan:   - Will hold  testosterone gel for 10 days, then we will recheck testosterone level without supplement to determine whether he needed replacement on August  -We will check thyroid function test in 10 days   -Return to clinic with me as joint appointment with neurosurgery in 1 year      We spent 35 minutes with this patient face to face and explained the conditions and plans (more than 50% of time was counseling/coordination of care, plan for steroid taper, plan for other axis assessment) . The patient understood and is satisfied with today's visit. Return to clinic with me in 3-6 month with Neurosurg appointment.     Gris Santoro MD  Staff Physician  Endocrinology and Metabolism  [facility] [address]  License: [id number]  Pager: [phone]      Current Problem List:   Patient Active Problem List   Diagnosis     Sleep apnea     Coronary artery disease involving native coronary artery of native heart without angina pectoris     Essential hypertension     Hyperlipidemia     Non morbid obesity due to excess calories     Chest pain     MORENO (dyspnea on exertion)     Status post coronary angiogram     Intracranial tumor (H)     Secondary male hypogonadism     Coronary atherosclerosis     Pituitary adenoma (H)     S/P appendectomy     S/P knee replacement     S/P shoulder surgery       Past Medical and Past Surgical History:  Past Medical History:   Diagnosis Date     Coronary artery disease     cardiac cath 2016: medical management; cath 2010: SUSANA x2 to LAD; cath 1993: PTCA to LAD; cath 1992: PTCA to LAD, 1992: atherectomy of LAD     Hearing problem      Hx of angiography 4-    no stenosis greater than 25%-rec. medical management     Hyperlipidaemia      Hypertension      Obese      Obstructive sleep apnea      Reduced vision      Sleep apnea     CPAP       Past Surgical History:   Procedure Laterality Date     APPENDECTOMY OPEN       ARTHROPLASTY KNEE Left      ARTHROPLASTY SHOULDER Right      ENT SURGERY       HEART  CATH, ANGIOPLASTY  1992    LAD  atherectomy with a DVI device      HEART CATH, ANGIOPLASTY  4-28-10    PTCA and stent proximal and mid LAD (2) stents Xience     OPTICAL TRACKING SYSTEM ENDOSCOPIC RESECTION TUMOR CRANIAL N/A 11/3/2017    Procedure: OPTICAL TRACKING SYSTEM ENDOSCOPIC RESECTION TUMOR CRANIAL;  Stealth Guided Endoscopic Transnasal Resection of Pituitary Tumor;  Surgeon: Amanda Bates MD;  Location:  OR       Medications:   Current Outpatient Prescriptions   Medication Sig Dispense Refill     acetaminophen (TYLENOL) 500 MG tablet Take 500 mg by mouth       hydrocortisone (CORTEF) 10 MG tablet        testosterone (ANDROGEL 1.62% PUMP) 20.25 MG/ACT gel Place 1 pump (20.25 mg) onto the skin daily Apply from dispenser to clean, dry, intact skin of the upper arms and shoulders. 75 g 5     levothyroxine (SYNTHROID/LEVOTHROID) 75 MCG tablet Take 1 tablet (75 mcg) by mouth daily 90 tablet 3     oxyCODONE IR (ROXICODONE) 5 MG tablet Take 1 tablet (5 mg) by mouth every 6 hours as needed for moderate to severe pain 24 tablet 0     acetaminophen (TYLENOL) 325 MG tablet Take 2 tablets (650 mg) by mouth every 4 hours as needed for other (surgical pain) 100 tablet 0     aspirin 81 MG tablet Take 1 tablet (81 mg) by mouth every evening 30 tablet 0     senna-docusate (SENOKOT-S;PERICOLACE) 8.6-50 MG per tablet Take 2 tablets by mouth 2 times daily 100 tablet 0     pantoprazole (PROTONIX) 40 MG EC tablet Take 1 tablet (40 mg) by mouth every morning (before breakfast) 10 tablet 0     rosuvastatin (CRESTOR) 40 MG tablet Take 1 tablet (40 mg) by mouth daily (Patient taking differently: Take 40 mg by mouth every evening ) 90 tablet 2     metoprolol (LOPRESSOR) 25 MG tablet Take 1 tablet (25 mg) by mouth 2 times daily 180 tablet 3     ramipril (ALTACE) 5 MG capsule Take 1 capsule (5 mg) by mouth daily (Patient taking differently: Take 5 mg by mouth every morning ) 90 capsule 3     nitroGLYcerin (NITROSTAT) 0.4  "MG sublingual tablet Place 1 tablet (0.4 mg) under the tongue every 5 minutes as needed May repeat X 2. If no relief after 3 tablets call 911 25 tablet 1     ezetimibe (ZETIA) 10 MG tablet Take 1 tablet (10 mg) by mouth daily (Patient taking differently: Take 10 mg by mouth every morning ) 90 tablet 3     Glucosamine 500 MG CAPS Take 500 mg by mouth 2 times daily        Multiple Vitamin (MULTI-VITAMIN) per tablet Take 1 tablet by mouth every morning          Allergies:   Allergies   Allergen Reactions     Cardizem [Diltiazem Hcl] Itching     No Clinical Screening - See Comments Hives       Social History:  Social History   Substance Use Topics     Smoking status: Former Smoker     Packs/day: 0.20     Years: 5.00     Types: Cigarettes     Quit date: 1960     Smokeless tobacco: Never Used      Comment: Social smoking only     Alcohol use 0.6 - 1.2 oz/week     1 - 2 Standard drinks or equivalent per week      Comment: DAILY         Family History:  Family History   Problem Relation Age of Onset     C.A.D. Father      HEART DISEASE Father       at age 44     HEART DISEASE Son 37     enlarged heart     HEART DISEASE Mother       at age 80 after heart surgery     Heart Surgery Mother      open heart, passed 10 days after     DIABETES Mother      Family History Negative Maternal Grandmother      Family History Negative Maternal Grandfather      Family History Negative Paternal Grandmother      Family History Negative Paternal Grandfather      Family History Negative Brother      Alzheimer Disease Brother      Family History Negative Sister      Family History Negative Daughter      Family History Negative Son      Family History Negative Daughter      HEART DISEASE Son       at age 37       Review of Systems:  A 10-point ROS is otherwise negative except as noted in HPI.     Physical Examination:  Blood pressure 125/47, pulse 63, height 1.778 m (5' 10\"), weight 104.4 kg (230 lb 3.2 oz).  Body mass index is " 33.03 kg/(m^2).  Wt Readings from Last 4 Encounters:   02/06/18 104 kg (229 lb 4.8 oz)   02/06/18 104.4 kg (230 lb 3.2 oz)   12/21/17 103.4 kg (228 lb)   12/07/17 104.8 kg (231 lb)     GEN: Alert, no distress.  HEENT: EOMI.  NECK: Thyroid normal to palpation, non-tender. No cervical/clavicular LAD.   Visual field: grossly intact  CV: RRR with no murmur.   RESP: CTA bilaterally.   EXT: No peripheral edema.    SKIN: Normal skin temperature and turgor with no lesions.   MSK: Normal muscle bulk, no abnormal appearing joints.   NEURO: Cranial nerves intact. Non-focal.         8/3/2017 13:27 11/20/2017 12:20 11/27/2017 09:05   Testosterone Total 132 (L) 52 (L) 79 (L)     MR BRAIN W/O & W CONTRAST 2/1/2018 12:05 PM     Provided History: ; Intracranial tumor (H).     ICD-10: Intracranial tumor (H)     Comparison: Pituitary MRI from 7/20/2017 and Stealth MRI from  11/3/2017.     Technique: Multiplanar T1-weighted, axial FLAIR, and susceptibility  images were obtained without intravenous contrast. Following  intravenous gadolinium-based contrast administration, axial  T2-weighted, diffusion, and T1-weighted images (in multiple planes)  were obtained.     Contrast dose: 10mL Gadavist     Findings: Postsurgical changes of endoscopic resection of pituitary  macroadenoma from 11/3/2017. Postsurgical changes of cystectomy and  ethmoidectomy. Pituitary stalk is deviated to the left. There is  partially empty sella appearance. Along the medial aspect of the right  cavernous sinus, there is a 5 x 5 x 5 mm focus of heterogeneous  hypoenhancement (image 87 of series 11 and image 115 of series 13),  which is similar in enhancement appearance to the previously resected  macroadenoma.     There is no mass effect, midline shift, or evidence of intracranial  hemorrhage. Mild cerebral volume loss and associated enlargement of  the lateral ventricles unchanged compared to previous exam. Scattered  nonspecific T2 hyperintensities in the  supraventricular white matter,  unchanged. 2 foci of susceptibility artifact in the posterior right  frontal lobe which would be consistent with chronic microhemorrhage  and are nonspecific. Postcontrast images demonstrate no abnormal  intracranial enhancing lesions.     No definite abnormality of the skull marrow signal is noted. The major  vascular intracranial flow-voids are present. Mastoid air cells are  clear. Bilateral postsurgical changes of cataract surgery. Otherwise  orbital structures are unremarkable.         Impression:   1. Transnasal endoscopic resection of previously seen pituitary  macroadenoma. Although study is not dedicated to pituitary gland,  there appears to be a small focus of heterogeneous hypoenhancement  that is similar in appearance to the prior macroadenoma enhancement.  Although this could be postoperative in nature, cannot completely  exclude residual adenoma. 3-6 month follow-up recommended with  dedicated pituitary protocol MRI.  2. Mild generalized cerebral volume loss and nonspecific white matter  T2 hyperintensities stable since 7/20/2017.

## 2018-02-06 NOTE — PROGRESS NOTES
Dear Dr. Hernandez:    We saw Mr. Terrence Murillo back in [facility] today for his three month post-operative follow-up. We saw him along with Dr. Santoro. He underwent endoscopic transnasal resection of a non-secreting pituitary macroadenoma on 11/03/2017. This was causing peripheral vision loss for up to 2 years prior to its removal. He has recovered well from his operation. He believes his vision is unchanged, specifically when reading. He is continues to have congestion and is using saline rinses. He currently uses Androgel, but may be switching to injections. He is also on hydrocortisone and levothyroxine.    On exam, his general appearance is good. Extraocular movements intact. He moves all extremities equally and with good coordination. Gross neurological examination is normal.    REVIEW OF STUDIES: We reviewed his MRI with him. There is a 4 mm hypoenhancment in the right parasellar region that likely represents a small residual tumor. The optic apparatus is completely decompressed. The pituitary gland is displaced to the left side of the sella. The vast majority of the tumor has been resected.    IMPRESSION AND PLAN: We are pleased to see Mr. Murillo has recovered well from his operation and his imaging looks favorable. He will continue to follow with Dr. Santoro in pituitary endocrinology, and we will see him back in 1 year with another MRI. Please do not hesitate to contact us with questions. We will keep you informed of his progress.    MD LYLA MARSHALL Timothy Suek, am serving as a scribe to document services personally performed by Amanda Bates MD, based upon my observations and the provider's statements to me. All documentation has been reviewed by the aforementioned doctor prior to being entered into the official medical record.    I, Amanda Bates, attest that above named individual is acting in scribe capacity, has observed my performance of the services and has documented  them in accordance with my direction. The documentation recorded by the scribe accurately reflects the service I personally performed and the decisions made by me.

## 2018-02-06 NOTE — NURSING NOTE
Chief Complaint   Patient presents with     RECHECK     [facility]- PITUITARY ADENOMA, 3 MONTHS F/U     Jesus Garber, CMA

## 2018-02-06 NOTE — LETTER
2/6/2018       RE: Terrence Murillo  [address]  [facility] [place] [address]     Dear Colleague,    Thank you for referring your patient, Terrence Murillo, to the [facility] ENDOCRINOLOGY at [facility]. Please see a copy of my visit note below.                                                                --------- Endocrinology Follow up visit -------------    Reason for consultation: pituitary macroadenoma, hypogonadism    Subjective: A 72 year old male with a past medical history of pituitary macroadenoma who presents post-operative follow-up after endoscopic, transphenoidal resection of pituitary adenoma on 11/3/17. The mass was found to be non-secretory on initial labs. He was initially found to have the tumor on MRI after he presented to his PCP with complaints of vision problems. The tumor was 3.2 x 2.5 x 2.0 cm in size and compressed the optic chiasm. Pathology consistent with benign adenoma. He was started on prophylactic hydrocortisone prior to discharge. Since d/c, he has been doing well. Energy level is improved. He is walking more. Has occasional headaches behind his eyes but seems to be getting better. Denies increased urinary frequency or excessive thirst. Reports his libido is fine but is not currently sexually active with his wife due to medical issues that he has.  Denies nipple discharge. Feels his vision is about the same as before the surgery. Still has difficulty reading small print.     Interval history: Patient has been doing well.  Currently using the testosterone gel daily basis.  No muscle weakness, no fatigue.  He is still taking levothyroxine.  Hydrocortisone was tapered last visit and is not taking right now. He has good appetite and good sleep.  No headache, no dizziness.  No change vision issues after the surgery especially when he reads.  Today he came to follow-up postop MRI on 2/1/2-018  and came here for joint appointment with endocrine and  neurosurgery.  He is concerned about price of testosterone gel, and also wondering whether he can wean off testosterone.      Assessment: A 73 yo male post macro adenoma TSS on 11/3/2017, currently doing well, MRI showed small residual 4 mm on the right sellar.     Plan:   - Will hold testosterone gel for 10 days, then we will recheck testosterone level without supplement to determine whether he needed replacement on August  -We will check thyroid function test in 10 days   -Return to clinic with me as joint appointment with neurosurgery in 1 year      We spent 35 minutes with this patient face to face and explained the conditions and plans (more than 50% of time was counseling/coordination of care, plan for steroid taper, plan for other axis assessment) . The patient understood and is satisfied with today's visit. Return to clinic with me in 3-6 month with Neurosurg appointment.     Gris Santoro MD  Staff Physician  Endocrinology and Metabolism  [facility] [employer]  License: [id number]  Pager: [phone]      Current Problem List:   Patient Active Problem List   Diagnosis     Sleep apnea     Coronary artery disease involving native coronary artery of native heart without angina pectoris     Essential hypertension     Hyperlipidemia     Non morbid obesity due to excess calories     Chest pain     MORENO (dyspnea on exertion)     Status post coronary angiogram     Intracranial tumor (H)     Secondary male hypogonadism     Coronary atherosclerosis     Pituitary adenoma (H)     S/P appendectomy     S/P knee replacement     S/P shoulder surgery       Past Medical and Past Surgical History:  Past Medical History:   Diagnosis Date     Coronary artery disease     cardiac cath 2016: medical management; cath 2010: SUSANA x2 to LAD; cath 1993: PTCA to LAD; cath 1992: PTCA to LAD, 1992: atherectomy of LAD     Hearing problem      Hx of angiography 4-    no stenosis greater than 25%-rec. medical management      Hyperlipidaemia      Hypertension      Obese      Obstructive sleep apnea      Reduced vision      Sleep apnea     CPAP       Past Surgical History:   Procedure Laterality Date     APPENDECTOMY OPEN       ARTHROPLASTY KNEE Left      ARTHROPLASTY SHOULDER Right      ENT SURGERY       HEART CATH, ANGIOPLASTY  1992    LAD  atherectomy with a DVI device      HEART CATH, ANGIOPLASTY  4-28-10    PTCA and stent proximal and mid LAD (2) stents Xience     OPTICAL TRACKING SYSTEM ENDOSCOPIC RESECTION TUMOR CRANIAL N/A 11/3/2017    Procedure: OPTICAL TRACKING SYSTEM ENDOSCOPIC RESECTION TUMOR CRANIAL;  Stealth Guided Endoscopic Transnasal Resection of Pituitary Tumor;  Surgeon: Amanda Bates MD;  Location:  OR       Medications:   Current Outpatient Prescriptions   Medication Sig Dispense Refill     acetaminophen (TYLENOL) 500 MG tablet Take 500 mg by mouth       hydrocortisone (CORTEF) 10 MG tablet        testosterone (ANDROGEL 1.62% PUMP) 20.25 MG/ACT gel Place 1 pump (20.25 mg) onto the skin daily Apply from dispenser to clean, dry, intact skin of the upper arms and shoulders. 75 g 5     levothyroxine (SYNTHROID/LEVOTHROID) 75 MCG tablet Take 1 tablet (75 mcg) by mouth daily 90 tablet 3     oxyCODONE IR (ROXICODONE) 5 MG tablet Take 1 tablet (5 mg) by mouth every 6 hours as needed for moderate to severe pain 24 tablet 0     acetaminophen (TYLENOL) 325 MG tablet Take 2 tablets (650 mg) by mouth every 4 hours as needed for other (surgical pain) 100 tablet 0     aspirin 81 MG tablet Take 1 tablet (81 mg) by mouth every evening 30 tablet 0     senna-docusate (SENOKOT-S;PERICOLACE) 8.6-50 MG per tablet Take 2 tablets by mouth 2 times daily 100 tablet 0     pantoprazole (PROTONIX) 40 MG EC tablet Take 1 tablet (40 mg) by mouth every morning (before breakfast) 10 tablet 0     rosuvastatin (CRESTOR) 40 MG tablet Take 1 tablet (40 mg) by mouth daily (Patient taking differently: Take 40 mg by mouth every evening ) 90  tablet 2     metoprolol (LOPRESSOR) 25 MG tablet Take 1 tablet (25 mg) by mouth 2 times daily 180 tablet 3     ramipril (ALTACE) 5 MG capsule Take 1 capsule (5 mg) by mouth daily (Patient taking differently: Take 5 mg by mouth every morning ) 90 capsule 3     nitroGLYcerin (NITROSTAT) 0.4 MG sublingual tablet Place 1 tablet (0.4 mg) under the tongue every 5 minutes as needed May repeat X 2. If no relief after 3 tablets call 911 25 tablet 1     ezetimibe (ZETIA) 10 MG tablet Take 1 tablet (10 mg) by mouth daily (Patient taking differently: Take 10 mg by mouth every morning ) 90 tablet 3     Glucosamine 500 MG CAPS Take 500 mg by mouth 2 times daily        Multiple Vitamin (MULTI-VITAMIN) per tablet Take 1 tablet by mouth every morning          Allergies:   Allergies   Allergen Reactions     Cardizem [Diltiazem Hcl] Itching     No Clinical Screening - See Comments Hives       Social History:  Social History   Substance Use Topics     Smoking status: Former Smoker     Packs/day: 0.20     Years: 5.00     Types: Cigarettes     Quit date: 1960     Smokeless tobacco: Never Used      Comment: Social smoking only     Alcohol use 0.6 - 1.2 oz/week     1 - 2 Standard drinks or equivalent per week      Comment: DAILY         Family History:  Family History   Problem Relation Age of Onset     C.A.D. Father      HEART DISEASE Father       at age 44     HEART DISEASE Son 37     enlarged heart     HEART DISEASE Mother       at age 80 after heart surgery     Heart Surgery Mother      open heart, passed 10 days after     DIABETES Mother      Family History Negative Maternal Grandmother      Family History Negative Maternal Grandfather      Family History Negative Paternal Grandmother      Family History Negative Paternal Grandfather      Family History Negative Brother      Alzheimer Disease Brother      Family History Negative Sister      Family History Negative Daughter      Family History Negative Son      Family  "History Negative Daughter      HEART DISEASE Son       at age 37       Review of Systems:  A 10-point ROS is otherwise negative except as noted in HPI.     Physical Examination:  Blood pressure 125/47, pulse 63, height 1.778 m (5' 10\"), weight 104.4 kg (230 lb 3.2 oz).  Body mass index is 33.03 kg/(m^2).  Wt Readings from Last 4 Encounters:   18 104 kg (229 lb 4.8 oz)   18 104.4 kg (230 lb 3.2 oz)   17 103.4 kg (228 lb)   17 104.8 kg (231 lb)     GEN: Alert, no distress.  HEENT: EOMI.  NECK: Thyroid normal to palpation, non-tender. No cervical/clavicular LAD.   Visual field: grossly intact  CV: RRR with no murmur.   RESP: CTA bilaterally.   EXT: No peripheral edema.    SKIN: Normal skin temperature and turgor with no lesions.   MSK: Normal muscle bulk, no abnormal appearing joints.   NEURO: Cranial nerves intact. Non-focal.         8/3/2017 13:27 2017 12:20 2017 09:05   Testosterone Total 132 (L) 52 (L) 79 (L)     MR BRAIN W/O & W CONTRAST 2018 12:05 PM     Provided History: ; Intracranial tumor (H).     ICD-10: Intracranial tumor (H)     Comparison: Pituitary MRI from 2017 and Stealth MRI from  11/3/2017.     Technique: Multiplanar T1-weighted, axial FLAIR, and susceptibility  images were obtained without intravenous contrast. Following  intravenous gadolinium-based contrast administration, axial  T2-weighted, diffusion, and T1-weighted images (in multiple planes)  were obtained.     Contrast dose: 10mL Gadavist     Findings: Postsurgical changes of endoscopic resection of pituitary  macroadenoma from 11/3/2017. Postsurgical changes of cystectomy and  ethmoidectomy. Pituitary stalk is deviated to the left. There is  partially empty sella appearance. Along the medial aspect of the right  cavernous sinus, there is a 5 x 5 x 5 mm focus of heterogeneous  hypoenhancement (image 87 of series 11 and image 115 of series 13),  which is similar in enhancement appearance to the " previously resected  macroadenoma.     There is no mass effect, midline shift, or evidence of intracranial  hemorrhage. Mild cerebral volume loss and associated enlargement of  the lateral ventricles unchanged compared to previous exam. Scattered  nonspecific T2 hyperintensities in the supraventricular white matter,  unchanged. 2 foci of susceptibility artifact in the posterior right  frontal lobe which would be consistent with chronic microhemorrhage  and are nonspecific. Postcontrast images demonstrate no abnormal  intracranial enhancing lesions.     No definite abnormality of the skull marrow signal is noted. The major  vascular intracranial flow-voids are present. Mastoid air cells are  clear. Bilateral postsurgical changes of cataract surgery. Otherwise  orbital structures are unremarkable.         Impression:   1. Transnasal endoscopic resection of previously seen pituitary  macroadenoma. Although study is not dedicated to pituitary gland,  there appears to be a small focus of heterogeneous hypoenhancement  that is similar in appearance to the prior macroadenoma enhancement.  Although this could be postoperative in nature, cannot completely  exclude residual adenoma. 3-6 month follow-up recommended with  dedicated pituitary protocol MRI.  2. Mild generalized cerebral volume loss and nonspecific white matter  T2 hyperintensities stable since 7/20/2017.                   Again, thank you for allowing me to participate in the care of your patient.      Sincerely,    Gris Santoro MD

## 2018-02-06 NOTE — LETTER
2/6/2018      RE: Terrence Murillo  [address]  [place] [facility] [address]       Dear Dr. Hernandez:    We saw Mr. Terrence Murillo back in Pituitary Clinic today for his three month post-operative follow-up. We saw him along with Dr. Santoro. He underwent endoscopic transnasal resection of a non-secreting pituitary macroadenoma on 11/03/2017. This was causing peripheral vision loss for up to 2 years prior to its removal. He has recovered well from his operation. He believes his vision is unchanged, specifically when reading. He is continues to have congestion and is using saline rinses. He currently uses Androgel, but may be switching to injections. He is also on hydrocortisone and levothyroxine.    On exam, his general appearance is good. Extraocular movements intact. He moves all extremities equally and with good coordination. Gross neurological examination is normal.    REVIEW OF STUDIES: We reviewed his MRI with him. There is a 4 mm hypoenhancment in the right parasellar region that likely represents a small residual tumor. The optic apparatus is completely decompressed. The pituitary gland is displaced to the left side of the sella. The vast majority of the tumor has been resected.    IMPRESSION AND PLAN: We are pleased to see Mr. Murillo has recovered well from his operation and his imaging looks favorable. He will continue to follow with Dr. Santoro in pituitary endocrinology, and we will see him back in 1 year with another MRI. Please do not hesitate to contact us with questions. We will keep you informed of his progress.    MD LYLA MARSHALL Timothy Suek, am serving as a scribe to document services personally performed by Amanda Bates MD, based upon my observations and the provider's statements to me. All documentation has been reviewed by the aforementioned doctor prior to being entered into the official medical record.    I, Amanda Bates, attest that above named individual is acting in  scribe capacity, has observed my performance of the services and has documented them in accordance with my direction. The documentation recorded by the scribe accurately reflects the service I personally performed and the decisions made by me.    Amanda Bates MD

## 2018-02-06 NOTE — MR AVS SNAPSHOT
After Visit Summary   2/6/2018    Terrence Murillo    MRN: [id number]           Patient Information     Date Of Birth          1945        Visit Information        Provider Department      2/6/2018 1:00 PM Amanda Bates MD [facility] Neurosurgery        Care Instructions    Follow-up in a year and have a MRI done before you see Dr. Bates.          Follow-ups after your visit        Follow-up notes from your care team     Return in about 1 year (around 2/6/2019).      Your next 10 appointments already scheduled     Feb 06, 2018  2:30 PM CST   LAB with [facility] ([facility])    [address] [place] [address]  [facility] [id number] [phone]           Please do not eat 10-12 hours before your appointment if you are coming in fasting for labs on lipids, cholesterol, or glucose (sugar). This does not apply to pregnant women. Water, hot tea and black coffee (with nothing added) are okay. Do not drink other fluids, diet soda or chew gum.            Feb 08, 2018  3:40 PM CST   (Arrive by 3:25 PM)   Return Visit with Lara Mckeon MD   [facility] Ear Nose and Throat ([facility])    [address] [place] [id number] [phone]            Feb 05, 2019 12:15 PM CST   (Arrive by 12:00 PM)   MR BRAIN W/O & W CONTRAST with [address] [facility] [place] [facility] ([facility])    [address] [place] [id number] [phone]           Take your medicines as usual, unless your doctor tells you not to. Bring a list of your current medicines to your exam (including vitamins, minerals and over-the-counter drugs).  You will be given intravenous contrast for this exam. To prepare:   The day before your exam, drink extra fluids at least six 8-ounce glasses (unless your doctor tells you to restrict your fluids).   Have a blood test (creatinine test)  within 30 days of your exam. Go to your clinic or Diagnostic Imaging Department for this test.  The MRI machine uses a strong magnet. Please wear clothes without metal (snaps, zippers). A sweatsuit works well, or we may give you a hospital gown.  Please remove any body piercings and hair extensions before you arrive. You will also remove watches, jewelry, hairpins, wallets, dentures, partial dental plates and hearing aids. You may wear contact lenses, and you may be able to wear your rings. We have a safe place to keep your personal items, but it is safer to leave them at home.   **IMPORTANT** THE INSTRUCTIONS BELOW ARE ONLY FOR THOSE PATIENTS WHO HAVE BEEN TOLD THEY WILL RECEIVE SEDATION OR GENERAL ANESTHESIA DURING THEIR MRI PROCEDURE:  IF YOU WILL RECEIVE SEDATION (take medicine to help you relax during your exam):   You must get the medicine from your doctor before you arrive. Bring the medicine to the exam. Do not take it at home.   Arrive one hour early. Bring someone who can take you home after the test. Your medicine will make you sleepy. After the exam, you may not drive, take a bus or take a taxi by yourself.   No eating 8 hours before your exam. You may have clear liquids up until 4 hours before your exam. (Clear liquids include water, clear tea, black coffee and fruit juice without pulp.)  IF YOU WILL RECEIVE ANESTHESIA (be asleep for your exam):   Arrive 1 1/2 hours early. Bring someone who can take you home after the test. You may not drive, take a bus or take a taxi by yourself.   No eating 8 hours before your exam. You may have clear liquids up until 4 hours before your exam. (Clear liquids include water, clear tea, black coffee and fruit juice without pulp.)  Please call the Imaging Department at your exam site with any questions.            Feb 05, 2019  1:00 PM CST   (Arrive by 12:45 PM)   Return Visit with Amanda Bates MD   [facility] Neurosurgery ([facility]  [facility])    [address] [address] [phone]              Future tests that were ordered for you today     Open Future Orders        Priority Expected Expires Ordered    MRI Brain with & without gadolinium [FIU118] Routine  2/6/2019 2/6/2018    Testosterone total Routine  2/6/2019 2/6/2018    TSH Routine  2/6/2019 2/6/2018    T4 free Routine 2/20/2018 4/7/2018 2/6/2018            Who to contact     Please call your clinic at [phone] to:    Ask questions about your health    Make or cancel appointments    Discuss your medicines    Learn about your test results    Speak to your doctor   If you have compliments or concerns about an experience at your clinic, or if you wish to file a complaint, please contact [facility] Patient Relations at [phone] or email us at [email or web address]         Additional Information About Your Visit        G[employer] Information     [employer] gives you secure access to your electronic health record. If you see a primary care provider, you can also send messages to your care team and make appointments. If you have questions, please call your primary care clinic.  If you do not have a primary care provider, please call [phone] and they will assist you.      [employer] is an electronic gateway that provides easy, online access to your medical records. With [employer], you can request a clinic appointment, read your test results, renew a prescription or communicate with your care team.     To access your existing account, please contact your [facility] or call [phone] for assistance.        Care EveryWhere ID     This is your Care EveryWhere ID. This could be used by other organizations to access your [place] medical records  [id number]        Your Vitals Were     Pulse Temperature Respirations Height Pulse Oximetry BMI (Body Mass Index)    63 98.1  F (36.7  C) 20 1.778 m  "(5' 10\") 95% 32.9 kg/m2       Blood Pressure from Last 3 Encounters:   02/06/18 125/47   02/06/18 125/47   11/20/17 131/70    Weight from Last 3 Encounters:   02/06/18 104 kg (229 lb 4.8 oz)   02/06/18 104.4 kg (230 lb 3.2 oz)   12/21/17 103.4 kg (228 lb)              Today, you had the following     No orders found for display         Today's Medication Changes          These changes are accurate as of 2/6/18  2:24 PM.  If you have any questions, ask your nurse or doctor.               These medicines have changed or have updated prescriptions.        Dose/Directions    ezetimibe 10 MG tablet   Commonly known as:  ZETIA   This may have changed:  when to take this   Used for:  Atherosclerosis of native coronary artery of native heart with other form of angina pectoris (H)        Dose:  10 mg   Take 1 tablet (10 mg) by mouth daily   Quantity:  90 tablet   Refills:  3       ramipril 5 MG capsule   Commonly known as:  ALTACE   This may have changed:  when to take this   Used for:  Benign essential hypertension, Hyperlipidemia LDL goal <70, Coronary artery disease involving native coronary artery of native heart without angina pectoris        Dose:  5 mg   Take 1 capsule (5 mg) by mouth daily   Quantity:  90 capsule   Refills:  3       rosuvastatin 40 MG tablet   Commonly known as:  CRESTOR   This may have changed:  when to take this   Used for:  CAD (coronary artery disease), Hyperlipidemia LDL goal <70, Benign essential hypertension, Coronary artery disease involving native coronary artery of native heart without angina pectoris        Dose:  40 mg   Take 1 tablet (40 mg) by mouth daily   Quantity:  90 tablet   Refills:  2                Primary Care Provider Office Phone # Fax #    Alexy Hernandez [phone]       [facility] [address] [facility] [address]        Equal Access to Services     KENNETH MARLOW AH: Jorden Johnson, linh rodriguez, ce reynaga " faustino chicaspatricia christianojoyce gan'aan ah. So [facility] [phone].    ATENCIÓN: Si ivan gaitan, tiene a herndon disposición servicios gratuitos de asistencia lingüística. Esha al [phone].    We comply with applicable federal civil rights laws and [place] laws. We do not discriminate on the basis of race, color, national origin, age, disability, sex, sexual orientation, or gender identity.            Thank you!     Thank you for choosing [facility] NEUROSURGERY  for your care. Our goal is always to provide you with excellent care. Hearing back from our patients is one way we can continue to improve our services. Please take a few minutes to complete the written survey that you may receive in the mail after your visit with us. Thank you!             Your Updated Medication List - Protect others around you: Learn how to safely use, store and throw away your medicines at www.disposemymeds.org.          This list is accurate as of 2/6/18  2:24 PM.  Always use your most recent med list.                   Brand Name Dispense Instructions for use Diagnosis    * acetaminophen 500 MG tablet    TYLENOL     Take 500 mg by mouth        * acetaminophen 325 MG tablet    TYLENOL    100 tablet    Take 2 tablets (650 mg) by mouth every 4 hours as needed for other (surgical pain)    Intracranial tumor (H)       aspirin 81 MG tablet     30 tablet    Take 1 tablet (81 mg) by mouth every evening    Coronary artery disease involving native coronary artery of native heart without angina pectoris       ezetimibe 10 MG tablet    ZETIA    90 tablet    Take 1 tablet (10 mg) by mouth daily    Atherosclerosis of native coronary artery of native heart with other form of angina pectoris (H)       glucosamine 500 MG Caps      Take 500 mg by mouth 2 times daily        hydrocortisone 10 MG tablet    CORTEF          levothyroxine 75 MCG tablet    SYNTHROID/LEVOTHROID    90 tablet    Take 1 tablet (75 mcg) by mouth daily    Pituitary  adenoma (H), Secondary hypothyroidism       metoprolol tartrate 25 MG tablet    LOPRESSOR    180 tablet    Take 1 tablet (25 mg) by mouth 2 times daily    CAD (coronary artery disease), Hyperlipidemia LDL goal <70, Benign essential hypertension, Coronary artery disease involving native coronary artery of native heart without angina pectoris       Multi-vitamin Tabs tablet   Generic drug:  multivitamin, therapeutic with minerals      Take 1 tablet by mouth every morning        nitroGLYcerin 0.4 MG sublingual tablet    NITROSTAT    25 tablet    Place 1 tablet (0.4 mg) under the tongue every 5 minutes as needed May repeat X 2. If no relief after 3 tablets call 911    Coronary artery disease involving native coronary artery of native heart without angina pectoris       oxyCODONE IR 5 MG tablet    ROXICODONE    24 tablet    Take 1 tablet (5 mg) by mouth every 6 hours as needed for moderate to severe pain    Intracranial tumor (H)       pantoprazole 40 MG EC tablet    PROTONIX    10 tablet    Take 1 tablet (40 mg) by mouth every morning (before breakfast)    Intracranial tumor (H)       ramipril 5 MG capsule    ALTACE    90 capsule    Take 1 capsule (5 mg) by mouth daily    Benign essential hypertension, Hyperlipidemia LDL goal <70, Coronary artery disease involving native coronary artery of native heart without angina pectoris       rosuvastatin 40 MG tablet    CRESTOR    90 tablet    Take 1 tablet (40 mg) by mouth daily    CAD (coronary artery disease), Hyperlipidemia LDL goal <70, Benign essential hypertension, Coronary artery disease involving native coronary artery of native heart without angina pectoris       senna-docusate 8.6-50 MG per tablet    SENOKOT-S;PERICOLACE    100 tablet    Take 2 tablets by mouth 2 times daily    Intracranial tumor (H)       testosterone 20.25 MG/ACT gel    ANDROGEL 1.62% PUMP    75 g    Place 1 pump (20.25 mg) onto the skin daily Apply from dispenser to clean, dry, intact skin of the  upper arms and shoulders.    Secondary male hypogonadism       * Notice:  This list has 2 medication(s) that are the same as other medications prescribed for you. Read the directions carefully, and ask your doctor or other care provider to review them with you.

## 2018-02-06 NOTE — MR AVS SNAPSHOT
After Visit Summary   2/6/2018    Terrence Murillo    MRN: [id number]           Patient Information     Date Of Birth          1945        Visit Information        Provider Department      2/6/2018 12:30 PM Gris Santoro MD [facility] Endocrinology        Today's Diagnoses     Pituitary adenoma (H)    -  1    Secondary male hypogonadism        Secondary hypothyroidism           Follow-ups after your visit        Follow-up notes from your care team     Return in about 1 year (around 2/6/2019).      Your next 10 appointments already scheduled     Feb 08, 2018  3:40 PM CST   (Arrive by 3:25 PM)   Return Visit with Lara Mckeon MD   [facility] Nose [facility] Th[facility] ([facility])    [address]  4th Floor  [facility] [id number] [phone]            Feb 05, 2019 12:15 PM CST   (Arrive by 12:00 PM)   MR BRAIN W/O & W CONTRAST with [address] MRI ([facility])    [address]  1st Floor  [facility] [id number] [phone]           Take your medicines as usual, unless your doctor tells you not to. Bring a list of your current medicines to your exam (including vitamins, minerals and over-the-counter drugs).  You will be given intravenous contrast for this exam. To prepare:   The day before your exam, drink extra fluids at least six 8-ounce glasses (unless your doctor tells you to restrict your fluids).   Have a blood test (creatinine test) within 30 days of your exam. Go to your clinic or Diagnostic Imaging Department for this test.  The MRI machine uses a strong magnet. Please wear clothes without metal (snaps, zippers). A sweatsuit works well, or we may give you a hospital gown.  Please remove any body piercings and hair extensions before you arrive. You will also remove watches, jewelry, hairpins, wallets, dentures, partial dental plates and hearing aids. You may wear contact lenses, and you may  be able to wear your rings. We have a safe place to keep your personal items, but it is safer to leave them at home.   **IMPORTANT** THE INSTRUCTIONS BELOW ARE ONLY FOR THOSE PATIENTS WHO HAVE BEEN TOLD THEY WILL RECEIVE SEDATION OR GENERAL ANESTHESIA DURING THEIR MRI PROCEDURE:  IF YOU WILL RECEIVE SEDATION (take medicine to help you relax during your exam):   You must get the medicine from your doctor before you arrive. Bring the medicine to the exam. Do not take it at home.   Arrive one hour early. Bring someone who can take you home after the test. Your medicine will make you sleepy. After the exam, you may not drive, take a bus or take a taxi by yourself.   No eating 8 hours before your exam. You may have clear liquids up until 4 hours before your exam. (Clear liquids include water, clear tea, black coffee and fruit juice without pulp.)  IF YOU WILL RECEIVE ANESTHESIA (be asleep for your exam):   Arrive 1 1/2 hours early. Bring someone who can take you home after the test. You may not drive, take a bus or take a taxi by yourself.   No eating 8 hours before your exam. You may have clear liquids up until 4 hours before your exam. (Clear liquids include water, clear tea, black coffee and fruit juice without pulp.)  Please call the Imaging Department at your exam site with any questions.            Feb 05, 2019  1:00 PM CST   (Arrive by 12:45 PM)   Return Visit with Amanda Bates MD   [facility] Neurosurgery ([facility])    [address] [place] [address] [phone]              Future tests that were ordered for you today     Open Future Orders        Priority Expected Expires Ordered    MRI Brain with & without gadolinium [HTI699] Routine  2/6/2019 2/6/2018    Testosterone total Routine  2/6/2019 2/6/2018    TSH Routine  2/6/2019 2/6/2018    T4 free Routine 2/20/2018 4/7/2018 2/6/2018            Who to contact     Please call your clinic at  "[phone] to:    Ask questions about your health    Make or cancel appointments    Discuss your medicines    Learn about your test results    Speak to your doctor   If you have compliments or concerns about an experience at your clinic, or if you wish to file a complaint, please contact [facility] Patient Relations at [phone] or email us at [email or web address]         Additional Information About Your Visit        Fl[employer] Information     eÃ‡iftt gives you secure access to your electronic health record. If you see a primary care provider, you can also send messages to your care team and make appointments. If you have questions, please call your primary care clinic.  If you do not have a primary care provider, please call [phone] and they will assist you.      [employer] is an electronic gateway that provides easy, online access to your medical records. With [employer], you can request a clinic appointment, read your test results, renew a prescription or communicate with your care team.     To access your existing account, please contact your [facility] or call [phone] for assistance.        Care EveryWhere ID     This is your Care EveryWhere ID. This could be used by other organizations to access your [place] medical records  [id number]        Your Vitals Were     Pulse Height BMI (Body Mass Index)             63 1.778 m (5' 10\") 33.03 kg/m2          Blood Pressure from Last 3 Encounters:   02/06/18 125/47   02/06/18 125/47   11/20/17 131/70    Weight from Last 3 Encounters:   02/06/18 104 kg (229 lb 4.8 oz)   02/06/18 104.4 kg (230 lb 3.2 oz)   12/21/17 103.4 kg (228 lb)                 Today's Medication Changes          These changes are accurate as of 2/6/18 11:59 PM.  If you have any questions, ask your nurse or doctor.               These medicines have changed or have updated prescriptions.        Dose/Directions    ezetimibe " 10 MG tablet   Commonly known as:  ZETIA   This may have changed:  when to take this   Used for:  Atherosclerosis of native coronary artery of native heart with other form of angina pectoris (H)        Dose:  10 mg   Take 1 tablet (10 mg) by mouth daily   Quantity:  90 tablet   Refills:  3       ramipril 5 MG capsule   Commonly known as:  ALTACE   This may have changed:  when to take this   Used for:  Benign essential hypertension, Hyperlipidemia LDL goal <70, Coronary artery disease involving native coronary artery of native heart without angina pectoris        Dose:  5 mg   Take 1 capsule (5 mg) by mouth daily   Quantity:  90 capsule   Refills:  3       rosuvastatin 40 MG tablet   Commonly known as:  CRESTOR   This may have changed:  when to take this   Used for:  CAD (coronary artery disease), Hyperlipidemia LDL goal <70, Benign essential hypertension, Coronary artery disease involving native coronary artery of native heart without angina pectoris        Dose:  40 mg   Take 1 tablet (40 mg) by mouth daily   Quantity:  90 tablet   Refills:  2                Primary Care Provider Office Phone # Fax #    Alexy Hernandez [phone]       [facility] [address] [facility] [address]        Equal Access to Services     Sutter Coast HospitalKAREN : Hadii amado ku hadasho Soomaali, waaxda luqadaha, qaybta kaalmada adeegyada, faustino lopez . So [facility] [phone].    ATENCIÓN: Si habla español, tiene a herndon disposición servicios gratuitos de asistencia lingüística. [facility] [phone].    We comply with applicable federal civil rights laws and [place] laws. We do not discriminate on the basis of race, color, national origin, age, disability, sex, sexual orientation, or gender identity.            Thank you!     Thank you for choosing [facility]  for your care. Our goal is always to provide you with excellent care. Hearing back from our patients is one  way we can continue to improve our services. Please take a few minutes to complete the written survey that you may receive in the mail after your visit with us. Thank you!             Your Updated Medication List - Protect others around you: Learn how to safely use, store and throw away your medicines at www.disposemymeds.org.          This list is accurate as of 2/6/18 11:59 PM.  Always use your most recent med list.                   Brand Name Dispense Instructions for use Diagnosis    * acetaminophen 500 MG tablet    TYLENOL     Take 500 mg by mouth        * acetaminophen 325 MG tablet    TYLENOL    100 tablet    Take 2 tablets (650 mg) by mouth every 4 hours as needed for other (surgical pain)    Intracranial tumor (H)       aspirin 81 MG tablet     30 tablet    Take 1 tablet (81 mg) by mouth every evening    Coronary artery disease involving native coronary artery of native heart without angina pectoris       ezetimibe 10 MG tablet    ZETIA    90 tablet    Take 1 tablet (10 mg) by mouth daily    Atherosclerosis of native coronary artery of native heart with other form of angina pectoris (H)       glucosamine 500 MG Caps      Take 500 mg by mouth 2 times daily        hydrocortisone 10 MG tablet    CORTEF          levothyroxine 75 MCG tablet    SYNTHROID/LEVOTHROID    90 tablet    Take 1 tablet (75 mcg) by mouth daily    Pituitary adenoma (H), Secondary hypothyroidism       metoprolol tartrate 25 MG tablet    LOPRESSOR    180 tablet    Take 1 tablet (25 mg) by mouth 2 times daily    CAD (coronary artery disease), Hyperlipidemia LDL goal <70, Benign essential hypertension, Coronary artery disease involving native coronary artery of native heart without angina pectoris       Multi-vitamin Tabs tablet   Generic drug:  multivitamin, therapeutic with minerals      Take 1 tablet by mouth every morning        nitroGLYcerin 0.4 MG sublingual tablet    NITROSTAT    25 tablet    Place 1 tablet (0.4 mg) under the tongue  every 5 minutes as needed May repeat X 2. If no relief after 3 tablets call 911    Coronary artery disease involving native coronary artery of native heart without angina pectoris       oxyCODONE IR 5 MG tablet    ROXICODONE    24 tablet    Take 1 tablet (5 mg) by mouth every 6 hours as needed for moderate to severe pain    Intracranial tumor (H)       pantoprazole 40 MG EC tablet    PROTONIX    10 tablet    Take 1 tablet (40 mg) by mouth every morning (before breakfast)    Intracranial tumor (H)       ramipril 5 MG capsule    ALTACE    90 capsule    Take 1 capsule (5 mg) by mouth daily    Benign essential hypertension, Hyperlipidemia LDL goal <70, Coronary artery disease involving native coronary artery of native heart without angina pectoris       rosuvastatin 40 MG tablet    CRESTOR    90 tablet    Take 1 tablet (40 mg) by mouth daily    CAD (coronary artery disease), Hyperlipidemia LDL goal <70, Benign essential hypertension, Coronary artery disease involving native coronary artery of native heart without angina pectoris       senna-docusate 8.6-50 MG per tablet    SENOKOT-S;PERICOLACE    100 tablet    Take 2 tablets by mouth 2 times daily    Intracranial tumor (H)       testosterone 20.25 MG/ACT gel    ANDROGEL 1.62% PUMP    75 g    Place 1 pump (20.25 mg) onto the skin daily Apply from dispenser to clean, dry, intact skin of the upper arms and shoulders.    Secondary male hypogonadism       * Notice:  This list has 2 medication(s) that are the same as other medications prescribed for you. Read the directions carefully, and ask your doctor or other care provider to review them with you.

## 2018-02-08 ENCOUNTER — OFFICE VISIT (OUTPATIENT)
Dept: OTOLARYNGOLOGY | Facility: CLINIC | Age: 73
End: 2018-02-08
Payer: COMMERCIAL

## 2018-02-08 VITALS — HEIGHT: 70 IN | BODY MASS INDEX: 32.35 KG/M2 | WEIGHT: 226 LBS

## 2018-02-08 DIAGNOSIS — D35.2 PITUITARY ADENOMA (H): Primary | ICD-10-CM

## 2018-02-08 ASSESSMENT — PAIN SCALES - GENERAL: PAINLEVEL: NO PAIN (0)

## 2018-02-08 NOTE — PROGRESS NOTES
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      TREATMENT:  The patient underwent transnasal endoscopic resection of pituitary macroadenoma on 11/03/2017 and reconstruction with right-sided nasal septal flap.      HISTORY OF PRESENT ILLNESS:  Mr. Murillo is coming back today to the Otolaryngology Clinic for followup.  He has been doing really well.  His nose still has some crust, but it is very minimal.  Other than that, he is back to his usual activities.  He is not having any symptoms at this point.        MEDICATIONS:     Current Outpatient Prescriptions   Medication Sig Dispense Refill     acetaminophen (TYLENOL) 500 MG tablet Take 500 mg by mouth       hydrocortisone (CORTEF) 10 MG tablet        testosterone (ANDROGEL 1.62% PUMP) 20.25 MG/ACT gel Place 1 pump (20.25 mg) onto the skin daily Apply from dispenser to clean, dry, intact skin of the upper arms and shoulders. 75 g 5     levothyroxine (SYNTHROID/LEVOTHROID) 75 MCG tablet Take 1 tablet (75 mcg) by mouth daily 90 tablet 3     oxyCODONE IR (ROXICODONE) 5 MG tablet Take 1 tablet (5 mg) by mouth every 6 hours as needed for moderate to severe pain 24 tablet 0     acetaminophen (TYLENOL) 325 MG tablet Take 2 tablets (650 mg) by mouth every 4 hours as needed for other (surgical pain) 100 tablet 0     aspirin 81 MG tablet Take 1 tablet (81 mg) by mouth every evening 30 tablet 0     senna-docusate (SENOKOT-S;PERICOLACE) 8.6-50 MG per tablet Take 2 tablets by mouth 2 times daily 100 tablet 0     pantoprazole (PROTONIX) 40 MG EC tablet Take 1 tablet (40 mg) by mouth every morning (before breakfast) 10 tablet 0     rosuvastatin (CRESTOR) 40 MG tablet Take 1 tablet (40 mg) by mouth daily (Patient taking differently: Take 40 mg by mouth every evening ) 90 tablet 2     metoprolol (LOPRESSOR) 25 MG tablet Take 1 tablet (25 mg) by mouth 2 times daily 180 tablet 3     ramipril (ALTACE) 5 MG capsule Take 1 capsule (5 mg) by mouth  daily (Patient taking differently: Take 5 mg by mouth every morning ) 90 capsule 3     nitroGLYcerin (NITROSTAT) 0.4 MG sublingual tablet Place 1 tablet (0.4 mg) under the tongue every 5 minutes as needed May repeat X 2. If no relief after 3 tablets call 911 25 tablet 1     ezetimibe (ZETIA) 10 MG tablet Take 1 tablet (10 mg) by mouth daily (Patient taking differently: Take 10 mg by mouth every morning ) 90 tablet 3     Glucosamine 500 MG CAPS Take 500 mg by mouth 2 times daily        Multiple Vitamin (MULTI-VITAMIN) per tablet Take 1 tablet by mouth every morning          ALLERGIES:    Allergies   Allergen Reactions     Cardizem [Diltiazem Hcl] Itching     No Clinical Screening - See Comments Hives       HABITS/SOCIAL HISTORY: No smoking, no drinking.    PAST MEDICAL HISTORY:   Past Medical History:   Diagnosis Date     Coronary artery disease     cardiac cath 2016: medical management; cath : SUSANA x2 to LAD; cath : PTCA to LAD; cath : PTCA to LAD, : atherectomy of LAD     Hearing problem      Hx of angiography 2016    no stenosis greater than 25%-rec. medical management     Hyperlipidaemia      Hypertension      Obese      Obstructive sleep apnea      Reduced vision      Sleep apnea     CPAP        FAMILY HISTORY:    Family History   Problem Relation Age of Onset     C.A.D. Father      HEART DISEASE Father       at age 44     HEART DISEASE Son 37     enlarged heart     HEART DISEASE Mother       at age 80 after heart surgery     Heart Surgery Mother      open heart, passed 10 days after     DIABETES Mother      Family History Negative Maternal Grandmother      Family History Negative Maternal Grandfather      Family History Negative Paternal Grandmother      Family History Negative Paternal Grandfather      Family History Negative Brother      Alzheimer Disease Brother      Family History Negative Sister      Family History Negative Daughter      Family History Negative Son      Family  History Negative Daughter      HEART DISEASE Son       at age 37       REVIEW OF SYSTEMS:  12 point ROS was negative other than the symptoms noted above in the HPI.      PHYSICAL EXAMINATION:    Constitutional: The patient was well-groomed and in no acute distress.    Skin: Warm and pink.    Neurologic: Alert and oriented x3. Cranial nerves III-XII within normal limits. Voice quality is normal.    Psychiatric: The patient's affect was calm, cooperative and appropriate.    Respiratory: Breathing comfortably without stridor or exertion of accessory muscles.    Eyes: Pupils were equal and reactive. Extraocular movements are intact.    Head: Normocephalic and atraumatic. No lesions or scars.    Ears: External auditory canals and tympanic membranes were clear.    Nose: Sinuses were nontender. Anterior rhinoscopy revealed midline septum and absence of purulence or polyps.    Oral cavity/Oropharynx: Normal tongue, floor of mouth, buccal mucosa and palate. No lesions or masses on inspection or palpation. No abnormal lymph tissue in the oropharynx.    Neck: The parotids are soft without masses. Supple with normal laryngeal and tracheal landmarks.    Lymphatic: There is no palpable lymphadenopathy or other masses in the neck or parotids.       PROCEDURE: Nasal endoscopy: The risks and benefits of the procedure were discussed and written consent was obtained. A timeout was performed. The patient's nose was sprayed with lidocaine and Afrin, and a 0-degree nasal endoscope was used to gain access into the nasal cavity on the left side.  There is a well mucosalized very small nasal septal perforation.  Posteriorly, the posterior septectomy is nice and clean.  Everything is healed.  The sphenoid sinus is wide open.  No evidence of purulence or CSF leak.  On the right side, inferior and middle turbinate are normal and present.      ASSESSMENT AND PLAN:  The patient is status post transnasal endoscopic excision of pituitary  macroadenoma, doing well.  From an otolaryngology standpoint, things are totally healed and very well.  We are going to see him on a p.r.n. basis.       Lara Mckeon M.D.  Otolaryngology- Head & Neck Surgery  [phone]

## 2018-02-08 NOTE — LETTER
2/8/2018       RE: Terrence Murillo  [address] [facility] [place] [id number]     Dear Colleague,    Thank you for referring your patient, Terrence Murillo, to the [facility] EAR [facility]SE [facility]ROAT at [facility]. Please see a copy of my visit note below.    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      TREATMENT:  The patient underwent transnasal endoscopic resection of pituitary macroadenoma on 11/03/2017 and reconstruction with right-sided nasal septal flap.      HISTORY OF PRESENT ILLNESS:  Mr. Murillo is coming back today to the Otolaryngology Clinic for followup.  He has been doing really well.  His nose still has some crust, but it is very minimal.  Other than that, he is back to his usual activities.  He is not having any symptoms at this point.        MEDICATIONS:     Current Outpatient Prescriptions   Medication Sig Dispense Refill     acetaminophen (TYLENOL) 500 MG tablet Take 500 mg by mouth       hydrocortisone (CORTEF) 10 MG tablet        testosterone (ANDROGEL 1.62% PUMP) 20.25 MG/ACT gel Place 1 pump (20.25 mg) onto the skin daily Apply from dispenser to clean, dry, intact skin of the upper arms and shoulders. 75 g 5     levothyroxine (SYNTHROID/LEVOTHROID) 75 MCG tablet Take 1 tablet (75 mcg) by mouth daily 90 tablet 3     oxyCODONE IR (ROXICODONE) 5 MG tablet Take 1 tablet (5 mg) by mouth every 6 hours as needed for moderate to severe pain 24 tablet 0     acetaminophen (TYLENOL) 325 MG tablet Take 2 tablets (650 mg) by mouth every 4 hours as needed for other (surgical pain) 100 tablet 0     aspirin 81 MG tablet Take 1 tablet (81 mg) by mouth every evening 30 tablet 0     senna-docusate (SENOKOT-S;PERICOLACE) 8.6-50 MG per tablet Take 2 tablets by mouth 2 times daily 100 tablet 0     pantoprazole (PROTONIX) 40 MG EC tablet Take 1 tablet (40 mg) by mouth every morning (before breakfast) 10 tablet 0     rosuvastatin (CRESTOR) 40 MG  tablet Take 1 tablet (40 mg) by mouth daily (Patient taking differently: Take 40 mg by mouth every evening ) 90 tablet 2     metoprolol (LOPRESSOR) 25 MG tablet Take 1 tablet (25 mg) by mouth 2 times daily 180 tablet 3     ramipril (ALTACE) 5 MG capsule Take 1 capsule (5 mg) by mouth daily (Patient taking differently: Take 5 mg by mouth every morning ) 90 capsule 3     nitroGLYcerin (NITROSTAT) 0.4 MG sublingual tablet Place 1 tablet (0.4 mg) under the tongue every 5 minutes as needed May repeat X 2. If no relief after 3 tablets call 911 25 tablet 1     ezetimibe (ZETIA) 10 MG tablet Take 1 tablet (10 mg) by mouth daily (Patient taking differently: Take 10 mg by mouth every morning ) 90 tablet 3     Glucosamine 500 MG CAPS Take 500 mg by mouth 2 times daily        Multiple Vitamin (MULTI-VITAMIN) per tablet Take 1 tablet by mouth every morning          ALLERGIES:    Allergies   Allergen Reactions     Cardizem [Diltiazem Hcl] Itching     No Clinical Screening - See Comments Hives       HABITS/SOCIAL HISTORY: No smoking, no drinking.    PAST MEDICAL HISTORY:   Past Medical History:   Diagnosis Date     Coronary artery disease     cardiac cath 2016: medical management; cath : SUSANA x2 to LAD; cath : PTCA to LAD; cath : PTCA to LAD, : atherectomy of LAD     Hearing problem      Hx of angiography 2016    no stenosis greater than 25%-rec. medical management     Hyperlipidaemia      Hypertension      Obese      Obstructive sleep apnea      Reduced vision      Sleep apnea     CPAP        FAMILY HISTORY:    Family History   Problem Relation Age of Onset     C.A.D. Father      HEART DISEASE Father       at age 44     HEART DISEASE Son 37     enlarged heart     HEART DISEASE Mother       at age 80 after heart surgery     Heart Surgery Mother      open heart, passed 10 days after     DIABETES Mother      Family History Negative Maternal Grandmother      Family History Negative Maternal Grandfather       Family History Negative Paternal Grandmother      Family History Negative Paternal Grandfather      Family History Negative Brother      Alzheimer Disease Brother      Family History Negative Sister      Family History Negative Daughter      Family History Negative Son      Family History Negative Daughter      HEART DISEASE Son       at age 37       REVIEW OF SYSTEMS:  12 point ROS was negative other than the symptoms noted above in the HPI.      PHYSICAL EXAMINATION:    Constitutional: The patient was well-groomed and in no acute distress.    Skin: Warm and pink.    Neurologic: Alert and oriented x3. Cranial nerves III-XII within normal limits. Voice quality is normal.    Psychiatric: The patient's affect was calm, cooperative and appropriate.    Respiratory: Breathing comfortably without stridor or exertion of accessory muscles.    Eyes: Pupils were equal and reactive. Extraocular movements are intact.    Head: Normocephalic and atraumatic. No lesions or scars.    Ears: External auditory canals and tympanic membranes were clear.    Nose: Sinuses were nontender. Anterior rhinoscopy revealed midline septum and absence of purulence or polyps.    Oral cavity/Oropharynx: Normal tongue, floor of mouth, buccal mucosa and palate. No lesions or masses on inspection or palpation. No abnormal lymph tissue in the oropharynx.    Neck: The parotids are soft without masses. Supple with normal laryngeal and tracheal landmarks.    Lymphatic: There is no palpable lymphadenopathy or other masses in the neck or parotids.       PROCEDURE: Nasal endoscopy: The risks and benefits of the procedure were discussed and written consent was obtained. A timeout was performed. The patient's nose was sprayed with lidocaine and Afrin, and a 0-degree nasal endoscope was used to gain access into the nasal cavity on the left side.  There is a well mucosalized very small nasal septal perforation.  Posteriorly, the posterior septectomy is  nice and clean.  Everything is healed.  The sphenoid sinus is wide open.  No evidence of purulence or CSF leak.  On the right side, inferior and middle turbinate are normal and present.      ASSESSMENT AND PLAN:  The patient is status post transnasal endoscopic excision of pituitary macroadenoma, doing well.  From an otolaryngology standpoint, things are totally healed and very well.  We are going to see him on a p.r.n. basis.       Lara Mckeon M.D.  Otolaryngology- Head & Neck Surgery  [phone]

## 2018-02-08 NOTE — NURSING NOTE
"Chief Complaint   Patient presents with     RECHECK     Post Op-pituitary adenoma resection     Height 1.78 m (5' 10.08\"), weight 102.5 kg (226 lb).    Thomas Watson LPN    "

## 2018-02-08 NOTE — PATIENT INSTRUCTIONS
1.  You were seen in the ENT Clinic today by Dr. Mckeon.  If you have questions or concerns after your appointment please call, [phone].  Press option #1 for scheduling related needs.  Press option #3 for Nurse advice.  2.  Plan is to return to clinic on an as needed basis  3. RN Care Coordinator is Leyla, [phone]

## 2018-02-08 NOTE — MR AVS SNAPSHOT
After Visit Summary   2/8/2018    Terrence Murillo    MRN: [id number]           Patient Information     Date Of Birth          1945        Visit Information        Provider Department      2/8/2018 3:40 PM Lara Mckeon MD [facility] Ear Nose and Throat        Today's Diagnoses     Pituitary adenoma (H)    -  1      Care Instructions    1.  You were seen in the ENT Clinic today by Dr. Mckeon.  If you have questions or concerns after your appointment please call, [phone].  Press option #1 for scheduling related needs.  Press option #3 for Nurse advice.  2.  Plan is to return to clinic on an as needed basis  3. RN Care Coordinator is Leyla [phone]            Follow-ups after your visit        Your next 10 appointments already scheduled     Feb 05, 2019 12:15 PM CST   (Arrive by 12:00 PM)   MR BRAIN W/O & W CONTRAST with [address] MRI ([facility])    [address] [place] [id number] [phone]           Take your medicines as usual, unless your doctor tells you not to. Bring a list of your current medicines to your exam (including vitamins, minerals and over-the-counter drugs).  You may or may not receive intravenous (IV) contrast for this exam pending the discretion of the Radiologist.  You do not need to do anything special to prepare.  The MRI machine uses a strong magnet. Please wear clothes without metal (snaps, zippers). A sweatsuit works well, or we may give you a hospital gown.  Please remove any body piercings and hair extensions before you arrive. You will also remove watches, jewelry, hairpins, wallets, dentures, partial dental plates and hearing aids. You may wear contact lenses, and you may be able to wear your rings. We have a safe place to keep your personal items, but it is safer to leave them at home.  **IMPORTANT** THE INSTRUCTIONS BELOW ARE ONLY FOR THOSE PATIENTS WHO  HAVE BEEN PRESCRIBED SEDATION OR GENERAL ANESTHESIA DURING THEIR MRI PROCEDURE:  IF YOUR DOCTOR PRESCRIBED ORAL SEDATION (take medicine to help you relax during your exam):   You must get the medicine from your doctor (oral medication) before you arrive. Bring the medicine to the exam. Do not take it at home. You ll be told when to take it upon arriving for your exam.   Arrive one hour early. Bring someone who can take you home after the test. Your medicine will make you sleepy. After the exam, you may not drive, take a bus or take a taxi by yourself.  IF YOUR DOCTOR PRESCRIBED IV SEDATION:   Arrive one hour early. Bring someone who can take you home after the test. Your medicine will make you sleepy. After the exam, you may not drive, take a bus or take a taxi by yourself.   No eating 6 hours before your exam. You may have clear liquids up until 4 hours before your exam. (Clear liquids include water, clear tea, black coffee and fruit juice without pulp.)  IF YOUR DOCTOR PRESCRIBED ANESTHESIA (be asleep for your exam):   Arrive 1 1/2 hours early. Bring someone who can take you home after the test. You may not drive, take a bus or take a taxi by yourself.   No eating 8 hours before your exam. You may have clear liquids up until 4 hours before your exam. (Clear liquids include water, clear tea, black coffee and fruit juice without pulp.)   You will spend four to five hours in the recovery room.  Please call the Imaging Department at your exam site with any questions.            Feb 05, 2019  1:00 PM CST   (Arrive by 12:45 PM)   Return Visit with Amanda Bates MD   [facility] Neurosurgery ([facility])    [address] [place] [id number] [phone]              Who to contact     Please call your clinic at [phone] to:    Ask questions about your health    Make or cancel appointments    Discuss your medicines    Learn about your test results    Speak  "to your doctor            Additional Information About Your Visit        [employer] Information     [employer] gives you secure access to your electronic health record. If you see a primary care provider, you can also send messages to your care team and make appointments. If you have questions, please call your primary care clinic.  If you do not have a primary care provider, please call [phone] and they will assist you.      [employer] is an electronic gateway that provides easy, online access to your medical records. With [employer], you can request a clinic appointment, read your test results, renew a prescription or communicate with your care team.     To access your existing account, please contact your [facility] or call [phone] for assistance.        Care EveryWhere ID     This is your Care EveryWhere ID. This could be used by other organizations to access your [facility] medical records  [id number]        Your Vitals Were     Height BMI (Body Mass Index)                1.78 m (5' 10.08\") 32.35 kg/m2           Blood Pressure from Last 3 Encounters:   02/06/18 125/47   02/06/18 125/47   11/20/17 131/70    Weight from Last 3 Encounters:   02/08/18 102.5 kg (226 lb)   02/06/18 104 kg (229 lb 4.8 oz)   02/06/18 104.4 kg (230 lb 3.2 oz)              We Performed the Following     NASAL ENDOSCOPY, DIAGNOSTIC          Today's Medication Changes          These changes are accurate as of 2/8/18 11:59 PM.  If you have any questions, ask your nurse or doctor.               These medicines have changed or have updated prescriptions.        Dose/Directions    ezetimibe 10 MG tablet   Commonly known as:  ZETIA   This may have changed:  when to take this   Used for:  Atherosclerosis of native coronary artery of native heart with other form of angina pectoris (H)        Dose:  10 mg   Take 1 tablet (10 mg) by mouth daily   Quantity:  90 tablet   Refills:  3       ramipril 5 MG capsule   Commonly " known as:  ALTACE   This may have changed:  when to take this   Used for:  Benign essential hypertension, Hyperlipidemia LDL goal <70, Coronary artery disease involving native coronary artery of native heart without angina pectoris        Dose:  5 mg   Take 1 capsule (5 mg) by mouth daily   Quantity:  90 capsule   Refills:  3       rosuvastatin 40 MG tablet   Commonly known as:  CRESTOR   This may have changed:  when to take this   Used for:  CAD (coronary artery disease), Hyperlipidemia LDL goal <70, Benign essential hypertension, Coronary artery disease involving native coronary artery of native heart without angina pectoris        Dose:  40 mg   Take 1 tablet (40 mg) by mouth daily   Quantity:  90 tablet   Refills:  2                Primary Care Provider Office Phone # Fax #    Alexy Hernandez [phone]       [facility] [address] [facility] [address]        Equal Access to Services     KENNETH MARLOW : Hadii amado varghese hadasho Soomaali, waaxda luqadaha, qaybta kaalmada adepatriciayada, faustino lopez . So [facility] [phone].    ATENCIÓN: Si habla español, tiene a herndon disposición servicios gratuitos de asistencia lingüística. [facility] [phone].    We comply with applicable federal civil rights laws and [place] laws. We do not discriminate on the basis of race, color, national origin, age, disability, sex, sexual orientation, or gender identity.            Thank you!     Thank you for choosing [facility] EAR NOSE AND THROAT  for your care. Our goal is always to provide you with excellent care. Hearing back from our patients is one way we can continue to improve our services. Please take a few minutes to complete the written survey that you may receive in the mail after your visit with us. Thank you!             Your Updated Medication List - Protect others around you: Learn how to safely use, store and throw away your medicines at  www.disposemymeds.org.          This list is accurate as of 2/8/18 11:59 PM.  Always use your most recent med list.                   Brand Name Dispense Instructions for use Diagnosis    * acetaminophen 500 MG tablet    TYLENOL     Take 500 mg by mouth        * acetaminophen 325 MG tablet    TYLENOL    100 tablet    Take 2 tablets (650 mg) by mouth every 4 hours as needed for other (surgical pain)    Intracranial tumor (H)       aspirin 81 MG tablet     30 tablet    Take 1 tablet (81 mg) by mouth every evening    Coronary artery disease involving native coronary artery of native heart without angina pectoris       ezetimibe 10 MG tablet    ZETIA    90 tablet    Take 1 tablet (10 mg) by mouth daily    Atherosclerosis of native coronary artery of native heart with other form of angina pectoris (H)       glucosamine 500 MG Caps      Take 500 mg by mouth 2 times daily        hydrocortisone 10 MG tablet    CORTEF          levothyroxine 75 MCG tablet    SYNTHROID/LEVOTHROID    90 tablet    Take 1 tablet (75 mcg) by mouth daily    Pituitary adenoma (H), Secondary hypothyroidism       metoprolol tartrate 25 MG tablet    LOPRESSOR    180 tablet    Take 1 tablet (25 mg) by mouth 2 times daily    CAD (coronary artery disease), Hyperlipidemia LDL goal <70, Benign essential hypertension, Coronary artery disease involving native coronary artery of native heart without angina pectoris       Multi-vitamin Tabs tablet   Generic drug:  multivitamin, therapeutic with minerals      Take 1 tablet by mouth every morning        nitroGLYcerin 0.4 MG sublingual tablet    NITROSTAT    25 tablet    Place 1 tablet (0.4 mg) under the tongue every 5 minutes as needed May repeat X 2. If no relief after 3 tablets call 911    Coronary artery disease involving native coronary artery of native heart without angina pectoris       oxyCODONE IR 5 MG tablet    ROXICODONE    24 tablet    Take 1 tablet (5 mg) by mouth every 6 hours as needed for moderate  to severe pain    Intracranial tumor (H)       pantoprazole 40 MG EC tablet    PROTONIX    10 tablet    Take 1 tablet (40 mg) by mouth every morning (before breakfast)    Intracranial tumor (H)       ramipril 5 MG capsule    ALTACE    90 capsule    Take 1 capsule (5 mg) by mouth daily    Benign essential hypertension, Hyperlipidemia LDL goal <70, Coronary artery disease involving native coronary artery of native heart without angina pectoris       rosuvastatin 40 MG tablet    CRESTOR    90 tablet    Take 1 tablet (40 mg) by mouth daily    CAD (coronary artery disease), Hyperlipidemia LDL goal <70, Benign essential hypertension, Coronary artery disease involving native coronary artery of native heart without angina pectoris       senna-docusate 8.6-50 MG per tablet    SENOKOT-S;PERICOLACE    100 tablet    Take 2 tablets by mouth 2 times daily    Intracranial tumor (H)       testosterone 20.25 MG/ACT gel    ANDROGEL 1.62% PUMP    75 g    Place 1 pump (20.25 mg) onto the skin daily Apply from dispenser to clean, dry, intact skin of the upper arms and shoulders.    Secondary male hypogonadism       * Notice:  This list has 2 medication(s) that are the same as other medications prescribed for you. Read the directions carefully, and ask your doctor or other care provider to review them with you.

## 2018-02-10 NOTE — LETTER
8/3/2017       RE: Terrence Murillo  [address] [place] [facility] [address]     Dear Colleague,    Thank you for referring your patient, Terrence Murillo, to the [facility] NEUROSURGERY at [facility]. Please see a copy of my visit note below.    Dear Dr. Hernandez:    We saw Mr. Terrence Murillo today in Pituitary Clinic today at Dr. Herndon's request. He is a right-handed 72 year old man with longstanding bitemporal hemianopsia diagnosed on a recent examination by Dr. Herndon. In retrospect, he believes he has had peripheral vision loss dating back at least two years. He noticed being unable to see kwaku pigeons when trap shooting with his grandson. Based on the visual field deficit, he had an MRI of the brain on 2017 which showed a pituitary mass. Currently, he is doing well otherwise. He seems to drive without difficulty. His visual field deficits are more obvious to him when he closes one eye while reading. He believes his right eye is worse than his left. He thinks his hearing is also worsening. He has dysgeusia where food occasionally tastes more bitter than normal. He has not had any alteration in smell, but he does have increased congestion. He has been gaining weight and his appetite remains normal. His energy-level is not like it used to be, but he is not abnormally fatigued. He continues to hunt and fish. His libido is at baseline and he has had erectile dysfunction for about 10 years. He feels like his balance is worsening but he does not have numbness or tingling in his legs. He does not experience light-headedness or vertigo. He remains on daily aspirin.     MEDICAL HISTORY:  1. Multiple stent placements in the coronary arteries - no MI  2. Left knee and right shoulder arthroplasty  3. Appendectomy  4. Hearing aids for 13 years  5. Bilateral cataract surgery    SOCIAL HISTORY: He is  with three living children. His youngest son  suddenly six years ago at the age  of 37 from hypertrophic cardiomyopathy. They live in [place]. 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 He consumes 2 alcoholic drinks daily.     PHYSICAL EXAM: On exam, he appears well. He is obese. Extraocular movements intact. We did not repeat confrontation visual fields since he had formal visual field testing recently with Dr. Herndon. He moves upper and lower extremities equally and with good coordination. Fluent and coherent speech. Gross neurological examination is otherwise normal.    REVIEW OF STUDIES: We reviewed his recent MRI. This shows a 3 cm enhancing mass in the sella with suprasellar extension. There is mild extension into the right parasellar space. There is expansion of the sellar floor, consistent with pituitary macroadenoma. The pituitary gland appears displaced superiorly, posteriorly and to the left.    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 We agree with Dr. Herndon that surgical resection is indicated. The majority of this 60 minute visit was spent discussing the management of pituitary tumors. The primary goal of surgery is to prevent further decline in vision. Perhaps he will regain some of his lost vision. We dicussed the endoscopic transnasal approach in detail. We went over the risks of death, carotid injury (stroke), hypopituitarism, CSF leak, infection, hemorrhage, blindness, or need for reoperation and he agrees to proceed. For completion, we will check a pituitary panel on his way out and arrange for him to meet with our skull base surgery partner from ENT, Dr. Muniz, to discuss the sinonasal aspects of surgery. We will plan on surgery in the next few weeks and work around some hunting trips that he has planned in the middle of Fall.     Please do not hesitate to contact us with questions. We will keep you informed of his progress.     MD LYLA MARSHALL, Guillaume Nichols, am serving as a scribe to document  services personally performed by Amanda Bates MD, based upon my observations and the provider's statements to me. All documentation has been reviewed by the aforementioned doctor prior to being entered into the official medical record.    I, Amanda Bates, attest that above named individual is acting in scribe capacity, has observed my performance of the services and has documented them in accordance with my direction. The documentation recorded by the scribe accurately reflects the service I personally performed and the decisions made by me.    Again, thank you for allowing me to participate in the care of your patient.      Sincerely,    Amanda Bates MD       Strong peripheral pulses

## 2018-02-16 ENCOUNTER — TELEPHONE (OUTPATIENT)
Dept: ENDOCRINOLOGY | Facility: CLINIC | Age: 73
End: 2018-02-16

## 2018-02-16 NOTE — TELEPHONE ENCOUNTER
Pt called stating he had steroid spinal injection on L2 and was concerned it would impact upcoming blood draw for testosterone result. Forwarded to Dr Santoro and Dr Zavala, per Pt request.

## 2018-02-16 NOTE — TELEPHONE ENCOUNTER
Spoke w/ Dr Santoro who stated: Please have Pt complete Labs in [place] last week of February T4Free, TSH, Testosterone Total.

## 2018-02-28 LAB
T4 FREE SERPL-MCNC: 1.2 NG/DL (ref 0.8–1.8)
TESTOST SERPL-MCNC: 121 NG/DL (ref 250–827)
TSH SERPL-ACNC: 0.03 MCU/ML (ref 0.4–4.5)

## 2018-03-08 ENCOUNTER — TELEPHONE (OUTPATIENT)
Dept: ENDOCRINOLOGY | Facility: CLINIC | Age: 73
End: 2018-03-08

## 2018-03-08 DIAGNOSIS — E29.1 SECONDARY MALE HYPOGONADISM: Primary | ICD-10-CM

## 2018-03-08 NOTE — TELEPHONE ENCOUNTER
----- Message from Gris Santoro MD sent at 3/8/2018 11:29 AM CST -----  Regarding: RE: lab results from [place]  I talked  ----- Message -----     From: Lisa Clark RN     Sent: 3/7/2018   4:26 PM       To: Gris Santoro MD  Subject: lab results from [place]                         He had labs done in [place]  2/28/18 and have been  Entered into the system. HE did not get any feedback on the results  Or a results letter.

## 2018-03-08 NOTE — PROGRESS NOTES
I called patient for the result.    Results for DANIEL RIGGINS (MRN [id number]) as of 3/8/2018 11:13   Ref. Range 2/28/2018 00:00   T4 Free Latest Ref Range: 0.8 - 1.8 ng/dL 1.2   TSH Latest Ref Range: 0.40 - 4.50 mcU/mL 0.03 (A)   Testosterone Total Latest Ref Range: 250 - 827 ng/dL 121 (A)       Need to continue testosterone either gel or injection. He concerns cost.   I suggested to try gel every other day and recheck testosterone in 2 viet.     Thryoid- continru current dose.    Patient understood.    Gris Santoro MD  Staff Physician  Endocrinology and Metabolism  [facility]  License: [id number]  Pager: [phone]

## 2018-03-12 ENCOUNTER — TELEPHONE (OUTPATIENT)
Dept: NEUROSURGERY | Facility: CLINIC | Age: 73
End: 2018-03-12

## 2018-03-12 NOTE — TELEPHONE ENCOUNTER
Writer returned pt's call.  There was a message that the pt needed clarification.  Pt said that another MD to him that he needed to have an MRI in 3-4 months.  From Dr. Bates perspective the MRI can be done in a year.  The pt will email the writer MD's name.

## 2018-03-23 DIAGNOSIS — I10 BENIGN ESSENTIAL HYPERTENSION: ICD-10-CM

## 2018-03-23 DIAGNOSIS — I25.10 CAD (CORONARY ARTERY DISEASE): ICD-10-CM

## 2018-03-23 DIAGNOSIS — E78.5 HYPERLIPIDEMIA LDL GOAL <70: ICD-10-CM

## 2018-03-23 DIAGNOSIS — I25.10 CORONARY ARTERY DISEASE INVOLVING NATIVE CORONARY ARTERY OF NATIVE HEART WITHOUT ANGINA PECTORIS: Chronic | ICD-10-CM

## 2018-03-23 RX ORDER — ROSUVASTATIN CALCIUM 40 MG/1
40 TABLET, COATED ORAL DAILY
Qty: 90 TABLET | Refills: 0 | Status: SHIPPED | OUTPATIENT
Start: 2018-03-23 | End: 2018-09-20

## 2018-04-09 ENCOUNTER — TELEPHONE (OUTPATIENT)
Dept: ENDOCRINOLOGY | Facility: CLINIC | Age: 73
End: 2018-04-09

## 2018-04-09 NOTE — TELEPHONE ENCOUNTER
Health Call Center    Phone Message    May a detailed message be left on voicemail: no    Reason for Call: Other: pt requesting call back to discus some testing and an appt with Dr. Santoro. Pt thought he was supposed to have testing done and an appt with her prior to October.      Action Taken: Message routed to:  [facility] endocrinology clinic

## 2018-04-10 NOTE — TELEPHONE ENCOUNTER
I sent Scar a message tat itrolando too soon for lab  Orders for October. He has  Labs to do in May so depending on te result we don't know what will be needed in October yet.

## 2018-05-21 DIAGNOSIS — E29.1 SECONDARY MALE HYPOGONADISM: Primary | ICD-10-CM

## 2018-05-21 DIAGNOSIS — I25.10 CORONARY ARTERY DISEASE INVOLVING NATIVE CORONARY ARTERY OF NATIVE HEART WITHOUT ANGINA PECTORIS: ICD-10-CM

## 2018-05-21 RX ORDER — NITROGLYCERIN 0.4 MG/1
0.4 TABLET SUBLINGUAL EVERY 5 MIN PRN
Qty: 25 TABLET | Refills: 3 | Status: SHIPPED | OUTPATIENT
Start: 2018-05-21 | End: 2019-12-20

## 2018-05-21 NOTE — TELEPHONE ENCOUNTER
Received refill request for:  NTG  Last OV was: 6/29/2017 with Dr. Jolley  Labs/EKG: n//a  F/U scheduled: 7/24/2018 with Dr. Jolley  New script sent to: [facility]

## 2018-05-22 ENCOUNTER — TELEPHONE (OUTPATIENT)
Dept: ENDOCRINOLOGY | Facility: CLINIC | Age: 73
End: 2018-05-22

## 2018-05-22 NOTE — TELEPHONE ENCOUNTER
----- Message from Gris Santoro MD sent at 5/21/2018  4:46 PM CDT -----  Regarding: RE: lab orders  I ordered testosterone, that needs to be done between injections.   (meaning next week after injection).     If he can do within this month, that would be perfect.    He also needs another one in October too.     Gris   ----- Message -----     From: Lisa Clark RN     Sent: 5/21/2018  10:52 AM       To: Gris Santoro MD  Subject: lab orders                                       His appt was moved to October when you are back.  What labs is he needing now   Testosterone is  Placed but no  Due date  ? And what do you want  ordered for October before his next appointment ? .

## 2018-05-22 NOTE — TELEPHONE ENCOUNTER
Left message on Pts vm to please check MyChart re message from Gris Seaman MD Schwendeman, Connie M, JORDY                     I ordered testosterone, that needs to be done between injections.   (meaning next week after injection).     If he can do within this month, that would be perfect.     He also needs another one in October too.     Gris

## 2018-05-24 ENCOUNTER — TELEPHONE (OUTPATIENT)
Dept: ENDOCRINOLOGY | Facility: CLINIC | Age: 73
End: 2018-05-24

## 2018-05-24 NOTE — TELEPHONE ENCOUNTER
Pt states not getting injections - using gel every other day - questions what he should do RE lab tests. Forwarded to Dr Santoro.

## 2018-05-25 ENCOUNTER — TELEPHONE (OUTPATIENT)
Dept: ENDOCRINOLOGY | Facility: CLINIC | Age: 73
End: 2018-05-25

## 2018-05-25 NOTE — TELEPHONE ENCOUNTER
"[place]\" No phone - Spoke w/ Pts wife who states Pt will have it done late Tuesday afternoon.   "

## 2018-05-25 NOTE — TELEPHONE ENCOUNTER
----- Message from Gris Santoro MD sent at 5/24/2018  5:22 PM CDT -----  Regarding: RE: lab and Rx clarification  He can draw anytime but early next week (tomorow, Sat, or next Tuesday) 8-9 am, it takes time to get the results, so that I can follow before I go to maternity leave, otherwise it may be interpret by on call physicians... Could you tell him?    Thank you very much    Gris  ----- Message -----     From: Elissa Murillo RN     Sent: 5/24/2018  10:59 AM       To: Gris Santoro MD  Subject: lab and Rx clarification                         Pt states not getting injections - using gel every other day - questions what he should do regarding  lab tests    Your previous instructions were:   have your testosterone checked, preferably within this month. You would need to have the Lab drawn between your injections, meaning the next week after your injection. She would also like you to have one drawn in October, as well

## 2018-05-30 NOTE — TELEPHONE ENCOUNTER
Health Call Center    Phone Message    May a detailed message be left on voicemail: no    Reason for Call: Other: Patient called wondering how many days before lab appt for testosterone level that he needs to stop using the Androgel. Please contact patient with that information.      Action Taken: Message routed to:  [facility] Endocrinology

## 2018-05-31 ENCOUNTER — TELEPHONE (OUTPATIENT)
Dept: ENDOCRINOLOGY | Facility: CLINIC | Age: 73
End: 2018-05-31

## 2018-05-31 NOTE — TELEPHONE ENCOUNTER
Terrence was notified to have labs done holding the testosterone until  Done. He also only applies it every other day so may go in on the non  day.

## 2018-05-31 NOTE — TELEPHONE ENCOUNTER
----- Message from Gris Santoro MD sent at 5/31/2018  8:06 AM CDT -----  Regarding: RE: instructions  Just the morning of blood draw, hold.  gel    Gris  ----- Message -----     From: Lisa Clark RN     Sent: 5/30/2018   3:43 PM       To: Gris Santoro MD  Subject: instructions                                     He is on Androgel  not injections . Do you want him to hold the Androgel the morning of the lab draw for testosterone ? He will have this done this week.  .

## 2018-06-04 ENCOUNTER — HOSPITAL ENCOUNTER (OUTPATIENT)
Dept: LAB | Facility: CLINIC | Age: 73
Discharge: HOME | End: 2018-06-04
Attending: FAMILY MEDICINE | Admitting: FAMILY MEDICINE
Payer: MEDICARE

## 2018-06-04 DIAGNOSIS — D35.2 PITUITARY ADENOMA (H): ICD-10-CM

## 2018-06-04 DIAGNOSIS — E29.1 SECONDARY MALE HYPOGONADISM: ICD-10-CM

## 2018-06-04 LAB — TSH SERPL DL<=0.005 MIU/L-ACNC: 1.82 MU/L (ref 0.4–4)

## 2018-06-04 PROCEDURE — 36415 COLL VENOUS BLD VENIPUNCTURE: CPT | Performed by: INTERNAL MEDICINE

## 2018-06-04 PROCEDURE — 84403 ASSAY OF TOTAL TESTOSTERONE: CPT | Performed by: INTERNAL MEDICINE

## 2018-06-04 PROCEDURE — 84443 ASSAY THYROID STIM HORMONE: CPT | Performed by: INTERNAL MEDICINE

## 2018-06-06 LAB — TESTOST SERPL-MCNC: 172 NG/DL (ref 240–950)

## 2018-06-20 ENCOUNTER — TELEPHONE (OUTPATIENT)
Dept: ENDOCRINOLOGY | Facility: CLINIC | Age: 73
End: 2018-06-20

## 2018-06-20 NOTE — TELEPHONE ENCOUNTER
[facility]       Could you communicate with patient?   1. His testosterone 6/4 was 172, which is better than previous (2/28 121), comment my apology for delay due to maternity leave.     2. But 172 is still lower side and need testosterone therapy either gel or injection.     3. Could you verify he is on testosterone gel every other day?     4. Offer the option: testosterone gel daily use (he concerns cost), or testosteone injection (200 mg every other week).     5. If he wants injection, could you prescribe and get signature from on call?     6. Thyroid level looks normal.     Thank you very much!!     Gris

## 2018-06-25 DIAGNOSIS — E29.1 SECONDARY MALE HYPOGONADISM: ICD-10-CM

## 2018-06-25 RX ORDER — TESTOSTERONE 1.62 MG/G
1 GEL TRANSDERMAL DAILY
Qty: 75 G | Refills: 5 | Status: SHIPPED | OUTPATIENT
Start: 2018-06-25 | End: 2018-06-25

## 2018-06-25 RX ORDER — TESTOSTERONE 1.62 MG/G
1 GEL TRANSDERMAL DAILY
Qty: 75 G | Refills: 5 | Status: SHIPPED | OUTPATIENT
Start: 2018-06-25 | End: 2018-10-17

## 2018-07-10 ENCOUNTER — PRE VISIT (OUTPATIENT)
Dept: CARDIOLOGY | Facility: CLINIC | Age: 73
End: 2018-07-10

## 2018-07-10 DIAGNOSIS — I25.10 CORONARY ARTERY DISEASE INVOLVING NATIVE CORONARY ARTERY OF NATIVE HEART WITHOUT ANGINA PECTORIS: Primary | ICD-10-CM

## 2018-07-24 ENCOUNTER — OFFICE VISIT (OUTPATIENT)
Dept: CARDIOLOGY | Facility: CLINIC | Age: 73
End: 2018-07-24
Attending: INTERNAL MEDICINE
Payer: COMMERCIAL

## 2018-07-24 VITALS
HEIGHT: 70 IN | DIASTOLIC BLOOD PRESSURE: 62 MMHG | HEART RATE: 56 BPM | WEIGHT: 221.6 LBS | SYSTOLIC BLOOD PRESSURE: 118 MMHG | BODY MASS INDEX: 31.73 KG/M2

## 2018-07-24 DIAGNOSIS — E78.00 PURE HYPERCHOLESTEROLEMIA: ICD-10-CM

## 2018-07-24 DIAGNOSIS — I25.10 CORONARY ARTERY DISEASE INVOLVING NATIVE CORONARY ARTERY OF NATIVE HEART WITHOUT ANGINA PECTORIS: ICD-10-CM

## 2018-07-24 DIAGNOSIS — E66.09 NON MORBID OBESITY DUE TO EXCESS CALORIES: Chronic | ICD-10-CM

## 2018-07-24 DIAGNOSIS — I10 BENIGN ESSENTIAL HYPERTENSION: Primary | ICD-10-CM

## 2018-07-24 LAB
ANION GAP SERPL CALCULATED.3IONS-SCNC: 13.4 MMOL/L (ref 6–17)
BUN SERPL-MCNC: 14 MG/DL (ref 7–30)
CALCIUM SERPL-MCNC: 9.3 MG/DL (ref 8.5–10.5)
CHLORIDE SERPL-SCNC: 101 MMOL/L (ref 98–107)
CHOLEST SERPL-MCNC: 106 MG/DL
CO2 SERPL-SCNC: 27 MMOL/L (ref 23–29)
CREAT SERPL-MCNC: 1.05 MG/DL (ref 0.7–1.3)
GFR SERPL CREATININE-BSD FRML MDRD: 69 ML/MIN/1.7M2
GLUCOSE SERPL-MCNC: 98 MG/DL (ref 70–105)
HDLC SERPL-MCNC: 41 MG/DL
LDLC SERPL CALC-MCNC: 46 MG/DL
NONHDLC SERPL-MCNC: 65 MG/DL
POTASSIUM SERPL-SCNC: 4.4 MMOL/L (ref 3.5–5.1)
SODIUM SERPL-SCNC: 137 MMOL/L (ref 136–145)
TRIGL SERPL-MCNC: 94 MG/DL

## 2018-07-24 PROCEDURE — 36415 COLL VENOUS BLD VENIPUNCTURE: CPT | Performed by: INTERNAL MEDICINE

## 2018-07-24 PROCEDURE — 80061 LIPID PANEL: CPT | Performed by: INTERNAL MEDICINE

## 2018-07-24 PROCEDURE — 80048 BASIC METABOLIC PNL TOTAL CA: CPT | Performed by: INTERNAL MEDICINE

## 2018-07-24 PROCEDURE — 99214 OFFICE O/P EST MOD 30 MIN: CPT | Performed by: INTERNAL MEDICINE

## 2018-07-24 RX ORDER — IBUPROFEN 200 MG
400 TABLET ORAL DAILY
COMMUNITY

## 2018-07-24 NOTE — LETTER
7/24/2018    Alexy Hernandez MD  [facility] [address]  [place] [address]    RE: Terrence Murillo       Dear Colleague,    I had the pleasure of seeing Terrence Murillo in the [facility].    HPI and Plan:   See dictation    Orders Placed This Encounter   Procedures     Basic metabolic panel     Lipid Profile     ALT       Orders Placed This Encounter   Medications     ibuprofen (ADVIL/MOTRIN) 200 MG tablet     Sig: Take 400 mg by mouth daily       Medications Discontinued During This Encounter   Medication Reason     oxyCODONE IR (ROXICODONE) 5 MG tablet Therapy completed     pantoprazole (PROTONIX) 40 MG EC tablet Therapy completed     senna-docusate (SENOKOT-S;PERICOLACE) 8.6-50 MG per tablet Therapy completed     acetaminophen (TYLENOL) 325 MG tablet Therapy completed     acetaminophen (TYLENOL) 500 MG tablet Therapy completed     hydrocortisone (CORTEF) 10 MG tablet Therapy completed         Encounter Diagnoses   Name Primary?     Coronary artery disease involving native coronary artery of native heart without angina pectoris      Benign essential hypertension Yes     Pure hypercholesterolemia      Non morbid obesity due to excess calories        CURRENT MEDICATIONS:  Current Outpatient Prescriptions   Medication Sig Dispense Refill     aspirin 81 MG tablet Take 1 tablet (81 mg) by mouth every evening 30 tablet 0     ezetimibe (ZETIA) 10 MG tablet Take 1 tablet (10 mg) by mouth daily (Patient taking differently: Take 10 mg by mouth every morning ) 90 tablet 3     Glucosamine 500 MG CAPS Take 500 mg by mouth 2 times daily        ibuprofen (ADVIL/MOTRIN) 200 MG tablet Take 400 mg by mouth daily       levothyroxine (SYNTHROID/LEVOTHROID) 75 MCG tablet Take 1 tablet (75 mcg) by mouth daily 90 tablet 3     metoprolol (LOPRESSOR) 25 MG tablet Take 1 tablet (25 mg) by mouth 2 times daily 180 tablet 3     Multiple Vitamin (MULTI-VITAMIN) per tablet Take 1 tablet  by mouth every morning        nitroGLYcerin (NITROSTAT) 0.4 MG sublingual tablet Place 1 tablet (0.4 mg) under the tongue every 5 minutes as needed May repeat X 2. If no relief after 3 tablets call 911 25 tablet 3     ramipril (ALTACE) 5 MG capsule Take 1 capsule (5 mg) by mouth daily (Patient taking differently: Take 5 mg by mouth every morning ) 90 capsule 3     rosuvastatin (CRESTOR) 40 MG tablet Take 1 tablet (40 mg) by mouth daily 90 tablet 0     testosterone (ANDROGEL 1.62% PUMP) 20.25 MG/ACT gel Place 1 pump (20.25 mg) onto the skin daily Apply from dispenser to clean, dry, intact skin of the upper arms and shoulders. 75 g 5       ALLERGIES     Allergies   Allergen Reactions     Cardizem [Diltiazem Hcl] Itching     No Clinical Screening - See Comments Hives       PAST MEDICAL HISTORY:  Past Medical History:   Diagnosis Date     Coronary artery disease     cardiac cath 2016: medical management; cath 2010: SUSANA x2 to LAD; cath 1993: PTCA to LAD; cath 1992: PTCA to LAD, 1992: atherectomy of LAD     Hearing problem      Hyperlipidaemia      Hypertension      Obese      Obstructive sleep apnea      Reduced vision      Sleep apnea     CPAP       PAST SURGICAL HISTORY:  Past Surgical History:   Procedure Laterality Date     APPENDECTOMY OPEN       ARTHROPLASTY KNEE Left      ARTHROPLASTY SHOULDER Right      ENT SURGERY       HEART CATH, ANGIOPLASTY  1992    LAD  atherectomy with a DVI device      HEART CATH, ANGIOPLASTY  4-28-10    PTCA and stent proximal and mid LAD (2) stents Xience     HEART CATH, ANGIOPLASTY  04/20/2016    no stenosis greater than 25%-rec. medical management     OPTICAL TRACKING SYSTEM ENDOSCOPIC RESECTION TUMOR CRANIAL N/A 11/3/2017    Procedure: OPTICAL TRACKING SYSTEM ENDOSCOPIC RESECTION TUMOR CRANIAL;  Stealth Guided Endoscopic Transnasal Resection of Pituitary Tumor;  Surgeon: Amanda Bates MD;  Location:  OR       FAMILY HISTORY:  Family History   Problem Relation Age of  Onset     C.A.D. Father      HEART DISEASE Father       at age 44     HEART DISEASE Son 37     enlarged heart     HEART DISEASE Mother       at age 80 after heart surgery     Heart Surgery Mother      open heart, passed 10 days after     Diabetes Mother      Family History Negative Maternal Grandmother      Family History Negative Maternal Grandfather      Family History Negative Paternal Grandmother      Family History Negative Paternal Grandfather      Family History Negative Brother      Alzheimer Disease Brother      Family History Negative Sister      Family History Negative Daughter      Family History Negative Son      Family History Negative Daughter      HEART DISEASE Son       at age 37       SOCIAL HISTORY:  Social History     Social History     Marital status:      Spouse name: N/A     Number of children: N/A     Years of education: N/A     Social History Main Topics     Smoking status: Former Smoker     Packs/day: 0.20     Years: 5.00     Types: Cigarettes     Quit date: 1960     Smokeless tobacco: Never Used      Comment: Social smoking only     Alcohol use 0.6 - 1.2 oz/week     1 - 2 Standard drinks or equivalent per week      Comment: FIVE DAYS A WEEK     Drug use: No     Sexual activity: Not Currently     Partners: Female     Birth control/ protection: Male Surgical     Other Topics Concern     Parent/Sibling W/ Cabg, Mi Or Angioplasty Before 65f 55m? No     Caffeine Concern Yes     coffee      Sleep Concern No     Stress Concern No     Weight Concern Yes     Weight decrease 10 lbs     Special Diet Yes     Mediterranean diet     Exercise Yes     Walking 5 days per week 35- 60 minutes     Seat Belt Yes     Social History Narrative       Review of Systems:  Skin:  Negative       Eyes:  Positive for glasses    ENT:  Negative      Respiratory:  Positive for sleep apnea;CPAP     Cardiovascular:  Negative      Gastroenterology: Negative      Genitourinary:  Negative     "  Musculoskeletal:  Positive for arthritis    Neurologic:  Positive for numbness or tingling of hands WHILE SLEEPPING  Psychiatric:         Heme/Lymph/Imm:  Negative      Endocrine:  Positive for thyroid disorder      Physical Exam:  Vitals: /62  Pulse 56  Ht 1.78 m (5' 10.08\")  Wt 100.5 kg (221 lb 9.6 oz)  BMI 31.72 kg/m2    Constitutional:  cooperative, alert and oriented, well developed, well nourished, in no acute distress obese      Skin:  warm and dry to the touch, no apparent skin lesions or masses noted          Head:  normocephalic, no masses or lesions        Eyes:  pupils equal and round;conjunctivae and lids unremarkable;sclera white;no xanthalasma;no nystagmus        Lymph:      ENT:  no pallor or cyanosis, dentition good        Neck:  carotid pulses are full and equal bilaterally;no carotid bruit        Respiratory:  normal breath sounds, clear to auscultation, normal A-P diameter, normal symmetry, normal respiratory excursion, no use of accessory muscles         Cardiac: regular rhythm;normal S1 and S2;no S3 or S4;no murmurs, gallops or rubs detected                pulses full and equal                                        GI:    obese      Extremities and Muscular Skeletal:  no edema;no spinal abnormalities noted;normal muscle strength and tone   varicose vein          Neurological:  no gross motor deficits        Psych:  affect appropriate, oriented to time, person and place        CC  aEgle Jolley MD  [id number]  [place] [id number]                Thank you for allowing me to participate in the care of your patient.      Sincerely,     Eagle Jolley MD     [facility]    cc:   Eagle Jolley MD  [id number]  [place] [id number]        "

## 2018-07-24 NOTE — PROGRESS NOTES
Service Date: 2018      HISTORY OF PRESENT ILLNESS:  Terrence is a very nice 73-year-old gentleman with past medical history significant for directional coronary atherectomy in .  He subsequently developed restenosis and we put 2 more interventions by balloon angioplasty.  In , 2 Xience drug-eluting stents were placed in his proximal and mid left anterior descending artery.      Psychosocially, Terrence has had a tough go of it.  His son, Doug,  suddenly in  of what sounds a cardiac arrest, possible heart attack.  Interestingly, his dad  suddenly of an apparent heart attack at age 44, his grandmother  suddenly at age 21 of uncertain reasons.  His wife is dealing with pulmonary hypertension followed with the [facility] system.  He has arthritic issues which has interfered with his ability to exercise.      Recently Terrence has taken up with my wife is a holistic health  and has made dramatic changes in his dietary choices, alcohol consumption, exercise regimen and this has resulted in weight loss and overall better well-being.      In 2016, Terrence had some exertional chest discomfort.  Stress nuclear scan did not appear to demonstrate any significant ischemia.  Due to continued symptoms, we brought him back to the Cath Lab, which demonstrated an 80% stenosis with JADE grade 2 flow and a small ramus intermedius branch, new from .  He also had a jailed diagonal that had a 30%-50% ostial narrowing in a branch that had 80%-90% stenosis.  Ejection fraction was 50%-55% with mild anterior wall hypokinesia.  Left ventricular end-diastolic pressure 13, and we decided to treat him medically and has done quite well since that time.      Terrence returns to clinic stating he has no exertional chest, arm, neck, jaw or shoulder discomfort.  No dyspnea on exertion, orthopnea or PND.  No palpitations, lightheadedness, dizziness, syncope or near-syncope.  He is quite pleased at how well he is doing with his  newfound lifestyle changes.      ASSESSMENT AND PLAN:  Daniel appears well from a cardiac standpoint without clinical evidence of ischemia, heart failure or significant arrhythmia.      Blood pressure is very well controlled at 118/62 with a pulse of 56.      Weight is 221 pounds, giving a body mass index of 31.8 with clothes on, although he states he is probably more like 215 on his home scale.  This is down from 230 from 2018.        Fasting lipid profile is good, although not as significantly improved as I would have anticipated.  Total cholesterol is 106, HDL is 41, LDL is 46, triglycerides are 94.      His biggest improvement came in his fasting glucose, which is consistently run in the 140s to 150s and is now 98.  I have encouraged him to continue with all of his interventions and lifestyle changes, and I have told him that his cholesterol profile is likely to get better over time with continued lifestyle improvements.      We reviewed his medications.  We will continue them all as is.      Basic metabolic profile shows creatinine 1.05, BUN 14, potassium of 4.4.  I will see him back in 1 year.  If he should have any problems, I would be glad to see him sooner.      We also talked about his varicose veins in his legs.  They are purely cosmetic and I do not think we need to do anything about them at this time.         CHRISTY VELÁSQUEZ MD, [facility]             D: 2018   T: 2018   MT: TELLY      Name:     DANIEL RIGGINS   MRN:      [id number]        Account:      [id number]   :      1945           Service Date: 2018      Document: [id number]

## 2018-07-24 NOTE — PROGRESS NOTES
HPI and Plan:   See dictation    Orders Placed This Encounter   Procedures     Basic metabolic panel     Lipid Profile     ALT       Orders Placed This Encounter   Medications     ibuprofen (ADVIL/MOTRIN) 200 MG tablet     Sig: Take 400 mg by mouth daily       Medications Discontinued During This Encounter   Medication Reason     oxyCODONE IR (ROXICODONE) 5 MG tablet Therapy completed     pantoprazole (PROTONIX) 40 MG EC tablet Therapy completed     senna-docusate (SENOKOT-S;PERICOLACE) 8.6-50 MG per tablet Therapy completed     acetaminophen (TYLENOL) 325 MG tablet Therapy completed     acetaminophen (TYLENOL) 500 MG tablet Therapy completed     hydrocortisone (CORTEF) 10 MG tablet Therapy completed         Encounter Diagnoses   Name Primary?     Coronary artery disease involving native coronary artery of native heart without angina pectoris      Benign essential hypertension Yes     Pure hypercholesterolemia      Non morbid obesity due to excess calories        CURRENT MEDICATIONS:  Current Outpatient Prescriptions   Medication Sig Dispense Refill     aspirin 81 MG tablet Take 1 tablet (81 mg) by mouth every evening 30 tablet 0     ezetimibe (ZETIA) 10 MG tablet Take 1 tablet (10 mg) by mouth daily (Patient taking differently: Take 10 mg by mouth every morning ) 90 tablet 3     Glucosamine 500 MG CAPS Take 500 mg by mouth 2 times daily        ibuprofen (ADVIL/MOTRIN) 200 MG tablet Take 400 mg by mouth daily       levothyroxine (SYNTHROID/LEVOTHROID) 75 MCG tablet Take 1 tablet (75 mcg) by mouth daily 90 tablet 3     metoprolol (LOPRESSOR) 25 MG tablet Take 1 tablet (25 mg) by mouth 2 times daily 180 tablet 3     Multiple Vitamin (MULTI-VITAMIN) per tablet Take 1 tablet by mouth every morning        nitroGLYcerin (NITROSTAT) 0.4 MG sublingual tablet Place 1 tablet (0.4 mg) under the tongue every 5 minutes as needed May repeat X 2. If no relief after 3 tablets call 911 25 tablet 3     ramipril (ALTACE) 5 MG capsule  Take 1 capsule (5 mg) by mouth daily (Patient taking differently: Take 5 mg by mouth every morning ) 90 capsule 3     rosuvastatin (CRESTOR) 40 MG tablet Take 1 tablet (40 mg) by mouth daily 90 tablet 0     testosterone (ANDROGEL 1.62% PUMP) 20.25 MG/ACT gel Place 1 pump (20.25 mg) onto the skin daily Apply from dispenser to clean, dry, intact skin of the upper arms and shoulders. 75 g 5       ALLERGIES     Allergies   Allergen Reactions     Cardizem [Diltiazem Hcl] Itching     No Clinical Screening - See Comments Hives       PAST MEDICAL HISTORY:  Past Medical History:   Diagnosis Date     Coronary artery disease     cardiac cath 2016: medical management; cath : SUSANA x2 to LAD; cath : PTCA to LAD; cath : PTCA to LAD, : atherectomy of LAD     Hearing problem      Hyperlipidaemia      Hypertension      Obese      Obstructive sleep apnea      Reduced vision      Sleep apnea     CPAP       PAST SURGICAL HISTORY:  Past Surgical History:   Procedure Laterality Date     APPENDECTOMY OPEN       ARTHROPLASTY KNEE Left      ARTHROPLASTY SHOULDER Right      ENT SURGERY       HEART CATH, ANGIOPLASTY      LAD  atherectomy with a DVI device      HEART CATH, ANGIOPLASTY  4-28-10    PTCA and stent proximal and mid LAD (2) stents Xience     HEART CATH, ANGIOPLASTY  2016    no stenosis greater than 25%-rec. 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 Father      HEART DISEASE Father       at age 44     HEART DISEASE Son 37     enlarged heart     HEART DISEASE Mother       at age 80 after heart surgery     Heart Surgery Mother      open heart, passed 10 days after      Diabetes Mother      Family History Negative Maternal Grandmother      Family History Negative Maternal Grandfather      Family History Negative Paternal Grandmother      Family History Negative Paternal Grandfather      Family History Negative Brother      Alzheimer Disease Brother      Family History Negative Sister      Family History Negative Daughter      Family History Negative Son      Family History Negative Daughter      HEART DISEASE Son       at age 37       SOCIAL HISTORY:  Social History     Social History     Marital status:      Spouse name: N/A     Number of children: N/A     Years of education: N/A     Social History Main Topics     Smoking status: Former Smoker     Packs/day: 0.20     Years: 5.00     Types: Cigarettes     Quit date: 1960     Smokeless tobacco: Never Used      Comment: Social smoking only     Alcohol use 0.6 - 1.2 oz/week     1 - 2 Standard drinks or equivalent per week      Comment: FIVE DAYS A WEEK     Drug use: No     Sexual activity: Not Currently     Partners: Female     Birth control/ protection: Male Surgical     Other Topics Concern     Parent/Sibling W/ Cabg, Mi Or Angioplasty Before 65f 55m? No     Caffeine Concern Yes     coffee      Sleep Concern No     Stress Concern No     Weight Concern Yes     Weight decrease 10 lbs     Special Diet Yes     Mediterranean diet     Exercise Yes     Walking 5 days per week 35- 60 minutes     Seat Belt Yes     Social History Narrative       Review of Systems:  Skin:  Negative       Eyes:  Positive for glasses    ENT:  Negative      Respiratory:  Positive for sleep apnea;CPAP     Cardiovascular:  Negative      Gastroenterology: Negative      Genitourinary:  Negative      Musculoskeletal:  Positive for arthritis    Neurologic:  Positive for numbness or tingling of hands WHILE SLEEPPING  Psychiatric:         Heme/Lymph/Imm:  Negative      Endocrine:  Positive for thyroid disorder      Physical Exam:  Vitals: /62  Pulse  "56  Ht 1.78 m (5' 10.08\")  Wt 100.5 kg (221 lb 9.6 oz)  BMI 31.72 kg/m2    Constitutional:  cooperative, alert and oriented, well developed, well nourished, in no acute distress obese      Skin:  warm and dry to the touch, no apparent skin lesions or masses noted          Head:  normocephalic, no masses or lesions        Eyes:  pupils equal and round;conjunctivae and lids unremarkable;sclera white;no xanthalasma;no nystagmus        Lymph:      ENT:  no pallor or cyanosis, dentition good        Neck:  carotid pulses are full and equal bilaterally;no carotid bruit        Respiratory:  normal breath sounds, clear to auscultation, normal A-P diameter, normal symmetry, normal respiratory excursion, no use of accessory muscles         Cardiac: regular rhythm;normal S1 and S2;no S3 or S4;no murmurs, gallops or rubs detected                pulses full and equal                                        GI:    obese      Extremities and Muscular Skeletal:  no edema;no spinal abnormalities noted;normal muscle strength and tone   varicose vein          Neurological:  no gross motor deficits        Psych:  affect appropriate, oriented to time, person and place        CC  Eagle Jolley MD  [address]  [place] [id number]              "

## 2018-07-24 NOTE — LETTER
2018      Alexy Hernandez MD  [facility] [address] [place] [address]      RE: Terrence Murillo       Dear Colleague,    I had the pleasure of seeing Terrence Murillo in the [facility].    Service Date: 2018      HISTORY OF PRESENT ILLNESS:  Terrence is a very nice 73-year-old gentleman with past medical history significant for directional coronary atherectomy in .  He subsequently developed restenosis and we put 2 more interventions by balloon angioplasty.  In , 2 Xience drug-eluting stents were placed in his proximal and mid left anterior descending artery.      Psychosocially, Terrence has had a tough go of it.  His son, Doug,  suddenly in  of what sounds a cardiac arrest, possible heart attack.  Interestingly, his dad  suddenly of an apparent heart attack at age 44, his grandmother  suddenly at age 21 of uncertain reasons.  His wife is dealing with pulmonary hypertension followed with the [facility] system.  He has arthritic issues which has interfered with his ability to exercise.      Recently Terrence has taken up with my wife is a holistic health  and has made dramatic changes in his dietary choices, alcohol consumption, exercise regimen and this has resulted in weight loss and overall better well-being.      In 2016, Terrence had some exertional chest discomfort.  Stress nuclear scan did not appear to demonstrate any significant ischemia.  Due to continued symptoms, we brought him back to the Cath Lab, which demonstrated an 80% stenosis with JADE grade 2 flow and a small ramus intermedius branch, new from .  He also had a jailed diagonal that had a 30%-50% ostial narrowing in a branch that had 80%-90% stenosis.  Ejection fraction was 50%-55% with mild anterior wall hypokinesia.  Left ventricular end-diastolic pressure 13, and we decided to treat him medically and has done quite well since that time.      Terrence returns  to clinic stating he has no exertional chest, arm, neck, jaw or shoulder discomfort.  No dyspnea on exertion, orthopnea or PND.  No palpitations, lightheadedness, dizziness, syncope or near-syncope.  He is quite pleased at how well he is doing with his newfound lifestyle changes.      ASSESSMENT AND PLAN:  Daniel appears well from a cardiac standpoint without clinical evidence of ischemia, heart failure or significant arrhythmia.      Blood pressure is very well controlled at 118/62 with a pulse of 56.      Weight is 221 pounds, giving a body mass index of 31.8 with clothes on, although he states he is probably more like 215 on his home scale.  This is down from 230 from 2018.        Fasting lipid profile is good, although not as significantly improved as I would have anticipated.  Total cholesterol is 106, HDL is 41, LDL is 46, triglycerides are 94.      His biggest improvement came in his fasting glucose, which is consistently run in the 140s to 150s and is now 98.  I have encouraged him to continue with all of his interventions and lifestyle changes, and I have told him that his cholesterol profile is likely to get better over time with continued lifestyle improvements.      We reviewed his medications.  We will continue them all as is.      Basic metabolic profile shows creatinine 1.05, BUN 14, potassium of 4.4.  I will see him back in 1 year.  If he should have any problems, I would be glad to see him sooner.      We also talked about his varicose veins in his legs.  They are purely cosmetic and I do not think we need to do anything about them at this time.         CHRISTY VELÁSQUEZ MD, [facility]             D: 2018   T: 2018   MT: TELLY      Name:     DANIEL RIGGINS   MRN:      [id number]        Account:      [id number]   :      1945           Service Date: 2018      Document: [id number]         Outpatient Encounter Prescriptions as of 2018   Medication Sig Dispense Refill      aspirin 81 MG tablet Take 1 tablet (81 mg) by mouth every evening 30 tablet 0     ezetimibe (ZETIA) 10 MG tablet Take 1 tablet (10 mg) by mouth daily (Patient taking differently: Take 10 mg by mouth every morning ) 90 tablet 3     Glucosamine 500 MG CAPS Take 500 mg by mouth 2 times daily        ibuprofen (ADVIL/MOTRIN) 200 MG tablet Take 400 mg by mouth daily       levothyroxine (SYNTHROID/LEVOTHROID) 75 MCG tablet Take 1 tablet (75 mcg) by mouth daily 90 tablet 3     metoprolol (LOPRESSOR) 25 MG tablet Take 1 tablet (25 mg) by mouth 2 times daily 180 tablet 3     Multiple Vitamin (MULTI-VITAMIN) per tablet Take 1 tablet by mouth every morning        nitroGLYcerin (NITROSTAT) 0.4 MG sublingual tablet Place 1 tablet (0.4 mg) under the tongue every 5 minutes as needed May repeat X 2. If no relief after 3 tablets call 911 25 tablet 3     ramipril (ALTACE) 5 MG capsule Take 1 capsule (5 mg) by mouth daily (Patient taking differently: Take 5 mg by mouth every morning ) 90 capsule 3     rosuvastatin (CRESTOR) 40 MG tablet Take 1 tablet (40 mg) by mouth daily 90 tablet 0     testosterone (ANDROGEL 1.62% PUMP) 20.25 MG/ACT gel Place 1 pump (20.25 mg) onto the skin daily Apply from dispenser to clean, dry, intact skin of the upper arms and shoulders. 75 g 5     [DISCONTINUED] acetaminophen (TYLENOL) 325 MG tablet Take 2 tablets (650 mg) by mouth every 4 hours as needed for other (surgical pain) (Patient not taking: Reported on 7/24/2018) 100 tablet 0     [DISCONTINUED] acetaminophen (TYLENOL) 500 MG tablet Take 500 mg by mouth       [DISCONTINUED] hydrocortisone (CORTEF) 10 MG tablet        [DISCONTINUED] oxyCODONE IR (ROXICODONE) 5 MG tablet Take 1 tablet (5 mg) by mouth every 6 hours as needed for moderate to severe pain 24 tablet 0     [DISCONTINUED] pantoprazole (PROTONIX) 40 MG EC tablet Take 1 tablet (40 mg) by mouth every morning (before breakfast) 10 tablet 0     [DISCONTINUED] senna-docusate (SENOKOT-S;PERICOLACE)  8.6-50 MG per tablet Take 2 tablets by mouth 2 times daily 100 tablet 0     No facility-administered encounter medications on file as of 7/24/2018.        Again, thank you for allowing me to participate in the care of your patient.      Sincerely,    Eagle Jolley MD     [facility]

## 2018-07-24 NOTE — MR AVS SNAPSHOT
After Visit Summary   7/24/2018    Terrence Murillo    MRN: [id number]           Patient Information     Date Of Birth          1945        Visit Information        Provider Department      7/24/2018 8:45 AM Eagle Jolley MD [facility]        Today's Diagnoses     Benign essential hypertension    -  1    Coronary artery disease involving native coronary artery of native heart without angina pectoris        Pure hypercholesterolemia        Non morbid obesity due to excess calories           Follow-ups after your visit        Your next 10 appointments already scheduled     Oct 09, 2018 11:00 AM CDT   (Arrive by 10:45 AM)   RETURN ENDOCRINE with Gris Santoro MD   [facility] ([facility])    [address]  3rd Floor  [facility] [address] [phone]            Feb 05, 2019 12:15 PM CST   MR BRAIN W/O & W CONTRAST with [address] MRI ([facility])    [address]  [address] [facility] [address] [phone]           Take your medicines as usual, unless your doctor tells you not to. Bring a list of your current medicines to your exam (including vitamins, minerals and over-the-counter drugs).  You may or may not receive intravenous (IV) contrast for this exam pending the discretion of the Radiologist.  You do not need to do anything special to prepare.  The MRI machine uses a strong magnet. Please wear clothes without metal (snaps, zippers). A sweatsuit works well, or we may give you a hospital gown.  Please remove any body piercings and hair extensions before you arrive. You will also remove watches, jewelry, hairpins, wallets, dentures, partial dental plates and hearing aids. You may wear contact lenses, and you may be able to wear your rings. We have a safe place to keep your personal items, but it is safer to leave them at home.  **IMPORTANT**  THE INSTRUCTIONS BELOW ARE ONLY FOR THOSE PATIENTS WHO HAVE BEEN PRESCRIBED SEDATION OR GENERAL ANESTHESIA DURING THEIR MRI PROCEDURE:  IF YOUR DOCTOR PRESCRIBED ORAL SEDATION (take medicine to help you relax during your exam):   You must get the medicine from your doctor (oral medication) before you arrive. Bring the medicine to the exam. Do not take it at home. You ll be told when to take it upon arriving for your exam.   Arrive one hour early. Bring someone who can take you home after the test. Your medicine will make you sleepy. After the exam, you may not drive, take a bus or take a taxi by yourself.  IF YOUR DOCTOR PRESCRIBED IV SEDATION:   Arrive one hour early. Bring someone who can take you home after the test. Your medicine will make you sleepy. After the exam, you may not drive, take a bus or take a taxi by yourself.   No eating 6 hours before your exam. You may have clear liquids up until 4 hours before your exam. (Clear liquids include water, clear tea, black coffee and fruit juice without pulp.)  IF YOUR DOCTOR PRESCRIBED ANESTHESIA (be asleep for your exam):   Arrive 1 1/2 hours early. Bring someone who can take you home after the test. You may not drive, take a bus or take a taxi by yourself.   No eating 8 hours before your exam. You may have clear liquids up until 4 hours before your exam. (Clear liquids include water, clear tea, black coffee and fruit juice without pulp.)   You will spend four to five hours in the recovery room.  Please call the Imaging Department at your exam site with any questions.            Feb 05, 2019  1:00 PM CST   (Arrive by 12:45 PM)   Return Visit with Amanda Bates MD   [facility] ([facility])    [address] [address] [phone]              Future tests that were ordered for you today     Open Future Orders        Priority Expected Expires Ordered    Basic metabolic panel Routine  "7/24/2019 7/25/2019 7/24/2018    Lipid Profile Routine 7/24/2019 7/24/2019 7/24/2018    ALT Routine 7/24/2019 7/24/2019 7/24/2018            Who to contact     If you have questions or need follow up information about today's clinic visit or your schedule please contact [facility] directly at [phone].  Normal or non-critical lab and imaging results will be communicated to you by [employer] Mobile[employer], letter or phone within 4 business days after the clinic has received the results. If you do not hear from us within 7 days, please contact the clinic through Roamer or phone. If you have a critical or abnormal lab result, we will notify you by phone as soon as possible.  Submit refill requests through Roamer or call your pharmacy and they will forward the refill request to us. Please allow 3 business days for your refill to be completed.          Additional Information About Your Visit        Roamer Information     Roamer gives you secure access to your electronic health record. If you see a primary care provider, you can also send messages to your care team and make appointments. If you have questions, please call your primary care clinic.  If you do not have a primary care provider, please call [phone] and they will assist you.        Care EveryWhere ID     This is your Care EveryWhere ID. This could be used by other organizations to access your [facility] medical records  [id number]        Your Vitals Were     Pulse Height BMI (Body Mass Index)             56 1.78 m (5' 10.08\") 31.72 kg/m2          Blood Pressure from Last 3 Encounters:   07/24/18 118/62   02/06/18 125/47   02/06/18 125/47    Weight from Last 3 Encounters:   07/24/18 100.5 kg (221 lb 9.6 oz)   02/08/18 102.5 kg (226 lb)   02/06/18 104 kg (229 lb 4.8 oz)              We Performed the Following     Follow-Up with Cardiologist          Today's Medication Changes          These changes are accurate as of 7/24/18  " 9:51 AM.  If you have any questions, ask your nurse or doctor.               These medicines have changed or have updated prescriptions.        Dose/Directions    ezetimibe 10 MG tablet   Commonly known as:  ZETIA   This may have changed:  when to take this   Used for:  Atherosclerosis of native coronary artery of native heart with other form of angina pectoris (H)        Dose:  10 mg   Take 1 tablet (10 mg) by mouth daily   Quantity:  90 tablet   Refills:  3       ramipril 5 MG capsule   Commonly known as:  ALTACE   This may have changed:  when to take this   Used for:  Benign essential hypertension, Hyperlipidemia LDL goal <70, Coronary artery disease involving native coronary artery of native heart without angina pectoris        Dose:  5 mg   Take 1 capsule (5 mg) by mouth daily   Quantity:  90 capsule   Refills:  3                Primary Care Provider Office Phone # Fax #    Alexy Hernandez [phone]       [facility] [address] [facility] [address]        Equal Access to Services     KENNETH MARLOW : Hadii amado varghese hadasho Soomaali, waaxda luqadaha, qaybta kaalmada adeegyada, waxay lindain hayhipolito lopez . So [facility] [phone].    ATENCIÓN: Si habla español, tiene a herndon disposición servicios gratuitos de asistencia lingüística. Llame al [phone].    We comply with applicable federal civil rights laws and [place] laws. We do not discriminate on the basis of race, color, national origin, age, disability, sex, sexual orientation, or gender identity.            Thank you!     Thank you for choosing [facility]  for your care. Our goal is always to provide you with excellent care. Hearing back from our patients is one way we can continue to improve our services. Please take a few minutes to complete the written survey that you may receive in the mail after your visit with us. Thank you!             Your Updated  Medication List - Protect others around you: Learn how to safely use, store and throw away your medicines at www.disposemymeds.org.          This list is accurate as of 7/24/18  9:51 AM.  Always use your most recent med list.                   Brand Name Dispense Instructions for use Diagnosis    aspirin 81 MG tablet     30 tablet    Take 1 tablet (81 mg) by mouth every evening    Coronary artery disease involving native coronary artery of native heart without angina pectoris       ezetimibe 10 MG tablet    ZETIA    90 tablet    Take 1 tablet (10 mg) by mouth daily    Atherosclerosis of native coronary artery of native heart with other form of angina pectoris (H)       glucosamine 500 MG Caps      Take 500 mg by mouth 2 times daily        ibuprofen 200 MG tablet    ADVIL/MOTRIN     Take 400 mg by mouth daily        levothyroxine 75 MCG tablet    SYNTHROID/LEVOTHROID    90 tablet    Take 1 tablet (75 mcg) by mouth daily    Pituitary adenoma (H), Secondary hypothyroidism       metoprolol tartrate 25 MG tablet    LOPRESSOR    180 tablet    Take 1 tablet (25 mg) by mouth 2 times daily    CAD (coronary artery disease), Hyperlipidemia LDL goal <70, Benign essential hypertension, Coronary artery disease involving native coronary artery of native heart without angina pectoris       Multi-vitamin Tabs tablet   Generic drug:  multivitamin, therapeutic with minerals      Take 1 tablet by mouth every morning        nitroGLYcerin 0.4 MG sublingual tablet    NITROSTAT    25 tablet    Place 1 tablet (0.4 mg) under the tongue every 5 minutes as needed May repeat X 2. If no relief after 3 tablets call 911    Coronary artery disease involving native coronary artery of native heart without angina pectoris       ramipril 5 MG capsule    ALTACE    90 capsule    Take 1 capsule (5 mg) by mouth daily    Benign essential hypertension, Hyperlipidemia LDL goal <70, Coronary artery disease involving native coronary artery of native heart  without angina pectoris       rosuvastatin 40 MG tablet    CRESTOR    90 tablet    Take 1 tablet (40 mg) by mouth daily    CAD (coronary artery disease), Hyperlipidemia LDL goal <70, Benign essential hypertension, Coronary artery disease involving native coronary artery of native heart without angina pectoris       testosterone 20.25 MG/ACT gel    ANDROGEL 1.62% PUMP    75 g    Place 1 pump (20.25 mg) onto the skin daily Apply from dispenser to clean, dry, intact skin of the upper arms and shoulders.    Secondary male hypogonadism

## 2018-07-31 DIAGNOSIS — I25.118 ATHEROSCLEROSIS OF NATIVE CORONARY ARTERY OF NATIVE HEART WITH OTHER FORM OF ANGINA PECTORIS (H): ICD-10-CM

## 2018-07-31 RX ORDER — EZETIMIBE 10 MG/1
10 TABLET ORAL DAILY
Qty: 90 TABLET | Refills: 3 | Status: SHIPPED | OUTPATIENT
Start: 2018-07-31 | End: 2019-01-14

## 2018-09-04 DIAGNOSIS — E78.5 HYPERLIPIDEMIA LDL GOAL <70: ICD-10-CM

## 2018-09-04 DIAGNOSIS — I10 BENIGN ESSENTIAL HYPERTENSION: ICD-10-CM

## 2018-09-04 DIAGNOSIS — I25.10 CORONARY ARTERY DISEASE INVOLVING NATIVE CORONARY ARTERY OF NATIVE HEART WITHOUT ANGINA PECTORIS: Chronic | ICD-10-CM

## 2018-09-04 RX ORDER — RAMIPRIL 5 MG/1
5 CAPSULE ORAL DAILY
Qty: 90 CAPSULE | Refills: 3 | Status: SHIPPED | OUTPATIENT
Start: 2018-09-04 | End: 2019-01-14

## 2018-09-04 RX ORDER — METOPROLOL TARTRATE 25 MG/1
25 TABLET, FILM COATED ORAL 2 TIMES DAILY
Qty: 180 TABLET | Refills: 3 | Status: SHIPPED | OUTPATIENT
Start: 2018-09-04 | End: 2019-01-14

## 2018-09-04 NOTE — TELEPHONE ENCOUNTER
Received refill request for: metoprolol tartrate and ramipril   Last OV was: 7/24/2018 with Dr. Jolley  Labs/EKG: last BMP 7/24/2018  F/U scheduled: orders in Epic for 7/2019  New script sent to: [facility]

## 2018-09-20 DIAGNOSIS — I10 BENIGN ESSENTIAL HYPERTENSION: ICD-10-CM

## 2018-09-20 DIAGNOSIS — I25.10 CAD (CORONARY ARTERY DISEASE): ICD-10-CM

## 2018-09-20 DIAGNOSIS — E78.5 HYPERLIPIDEMIA LDL GOAL <70: ICD-10-CM

## 2018-09-20 DIAGNOSIS — I25.10 CORONARY ARTERY DISEASE INVOLVING NATIVE CORONARY ARTERY OF NATIVE HEART WITHOUT ANGINA PECTORIS: Chronic | ICD-10-CM

## 2018-09-20 RX ORDER — ROSUVASTATIN CALCIUM 40 MG/1
40 TABLET, COATED ORAL DAILY
Qty: 90 TABLET | Refills: 0 | Status: SHIPPED | OUTPATIENT
Start: 2018-09-20 | End: 2018-12-17

## 2018-10-09 ENCOUNTER — OFFICE VISIT (OUTPATIENT)
Dept: ENDOCRINOLOGY | Facility: CLINIC | Age: 73
End: 2018-10-09
Payer: COMMERCIAL

## 2018-10-09 VITALS
SYSTOLIC BLOOD PRESSURE: 126 MMHG | DIASTOLIC BLOOD PRESSURE: 72 MMHG | WEIGHT: 225.1 LBS | BODY MASS INDEX: 32.22 KG/M2 | HEIGHT: 70 IN | HEART RATE: 55 BPM

## 2018-10-09 DIAGNOSIS — E03.8 SECONDARY HYPOTHYROIDISM: ICD-10-CM

## 2018-10-09 DIAGNOSIS — E29.1 SECONDARY MALE HYPOGONADISM: Primary | ICD-10-CM

## 2018-10-09 ASSESSMENT — PAIN SCALES - GENERAL: PAINLEVEL: NO PAIN (0)

## 2018-10-09 NOTE — PROGRESS NOTES
--------- Endocrinology Follow up visit -------------    Reason for consultation: pituitary macroadenoma, hypogonadism      Assessment: A 73 yo male post macro adenoma TSS on 11/3/2017, currently doing well, MRI showed small residual 4 mm on the right sellar.     Plan:   # Pituitary macroadenoma  S/p TSS on 11/3/2017, 3 month MRI 4 mm residual vs scar, next MRI with Dr. Bates in 2/2019.     # Secondary hypogonadism  Currently on AndroGel 1 pump 1 pump.    To check total testosterone level tomorrow morning.    # Secondary hypothyroidism  Currently on levothyroxine 75 mcg  Recheck TSH and free T4    # Follow-up plan  -Endocrine standing point, if testosterone level is stable we can extend to follow-up once a year, coordinating with neurosurgery.     -Return to clinic with me as joint appointment with neurosurgery in 1 year      We spent 30 minutes with this patient face to face and explained the conditions and plans (more than 50% of time was counseling/coordination of care, plan for steroid taper, plan for other axis assessment) . The patient understood and is satisfied with today's visit. Return to clinic with me in 1 year.     Gris Santoro MD  Staff Physician  Endocrinology and Metabolism  [facility]  License: [id number]  Pager: [phone]    Interval History: Last seen in 2/2018.  Nonfunctioning microadenoma, transsphenoidal surgery done November 3, 2017, today no symptoms of headache nausea no visual disturbance.  He has been compliant to testosterone gel currently using 1 pump.  His energy level increased, currently he is working on diet and exercise with lifestyle , he intentionally reduced his body weight 23 pounds for the past 6 months.  He also cut to third over the amount of his alcohol to take.     Subjective: A 72 year old male with a past medical history of pituitary macroadenoma who presents post-operative follow-up  after endoscopic, transphenoidal resection of pituitary adenoma on 11/3/17. The mass was found to be non-secretory on initial labs. He was initially found to have the tumor on MRI after he presented to his PCP with complaints of vision problems. The tumor was 3.2 x 2.5 x 2.0 cm in size and compressed the optic chiasm. Pathology consistent with benign adenoma. He was started on prophylactic hydrocortisone prior to discharge. Since d/c, he has been doing well. Energy level is improved. He is walking more. Has occasional headaches behind his eyes but seems to be getting better. Denies increased urinary frequency or excessive thirst. Reports his libido is fine but is not currently sexually active with his wife due to medical issues that he has.  Denies nipple discharge. Feels his vision is about the same as before the surgery. Still has difficulty reading small print.     Interval history: Patient has been doing well.  Currently using the testosterone gel daily basis.  No muscle weakness, no fatigue.  He is still taking levothyroxine.  Hydrocortisone was tapered last visit and is not taking right now. He has good appetite and good sleep.  No headache, no dizziness.  No change vision issues after the surgery especially when he reads.  Today he came to follow-up postop MRI on 2/1/2-018  and came here for joint appointment with endocrine and neurosurgery.  He is concerned about price of testosterone gel, and also wondering whether he can wean off testosterone.      Current Problem List:   Patient Active Problem List   Diagnosis     Sleep apnea     Coronary artery disease involving native coronary artery of native heart without angina pectoris     Benign essential hypertension     Pure hypercholesterolemia     Non morbid obesity due to excess calories     Chest pain     MORENO (dyspnea on exertion)     Intracranial tumor (H)     Secondary male hypogonadism     Pituitary adenoma (H)     S/P appendectomy     S/P knee replacement      S/P shoulder surgery       Past Medical and Past Surgical History:  Past Medical History:   Diagnosis Date     Coronary artery disease     cardiac cath 2016: medical management; cath 2010: SUSANA x2 to LAD; cath 1993: PTCA to LAD; cath 1992: PTCA to LAD, 1992: atherectomy of LAD     Hearing problem      Hyperlipidaemia      Hypertension      Obese      Obstructive sleep apnea      Reduced vision      Sleep apnea     CPAP       Past Surgical History:   Procedure Laterality Date     APPENDECTOMY OPEN       ARTHROPLASTY KNEE Left      ARTHROPLASTY SHOULDER Right      ENT SURGERY       HEART CATH, ANGIOPLASTY  1992    LAD  atherectomy with a DVI device      HEART CATH, ANGIOPLASTY  4-28-10    PTCA and stent proximal and mid LAD (2) stents Xience     HEART CATH, ANGIOPLASTY  04/20/2016    no stenosis greater than 25%-rec. medical management     OPTICAL TRACKING SYSTEM ENDOSCOPIC RESECTION TUMOR CRANIAL N/A 11/3/2017    Procedure: OPTICAL TRACKING SYSTEM ENDOSCOPIC RESECTION TUMOR CRANIAL;  Stealth Guided Endoscopic Transnasal Resection of Pituitary Tumor;  Surgeon: Amanda Bates MD;  Location:  OR       Medications:   Current Outpatient Prescriptions   Medication Sig Dispense Refill     aspirin 81 MG tablet Take 1 tablet (81 mg) by mouth every evening 30 tablet 0     ezetimibe (ZETIA) 10 MG tablet Take 1 tablet (10 mg) by mouth daily 90 tablet 3     Glucosamine 500 MG CAPS Take 500 mg by mouth 2 times daily        ibuprofen (ADVIL/MOTRIN) 200 MG tablet Take 400 mg by mouth daily       levothyroxine (SYNTHROID/LEVOTHROID) 75 MCG tablet Take 1 tablet (75 mcg) by mouth daily 90 tablet 3     metoprolol tartrate (LOPRESSOR) 25 MG tablet Take 1 tablet (25 mg) by mouth 2 times daily 180 tablet 3     Multiple Vitamin (MULTI-VITAMIN) per tablet Take 1 tablet by mouth every morning        nitroGLYcerin (NITROSTAT) 0.4 MG sublingual tablet Place 1 tablet (0.4 mg) under the tongue every 5 minutes as needed May  repeat X 2. If no relief after 3 tablets call 911 25 tablet 3     ramipril (ALTACE) 5 MG capsule Take 1 capsule (5 mg) by mouth daily 90 capsule 3     rosuvastatin (CRESTOR) 40 MG tablet Take 1 tablet (40 mg) by mouth daily 90 tablet 0     testosterone (ANDROGEL 1.62% PUMP) 20.25 MG/ACT gel Place 1 pump (20.25 mg) onto the skin daily Apply from dispenser to clean, dry, intact skin of the upper arms and shoulders. 75 g 5       Allergies:   Allergies   Allergen Reactions     Cardizem [Diltiazem Hcl] Itching     No Clinical Screening - See Comments Hives       Social History:  Social History   Substance Use Topics     Smoking status: Former Smoker     Packs/day: 0.20     Years: 5.00     Types: Cigarettes     Quit date: 1960     Smokeless tobacco: Never Used      Comment: Social smoking only     Alcohol use 0.6 - 1.2 oz/week     1 - 2 Standard drinks or equivalent per week      Comment: FIVE DAYS A WEEK         Family History:  Family History   Problem Relation Age of Onset     C.A.D. Father      HEART DISEASE Father       at age 44     HEART DISEASE Son 37     enlarged heart     HEART DISEASE Mother       at age 80 after heart surgery     Heart Surgery Mother      open heart, passed 10 days after     Diabetes Mother      Family History Negative Maternal Grandmother      Family History Negative Maternal Grandfather      Family History Negative Paternal Grandmother      Family History Negative Paternal Grandfather      Family History Negative Brother      Alzheimer Disease Brother      Family History Negative Sister      Family History Negative Daughter      Family History Negative Son      Family History Negative Daughter      HEART DISEASE Son       at age 37       Review of Systems:  A 10-point ROS is otherwise negative except as noted in HPI.     Physical Examination:  There were no vitals taken for this visit.  There is no height or weight on file to calculate BMI.  Wt Readings from Last 4  Encounters:   07/24/18 100.5 kg (221 lb 9.6 oz)   02/08/18 102.5 kg (226 lb)   02/06/18 104 kg (229 lb 4.8 oz)   02/06/18 104.4 kg (230 lb 3.2 oz)     GEN: Alert, no distress.  HEENT: EOMI.  NECK: Thyroid normal to palpation, non-tender. No cervical/clavicular LAD.   Visual field: grossly intact  CV: RRR with no murmur.   RESP: CTA bilaterally.   EXT: No peripheral edema.    SKIN: Normal skin temperature and turgor with no lesions.   MSK: Normal muscle bulk, no abnormal appearing joints.   NEURO: Cranial nerves intact. Non-focal.         8/3/2017 13:27 11/20/2017 12:20 11/27/2017 09:05   Testosterone Total 132 (L) 52 (L) 79 (L)     MR BRAIN W/O & W CONTRAST 2/1/2018 12:05 PM     Provided History: ; Intracranial tumor (H).     ICD-10: Intracranial tumor (H)     Comparison: Pituitary MRI from 7/20/2017 and Stealth MRI from  11/3/2017.     Technique: Multiplanar T1-weighted, axial FLAIR, and susceptibility  images were obtained without intravenous contrast. Following  intravenous gadolinium-based contrast administration, axial  T2-weighted, diffusion, and T1-weighted images (in multiple planes)  were obtained.     Contrast dose: 10mL Gadavist     Findings: Postsurgical changes of endoscopic resection of pituitary  macroadenoma from 11/3/2017. Postsurgical changes of cystectomy and  ethmoidectomy. Pituitary stalk is deviated to the left. There is  partially empty sella appearance. Along the medial aspect of the right  cavernous sinus, there is a 5 x 5 x 5 mm focus of heterogeneous  hypoenhancement (image 87 of series 11 and image 115 of series 13),  which is similar in enhancement appearance to the previously resected  macroadenoma.     There is no mass effect, midline shift, or evidence of intracranial  hemorrhage. Mild cerebral volume loss and associated enlargement of  the lateral ventricles unchanged compared to previous exam. Scattered  nonspecific T2 hyperintensities in the supraventricular white  matter,  unchanged. 2 foci of susceptibility artifact in the posterior right  frontal lobe which would be consistent with chronic microhemorrhage  and are nonspecific. Postcontrast images demonstrate no abnormal  intracranial enhancing lesions.     No definite abnormality of the skull marrow signal is noted. The major  vascular intracranial flow-voids are present. Mastoid air cells are  clear. Bilateral postsurgical changes of cataract surgery. Otherwise  orbital structures are unremarkable.         Impression:   1. Transnasal endoscopic resection of previously seen pituitary  macroadenoma. Although study is not dedicated to pituitary gland,  there appears to be a small focus of heterogeneous hypoenhancement  that is similar in appearance to the prior macroadenoma enhancement.  Although this could be postoperative in nature, cannot completely  exclude residual adenoma. 3-6 month follow-up recommended with  dedicated pituitary protocol MRI.  2. Mild generalized cerebral volume loss and nonspecific white matter  T2 hyperintensities stable since 7/20/2017.

## 2018-10-09 NOTE — LETTER
10/9/2018       RE: Terrence Murillo  [address]  [place] [address]     Dear Colleague,    Thank you for referring your patient, Terrence Murillo, to the [facility] ENDO[facility] at [facility]. Please see a copy of my visit note below.                                                                --------- Endocrinology Follow up visit -------------    Reason for consultation: pituitary macroadenoma, hypogonadism      Assessment: A 71 yo male post macro adenoma TSS on 11/3/2017, currently doing well, MRI showed small residual 4 mm on the right sellar.     Plan:   # Pituitary macroadenoma  S/p TSS on 11/3/2017, 3 month MRI 4 mm residual vs scar, next MRI with Dr. Bates in 2/2019.     # Secondary hypogonadism  Currently on AndroGel 1 pump 1 pump.    To check total testosterone level tomorrow morning.    # Secondary hypothyroidism  Currently on levothyroxine 75 mcg  Recheck TSH and free T4    # Follow-up plan  -Endocrine standing point, if testosterone level is stable we can extend to follow-up once a year, coordinating with neurosurgery.     -Return to clinic with me as joint appointment with neurosurgery in 1 year      We spent 30 minutes with this patient face to face and explained the conditions and plans (more than 50% of time was counseling/coordination of care, plan for steroid taper, plan for other axis assessment) . The patient understood and is satisfied with today's visit. Return to clinic with me in 1 year.     Gris Santoro MD  Staff Physician  Endocrinology and Metabolism  [facility]  License: [id number]  Pager: [phone]    Interval History: Last seen in 2/2018.  Nonfunctioning microadenoma, transsphenoidal surgery done November 3, 2017, today no symptoms of headache nausea no visual disturbance.  He has been compliant to testosterone gel currently using 1 pump.  His energy level increased, currently he is working on diet and exercise with  lifestyle , he intentionally reduced his body weight 23 pounds for the past 6 months.  He also cut to third over the amount of his alcohol to take.     Subjective: A 72 year old male with a past medical history of pituitary macroadenoma who presents post-operative follow-up after endoscopic, transphenoidal resection of pituitary adenoma on 11/3/17. The mass was found to be non-secretory on initial labs. He was initially found to have the tumor on MRI after he presented to his PCP with complaints of vision problems. The tumor was 3.2 x 2.5 x 2.0 cm in size and compressed the optic chiasm. Pathology consistent with benign adenoma. He was started on prophylactic hydrocortisone prior to discharge. Since d/c, he has been doing well. Energy level is improved. He is walking more. Has occasional headaches behind his eyes but seems to be getting better. Denies increased urinary frequency or excessive thirst. Reports his libido is fine but is not currently sexually active with his wife due to medical issues that he has.  Denies nipple discharge. Feels his vision is about the same as before the surgery. Still has difficulty reading small print.     Interval history: Patient has been doing well.  Currently using the testosterone gel daily basis.  No muscle weakness, no fatigue.  He is still taking levothyroxine.  Hydrocortisone was tapered last visit and is not taking right now. He has good appetite and good sleep.  No headache, no dizziness.  No change vision issues after the surgery especially when he reads.  Today he came to follow-up postop MRI on 2/1/2-018  and came here for joint appointment with endocrine and neurosurgery.  He is concerned about price of testosterone gel, and also wondering whether he can wean off testosterone.      Current Problem List:   Patient Active Problem List   Diagnosis     Sleep apnea     Coronary artery disease involving native coronary artery of native heart without angina pectoris      Benign essential hypertension     Pure hypercholesterolemia     Non morbid obesity due to excess calories     Chest pain     MORENO (dyspnea on exertion)     Intracranial tumor (H)     Secondary male hypogonadism     Pituitary adenoma (H)     S/P appendectomy     S/P knee replacement     S/P shoulder surgery       Past Medical and Past Surgical History:  Past Medical History:   Diagnosis Date     Coronary artery disease     cardiac cath 2016: medical management; cath 2010: SUSANA x2 to LAD; cath 1993: PTCA to LAD; cath 1992: PTCA to LAD, 1992: atherectomy of LAD     Hearing problem      Hyperlipidaemia      Hypertension      Obese      Obstructive sleep apnea      Reduced vision      Sleep apnea     CPAP       Past Surgical History:   Procedure Laterality Date     APPENDECTOMY OPEN       ARTHROPLASTY KNEE Left      ARTHROPLASTY SHOULDER Right      ENT SURGERY       HEART CATH, ANGIOPLASTY  1992    LAD  atherectomy with a DVI device      HEART CATH, ANGIOPLASTY  4-28-10    PTCA and stent proximal and mid LAD (2) stents Xience     HEART CATH, ANGIOPLASTY  04/20/2016    no stenosis greater than 25%-rec. medical management     OPTICAL TRACKING SYSTEM ENDOSCOPIC RESECTION TUMOR CRANIAL N/A 11/3/2017    Procedure: OPTICAL TRACKING SYSTEM ENDOSCOPIC RESECTION TUMOR CRANIAL;  Stealth Guided Endoscopic Transnasal Resection of Pituitary Tumor;  Surgeon: Amanda Bates MD;  Location:  OR       Medications:   Current Outpatient Prescriptions   Medication Sig Dispense Refill     aspirin 81 MG tablet Take 1 tablet (81 mg) by mouth every evening 30 tablet 0     ezetimibe (ZETIA) 10 MG tablet Take 1 tablet (10 mg) by mouth daily 90 tablet 3     Glucosamine 500 MG CAPS Take 500 mg by mouth 2 times daily        ibuprofen (ADVIL/MOTRIN) 200 MG tablet Take 400 mg by mouth daily       levothyroxine (SYNTHROID/LEVOTHROID) 75 MCG tablet Take 1 tablet (75 mcg) by mouth daily 90 tablet 3     metoprolol tartrate (LOPRESSOR) 25 MG  tablet Take 1 tablet (25 mg) by mouth 2 times daily 180 tablet 3     Multiple Vitamin (MULTI-VITAMIN) per tablet Take 1 tablet by mouth every morning        nitroGLYcerin (NITROSTAT) 0.4 MG sublingual tablet Place 1 tablet (0.4 mg) under the tongue every 5 minutes as needed May repeat X 2. If no relief after 3 tablets call 911 25 tablet 3     ramipril (ALTACE) 5 MG capsule Take 1 capsule (5 mg) by mouth daily 90 capsule 3     rosuvastatin (CRESTOR) 40 MG tablet Take 1 tablet (40 mg) by mouth daily 90 tablet 0     testosterone (ANDROGEL 1.62% PUMP) 20.25 MG/ACT gel Place 1 pump (20.25 mg) onto the skin daily Apply from dispenser to clean, dry, intact skin of the upper arms and shoulders. 75 g 5       Allergies:   Allergies   Allergen Reactions     Cardizem [Diltiazem Hcl] Itching     No Clinical Screening - See Comments Hives       Social History:  Social History   Substance Use Topics     Smoking status: Former Smoker     Packs/day: 0.20     Years: 5.00     Types: Cigarettes     Quit date: 1960     Smokeless tobacco: Never Used      Comment: Social smoking only     Alcohol use 0.6 - 1.2 oz/week     1 - 2 Standard drinks or equivalent per week      Comment: FIVE DAYS A WEEK         Family History:  Family History   Problem Relation Age of Onset     C.A.D. Father      HEART DISEASE Father       at age 44     HEART DISEASE Son 37     enlarged heart     HEART DISEASE Mother       at age 80 after heart surgery     Heart Surgery Mother      open heart, passed 10 days after     Diabetes Mother      Family History Negative Maternal Grandmother      Family History Negative Maternal Grandfather      Family History Negative Paternal Grandmother      Family History Negative Paternal Grandfather      Family History Negative Brother      Alzheimer Disease Brother      Family History Negative Sister      Family History Negative Daughter      Family History Negative Son      Family History Negative Daughter      HEART  DISEASE Son       at age 37       Review of Systems:  A 10-point ROS is otherwise negative except as noted in HPI.     Physical Examination:  There were no vitals taken for this visit.  There is no height or weight on file to calculate BMI.  Wt Readings from Last 4 Encounters:   18 100.5 kg (221 lb 9.6 oz)   18 102.5 kg (226 lb)   18 104 kg (229 lb 4.8 oz)   18 104.4 kg (230 lb 3.2 oz)     GEN: Alert, no distress.  HEENT: EOMI.  NECK: Thyroid normal to palpation, non-tender. No cervical/clavicular LAD.   Visual field: grossly intact  CV: RRR with no murmur.   RESP: CTA bilaterally.   EXT: No peripheral edema.    SKIN: Normal skin temperature and turgor with no lesions.   MSK: Normal muscle bulk, no abnormal appearing joints.   NEURO: Cranial nerves intact. Non-focal.         8/3/2017 13:27 2017 12:20 2017 09:05   Testosterone Total 132 (L) 52 (L) 79 (L)     MR BRAIN W/O & W CONTRAST 2018 12:05 PM     Provided History: ; Intracranial tumor (H).     ICD-10: Intracranial tumor (H)     Comparison: Pituitary MRI from 2017 and Stealth MRI from  11/3/2017.     Technique: Multiplanar T1-weighted, axial FLAIR, and susceptibility  images were obtained without intravenous contrast. Following  intravenous gadolinium-based contrast administration, axial  T2-weighted, diffusion, and T1-weighted images (in multiple planes)  were obtained.     Contrast dose: 10mL Gadavist     Findings: Postsurgical changes of endoscopic resection of pituitary  macroadenoma from 11/3/2017. Postsurgical changes of cystectomy and  ethmoidectomy. Pituitary stalk is deviated to the left. There is  partially empty sella appearance. Along the medial aspect of the right  cavernous sinus, there is a 5 x 5 x 5 mm focus of heterogeneous  hypoenhancement (image 87 of series 11 and image 115 of series 13),  which is similar in enhancement appearance to the previously resected  macroadenoma.     There is no mass  effect, midline shift, or evidence of intracranial  hemorrhage. Mild cerebral volume loss and associated enlargement of  the lateral ventricles unchanged compared to previous exam. Scattered  nonspecific T2 hyperintensities in the supraventricular white matter,  unchanged. 2 foci of susceptibility artifact in the posterior right  frontal lobe which would be consistent with chronic microhemorrhage  and are nonspecific. Postcontrast images demonstrate no abnormal  intracranial enhancing lesions.     No definite abnormality of the skull marrow signal is noted. The major  vascular intracranial flow-voids are present. Mastoid air cells are  clear. Bilateral postsurgical changes of cataract surgery. Otherwise  orbital structures are unremarkable.         Impression:   1. Transnasal endoscopic resection of previously seen pituitary  macroadenoma. Although study is not dedicated to pituitary gland,  there appears to be a small focus of heterogeneous hypoenhancement  that is similar in appearance to the prior macroadenoma enhancement.  Although this could be postoperative in nature, cannot completely  exclude residual adenoma. 3-6 month follow-up recommended with  dedicated pituitary protocol MRI.  2. Mild generalized cerebral volume loss and nonspecific white matter  T2 hyperintensities stable since 7/20/2017.                     Again, thank you for allowing me to participate in the care of your patient.      Sincerely,    Gris Santoro MD

## 2018-10-09 NOTE — MR AVS SNAPSHOT
After Visit Summary   10/9/2018    Terrence Murillo    MRN: [id number]           Patient Information     Date Of Birth          1945        Visit Information        Provider Department      10/9/2018 11:00 AM Gris Santoro MD [facility]         Follow-ups after your visit        Your next 10 appointments already scheduled     Feb 05, 2019 12:15 PM CST   MR BRAIN W/O & W CONTRAST with [facility] [facility] ([facility])    [address] [address] [phone]           How do I prepare for my exam? (Food and drink instructions) **If you will be receiving sedation or general anesthesia, please see special notes below.**  How do I prepare for my exam? (Other instructions) Take your medicines as usual, unless your doctor tells you not to. You may or may not receive intravenous (IV) contrast for this exam pending the discretion of the Radiologist.  You do not need to do anything special to prepare.  **If you will be receiving sedation or general anesthesia, please see special notes below.**  What should I wear: The MRI machine uses a strong magnet. Please wear clothes without metal (snaps, zippers). A sweatsuit works well, or we may give you a hospital gown. Please remove any body piercings and hair extensions before you arrive. You will also remove watches, jewelry, hairpins, wallets, dentures, partial dental plates and hearing aids. You may wear contact lenses, and you may be able to wear your rings. We have a safe place to keep your personal items, but it is safer to leave them at home.  How long does the exam take: Most tests take 30 to 60 minutes.  HOWEVER, IF YOUR DOCTOR PRESCRIBES ANESTHESIA please plan on spending four to five hours in the recovery room.  What should I bring:  Bring a list of your current medicines to your exam (including vitamins, minerals and over-the-counter drugs).  Do I need a :   **If you will be receiving sedation or general anesthesia, please see special notes below.**  What should I do after the exam: No Restrictions, You may resume normal activities.  What is this test: MRI (magnetic resonance imaging) uses a strong magnet and radio waves to look inside the body. An MRA (magnetic resonance angiogram) does the same thing, but it lets us look at your blood vessels. A computer turns the radio waves into pictures showing cross sections of the body, much like slices of bread. This helps us see any problems more clearly. You may receive fluid (called  contrast ) before or during your scan. The fluid helps us see the pictures better. We give the fluid through an IV (small needle in your arm).  Who should I call with questions:  Please call the Imaging Department at your exam site with any questions. Directions, parking instructions, and other information is available on our website, [email or web address].  How do I prepare if I m having sedation or anesthesia? **IMPORTANT** THE INSTRUCTIONS BELOW ARE ONLY FOR THOSE PATIENTS WHO HAVE BEEN TOLD THEY WILL RECEIVE SEDATION OR GENERAL ANESTHESIA DURING THEIR MRI PROCEDURE:  IF YOU WILL RECEIVE SEDATION (take medicine to help you relax during your exam): You must get the medicine from your doctor before you arrive. Bring the medicine to the exam. Do not take it at home. Arrive one hour early. Bring someone who can take you home after the test. Your medicine will make you sleepy. After the exam, you may not drive, take a bus or take a taxi by yourself. No eating 8 hours before your exam. You may have clear liquids up until 4 hours before your exam. (Clear liquids include water, clear tea, black coffee and fruit juice without pulp.)  IF YOU WILL RECEIVE ANESTHESIA (be asleep for your exam): Arrive 1 1/2 hours early. Bring someone who can take you home after the test. You may not drive, take a bus or take a taxi by yourself. No eating 8 hours before your  "exam. You may have clear liquids up until 4 hours before your exam. (Clear liquids include water, clear tea, black coffee and fruit juice without pulp.)            Feb 05, 2019  1:00 PM CST   (Arrive by 12:45 PM)   Return Visit with Amanda Bates MD   [facility] ([facility] [place])    [address] [place] [id number] [phone]              Who to contact     Please call your clinic at [phone] to:    Ask questions about your health    Make or cancel appointments    Discuss your medicines    Learn about your test results    Speak to your doctor            Additional Information About Your Visit        Tandem Information     Tandem gives you secure access to your electronic health record. If you see a primary care provider, you can also send messages to your care team and make appointments. If you have questions, please call your primary care clinic.  If you do not have a primary care provider, please call [phone] and they will assist you.      Tandem is an electronic gateway that provides easy, online access to your medical records. With Tandem, you can request a clinic appointment, read your test results, renew a prescription or communicate with your care team.     To access your existing account, please contact your [facility] or call [phone] for assistance.        Care EveryWhere ID     This is your Care EveryWhere ID. This could be used by other organizations to access your [facility] medical records  [id number]        Your Vitals Were     Pulse Height BMI (Body Mass Index)             55 1.78 m (5' 10.08\") 32.23 kg/m2          Blood Pressure from Last 3 Encounters:   10/09/18 126/72   07/24/18 118/62   02/06/18 125/47    Weight from Last 3 Encounters:   10/09/18 102.1 kg (225 lb 1.6 oz)   07/24/18 100.5 kg (221 lb 9.6 oz)   02/08/18 102.5 kg (226 lb)              Today, you had the following "     No orders found for display       Primary Care Provider Office Phone # Fax #    Alexy Hernandez [phone]       [facility] [address] [facility] [address]        Equal Access to Services     KENNETH MARLOW : Hadii aad ku hadkatyo Soomaali, waaxda luqadaha, qaybta kaalmada adeegyada, faustino benton hayrohann tristanpatricia collado john . So [facility] [phone].    ATENCIÓN: Si habla español, tiene a herndon disposición servicios gratuitos de asistencia lingüística. Llame al [phone].    We comply with applicable federal civil rights laws and [place] laws. We do not discriminate on the basis of race, color, national origin, age, disability, sex, sexual orientation, or gender identity.            Thank you!     Thank you for choosing [facility]  for your care. Our goal is always to provide you with excellent care. Hearing back from our patients is one way we can continue to improve our services. Please take a few minutes to complete the written survey that you may receive in the mail after your visit with us. Thank you!             Your Updated Medication List - Protect others around you: Learn how to safely use, store and throw away your medicines at www.disposemymeds.org.          This list is accurate as of 10/9/18 11:40 AM.  Always use your most recent med list.                   Brand Name Dispense Instructions for use Diagnosis    aspirin 81 MG tablet     30 tablet    Take 1 tablet (81 mg) by mouth every evening    Coronary artery disease involving native coronary artery of native heart without angina pectoris       ezetimibe 10 MG tablet    ZETIA    90 tablet    Take 1 tablet (10 mg) by mouth daily    Atherosclerosis of native coronary artery of native heart with other form of angina pectoris (H)       glucosamine 500 MG Caps      Take 500 mg by mouth 2 times daily        ibuprofen 200 MG tablet    ADVIL/MOTRIN     Take 400 mg by mouth daily        levothyroxine  75 MCG tablet    SYNTHROID/LEVOTHROID    90 tablet    Take 1 tablet (75 mcg) by mouth daily    Pituitary adenoma (H), Secondary hypothyroidism       metoprolol tartrate 25 MG tablet    LOPRESSOR    180 tablet    Take 1 tablet (25 mg) by mouth 2 times daily    Hyperlipidemia LDL goal <70, Benign essential hypertension, Coronary artery disease involving native coronary artery of native heart without angina pectoris       Multi-vitamin Tabs tablet   Generic drug:  multivitamin, therapeutic with minerals      Take 1 tablet by mouth every morning        nitroGLYcerin 0.4 MG sublingual tablet    NITROSTAT    25 tablet    Place 1 tablet (0.4 mg) under the tongue every 5 minutes as needed May repeat X 2. If no relief after 3 tablets call 911    Coronary artery disease involving native coronary artery of native heart without angina pectoris       ramipril 5 MG capsule    ALTACE    90 capsule    Take 1 capsule (5 mg) by mouth daily    Benign essential hypertension, Hyperlipidemia LDL goal <70, Coronary artery disease involving native coronary artery of native heart without angina pectoris       rosuvastatin 40 MG tablet    CRESTOR    90 tablet    Take 1 tablet (40 mg) by mouth daily    CAD (coronary artery disease), Hyperlipidemia LDL goal <70, Benign essential hypertension, Coronary artery disease involving native coronary artery of native heart without angina pectoris       testosterone 20.25 MG/ACT gel    ANDROGEL 1.62% PUMP    75 g    Place 1 pump (20.25 mg) onto the skin daily Apply from dispenser to clean, dry, intact skin of the upper arms and shoulders.    Secondary male hypogonadism

## 2018-10-10 ENCOUNTER — HOSPITAL ENCOUNTER (OUTPATIENT)
Dept: LAB | Facility: CLINIC | Age: 73
Discharge: HOME | End: 2018-10-10
Attending: INTERNAL MEDICINE | Admitting: INTERNAL MEDICINE
Payer: MEDICARE

## 2018-10-10 DIAGNOSIS — E29.1 SECONDARY MALE HYPOGONADISM: ICD-10-CM

## 2018-10-10 DIAGNOSIS — E03.8 SECONDARY HYPOTHYROIDISM: ICD-10-CM

## 2018-10-10 LAB
T4 FREE SERPL-MCNC: 0.75 NG/DL (ref 0.76–1.46)
TSH SERPL DL<=0.005 MIU/L-ACNC: 1.7 MU/L (ref 0.4–4)

## 2018-10-10 PROCEDURE — 36415 COLL VENOUS BLD VENIPUNCTURE: CPT | Performed by: INTERNAL MEDICINE

## 2018-10-10 PROCEDURE — 84439 ASSAY OF FREE THYROXINE: CPT | Performed by: INTERNAL MEDICINE

## 2018-10-10 PROCEDURE — 84403 ASSAY OF TOTAL TESTOSTERONE: CPT | Performed by: INTERNAL MEDICINE

## 2018-10-10 PROCEDURE — 84443 ASSAY THYROID STIM HORMONE: CPT | Performed by: INTERNAL MEDICINE

## 2018-10-11 LAB — TESTOST SERPL-MCNC: 162 NG/DL (ref 240–950)

## 2018-10-17 DIAGNOSIS — E29.1 SECONDARY MALE HYPOGONADISM: ICD-10-CM

## 2018-10-17 DIAGNOSIS — E03.8 SECONDARY HYPOTHYROIDISM: Primary | ICD-10-CM

## 2018-10-17 RX ORDER — TESTOSTERONE 1.62 MG/G
2 GEL TRANSDERMAL DAILY
Qty: 150 G | Refills: 5 | Status: SHIPPED | OUTPATIENT
Start: 2018-10-17 | End: 2019-01-15

## 2018-10-17 RX ORDER — LEVOTHYROXINE SODIUM 100 UG/1
100 TABLET ORAL DAILY
Qty: 90 TABLET | Refills: 3 | Status: SHIPPED | OUTPATIENT
Start: 2018-10-17 | End: 2019-01-15

## 2018-10-17 NOTE — PROGRESS NOTES
Androgel increased from 1 pump to 2 pumps.  Levothyroxine increased from 62yx030 mcg.  Notified to the patient.     Ref. Range 10/10/2018 08:47   T4 Free Latest Ref Range: 0.76 - 1.46 ng/dL 0.75 (L)   Testosterone Total Latest Ref Range: 240 - 950 ng/dL 162 (L)   TSH Latest Ref Range: 0.40 - 4.00 mU/L 1.70     Gris Santoro MD  Staff Physician  Endocrinology and Metabolism  [facility]  License: [place] [id number]  Pager: [phone]

## 2018-10-18 ENCOUNTER — TELEPHONE (OUTPATIENT)
Dept: ENDOCRINOLOGY | Facility: CLINIC | Age: 73
End: 2018-10-18

## 2018-10-18 NOTE — TELEPHONE ENCOUNTER
Terrence was notified  Of the increase levothyroxine  Dose and testosterone dose.  New orders called and sent to pharmacy in [place]

## 2018-10-18 NOTE — TELEPHONE ENCOUNTER
----- Message from Gris Santoro MD sent at 10/17/2018  2:32 PM CDT -----  Hi Could you communiacte with patient?    Rx done.      Gris    ------------------------  Androgel increased from 1 pump to 2 pumps.  Levothyroxine increased from 65lz126 mcg.  Notified to the patient.     Ref. Range 10/10/2018 08:47  T4 Free Latest Ref Range: 0.76 - 1.46 ng/dL 0.75 (L)  Testosterone Total Latest Ref Range: 240 - 950 ng/dL 162 (L)  TSH Latest Ref Range: 0.40 - 4.00 mU/L 1.70    Gris Santoro MD  Staff Physician  Endocrinology and Metabolism  [facility]  License: [place] [address]

## 2018-11-09 DIAGNOSIS — D35.2 PITUITARY ADENOMA (H): ICD-10-CM

## 2018-11-09 DIAGNOSIS — E03.8 SECONDARY HYPOTHYROIDISM: ICD-10-CM

## 2018-11-09 RX ORDER — LEVOTHYROXINE SODIUM 75 UG/1
75 TABLET ORAL DAILY
Qty: 90 TABLET | Refills: 3 | OUTPATIENT
Start: 2018-11-09

## 2018-12-17 DIAGNOSIS — E78.5 HYPERLIPIDEMIA LDL GOAL <70: ICD-10-CM

## 2018-12-17 DIAGNOSIS — I10 BENIGN ESSENTIAL HYPERTENSION: ICD-10-CM

## 2018-12-17 DIAGNOSIS — I25.10 CORONARY ARTERY DISEASE INVOLVING NATIVE CORONARY ARTERY OF NATIVE HEART WITHOUT ANGINA PECTORIS: Chronic | ICD-10-CM

## 2018-12-17 DIAGNOSIS — I25.10 CAD (CORONARY ARTERY DISEASE): ICD-10-CM

## 2018-12-17 RX ORDER — ROSUVASTATIN CALCIUM 40 MG/1
40 TABLET, COATED ORAL DAILY
Qty: 30 TABLET | Refills: 0 | Status: SHIPPED | OUTPATIENT
Start: 2018-12-17 | End: 2019-01-11

## 2019-01-10 ENCOUNTER — TELEPHONE (OUTPATIENT)
Dept: ENDOCRINOLOGY | Facility: CLINIC | Age: 74
End: 2019-01-10

## 2019-01-10 DIAGNOSIS — I25.10 CAD (CORONARY ARTERY DISEASE): ICD-10-CM

## 2019-01-10 DIAGNOSIS — E78.5 HYPERLIPIDEMIA LDL GOAL <70: ICD-10-CM

## 2019-01-10 DIAGNOSIS — I10 BENIGN ESSENTIAL HYPERTENSION: ICD-10-CM

## 2019-01-10 DIAGNOSIS — I25.10 CORONARY ARTERY DISEASE INVOLVING NATIVE CORONARY ARTERY OF NATIVE HEART WITHOUT ANGINA PECTORIS: Chronic | ICD-10-CM

## 2019-01-10 RX ORDER — ROSUVASTATIN CALCIUM 40 MG/1
40 TABLET, COATED ORAL DAILY
Qty: 90 TABLET | Refills: 1 | Status: CANCELLED | OUTPATIENT
Start: 2019-01-10

## 2019-01-11 DIAGNOSIS — I25.10 CAD (CORONARY ARTERY DISEASE): ICD-10-CM

## 2019-01-11 DIAGNOSIS — E78.5 HYPERLIPIDEMIA LDL GOAL <70: ICD-10-CM

## 2019-01-11 DIAGNOSIS — I25.10 CORONARY ARTERY DISEASE INVOLVING NATIVE CORONARY ARTERY OF NATIVE HEART WITHOUT ANGINA PECTORIS: Chronic | ICD-10-CM

## 2019-01-11 DIAGNOSIS — I10 BENIGN ESSENTIAL HYPERTENSION: ICD-10-CM

## 2019-01-11 RX ORDER — ROSUVASTATIN CALCIUM 40 MG/1
40 TABLET, COATED ORAL DAILY
Qty: 90 TABLET | Refills: 1 | Status: SHIPPED | OUTPATIENT
Start: 2019-01-11 | End: 2019-07-17

## 2019-01-14 ENCOUNTER — TELEPHONE (OUTPATIENT)
Dept: ENDOCRINOLOGY | Facility: CLINIC | Age: 74
End: 2019-01-14

## 2019-01-14 DIAGNOSIS — I10 BENIGN ESSENTIAL HYPERTENSION: ICD-10-CM

## 2019-01-14 DIAGNOSIS — I25.118 ATHEROSCLEROSIS OF NATIVE CORONARY ARTERY OF NATIVE HEART WITH OTHER FORM OF ANGINA PECTORIS (H): ICD-10-CM

## 2019-01-14 DIAGNOSIS — I25.10 CORONARY ARTERY DISEASE INVOLVING NATIVE CORONARY ARTERY OF NATIVE HEART WITHOUT ANGINA PECTORIS: Chronic | ICD-10-CM

## 2019-01-14 DIAGNOSIS — E78.5 HYPERLIPIDEMIA LDL GOAL <70: ICD-10-CM

## 2019-01-14 RX ORDER — METOPROLOL TARTRATE 25 MG/1
25 TABLET, FILM COATED ORAL 2 TIMES DAILY
Qty: 180 TABLET | Refills: 1 | Status: SHIPPED | OUTPATIENT
Start: 2019-01-14 | End: 2019-07-18

## 2019-01-14 RX ORDER — EZETIMIBE 10 MG/1
10 TABLET ORAL DAILY
Qty: 90 TABLET | Refills: 1 | Status: SHIPPED | OUTPATIENT
Start: 2019-01-14 | End: 2019-07-18

## 2019-01-14 RX ORDER — RAMIPRIL 5 MG/1
5 CAPSULE ORAL DAILY
Qty: 90 CAPSULE | Refills: 1 | Status: SHIPPED | OUTPATIENT
Start: 2019-01-14 | End: 2019-07-18

## 2019-01-14 NOTE — TELEPHONE ENCOUNTER
[facility] Call Center    Phone Message    May a detailed message be left on voicemail: yes    Reason for Call: Other: PT has questions about RX's through [employer].  He is requesting to have the provider contact the [facility] regarding the levothyroxine and testosterone.  PT is confused about the process to getting those RX's shipped to him.  Please follow up with the PT at the number above.   The number to [facility] at [phone].      Action Taken: Message routed to:  [facility] Andrea

## 2019-01-15 DIAGNOSIS — E03.8 SECONDARY HYPOTHYROIDISM: ICD-10-CM

## 2019-01-15 DIAGNOSIS — E29.1 SECONDARY MALE HYPOGONADISM: ICD-10-CM

## 2019-01-15 RX ORDER — LEVOTHYROXINE SODIUM 100 UG/1
100 TABLET ORAL DAILY
Qty: 90 TABLET | Refills: 3 | Status: SHIPPED | OUTPATIENT
Start: 2019-01-15 | End: 2019-11-12

## 2019-01-15 RX ORDER — TESTOSTERONE 1.62 MG/G
GEL TRANSDERMAL
Qty: 225 G | Refills: 1 | Status: SHIPPED | OUTPATIENT
Start: 2019-01-15 | End: 2019-06-07

## 2019-01-15 NOTE — TELEPHONE ENCOUNTER
[facility] Call Center    Phone Message    May a detailed message be left on voicemail: yes    Reason for Call: Medication Question or concern regarding medication   Prescription Clarification  Name of Medication: testosterone (ANDROGEL 1.62% PUMP) 20.25 MG/ACT gel  Prescribing Provider: Yvan   What on the order needs clarification? Pt called and stated that the med need PA to get the prescription to be filled. PA: [employer] : [phone]. Please call back pt for updates. Thanks.          Action Taken: Message routed to:  [facility] koby

## 2019-01-15 NOTE — TELEPHONE ENCOUNTER
I will have Levothyroxine 100 mcg and Testosterone  Pump local printed to fax to [facility] for 90 day refills.

## 2019-01-16 ENCOUNTER — TELEPHONE (OUTPATIENT)
Dept: ENDOCRINOLOGY | Facility: CLINIC | Age: 74
End: 2019-01-16

## 2019-01-16 ENCOUNTER — PATIENT OUTREACH (OUTPATIENT)
Dept: CARE COORDINATION | Facility: CLINIC | Age: 74
End: 2019-01-16

## 2019-01-16 NOTE — TELEPHONE ENCOUNTER
[facility] Call Center    Phone Message    May a detailed message be left on voicemail: yes    Reason for Call: Other: PT wants to know when he should get labs done.  Please follow up with the PT.      Action Taken: Message routed to:  [facility] koby

## 2019-01-17 ENCOUNTER — TELEPHONE (OUTPATIENT)
Dept: ENDOCRINOLOGY | Facility: CLINIC | Age: 74
End: 2019-01-17

## 2019-01-17 NOTE — TELEPHONE ENCOUNTER
No current insurance available. Eligibility check failed. Left message for pt to call me back with new info.

## 2019-01-17 NOTE — TELEPHONE ENCOUNTER
Left patient a detailed message that the PA was initiated and to give us a call with any further questions.

## 2019-01-17 NOTE — TELEPHONE ENCOUNTER
Prior Authorization Retail Medication Request    Medication/Dose: Testosterone (Androgel 1.62% Pump)  ICD code (if different than what is on RX):E29.1  Rationale:Secondary male hypogonadism     Insurance Name: Medicare  Insurance ID: [id number]        Pharmacy Information (if different than what is on RX)  Name:  [facility]  Phone: [phone]

## 2019-01-18 NOTE — TELEPHONE ENCOUNTER
Prior Authorization Approval    Authorization Effective Date: 1/18/2019  Authorization Expiration Date: 1/18/2021  Medication: testosterone (ANDROGEL 1.62% PUMP) 20.25 MG/ACT gel-PA approved  Approved Dose/Quantity:   Reference #:     Insurance Company: [employer] - Phone [phone] Fax [phone]  Expected CoPay:       CoPay Card Available:      Foundation Assistance Needed:    Which Pharmacy is filling the prescription (Not needed for infusion/clinic administered): [facility] [address] [place] [address] [address] AT [facility] [address] & [facility]  Pharmacy Notified: Yes  Patient Notified: Yes

## 2019-01-18 NOTE — TELEPHONE ENCOUNTER
I spoke to patient (on cell phone listed-best number to call). He has [employer] insurance; BIN-[id number], PCN-[id number]; GP-; ID-[id number]. He wants to have this filled at [facility] in [place], not at [facility]. Patient will be out of medication on 1/22.     Central Prior Authorization Team   Phone: [phone]      PA Initiation    Medication: testosterone (ANDROGEL 1.62% PUMP) 20.25 MG/ACT gel-PA initiated  Insurance Company: [employer] - Phone [phone] Fax [phone]  Pharmacy Filling the Rx: [facility] [address] [place] [address] [address] [facility] [address] AT [facility] [address] & [facility]  Filling Pharmacy Phone: [phone]  Filling Pharmacy Fax:    Start Date: 1/18/2019

## 2019-01-21 DIAGNOSIS — E29.1 SECONDARY MALE HYPOGONADISM: ICD-10-CM

## 2019-01-24 DIAGNOSIS — E23.0 HYPOPITUITARISM (H): Primary | ICD-10-CM

## 2019-01-24 NOTE — TELEPHONE ENCOUNTER
Per Dr Santoro  COuld you call patient, lab ordered, morning lab (no need fasting), checking testosterone and thryoid level. To adjust the dose he need to come on 1/30 same day as Dr. Bates s appotiment.     Gris           Spoke w/ Pts wife Xi, confirmed request for lab draw on 01/31/2019 while in clinic for MRI

## 2019-01-30 ENCOUNTER — TELEPHONE (OUTPATIENT)
Dept: ENDOCRINOLOGY | Facility: CLINIC | Age: 74
End: 2019-01-30

## 2019-01-30 ENCOUNTER — ANCILLARY PROCEDURE (OUTPATIENT)
Dept: MRI IMAGING | Facility: CLINIC | Age: 74
End: 2019-01-30
Attending: NEUROLOGICAL SURGERY
Payer: COMMERCIAL

## 2019-01-30 ENCOUNTER — OFFICE VISIT (OUTPATIENT)
Dept: NEUROSURGERY | Facility: CLINIC | Age: 74
End: 2019-01-30
Payer: COMMERCIAL

## 2019-01-30 VITALS
SYSTOLIC BLOOD PRESSURE: 138 MMHG | OXYGEN SATURATION: 95 % | TEMPERATURE: 98.1 F | DIASTOLIC BLOOD PRESSURE: 90 MMHG | BODY MASS INDEX: 33.16 KG/M2 | HEIGHT: 70 IN | WEIGHT: 231.6 LBS | HEART RATE: 61 BPM

## 2019-01-30 DIAGNOSIS — E29.1 SECONDARY MALE HYPOGONADISM: ICD-10-CM

## 2019-01-30 DIAGNOSIS — Z86.018 S/P SELECTIVE TRANSSPHENOIDAL PITUITARY ADENOMECTOMY: ICD-10-CM

## 2019-01-30 DIAGNOSIS — D35.2 PITUITARY MACROADENOMA (H): ICD-10-CM

## 2019-01-30 DIAGNOSIS — Z98.890 S/P SELECTIVE TRANSSPHENOIDAL PITUITARY ADENOMECTOMY: ICD-10-CM

## 2019-01-30 DIAGNOSIS — D35.2 BENIGN NEOPLASM OF PITUITARY GLAND AND CRANIOPHARYNGEAL DUCT (POUCH) (H): ICD-10-CM

## 2019-01-30 DIAGNOSIS — D35.3 BENIGN NEOPLASM OF PITUITARY GLAND AND CRANIOPHARYNGEAL DUCT (POUCH) (H): ICD-10-CM

## 2019-01-30 DIAGNOSIS — E23.0 HYPOPITUITARISM (H): ICD-10-CM

## 2019-01-30 LAB
T4 FREE SERPL-MCNC: 0.89 NG/DL (ref 0.76–1.46)
TSH SERPL DL<=0.005 MIU/L-ACNC: 0.98 MU/L (ref 0.4–4)

## 2019-01-30 RX ORDER — GADOBUTROL 604.72 MG/ML
10 INJECTION INTRAVENOUS ONCE
Status: COMPLETED | OUTPATIENT
Start: 2019-01-30 | End: 2019-01-30

## 2019-01-30 RX ADMIN — GADOBUTROL 10 ML: 604.72 INJECTION INTRAVENOUS at 07:45

## 2019-01-30 ASSESSMENT — MIFFLIN-ST. JEOR: SCORE: 1798.05

## 2019-01-30 ASSESSMENT — PAIN SCALES - GENERAL: PAINLEVEL: NO PAIN (0)

## 2019-01-30 NOTE — PATIENT INSTRUCTIONS
Follow-up with Dr. Bates in 1 year with an MRI prior to your appointment. We will contact you to schedule your appointment.     We have placed a referral for you to see Dr. Herndon in Neuro-ophthalmology. You can contact his office at [phone] to schedule an appointment.     Thank you for choosing [facility] for your health care needs.

## 2019-01-30 NOTE — DISCHARGE INSTRUCTIONS
MRI Contrast Discharge Instructions    The IV contrast you received today will pass out of your body in your  urine. This will happen in the next 24 hours. You will not feel this process.  Your urine will not change color.    Drink at least 4 extra glasses of water or juice today (unless your doctor  has restricted your fluids). This reduces the stress on your kidneys.  You may take your regular medicines.    If you are on dialysis: It is best to have dialysis today.    If you have a reaction: Most reactions happen right away. If you have  any new symptoms after leaving the hospital (such as hives or swelling),  call your hospital at the correct number below. Or call your family doctor.  If you have breathing distress or wheezing, call 911.    Special instructions: ***    I have read and understand the above information.    Signature:______________________________________ Date:___________    Staff:__________________________________________ Date:___________     Time:__________    [facility] Departments:    ___[place]: [phone]  ___[facility]: [phone]  ___[place]: [phone] ___[facility]: [phone]  ___[facility]: [phone]  ___[facility]: [phone]  ___[phone]  ___[facility]: [phone]  ___[phone]

## 2019-01-30 NOTE — LETTER
1/30/2019      RE: Terrence Murillo  [address]  [place] [address]       Dear Dr. Hernandez:    Mr. 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     In summary, this is a 72-year-old man who presented to our service with progressive bitemporal hemianopsia for a prolonged period of time. 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  On evaluation, it was found to be nonfunctioning although the patient was having compressive vision symptoms. Subsequently, he underwent surgical resection without complications about 16 months ago.    He has been doing well since we last saw him in 2/2018. His denies any severe headaches. His vision remains unchanged. He has not had a formal eye exam since the surgery, however. His last visit with Dr. Santoro, our pituitary endocrinologist, was on 10/9/2018 and she increased his levothyroxin and testosterone replacement dosage.    REVIEW OF SYSTEMS: His 10 point review of system is as stated above in HPI, PMH, PSH. The remaining system is negative.    PHYSICAL EXAMINATION:  Vital signs: BP: 138/90, P: 61, Temp: 98.1F, Ht: 178 cm, Wt: 105.1 kg, BMI: 33.16 kg/m2.  General: Obese male appearing comfortable.  Neurological: He appears to have a shallow bilateral superior temporal quadrant deficit on confrontation. The inferior quadrants appear normal on confrontation. The remaining gross neurologic examination is normal.    REVIEW OF STUDIES: We compared today's MRI with last year's study and his pre-operative study. The optic apparatus remains completely decompressed. The pituitary stalk and gland remain displaced to the left side of the sella. There remains a 4-5 mm hypoenhancing focus in the right parasellar region that likely represents residual tumor. It may have grown a fraction of a millimeter compared to last year, but for the most  part appears stable.     IMPRESSION AND PLAN: We are pleased to see Mr. Murillo is doing well. He is long overdue for an opthalmology follow up, we will help him with those arrangements. We will continue to observe the small residual tumor with annual MRIs.     Please do not hesitate to contact us with questions. We will keep you informed of his progress.    > 50% of the 30 minutes visit we spent in counseling, reviewing images, and care coordination for the problem outlined above.    Amanda Jacob, DNP, APRN, FNP-BC    I have reviewed the history. I have seen and examined the patient myself and agree with the assessment and plan above.    I, Darshan Garcia, am serving as a scribe to document services personally performed by Amanda Bates MD, based upon my observations and the provider's statements to me. All documentation has been reviewed by the aforementioned doctor prior to being entered into the official medical record.    I, Amanda Bates, attest that above named individual is acting in scribe capacity, has observed my performance of the services and has documented them in accordance with my direction. The documentation recorded by the scribe accurately reflects the service I personally performed and the decisions made by me.

## 2019-01-30 NOTE — LETTER
1/30/2019       RE: Terrence Murillo  [address] [facility] [address]     Dear Colleague,    Thank you for referring your patient, Terrence Murillo, to the [facility] NEUROSURGERY at [facility]. Please see a copy of my visit note below.    Date of Service: 1/30/32019    Dear Dr. Hernandez:    Mr. Terrence Murillo returned to our office for a follow-up of his pituitary macroadenoma which was resected through an endoscopic transnasal approach on 11/3/2017.     In summary, this is a 72-year-old gentleman who presented to our service with concerns of progressive bitemporal hemianopsia for a prolonged period of time. 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  On evaluation, it was found to be nonfunctioning although the patient was having compressive vision symptoms. Subsequently, he underwent surgical resection without complications.  It has been 16 months following the resection. He is and has been doing well since we last saw him in 2/2018. His denies unusual headaches. His vision remains unchanged. He has not had a formal eye exam since the surgery, however. His last visit with Dr. Santoro was on 10/9/2018 and she increased his levothyroxin and testosterone gel dosage.    PAST MEDICAL HISTORY: CAD, HTN, OSP (on CPAP), hyperlipidemia, obesity, and bilateral hearing deficit. INTERVAL HEALTH HISTORY is as stated above in HPI.    PAST SURGICAL HISTORY: Appendectomy, left knee arthroplasty, right shoulder arthroplasty, s/p hear stent, and endoscopic transphenoidal resection of pituitary tumor.    ALLERGIES: Cardizem.    CURRENT MEDICATIONS: aspirin, zetia, glucosamine, ibuprofen CA, levothyroxine, lopressor, multi vitamin, nitrostat PRN, ramipril, crestor, and androgel. Specific dosages are reviewed and updated under  medication reconciliation in EMR.    REVIEW OF SYSTEMS: His 10 point review of system is as stated above in HPI, PMH, PSH. The remaining system is  negative.    PHYSICAL EXAMINATION:  Vital signs: BP: 138/90, P: 61, Temp: 98.1F, Ht: 178 cm, Wt: 105.1 kg, BMI: 33.16 kg/m2.  General: Well nourished and obese male in no apparent distress.  Neuro: He appears to have a shallow bilateral superior temporal quadrant deficit. The inferior quadrants appear normal on confrontation. The remaining gross neurologic examination is otherwise normal.    REVIEW OF STUDIES: We compared today's MRI with last year's study and his pre-operative study. The optic apparatus remains completely decompressed. The pituitary stalk and gland remain displaced to the left side of the sella. There remains a 4-5 mm hypoenhancing focus in the right parasellar region that likely represents residual tumor. It may have grown a fraction of a millimeter compared to last year, but for the most part appears stable.     IMPRESSION AND PLAN: We are pleased to see Mr. Murillo is doing well. He is long due for an opthalmology follow up, we will help him with those arrangements. We will continue to observe the small residual tumor with annual MRIs.     Thank you very much for the opportunity to participate in his care. Please do not hesitate to contact us with questions. We will keep you informed of his progress.    > 50% of the 30 minutes visit we spent in counseling, reviewing images, and care coordination for the problem outlined above.    Amanda Jacob, DNP, APRN, FNP-BC    Darshan ARITA, am serving as a scribe to document services personally performed by Amanda Bates MD, based upon my observations and the provider's statements to me. All documentation has been reviewed by the aforementioned doctor prior to being entered into the official medical record.    I, Amanda Bates, attest that above named individual is acting in scribe capacity, has observed my performance of the services and has documented them in accordance with my direction. The documentation  recorded by the scribe accurately reflects the service I personally performed and the decisions made by me.

## 2019-01-30 NOTE — PROGRESS NOTES
Dear Dr. Hernandez:    Mr. 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     In summary, this is a 72-year-old man who presented to our service with progressive bitemporal hemianopsia for a prolonged period of time. 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  On evaluation, it was found to be nonfunctioning although the patient was having compressive vision symptoms. Subsequently, he underwent surgical resection without complications about 16 months ago.    He has been doing well since we last saw him in 2/2018. His denies any severe headaches. His vision remains unchanged. He has not had a formal eye exam since the surgery, however. His last visit with Dr. Santoro, our pituitary endocrinologist, was on 10/9/2018 and she increased his levothyroxin and testosterone replacement dosage.    REVIEW OF SYSTEMS: His 10 point review of system is as stated above in HPI, PMH, PSH. The remaining system is negative.    PHYSICAL EXAMINATION:  Vital signs: BP: 138/90, P: 61, Temp: 98.1F, Ht: 178 cm, Wt: 105.1 kg, BMI: 33.16 kg/m2.  General: Obese male appearing comfortable.  Neurological: He appears to have a shallow bilateral superior temporal quadrant deficit on confrontation. The inferior quadrants appear normal on confrontation. The remaining gross neurologic examination is normal.    REVIEW OF STUDIES: We compared today's MRI with last year's study and his pre-operative study. The optic apparatus remains completely decompressed. The pituitary stalk and gland remain displaced to the left side of the sella. There remains a 4-5 mm hypoenhancing focus in the right parasellar region that likely represents residual tumor. It may have grown a fraction of a millimeter compared to last year, but for the most part appears stable.     IMPRESSION AND PLAN: We are pleased to see Mr. Murillo is  doing well. He is long overdue for an opthalmology follow up, we will help him with those arrangements. We will continue to observe the small residual tumor with annual MRIs.     Please do not hesitate to contact us with questions. We will keep you informed of his progress.    > 50% of the 30 minutes visit we spent in counseling, reviewing images, and care coordination for the problem outlined above.      Amanda Jacob, DNP, APRN, FNP-BC    I have reviewed the history. I have seen and examined the patient myself and agree with the assessment and plan above.  RHONDA Bates MD      I, Darshan Garcia, am serving as a scribe to document services personally performed by Amanda Bates MD, based upon my observations and the provider's statements to me. All documentation has been reviewed by the aforementioned doctor prior to being entered into the official medical record.    I, Amanda Bates, attest that above named individual is acting in scribe capacity, has observed my performance of the services and has documented them in accordance with my direction. The documentation recorded by the scribe accurately reflects the service I personally performed and the decisions made by me.

## 2019-01-30 NOTE — NURSING NOTE
Chief Complaint   Patient presents with     RECHECK     [facility] RETURN YEARLY MRI F/U       Peggy Combs LPN

## 2019-01-30 NOTE — TELEPHONE ENCOUNTER
Dr Santoro spoke with his wife  and since he is doing well he can wait until 10/2019 to see Dr Santoro in Endocrine clinic.  He can be seen sooner if he does not feel  well.

## 2019-01-31 LAB — TESTOST SERPL-MCNC: 294 NG/DL (ref 240–950)

## 2019-02-04 NOTE — RESULT ENCOUNTER NOTE
Dear Terrence     Here is your results. Testosterone is within normal limits.  Please continue the same dose.  Please call nursing line at [phone] if you have any questions.    Take care  Gris Santoro MD

## 2019-04-02 ENCOUNTER — OFFICE VISIT (OUTPATIENT)
Dept: OPHTHALMOLOGY | Facility: CLINIC | Age: 74
End: 2019-04-02
Attending: NEUROLOGICAL SURGERY
Payer: COMMERCIAL

## 2019-04-02 DIAGNOSIS — H53.10 SUBJECTIVE VISUAL DISTURBANCE: Primary | ICD-10-CM

## 2019-04-02 DIAGNOSIS — D35.2 PITUITARY ADENOMA (H): Primary | ICD-10-CM

## 2019-04-02 DIAGNOSIS — H53.10 SUBJECTIVE VISUAL DISTURBANCE: ICD-10-CM

## 2019-04-02 DIAGNOSIS — H47.20 OPTIC ATROPHY: ICD-10-CM

## 2019-04-02 PROCEDURE — 92133 CPTRZD OPH DX IMG PST SGM ON: CPT | Mod: ZF | Performed by: OPHTHALMOLOGY

## 2019-04-02 PROCEDURE — G0463 HOSPITAL OUTPT CLINIC VISIT: HCPCS | Mod: ZF

## 2019-04-02 PROCEDURE — 92083 EXTENDED VISUAL FIELD XM: CPT | Mod: ZF | Performed by: OPHTHALMOLOGY

## 2019-04-02 ASSESSMENT — REFRACTION_WEARINGRX
OD_AXIS: 167
OD_CYLINDER: +0.50
OD_ADD: +2.50
OS_SPHERE: +0.25
OS_ADD: +2.50
OD_SPHERE: -0.25

## 2019-04-02 ASSESSMENT — VISUAL ACUITY
OD_CC: 20/20
METHOD: SNELLEN - LINEAR
OS_CC: 20/20
CORRECTION_TYPE: GLASSES

## 2019-04-02 ASSESSMENT — CONF VISUAL FIELD
OS_NORMAL: 1
OD_NORMAL: 1

## 2019-04-02 ASSESSMENT — SLIT LAMP EXAM - LIDS
COMMENTS: NORMAL
COMMENTS: NORMAL

## 2019-04-02 ASSESSMENT — CUP TO DISC RATIO
OD_RATIO: 0.2
OS_RATIO: 0.2

## 2019-04-02 ASSESSMENT — TONOMETRY
OD_IOP_MMHG: 17
IOP_METHOD: ICARE
OS_IOP_MMHG: 18

## 2019-04-02 ASSESSMENT — EXTERNAL EXAM - RIGHT EYE: OD_EXAM: NORMAL

## 2019-04-02 NOTE — NURSING NOTE
Chief Complaints and History of Present Illnesses   Patient presents with     Visual Field Defect Follow Up     Chief Complaint(s) and History of Present Illness(es)     Terrence Murillo is a 73 year old male with the following diagnoses:   1. Subjective visual disturbance   2. Visual field defect     Tumor resection 11/3/2017.   Saw optometrist since last visit with Dr. Herndon. C/o floaters left eye and visual field defect.     Xander AMIN 9:08 AM April 2, 2019

## 2019-04-02 NOTE — PROGRESS NOTES
Assessment & Plan     Terrence Murillo is a 72 year old male with the following diagnoses:   1. Pituitary adenoma (H)    2. Subjective visual disturbance    3. Optic atrophy       Terrence Murillo is a 73 year old male who presents for 2 year follow up. He was initially seen for bitemporal incongrous hemianopia and was found to have 3.2 x 2.5 a 2.0 cm enhancing sellar/suprasellar presumed pituitary macroadenoma. He underwent resection in 11/2017. Pathology report showed pituitary adenoma with multifocal fibrosis     Patient reports difficulty driving in low light conditions. Denies any issues with blind spots or changing lanes while driving but is cautious when backing the car. Denies any significant change to his reports. Reports occasional floater in his left eye.     On exam visual acuity is 20/20 in the right and 20/20 in the left. Pupil exam is normal with no afferent pupillary defect. Color plates are full with 11 out of 11 in both eyes. Extraocular movements are full and normal. Anterior segment exam is normal in both eyes. Fundus exam shows mild optic disc pallor in both eyes.    Visual field shows temporal hemianopia in the RIGHT eye and grossly normal results LEFT eye. Retinal nerve fiber layer shows borderline thinning in both eyes. His ganglion cell layer analysis shows nasal loss both eyes that is stable compared to his scan from 2017.     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 I reviewed patient's recent MRI and there is no longer any compression of the chiasm status post resection of pituitary macroadenoma.  We discussed that his vision will likely remain this way.  We will continue yearly follow up with visual field and retinal nerve fiber layer scans.            Attending Physician Attestation:  Complete documentation of historical and exam elements from today's encounter can be found in the full encounter summary report (not reduplicated in this  progress note).  I personally obtained the chief complaint(s) and history of present illness.  I confirmed and edited as necessary the review of systems, past medical/surgical history, family history, social history, and examination findings as documented by others; and I examined the patient myself.  I personally reviewed the relevant tests, images, and reports as documented above.  I formulated and edited as necessary the assessment and plan and discussed the findings and management plan with the patient and family. - Kendell Hernadez MD  PGY-3 Ophthalmology Resident  [phone]

## 2019-04-02 NOTE — LETTER
2019         RE:  :  MRN: Terrence Murillo  1945  [id number]     Dear Dr. Lenard Trevion,    Your patient, Terrence Murillo, returned for neuro-ophthalmic follow up. My assessment and plan are below.  For further details, please see my attached clinic note.           Assessment & Plan     Terrence Murillo is a 72 year old male with the following diagnoses:   1. Pituitary adenoma (H)    2. Subjective visual disturbance    3. Optic atrophy       Terrence Murillo is a 73 year old male who presents for 2 year follow up. He was initially seen for bitemporal incongrous hemianopia and was found to have 3.2 x 2.5 a 2.0 cm enhancing sellar/suprasellar presumed pituitary macroadenoma. He underwent resection in 2017. Pathology report showed pituitary adenoma with multifocal fibrosis     Patient reports difficulty driving in low light conditions. Denies any issues with blind spots or changing lanes while driving but is cautious when backing the car. Denies any significant change to his reports. Reports occasional floater in his left eye.     On exam visual acuity is 20/20 in the right and 20/20 in the left. Pupil exam is normal with no afferent pupillary defect. Color plates are full with 11 out of 11 in both eyes. Extraocular movements are full and normal. Anterior segment exam is normal in both eyes. Fundus exam shows mild optic disc pallor in both eyes.    Visual field shows temporal hemianopia in the RIGHT eye and grossly normal results LEFT eye. Retinal nerve fiber layer shows borderline thinning in both eyes. His ganglion cell layer analysis shows nasal loss both eyes that is stable compared to his scan from 2017.     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 I reviewed patient's recent MRI and there is no longer any compression of the chiasm status post resection of pituitary macroadenoma.  We discussed that his vision will likely remain this way.  We will continue yearly  follow up with visual field and retinal nerve fiber layer scans.           Again, thank you for allowing me to participate in the care of your patient.      Sincerely,    Kendell Herndon MD  Professor  Ophthalmology Residency   Director of Neuro-Ophthalmology  [employer] [facility] [employer] Endowed Chair  Departments of Ophthalmology, Neurology, and Neurosurgery  [facility] [address] [place]  [address]  T - [phone]  F - [phone]  JEAN PAUL [email or web address]      CC: Lenard RAMIREZ Belinda  [facility]  [address]  [facility] [address]  VIA Facsimile: [phone]     Alexy Hernandez MD  [facility]  [address] [facility] [address]  VIA Facsimile: [phone]     Amanda Bates MD  [id number]  [facility] [address]  VIA In Basket       DX = bitemporal hemianopia, pituitary adenoma

## 2019-06-07 DIAGNOSIS — E29.1 SECONDARY MALE HYPOGONADISM: ICD-10-CM

## 2019-06-07 RX ORDER — TESTOSTERONE 1.62 MG/G
GEL TRANSDERMAL
Qty: 225 G | Refills: 1 | Status: SHIPPED | OUTPATIENT
Start: 2019-06-07 | End: 2020-03-30

## 2019-06-07 NOTE — TELEPHONE ENCOUNTER
testosterone (ANDROGEL 1.62% PUMP) 20.25 MG/ACT gel      Last Written Prescription Date:  1-15-19  Last Fill Quantity: 225g,   # refills: 1  Last Office Visit : 10-9-18  Future Office visit:  9-18-19    Routing refill request to provider for review/approval because:  Controlled mediciction

## 2019-06-07 NOTE — TELEPHONE ENCOUNTER
[facility] Call Center    Phone Message    May a detailed message be left on voicemail: yes    Reason for Call: Medication Refill Request    Has the patient contacted the pharmacy for the refill? Yes   Name of medication being requested:  testosterone (ANDROGEL 1.62% PUMP) 20.25 MG/ACT gel  Provider who prescribed the medication: Yvan  Pharmacy:    [facility] [address] [place] [address] [address] [facility]    Date medication is needed: ASAP - patient is out as of today.         Action Taken: Message routed to:  Other: Med Refill Team

## 2019-06-11 ENCOUNTER — DOCUMENTATION ONLY (OUTPATIENT)
Dept: CARDIOLOGY | Facility: CLINIC | Age: 74
End: 2019-06-11

## 2019-06-11 DIAGNOSIS — I25.10 CORONARY ARTERY DISEASE INVOLVING NATIVE CORONARY ARTERY OF NATIVE HEART WITHOUT ANGINA PECTORIS: ICD-10-CM

## 2019-06-11 DIAGNOSIS — I25.10 CORONARY ARTERY DISEASE INVOLVING NATIVE CORONARY ARTERY OF NATIVE HEART WITHOUT ANGINA PECTORIS: Primary | Chronic | ICD-10-CM

## 2019-06-11 LAB
ALT SERPL W P-5'-P-CCNC: 26 U/L (ref 0–70)
ANION GAP SERPL CALCULATED.3IONS-SCNC: 3 MMOL/L (ref 3–14)
BUN SERPL-MCNC: 23 MG/DL (ref 7–30)
CALCIUM SERPL-MCNC: 8.6 MG/DL (ref 8.5–10.1)
CHLORIDE SERPL-SCNC: 109 MMOL/L (ref 94–109)
CHOLEST SERPL-MCNC: 100 MG/DL
CO2 SERPL-SCNC: 27 MMOL/L (ref 20–32)
CREAT SERPL-MCNC: 0.82 MG/DL (ref 0.66–1.25)
GFR SERPL CREATININE-BSD FRML MDRD: 86 ML/MIN/{1.73_M2}
GLUCOSE SERPL-MCNC: 94 MG/DL (ref 70–99)
HDLC SERPL-MCNC: 36 MG/DL
LDLC SERPL CALC-MCNC: 43 MG/DL
NONHDLC SERPL-MCNC: 64 MG/DL
POTASSIUM SERPL-SCNC: 4.6 MMOL/L (ref 3.4–5.3)
SODIUM SERPL-SCNC: 139 MMOL/L (ref 133–144)
TRIGL SERPL-MCNC: 107 MG/DL

## 2019-06-11 PROCEDURE — 80061 LIPID PANEL: CPT | Performed by: INTERNAL MEDICINE

## 2019-06-11 PROCEDURE — 80048 BASIC METABOLIC PNL TOTAL CA: CPT | Performed by: INTERNAL MEDICINE

## 2019-06-11 PROCEDURE — 36415 COLL VENOUS BLD VENIPUNCTURE: CPT | Performed by: INTERNAL MEDICINE

## 2019-06-11 PROCEDURE — 84460 ALANINE AMINO (ALT) (SGPT): CPT | Performed by: INTERNAL MEDICINE

## 2019-06-11 NOTE — LETTER
June 11, 2019       TO: Terrence JONES Chidi   [address] [facility] [address]       Dear Mr. Murillo,    The results of your recent Laboratory tests.    Results for orders placed or performed in visit on 06/11/19   ALT   Result Value Ref Range    ALT 26 0 - 70 U/L   Lipid Profile   Result Value Ref Range    Cholesterol 100 <200 mg/dL    Triglycerides 107 <150 mg/dL    HDL Cholesterol 36 (L) >39 mg/dL    LDL Cholesterol Calculated 43 <100 mg/dL    Non HDL Cholesterol 64 <130 mg/dL   Basic metabolic panel   Result Value Ref Range    Sodium 139 133 - 144 mmol/L    Potassium 4.6 3.4 - 5.3 mmol/L    Chloride 109 94 - 109 mmol/L    Carbon Dioxide 27 20 - 32 mmol/L    Anion Gap 3 3 - 14 mmol/L    Glucose 94 70 - 99 mg/dL    Urea Nitrogen 23 7 - 30 mg/dL    Creatinine 0.82 0.66 - 1.25 mg/dL    GFR Estimate 86 >60 mL/min/[1.73_m2]    GFR Estimate If Black >90 >60 mL/min/[1.73_m2]    Calcium 8.6 8.5 - 10.1 mg/dL               Your test results fall within the expected range(s) or remain unchanged from previous results.  You are due for your annual follow up with Dr Jolley who is, unfortunately, full through September. You have been added to his waitlist for October.     If you have any questions or concerns in the meantime that you would like to discuss, please call Dr Jolley's nurse phone at [phone].     Sincerely,    [facility]

## 2019-06-11 NOTE — PROGRESS NOTES
Lipids, ALT & BMP drawn today, ordered to be completed prior to annual f/up with Dr Jolley, which is not yet scheduled. Last OV 7/2018 w/ Dr Jolley.     Results letter mailed to patient. Left message on patient's cell to review that Dr Jolley is full until October, but that he will be added to his appointment waitlist (message sent to scheduling). Instructed patient to call back should he be having any new or concerning symptoms for which he would like to be seen sooner by an LUISA. Instructions included in results letter.

## 2019-07-16 ENCOUNTER — PRE VISIT (OUTPATIENT)
Dept: CARDIOLOGY | Facility: CLINIC | Age: 74
End: 2019-07-16

## 2019-07-17 DIAGNOSIS — I10 BENIGN ESSENTIAL HYPERTENSION: ICD-10-CM

## 2019-07-17 DIAGNOSIS — E78.5 HYPERLIPIDEMIA LDL GOAL <70: ICD-10-CM

## 2019-07-17 DIAGNOSIS — I25.10 CORONARY ARTERY DISEASE INVOLVING NATIVE CORONARY ARTERY OF NATIVE HEART WITHOUT ANGINA PECTORIS: Chronic | ICD-10-CM

## 2019-07-17 DIAGNOSIS — I25.10 CAD (CORONARY ARTERY DISEASE): ICD-10-CM

## 2019-07-17 RX ORDER — ROSUVASTATIN CALCIUM 40 MG/1
40 TABLET, COATED ORAL DAILY
Qty: 90 TABLET | Refills: 0 | Status: SHIPPED | OUTPATIENT
Start: 2019-07-17 | End: 2019-09-23

## 2019-07-18 ENCOUNTER — OFFICE VISIT (OUTPATIENT)
Dept: CARDIOLOGY | Facility: CLINIC | Age: 74
End: 2019-07-18
Payer: COMMERCIAL

## 2019-07-18 VITALS
BODY MASS INDEX: 32.91 KG/M2 | HEIGHT: 70 IN | WEIGHT: 229.9 LBS | HEART RATE: 56 BPM | SYSTOLIC BLOOD PRESSURE: 134 MMHG | DIASTOLIC BLOOD PRESSURE: 80 MMHG

## 2019-07-18 DIAGNOSIS — I25.118 ATHEROSCLEROSIS OF NATIVE CORONARY ARTERY OF NATIVE HEART WITH OTHER FORM OF ANGINA PECTORIS (H): ICD-10-CM

## 2019-07-18 DIAGNOSIS — E78.5 HYPERLIPIDEMIA LDL GOAL <70: ICD-10-CM

## 2019-07-18 DIAGNOSIS — I10 BENIGN ESSENTIAL HYPERTENSION: ICD-10-CM

## 2019-07-18 DIAGNOSIS — E78.5 HYPERLIPIDEMIA LDL GOAL <70: Primary | ICD-10-CM

## 2019-07-18 DIAGNOSIS — I25.10 CORONARY ARTERY DISEASE INVOLVING NATIVE CORONARY ARTERY OF NATIVE HEART WITHOUT ANGINA PECTORIS: Chronic | ICD-10-CM

## 2019-07-18 DIAGNOSIS — E78.6 HDL DEFICIENCY: ICD-10-CM

## 2019-07-18 DIAGNOSIS — E78.00 PURE HYPERCHOLESTEROLEMIA: ICD-10-CM

## 2019-07-18 DIAGNOSIS — E66.09 NON MORBID OBESITY DUE TO EXCESS CALORIES: Chronic | ICD-10-CM

## 2019-07-18 DIAGNOSIS — I25.10 CORONARY ARTERY DISEASE INVOLVING NATIVE CORONARY ARTERY OF NATIVE HEART WITHOUT ANGINA PECTORIS: ICD-10-CM

## 2019-07-18 PROCEDURE — 99213 OFFICE O/P EST LOW 20 MIN: CPT | Performed by: INTERNAL MEDICINE

## 2019-07-18 RX ORDER — EZETIMIBE 10 MG/1
10 TABLET ORAL DAILY
Qty: 90 TABLET | Refills: 3 | Status: SHIPPED | OUTPATIENT
Start: 2019-07-18 | End: 2020-05-22

## 2019-07-18 RX ORDER — RAMIPRIL 5 MG/1
5 CAPSULE ORAL DAILY
Qty: 90 CAPSULE | Refills: 3 | Status: SHIPPED | OUTPATIENT
Start: 2019-07-18 | End: 2020-05-22

## 2019-07-18 RX ORDER — METOPROLOL TARTRATE 25 MG/1
25 TABLET, FILM COATED ORAL 2 TIMES DAILY
Qty: 180 TABLET | Refills: 3 | Status: SHIPPED | OUTPATIENT
Start: 2019-07-18 | End: 2020-06-30

## 2019-07-18 ASSESSMENT — MIFFLIN-ST. JEOR: SCORE: 1785.32

## 2019-07-18 NOTE — PROGRESS NOTES
HPI and Plan:   See dictation    Orders Placed This Encounter   Procedures     Basic metabolic panel     Lipid Profile     Follow-Up with Cardiac Advanced Practice Provider     Follow-Up with Cardiologist       No orders of the defined types were placed in this encounter.      There are no discontinued medications.      Encounter Diagnoses   Name Primary?     Hyperlipidemia LDL goal <70 Yes     Benign essential hypertension      Coronary artery disease involving native coronary artery of native heart without angina pectoris      Pure hypercholesterolemia      Non morbid obesity due to excess calories        CURRENT MEDICATIONS:  Current Outpatient Medications   Medication Sig Dispense Refill     aspirin 81 MG tablet Take 1 tablet (81 mg) by mouth every evening 30 tablet 0     ezetimibe (ZETIA) 10 MG tablet Take 1 tablet (10 mg) by mouth daily 90 tablet 1     Glucosamine 500 MG CAPS Take 500 mg by mouth 2 times daily        ibuprofen (ADVIL/MOTRIN) 200 MG tablet Take 400 mg by mouth daily       levothyroxine (SYNTHROID/LEVOTHROID) 100 MCG tablet Take 1 tablet (100 mcg) by mouth daily 90 tablet 3     metoprolol tartrate (LOPRESSOR) 25 MG tablet Take 1 tablet (25 mg) by mouth 2 times daily 180 tablet 1     Multiple Vitamin (MULTI-VITAMIN) per tablet Take 1 tablet by mouth every morning        nitroGLYcerin (NITROSTAT) 0.4 MG sublingual tablet Place 1 tablet (0.4 mg) under the tongue every 5 minutes as needed May repeat X 2. If no relief after 3 tablets call 911 25 tablet 3     ramipril (ALTACE) 5 MG capsule Take 1 capsule (5 mg) by mouth daily 90 capsule 1     rosuvastatin (CRESTOR) 40 MG tablet Take 1 tablet (40 mg) by mouth daily 90 tablet 0     testosterone (ANDROGEL 1.62% PUMP) 20.25 MG/ACT gel Apply 2 pumps daily  to clean, dry, intact skin of the upper arms and shoulders. 225 g 1       ALLERGIES     Allergies   Allergen Reactions     Cardizem [Diltiazem Hcl] Itching     Cats Other (See Comments)     WATERY EYES,  SNEEZE, AND COUGH     No Clinical Screening - See Comments Hives       PAST MEDICAL HISTORY:  Past Medical History:   Diagnosis Date     Coronary artery disease     cardiac cath 2016: medical management; cath : SUSANA x2 to LAD; cath : PTCA to LAD; cath : PTCA to LAD, : atherectomy of LAD     Hearing problem      Hyperlipidaemia      Hypertension      Obese      Obstructive sleep apnea      Reduced vision      Sleep apnea     CPAP       PAST SURGICAL HISTORY:  Past Surgical History:   Procedure Laterality Date     APPENDECTOMY OPEN       ARTHROPLASTY KNEE Left      ARTHROPLASTY SHOULDER Right      ENT SURGERY       HEART CATH, ANGIOPLASTY      LAD  atherectomy with a DVI device      HEART CATH, ANGIOPLASTY  4-28-10    PTCA and stent proximal and mid LAD (2) stents Xience     HEART CATH, ANGIOPLASTY  2016    no stenosis greater than 25%-rec. 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 Father      Heart Disease Father          at age 44     Heart Disease Son 37        enlarged heart     Heart Disease Mother          at age 80 after heart surgery     Heart Surgery Mother         open heart, passed 10 days after     Diabetes Mother      Family History Negative Maternal Grandmother      Family History Negative Maternal Grandfather      Family History Negative Paternal Grandmother      Family History Negative Paternal Grandfather      Family History Negative Brother      Alzheimer Disease Brother      Family History Negative Sister      Family History Negative Daughter      Family History Negative Son      Family History Negative Daughter      Heart Disease Son           at age 37       SOCIAL HISTORY:  Social History     Socioeconomic History     Marital status:      Spouse name: None     Number of children: None     Years of education: None     Highest education level: None   Occupational History     None   Social Needs     Financial resource strain: None     Food insecurity:     Worry: None     Inability: None     Transportation needs:     Medical: None     Non-medical: None   Tobacco Use     Smoking status: Former Smoker     Packs/day: 0.20     Years: 5.00     Pack years: 1.00     Types: Cigarettes     Start date:      Last attempt to quit: 1960     Years since quittin.5     Smokeless tobacco: Never Used     Tobacco comment: Social smoking only   Substance and Sexual Activity     Alcohol use: Yes     Alcohol/week: 0.6 - 1.2 oz     Types: 1 - 2 Standard drinks or equivalent per week     Comment: FIVE DAYS A WEEK     Drug use: No     Sexual activity: Not Currently     Partners: Female     Birth control/protection: Male Surgical   Lifestyle     Physical activity:     Days per week: None     Minutes per session: None     Stress: None   Relationships     Social connections:     Talks on phone: None     Gets together: None     Attends Yazdanism service: None     Active member of club or organization: None     Attends meetings of clubs or organizations: None     Relationship status: None     Intimate partner violence:     Fear of current or ex partner: None     Emotionally abused: None     Physically abused: None     Forced sexual activity: None   Other Topics Concern     Parent/sibling w/ CABG, MI or angioplasty before 65F 55M? No      Service Not Asked     Blood Transfusions Not Asked     Caffeine Concern Yes     Comment: coffee      Occupational Exposure Not Asked     Hobby Hazards Not Asked     Sleep Concern No     Stress Concern No     Weight Concern Yes     Comment: Weight decrease 10 lbs     Special Diet Yes     Comment: Mediterranean diet  "    Back Care Not Asked     Exercise Yes     Comment: Walking 5 days per week 35- 60 minutes     Bike Helmet Not Asked     Seat Belt Yes     Self-Exams Not Asked   Social History Narrative     None       Review of Systems:  Skin:  Negative       Eyes:  Positive for glasses    ENT:  Positive for hearing loss    Respiratory:  Positive for CPAP;sleep apnea     Cardiovascular:  Negative      Gastroenterology: Negative      Genitourinary:  Negative      Musculoskeletal:  Positive for arthritis    Neurologic:  Negative      Psychiatric:  Negative      Heme/Lymph/Imm:  Negative      Endocrine:  Positive for thyroid disorder      Physical Exam:  Vitals: /80   Pulse 56   Ht 1.78 m (5' 10.08\")   Wt 104.3 kg (229 lb 14.4 oz)   BMI 32.91 kg/m      Constitutional:           Skin:             Head:           Eyes:           Lymph:      ENT:           Neck:           Respiratory:            Cardiac:                                                           GI:           Extremities and Muscular Skeletal:                 Neurological:           Psych:           CC  Alexy Hernandez MD  [facility]  [address] [facility] [place] [address]              "

## 2019-07-18 NOTE — PROGRESS NOTES
Service Date: 2019      HISTORY OF PRESENT ILLNESS:  Mr. Murillo is a very nice 74-year-old gentleman with a past medical history significant for directional coronary atherectomy in .  He subsequently developed restenosis. We did 2 more interventions by balloon angioplasty.  In  two Xience drug-eluting stents were placed in his proximal and mid left anterior descending artery.      Psychosocially, Terrence has had a tough go of it.  His son, Doug,  suddenly in  from what sounds like a cardiac arrest, possible heart attack.  Interestingly, his dad  suddenly of an apparent heart attack at age 44.  His grandmother  suddenly at age 21 of uncertain reasons.  His wife follows through the [facility] system with pulmonary hypertension and she had a port placed and this can be starting on  continuous intravenous therapy and he is quite anxious about this.      Unfortunately, Terrence has developed orthopedic problems over the years and has decreased his ability to exercise.  He has never been very good about controlling his diet and as a result has gained a significant amount of weight.  He was seen by my wife for a period of time helping with his diet and exercise, although he has now backslid and has regained all the weight he lost.  He is not exercising and as stated, has never been very good about his dietary discipline.      Terrence returns to clinic stating he is not having any chest, arm, neck, jaw or shoulder discomfort.  No dyspnea on exertion, orthopnea or PND.  No palpitations, lightheadedness, dizziness, syncope or near-syncope.      ASSESSMENT AND PLAN:  Terrence appears to be doing well from a cardiac standpoint without clinical evidence of ischemia.  Last evaluation of his coronary anatomy was an angiogram in .  He did have branch vessel disease and a jailed diagonal that we ultimately decided to treat medically.      Terrence has no symptoms to suggest heart failure or significant arrhythmia.       Blood pressure is well controlled at 134/80 with a pulse of 56.      Weight unfortunately is back up to 229 pounds, giving a body mass index of 32.9.  We emphasized the importance of regular exercise, healthier diet and weight loss.      Fasting lipid profile is still quite acceptable.  Total cholesterol is 100, HDL is 36, LDL is 43, triglycerides are 107.  I did point out that his HDL dropped significantly with his lack of exercise and his weight gain.      Electrolytes are within normal limits.      We reviewed his medications.  We will continue them as is.      I will have him follow up with my LUISA in 1 year.  I will see him back in 2 years.  If he should have any problems, I would be glad to see him sooner.         CHRISTY VELÁSQUEZ MD, [facility]             D: 2019   T: 2019   MT: [place]      Name:     DANIEL RIGGINS   MRN:      [id number]        Account:      [id number]   :      1945           Service Date: 2019      Document: [id number]

## 2019-07-18 NOTE — LETTER
2019      Alexy Hernandez MD  [facility] [address]  [place] [address]      RE: Terrence Murillo       Dear Colleague,    I had the pleasure of seeing Terrence Murillo in the [facility].    Service Date: 2019      HISTORY OF PRESENT ILLNESS:  Mr. Murillo is a very nice 74-year-old gentleman with a past medical history significant for directional coronary atherectomy in .  He subsequently developed restenosis. We did 2 more interventions by balloon angioplasty.  In  two Xience drug-eluting stents were placed in his proximal and mid left anterior descending artery.      Psychosocially, Terrence has had a tough go of it.  His son, Doug,  suddenly in  from what sounds like a cardiac arrest, possible heart attack.  Interestingly, his dad  suddenly of an apparent heart attack at age 44.  His grandmother  suddenly at age 21 of uncertain reasons.  His wife follows through the [facility] system with pulmonary hypertension and she had a port placed and this can be starting on  continuous intravenous therapy and he is quite anxious about this.      Unfortunately, Terrence has developed orthopedic problems over the years and has decreased his ability to exercise.  He has never been very good about controlling his diet and as a result has gained a significant amount of weight.  He was seen by my wife for a period of time helping with his diet and exercise, although he has now backslid and has regained all the weight he lost.  He is not exercising and as stated, has never been very good about his dietary discipline.      Terrence returns to clinic stating he is not having any chest, arm, neck, jaw or shoulder discomfort.  No dyspnea on exertion, orthopnea or PND.  No palpitations, lightheadedness, dizziness, syncope or near-syncope.      ASSESSMENT AND PLAN:  Terrence appears to be doing well from a cardiac standpoint without clinical evidence of  ischemia.  Last evaluation of his coronary anatomy was an angiogram in 2016.  He did have branch vessel disease and a jailed diagonal that we ultimately decided to treat medically.      Daniel has no symptoms to suggest heart failure or significant arrhythmia.      Blood pressure is well controlled at 134/80 with a pulse of 56.      Weight unfortunately is back up to 229 pounds, giving a body mass index of 32.9.  We emphasized the importance of regular exercise, healthier diet and weight loss.      Fasting lipid profile is still quite acceptable.  Total cholesterol is 100, HDL is 36, LDL is 43, triglycerides are 107.  I did point out that his HDL dropped significantly with his lack of exercise and his weight gain.      Electrolytes are within normal limits.      We reviewed his medications.  We will continue them as is.      I will have him follow up with my LUISA in 1 year.  I will see him back in 2 years.  If he should have any problems, I would be glad to see him sooner.         CHRISTY VELÁSQUEZ MD, [facility]             D: 2019   T: 2019   MT: [place]      Name:     DANIEL RIGGINS   MRN:      [id number]        Account:      [id number]   :      1945           Service Date: 2019      Document: [id number]         Outpatient Encounter Medications as of 2019   Medication Sig Dispense Refill     aspirin 81 MG tablet Take 1 tablet (81 mg) by mouth every evening 30 tablet 0     Glucosamine 500 MG CAPS Take 500 mg by mouth 2 times daily        ibuprofen (ADVIL/MOTRIN) 200 MG tablet Take 400 mg by mouth daily       levothyroxine (SYNTHROID/LEVOTHROID) 100 MCG tablet Take 1 tablet (100 mcg) by mouth daily 90 tablet 3     Multiple Vitamin (MULTI-VITAMIN) per tablet Take 1 tablet by mouth every morning        nitroGLYcerin (NITROSTAT) 0.4 MG sublingual tablet Place 1 tablet (0.4 mg) under the tongue every 5 minutes as needed May repeat X 2. If no relief after 3 tablets call 911 25 tablet 3      rosuvastatin (CRESTOR) 40 MG tablet Take 1 tablet (40 mg) by mouth daily 90 tablet 0     testosterone (ANDROGEL 1.62% PUMP) 20.25 MG/ACT gel Apply 2 pumps daily  to clean, dry, intact skin of the upper arms and shoulders. 225 g 1     [DISCONTINUED] ezetimibe (ZETIA) 10 MG tablet Take 1 tablet (10 mg) by mouth daily 90 tablet 1     [DISCONTINUED] metoprolol tartrate (LOPRESSOR) 25 MG tablet Take 1 tablet (25 mg) by mouth 2 times daily 180 tablet 1     [DISCONTINUED] ramipril (ALTACE) 5 MG capsule Take 1 capsule (5 mg) by mouth daily 90 capsule 1     No facility-administered encounter medications on file as of 7/18/2019.        Again, thank you for allowing me to participate in the care of your patient.      Sincerely,    Eagle Jolley MD     [facility]

## 2019-07-18 NOTE — LETTER
7/18/2019    Alexy Hernandez MD  [facility] [address] [place] [address]    RE: Terrence Murillo       Dear Colleague,    I had the pleasure of seeing Terrence Murillo in the [facility].    HPI and Plan:   See dictation    Orders Placed This Encounter   Procedures     Basic metabolic panel     Lipid Profile     Follow-Up with Cardiac Advanced Practice Provider     Follow-Up with Cardiologist       No orders of the defined types were placed in this encounter.      There are no discontinued medications.      Encounter Diagnoses   Name Primary?     Hyperlipidemia LDL goal <70 Yes     Benign essential hypertension      Coronary artery disease involving native coronary artery of native heart without angina pectoris      Pure hypercholesterolemia      Non morbid obesity due to excess calories        CURRENT MEDICATIONS:  Current Outpatient Medications   Medication Sig Dispense Refill     aspirin 81 MG tablet Take 1 tablet (81 mg) by mouth every evening 30 tablet 0     ezetimibe (ZETIA) 10 MG tablet Take 1 tablet (10 mg) by mouth daily 90 tablet 1     Glucosamine 500 MG CAPS Take 500 mg by mouth 2 times daily        ibuprofen (ADVIL/MOTRIN) 200 MG tablet Take 400 mg by mouth daily       levothyroxine (SYNTHROID/LEVOTHROID) 100 MCG tablet Take 1 tablet (100 mcg) by mouth daily 90 tablet 3     metoprolol tartrate (LOPRESSOR) 25 MG tablet Take 1 tablet (25 mg) by mouth 2 times daily 180 tablet 1     Multiple Vitamin (MULTI-VITAMIN) per tablet Take 1 tablet by mouth every morning        nitroGLYcerin (NITROSTAT) 0.4 MG sublingual tablet Place 1 tablet (0.4 mg) under the tongue every 5 minutes as needed May repeat X 2. If no relief after 3 tablets call 911 25 tablet 3     ramipril (ALTACE) 5 MG capsule Take 1 capsule (5 mg) by mouth daily 90 capsule 1     rosuvastatin (CRESTOR) 40 MG tablet Take 1 tablet (40 mg) by mouth daily 90 tablet 0     testosterone (ANDROGEL  1.62% PUMP) 20.25 MG/ACT gel Apply 2 pumps daily  to clean, dry, intact skin of the upper arms and shoulders. 225 g 1       ALLERGIES     Allergies   Allergen Reactions     Cardizem [Diltiazem Hcl] Itching     Cats Other (See Comments)     WATERY EYES, SNEEZE, AND COUGH     No Clinical Screening - See Comments Hives       PAST MEDICAL HISTORY:  Past Medical History:   Diagnosis Date     Coronary artery disease     cardiac cath 2016: medical management; cath : SUSANA x2 to LAD; cath : PTCA to LAD; cath : PTCA to LAD, : atherectomy of LAD     Hearing problem      Hyperlipidaemia      Hypertension      Obese      Obstructive sleep apnea      Reduced vision      Sleep apnea     CPAP       PAST SURGICAL HISTORY:  Past Surgical History:   Procedure Laterality Date     APPENDECTOMY OPEN       ARTHROPLASTY KNEE Left      ARTHROPLASTY SHOULDER Right      ENT SURGERY       HEART CATH, ANGIOPLASTY      LAD  atherectomy with a DVI device      HEART CATH, ANGIOPLASTY  4-28-10    PTCA and stent proximal and mid LAD (2) stents Xience     HEART CATH, ANGIOPLASTY  2016    no stenosis greater than 25%-rec. 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 Father      Heart Disease Father          at age 44     Heart Disease Son 37        enlarged heart     Heart Disease Mother          at age 80 after heart surgery     Heart Surgery Mother         open heart, passed 10 days after     Diabetes Mother      Family History Negative Maternal Grandmother      Family History Negative Maternal Grandfather      Family History Negative Paternal Grandmother      Family History Negative  Paternal Grandfather      Family History Negative Brother      Alzheimer Disease Brother      Family History Negative Sister      Family History Negative Daughter      Family History Negative Son      Family History Negative Daughter      Heart Disease Son          at age 37       SOCIAL HISTORY:  Social History     Socioeconomic History     Marital status:      Spouse name: None     Number of children: None     Years of education: None     Highest education level: None   Occupational History     None   Social Needs     Financial resource strain: None     Food insecurity:     Worry: None     Inability: None     Transportation needs:     Medical: None     Non-medical: None   Tobacco Use     Smoking status: Former Smoker     Packs/day: 0.20     Years: 5.00     Pack years: 1.00     Types: Cigarettes     Start date:      Last attempt to quit: 1960     Years since quittin.5     Smokeless tobacco: Never Used     Tobacco comment: Social smoking only   Substance and Sexual Activity     Alcohol use: Yes     Alcohol/week: 0.6 - 1.2 oz     Types: 1 - 2 Standard drinks or equivalent per week     Comment: FIVE DAYS A WEEK     Drug use: No     Sexual activity: Not Currently     Partners: Female     Birth control/protection: Male Surgical   Lifestyle     Physical activity:     Days per week: None     Minutes per session: None     Stress: None   Relationships     Social connections:     Talks on phone: None     Gets together: None     Attends Jainism service: None     Active member of club or organization: None     Attends meetings of clubs or organizations: None     Relationship status: None     Intimate partner violence:     Fear of current or ex partner: None     Emotionally abused: None     Physically abused: None     Forced sexual activity: None   Other Topics Concern     Parent/sibling w/ CABG, MI or angioplasty before 65F 55M? No      Service Not Asked     Blood Transfusions Not Asked      "Caffeine Concern Yes     Comment: coffee      Occupational Exposure Not Asked     Hobby Hazards Not Asked     Sleep Concern No     Stress Concern No     Weight Concern Yes     Comment: Weight decrease 10 lbs     Special Diet Yes     Comment: Mediterranean diet     Back Care Not Asked     Exercise Yes     Comment: Walking 5 days per week 35- 60 minutes     Bike Helmet Not Asked     Seat Belt Yes     Self-Exams Not Asked   Social History Narrative     None       Review of Systems:  Skin:  Negative       Eyes:  Positive for glasses    ENT:  Positive for hearing loss    Respiratory:  Positive for CPAP;sleep apnea     Cardiovascular:  Negative      Gastroenterology: Negative      Genitourinary:  Negative      Musculoskeletal:  Positive for arthritis    Neurologic:  Negative      Psychiatric:  Negative      Heme/Lymph/Imm:  Negative      Endocrine:  Positive for thyroid disorder      Physical Exam:  Vitals: /80   Pulse 56   Ht 1.78 m (5' 10.08\")   Wt 104.3 kg (229 lb 14.4 oz)   BMI 32.91 kg/m       Constitutional:           Skin:             Head:           Eyes:           Lymph:      ENT:           Neck:           Respiratory:            Cardiac:                                                           GI:           Extremities and Muscular Skeletal:                 Neurological:           Psych:           CC  Alexy Hernandez MD  [facility]  [address] [place] [address]                Service Date: 2019      HISTORY OF PRESENT ILLNESS:  Mr. Murillo is a very nice 74-year-old gentleman with a past medical history significant for directional coronary atherectomy in .  He subsequently developed restenosis. We did 2 more interventions by balloon angioplasty.  In  two Xience drug-eluting stents were placed in his proximal and mid left anterior descending artery.      Psychosocially, Terrence has had a tough go of it.  His son, Doug,  suddenly in  from what sounds " like a cardiac arrest, possible heart attack.  Interestingly, his dad  suddenly of an apparent heart attack at age 44.  His grandmother  suddenly at age 21 of uncertain reasons.  His wife follows through the [facility] system with pulmonary hypertension and she had a port placed and this can be starting on  continuous intravenous therapy and he is quite anxious about this.      Unfortunately, Terrence has developed orthopedic problems over the years and has decreased his ability to exercise.  He has never been very good about controlling his diet and as a result has gained a significant amount of weight.  He was seen by my wife for a period of time helping with his diet and exercise, although he has now backslid and has regained all the weight he lost.  He is not exercising and as stated, has never been very good about his dietary discipline.      Terrence returns to clinic stating he is not having any chest, arm, neck, jaw or shoulder discomfort.  No dyspnea on exertion, orthopnea or PND.  No palpitations, lightheadedness, dizziness, syncope or near-syncope.      ASSESSMENT AND PLAN:  Terrence appears to be doing well from a cardiac standpoint without clinical evidence of ischemia.  Last evaluation of his coronary anatomy was an angiogram in 2016.  He did have branch vessel disease and a jailed diagonal that we ultimately decided to treat medically.      Terrence has no symptoms to suggest heart failure or significant arrhythmia.      Blood pressure is well controlled at 134/80 with a pulse of 56.      Weight unfortunately is back up to 229 pounds, giving a body mass index of 32.9.  We emphasized the importance of regular exercise, healthier diet and weight loss.      Fasting lipid profile is still quite acceptable.  Total cholesterol is 100, HDL is 36, LDL is 43, triglycerides are 107.  I did point out that his HDL dropped significantly with his lack of exercise and his weight gain.      Electrolytes are within normal  limits.      We reviewed his medications.  We will continue them as is.      I will have him follow up with my LUISA in 1 year.  I will see him back in 2 years.  If he should have any problems, I would be glad to see him sooner.         EAGLE VELÁSQUEZ MD, [facility]             D: 2019   T: 2019   MT: [place]      Name:     DANIEL RIGGINS   MRN:      [id number]        Account:      [id number]   :      1945           Service Date: 2019      Document: [id number]       Thank you for allowing me to participate in the care of your patient.      Sincerely,     Eagle Velásquez MD     [facility]    cc:   Alexy Hernandez MD  [facility]  [address] [place] [address]

## 2019-09-05 DIAGNOSIS — E29.1 SECONDARY MALE HYPOGONADISM: ICD-10-CM

## 2019-09-05 RX ORDER — TESTOSTERONE 1.62 MG/G
GEL TRANSDERMAL
Qty: 225 G | Refills: 0 | OUTPATIENT
Start: 2019-09-05

## 2019-09-18 ENCOUNTER — OFFICE VISIT (OUTPATIENT)
Dept: ENDOCRINOLOGY | Facility: CLINIC | Age: 74
End: 2019-09-18
Payer: COMMERCIAL

## 2019-09-18 VITALS
SYSTOLIC BLOOD PRESSURE: 126 MMHG | BODY MASS INDEX: 33.74 KG/M2 | DIASTOLIC BLOOD PRESSURE: 85 MMHG | HEIGHT: 70 IN | HEART RATE: 60 BPM | WEIGHT: 235.7 LBS

## 2019-09-18 DIAGNOSIS — E29.1 HYPOGONADISM MALE: ICD-10-CM

## 2019-09-18 DIAGNOSIS — D49.7 PITUITARY TUMOR: ICD-10-CM

## 2019-09-18 DIAGNOSIS — E29.1 HYPOGONADISM MALE: Primary | ICD-10-CM

## 2019-09-18 DIAGNOSIS — E03.9 HYPOTHYROIDISM, UNSPECIFIED TYPE: ICD-10-CM

## 2019-09-18 LAB
ALBUMIN SERPL-MCNC: 3.7 G/DL (ref 3.4–5)
ALP SERPL-CCNC: 75 U/L (ref 40–150)
ALT SERPL W P-5'-P-CCNC: 24 U/L (ref 0–70)
ANION GAP SERPL CALCULATED.3IONS-SCNC: 3 MMOL/L (ref 3–14)
AST SERPL W P-5'-P-CCNC: 18 U/L (ref 0–45)
BILIRUB SERPL-MCNC: 0.3 MG/DL (ref 0.2–1.3)
BUN SERPL-MCNC: 17 MG/DL (ref 7–30)
CALCIUM SERPL-MCNC: 8.6 MG/DL (ref 8.5–10.1)
CHLORIDE SERPL-SCNC: 107 MMOL/L (ref 94–109)
CO2 SERPL-SCNC: 26 MMOL/L (ref 20–32)
CREAT SERPL-MCNC: 0.78 MG/DL (ref 0.66–1.25)
GFR SERPL CREATININE-BSD FRML MDRD: 88 ML/MIN/{1.73_M2}
GLUCOSE SERPL-MCNC: 96 MG/DL (ref 70–99)
POTASSIUM SERPL-SCNC: 4.4 MMOL/L (ref 3.4–5.3)
PROT SERPL-MCNC: 7.3 G/DL (ref 6.8–8.8)
SODIUM SERPL-SCNC: 137 MMOL/L (ref 133–144)
T4 FREE SERPL-MCNC: 0.82 NG/DL (ref 0.76–1.46)
TSH SERPL DL<=0.005 MIU/L-ACNC: 1.19 MU/L (ref 0.4–4)

## 2019-09-18 PROCEDURE — 84403 ASSAY OF TOTAL TESTOSTERONE: CPT | Performed by: INTERNAL MEDICINE

## 2019-09-18 ASSESSMENT — MIFFLIN-ST. JEOR: SCORE: 1811.65

## 2019-09-18 ASSESSMENT — PAIN SCALES - GENERAL: PAINLEVEL: NO PAIN (0)

## 2019-09-18 NOTE — PROGRESS NOTES
------ Endocrinology Follow up visit ------    Reason for consultation: pituitary macroadenoma, hypogonadism      Assessment: A 75 yo male post macro adenoma TSS on 11/3/2017, currently doing well, MRI showed small residual 4 mm on the right sellar, undercontrolled with tesosterone supplement and levothryoxine currently     Plan:   # Pituitary macroadenoma  S/p TSS on 11/3/2017, 3 month MRI 4 mm residual vs scar in 1/2019, patient will make follow up appointment with Dr. Bates.      # Secondary hypogonadism  Currently on AndroGel 2 pump.      - total testosterone level today.    # Secondary hypothyroidism    Currently on levothyroxine 100 mcg    - TSH and free T4 today    # Follow-up plan  Once a year follow up for testosterone and thyroid check         RTC with me in 1 year    Addendum    Testosterone 180 but he looks great, may not necessary to increase dose of gel, left message to his phone.     9/18/2019 10:52   Sodium 137   Potassium 4.4   Chloride 107   Carbon Dioxide 26   Urea Nitrogen 17   Creatinine 0.78   GFR Estimate 88   GFR Estimate If Black >90   Calcium 8.6   Anion Gap 3   Albumin 3.7   Protein Total 7.3   Bilirubin Total 0.3   Alkaline Phosphatase 75   ALT 24   AST 18   T4 Free 0.82   Testosterone Total 180 (L)   TSH 1.19   Glucose 96       I spent 30 minutes with this patient face to face and explained the conditions and plans (more than 50% of time was counseling/coordination of care, plan for steroid taper, plan for other axis assessment) . The patient understood and is satisfied with today's visit. Return to clinic with me in 1 year.     Gris Santoro MD  Staff Physician  Endocrinology and Metabolism  [facility]  License: [id number]  Pager: [phone]    Interval History as of 9/18/2019 : Patient has been doing well.  Stable body weight, no headache has been doing well, compliant to testosterone gel and thyroid  medication which he is taking in the middle of the night.  Currently he is busy taking care of his wife who has pulmonary hypertension .   interval History: Last seen in 2/2018.  Nonfunctioning microadenoma, transsphenoidal surgery done November 3, 2017, today no symptoms of headache nausea no visual disturbance.  He has been compliant to testosterone gel currently using 1 pump.  His energy level increased, currently he is working on diet and exercise with lifestyle , he intentionally reduced his body weight 23 pounds for the past 6 months.  He also cut to third over the amount of his alcohol to take.     Subjective: A 72 year old male with a past medical history of pituitary macroadenoma who presents post-operative follow-up after endoscopic, transphenoidal resection of pituitary adenoma on 11/3/17. The mass was found to be non-secretory on initial labs. He was initially found to have the tumor on MRI after he presented to his PCP with complaints of vision problems. The tumor was 3.2 x 2.5 x 2.0 cm in size and compressed the optic chiasm. Pathology consistent with benign adenoma. He was started on prophylactic hydrocortisone prior to discharge. Since d/c, he has been doing well. Energy level is improved. He is walking more. Has occasional headaches behind his eyes but seems to be getting better. Denies increased urinary frequency or excessive thirst. Reports his libido is fine but is not currently sexually active with his wife due to medical issues that he has.  Denies nipple discharge. Feels his vision is about the same as before the surgery. Still has difficulty reading small print.     Interval history: Patient has been doing well.  Currently using the testosterone gel daily basis.  No muscle weakness, no fatigue.  He is still taking levothyroxine.  Hydrocortisone was tapered last visit and is not taking right now. He has good appetite and good sleep.  No headache, no dizziness.  No change vision issues  after the surgery especially when he reads.  Today he came to follow-up postop MRI on 2/1/2-018  and came here for joint appointment with endocrine and neurosurgery.  He is concerned about price of testosterone gel, and also wondering whether he can wean off testosterone.      Current Problem List:   Patient Active Problem List   Diagnosis     Sleep apnea     Coronary artery disease involving native coronary artery of native heart without angina pectoris     Benign essential hypertension     Pure hypercholesterolemia     Non morbid obesity due to excess calories     Chest pain     MORENO (dyspnea on exertion)     Intracranial tumor (H)     Secondary male hypogonadism     Pituitary adenoma (H)     S/P appendectomy     S/P knee replacement     S/P shoulder surgery     HDL deficiency       Past Medical and Past Surgical History:  Past Medical History:   Diagnosis Date     Coronary artery disease     cardiac cath 2016: medical management; cath 2010: SUSANA x2 to LAD; cath 1993: PTCA to LAD; cath 1992: PTCA to LAD, 1992: atherectomy of LAD     Hearing problem      Hyperlipidaemia      Hypertension      Obese      Obstructive sleep apnea      Reduced vision      Sleep apnea     CPAP       Past Surgical History:   Procedure Laterality Date     APPENDECTOMY OPEN       ARTHROPLASTY KNEE Left      ARTHROPLASTY SHOULDER Right      ENT SURGERY       HEART CATH, ANGIOPLASTY  1992    LAD  atherectomy with a DVI device      HEART CATH, ANGIOPLASTY  4-28-10    PTCA and stent proximal and mid LAD (2) stents Xience     HEART CATH, ANGIOPLASTY  04/20/2016    no stenosis greater than 25%-rec. medical management     OPTICAL TRACKING SYSTEM ENDOSCOPIC RESECTION TUMOR CRANIAL N/A 11/3/2017    Procedure: OPTICAL TRACKING SYSTEM ENDOSCOPIC RESECTION TUMOR CRANIAL;  Stealth Guided Endoscopic Transnasal Resection of Pituitary Tumor;  Surgeon: Amanda Bates MD;  Location: [facility]       Medications:   Current Outpatient Medications    Medication Sig Dispense Refill     aspirin 81 MG tablet Take 1 tablet (81 mg) by mouth every evening 30 tablet 0     ezetimibe (ZETIA) 10 MG tablet Take 1 tablet (10 mg) by mouth daily 90 tablet 3     Glucosamine 500 MG CAPS Take 500 mg by mouth 2 times daily        ibuprofen (ADVIL/MOTRIN) 200 MG tablet Take 400 mg by mouth daily       levothyroxine (SYNTHROID/LEVOTHROID) 100 MCG tablet Take 1 tablet (100 mcg) by mouth daily 90 tablet 3     metoprolol tartrate (LOPRESSOR) 25 MG tablet Take 1 tablet (25 mg) by mouth 2 times daily 180 tablet 3     Multiple Vitamin (MULTI-VITAMIN) per tablet Take 1 tablet by mouth every morning        nitroGLYcerin (NITROSTAT) 0.4 MG sublingual tablet Place 1 tablet (0.4 mg) under the tongue every 5 minutes as needed May repeat X 2. If no relief after 3 tablets call 911 25 tablet 3     ramipril (ALTACE) 5 MG capsule Take 1 capsule (5 mg) by mouth daily 90 capsule 3     rosuvastatin (CRESTOR) 40 MG tablet Take 1 tablet (40 mg) by mouth daily 90 tablet 0     testosterone (ANDROGEL 1.62% PUMP) 20.25 MG/ACT gel Apply 2 pumps daily  to clean, dry, intact skin of the upper arms and shoulders. 225 g 1       Allergies:   Allergies   Allergen Reactions     Cardizem [Diltiazem Hcl] Itching     Cats Other (See Comments)     WATERY EYES, SNEEZE, AND COUGH     No Clinical Screening - See Comments Hives       Social History:  Social History     Tobacco Use     Smoking status: Former Smoker     Packs/day: 0.20     Years: 5.00     Pack years: 1.00     Types: Cigarettes     Start date:      Last attempt to quit: 1960     Years since quittin.7     Smokeless tobacco: Never Used     Tobacco comment: Social smoking only   Substance Use Topics     Alcohol use: Yes     Alcohol/week: 0.6 - 1.2 oz     Types: 1 - 2 Standard drinks or equivalent per week     Comment: FIVE DAYS A WEEK         Family History:  Family History   Problem Relation Age of Onset     C.A.D. Father      Heart Disease  "Father          at age 44     Heart Disease Son 37        enlarged heart     Heart Disease Mother          at age 80 after heart surgery     Heart Surgery Mother         open heart, passed 10 days after     Diabetes Mother      Family History Negative Maternal Grandmother      Family History Negative Maternal Grandfather      Family History Negative Paternal Grandmother      Family History Negative Paternal Grandfather      Family History Negative Brother      Alzheimer Disease Brother      Family History Negative Sister      Family History Negative Daughter      Family History Negative Son      Family History Negative Daughter      Heart Disease Son          at age 37       Review of Systems:  A 10-point ROS is otherwise negative except as noted in HPI.     Physical Examination:  Blood pressure 126/85, pulse 60, height 1.78 m (5' 10.08\"), weight 106.9 kg (235 lb 11.2 oz).  Body mass index is 33.74 kg/m .  Wt Readings from Last 4 Encounters:   19 106.9 kg (235 lb 11.2 oz)   19 104.3 kg (229 lb 14.4 oz)   19 105.1 kg (231 lb 9.6 oz)   10/09/18 102.1 kg (225 lb 1.6 oz)     GEN: Alert, no distress.  HEENT: EOMI.  NECK: Thyroid normal to palpation, non-tender. No cervical/clavicular LAD.   Visual field: grossly intact  CV: RRR with no murmur.   RESP: CTA bilaterally.   EXT: No peripheral edema.    SKIN: Normal skin temperature and turgor with no lesions.   MSK: Normal muscle bulk, no abnormal appearing joints.   NEURO: Cranial nerves intact. Non-focal.       2018 00:00 2018 07:38 10/10/2018 08:47 2019 09:17   Testosterone Total 121 (A) 172 (L) 162 (L) 294   TSH 0.03 (A) 1.82 1.70 0.98   T4 Free 1.2  0.75 (L) 0.89       2019 MRI: I also personally reviewed images.   Brain/ Pituitary MRI without and with contrast     History: Benign neoplasm of pituitary gland and craniopharyngeal duct  (pouch) (H); Benign neoplasm of pituitary gland and craniopharyngeal  duct (pouch) " (H).  ICD-10: Benign neoplasm of pituitary gland and craniopharyngeal duct  (pouch) (H); Benign neoplasm of pituitary gland and craniopharyngeal  duct (pouch) (H)     Comparison:  MRI  2/1/ 2018 and MRI study on 11/3/2017.      Technique: Axial diffusion and FLAIR images of the whole brain  obtained without intravenous contrast. Sagittal T1 and T2-weighted,  coronal T2-weighted, coronal T1-weighted images with focus on the  sella were obtained without intravenous contrast. Post intravenous  contrast using gadolinium coronal and sagittal T1-weighted images were  obtained focused on the sella. Dynamic postcontrast coronal  T1-weighted images were also obtained.     Contrast: 10ml  Gadavist     Findings:    Stable postoperative changes of transnasal endoscopic resection of  pituitary since 2/1/2018. Unchanged leftward deviation of the  pituitary stalk. Partial empty sella also appears unchanged.  Previously noted small round focus of hypoenhancement along the medial  aspect of the right cavernous sinus measuring up to 4 mm, unchanged  (series 100, image 85).     The optic chiasm is intact and nondisplaced.     The major cavernous carotid vascular flow-voids are patent.       FLAIR images through the whole brain demonstrate mild leukoaraiosis.  Mild generalized parenchymal volume loss. No mass effect, midline  shift, or hydrocephalus. Bilateral pseudophakia.                                                                      Impression:   1. Stable postoperative changes of transnasal endoscopic pituitary  adenoma resection.  2. Unchanged small focus of hypoenhancement in the right cavernous  since 2/2018 most likely reflecting postoperative change although  residual tumor cannot be entirely excluded.  MR BRAIN W/O & W CONTRAST 2/1/2018 12:05 PM

## 2019-09-18 NOTE — LETTER
9/18/2019     RE: Terrence Murillo  [address]  [place] [address]     Dear Colleague,    Thank you for referring your patient, Terrence Murillo, to the [facility] ENDOCRINOLOGY at [facility]. Please see a copy of my visit note below.                                                                ------ Endocrinology Follow up visit ------    Reason for consultation: pituitary macroadenoma, hypogonadism      Assessment: A 75 yo male post macro adenoma TSS on 11/3/2017, currently doing well, MRI showed small residual 4 mm on the right sellar, undercontrolled with tesosterone supplement and levothryoxine currently     Plan:   # Pituitary macroadenoma  S/p TSS on 11/3/2017, 3 month MRI 4 mm residual vs scar in 1/2019, patient will make follow up appointment with Dr. Bates.      # Secondary hypogonadism  Currently on AndroGel 2 pump.      - total testosterone level today.    # Secondary hypothyroidism    Currently on levothyroxine 100 mcg    - TSH and free T4 today    # Follow-up plan  Once a year follow up for testosterone and thyroid check         RTC with me in 1 year    Addendum    Testosterone 180 but he looks great, may not necessary to increase dose of gel, left message to his phone.     9/18/2019 10:52   Sodium 137   Potassium 4.4   Chloride 107   Carbon Dioxide 26   Urea Nitrogen 17   Creatinine 0.78   GFR Estimate 88   GFR Estimate If Black >90   Calcium 8.6   Anion Gap 3   Albumin 3.7   Protein Total 7.3   Bilirubin Total 0.3   Alkaline Phosphatase 75   ALT 24   AST 18   T4 Free 0.82   Testosterone Total 180 (L)   TSH 1.19   Glucose 96       I spent 30 minutes with this patient face to face and explained the conditions and plans (more than 50% of time was counseling/coordination of care, plan for steroid taper, plan for other axis assessment) . The patient understood and is satisfied with today's visit. Return to clinic with me in 1 year.     Gris Santoro MD  Staff  Physician  Endocrinology and Metabolism  [facility]  License: [id number]  Pager: [phone]    Interval History as of 9/18/2019 : Patient has been doing well.  Stable body weight, no headache has been doing well, compliant to testosterone gel and thyroid medication which he is taking in the middle of the night.  Currently he is busy taking care of his wife who has pulmonary hypertension .   interval History: Last seen in 2/2018.  Nonfunctioning microadenoma, transsphenoidal surgery done November 3, 2017, today no symptoms of headache nausea no visual disturbance.  He has been compliant to testosterone gel currently using 1 pump.  His energy level increased, currently he is working on diet and exercise with lifestyle , he intentionally reduced his body weight 23 pounds for the past 6 months.  He also cut to third over the amount of his alcohol to take.     Subjective: A 72 year old male with a past medical history of pituitary macroadenoma who presents post-operative follow-up after endoscopic, transphenoidal resection of pituitary adenoma on 11/3/17. The mass was found to be non-secretory on initial labs. He was initially found to have the tumor on MRI after he presented to his PCP with complaints of vision problems. The tumor was 3.2 x 2.5 x 2.0 cm in size and compressed the optic chiasm. Pathology consistent with benign adenoma. He was started on prophylactic hydrocortisone prior to discharge. Since d/c, he has been doing well. Energy level is improved. He is walking more. Has occasional headaches behind his eyes but seems to be getting better. Denies increased urinary frequency or excessive thirst. Reports his libido is fine but is not currently sexually active with his wife due to medical issues that he has.  Denies nipple discharge. Feels his vision is about the same as before the surgery. Still has difficulty reading small print.     Interval history: Patient has been doing well.   Currently using the testosterone gel daily basis.  No muscle weakness, no fatigue.  He is still taking levothyroxine.  Hydrocortisone was tapered last visit and is not taking right now. He has good appetite and good sleep.  No headache, no dizziness.  No change vision issues after the surgery especially when he reads.  Today he came to follow-up postop MRI on 2/1/2-018  and came here for joint appointment with endocrine and neurosurgery.  He is concerned about price of testosterone gel, and also wondering whether he can wean off testosterone.      Current Problem List:   Patient Active Problem List   Diagnosis     Sleep apnea     Coronary artery disease involving native coronary artery of native heart without angina pectoris     Benign essential hypertension     Pure hypercholesterolemia     Non morbid obesity due to excess calories     Chest pain     MORENO (dyspnea on exertion)     Intracranial tumor (H)     Secondary male hypogonadism     Pituitary adenoma (H)     S/P appendectomy     S/P knee replacement     S/P shoulder surgery     HDL deficiency       Past Medical and Past Surgical History:  Past Medical History:   Diagnosis Date     Coronary artery disease     cardiac cath 2016: medical management; cath 2010: SUSANA x2 to LAD; cath 1993: PTCA to LAD; cath 1992: PTCA to LAD, 1992: atherectomy of LAD     Hearing problem      Hyperlipidaemia      Hypertension      Obese      Obstructive sleep apnea      Reduced vision      Sleep apnea     CPAP       Past Surgical History:   Procedure Laterality Date     APPENDECTOMY OPEN       ARTHROPLASTY KNEE Left      ARTHROPLASTY SHOULDER Right      ENT SURGERY       HEART CATH, ANGIOPLASTY  1992    LAD  atherectomy with a DVI device      HEART CATH, ANGIOPLASTY  4-28-10    PTCA and stent proximal and mid LAD (2) stents Xience     HEART CATH, ANGIOPLASTY  04/20/2016    no stenosis greater than 25%-rec. medical management     OPTICAL TRACKING SYSTEM ENDOSCOPIC RESECTION TUMOR  CRANIAL N/A 11/3/2017    Procedure: OPTICAL TRACKING SYSTEM ENDOSCOPIC RESECTION TUMOR CRANIAL;  Stealth Guided Endoscopic Transnasal Resection of Pituitary Tumor;  Surgeon: Amanda Bates MD;  Location:  [facility]       Medications:   Current Outpatient Medications   Medication Sig Dispense Refill     aspirin 81 MG tablet Take 1 tablet (81 mg) by mouth every evening 30 tablet 0     ezetimibe (ZETIA) 10 MG tablet Take 1 tablet (10 mg) by mouth daily 90 tablet 3     Glucosamine 500 MG CAPS Take 500 mg by mouth 2 times daily        ibuprofen (ADVIL/MOTRIN) 200 MG tablet Take 400 mg by mouth daily       levothyroxine (SYNTHROID/LEVOTHROID) 100 MCG tablet Take 1 tablet (100 mcg) by mouth daily 90 tablet 3     metoprolol tartrate (LOPRESSOR) 25 MG tablet Take 1 tablet (25 mg) by mouth 2 times daily 180 tablet 3     Multiple Vitamin (MULTI-VITAMIN) per tablet Take 1 tablet by mouth every morning        nitroGLYcerin (NITROSTAT) 0.4 MG sublingual tablet Place 1 tablet (0.4 mg) under the tongue every 5 minutes as needed May repeat X 2. If no relief after 3 tablets call 911 25 tablet 3     ramipril (ALTACE) 5 MG capsule Take 1 capsule (5 mg) by mouth daily 90 capsule 3     rosuvastatin (CRESTOR) 40 MG tablet Take 1 tablet (40 mg) by mouth daily 90 tablet 0     testosterone (ANDROGEL 1.62% PUMP) 20.25 MG/ACT gel Apply 2 pumps daily  to clean, dry, intact skin of the upper arms and shoulders. 225 g 1       Allergies:   Allergies   Allergen Reactions     Cardizem [Diltiazem Hcl] Itching     Cats Other (See Comments)     WATERY EYES, SNEEZE, AND COUGH     No Clinical Screening - See Comments Hives       Social History:  Social History     Tobacco Use     Smoking status: Former Smoker     Packs/day: 0.20     Years: 5.00     Pack years: 1.00     Types: Cigarettes     Start date:      Last attempt to quit: 1960     Years since quittin.7     Smokeless tobacco: Never Used     Tobacco comment: Social smoking only  "  Substance Use Topics     Alcohol use: Yes     Alcohol/week: 0.6 - 1.2 oz     Types: 1 - 2 Standard drinks or equivalent per week     Comment: FIVE DAYS A WEEK         Family History:  Family History   Problem Relation Age of Onset     C.A.D. Father      Heart Disease Father          at age 44     Heart Disease Son 37        enlarged heart     Heart Disease Mother          at age 80 after heart surgery     Heart Surgery Mother         open heart, passed 10 days after     Diabetes Mother      Family History Negative Maternal Grandmother      Family History Negative Maternal Grandfather      Family History Negative Paternal Grandmother      Family History Negative Paternal Grandfather      Family History Negative Brother      Alzheimer Disease Brother      Family History Negative Sister      Family History Negative Daughter      Family History Negative Son      Family History Negative Daughter      Heart Disease Son          at age 37       Review of Systems:  A 10-point ROS is otherwise negative except as noted in HPI.     Physical Examination:  Blood pressure 126/85, pulse 60, height 1.78 m (5' 10.08\"), weight 106.9 kg (235 lb 11.2 oz).  Body mass index is 33.74 kg/m .  Wt Readings from Last 4 Encounters:   19 106.9 kg (235 lb 11.2 oz)   19 104.3 kg (229 lb 14.4 oz)   19 105.1 kg (231 lb 9.6 oz)   10/09/18 102.1 kg (225 lb 1.6 oz)     GEN: Alert, no distress.  HEENT: EOMI.  NECK: Thyroid normal to palpation, non-tender. No cervical/clavicular LAD.   Visual field: grossly intact  CV: RRR with no murmur.   RESP: CTA bilaterally.   EXT: No peripheral edema.    SKIN: Normal skin temperature and turgor with no lesions.   MSK: Normal muscle bulk, no abnormal appearing joints.   NEURO: Cranial nerves intact. Non-focal.       2018 00:00 2018 07:38 10/10/2018 08:47 2019 09:17   Testosterone Total 121 (A) 172 (L) 162 (L) 294   TSH 0.03 (A) 1.82 1.70 0.98   T4 Free 1.2  0.75 (L) " 0.89       1/30/2019 MRI: I also personally reviewed images.   Brain/ Pituitary MRI without and with contrast     History: Benign neoplasm of pituitary gland and craniopharyngeal duct  (pouch) (H); Benign neoplasm of pituitary gland and craniopharyngeal  duct (pouch) (H).  ICD-10: Benign neoplasm of pituitary gland and craniopharyngeal duct  (pouch) (H); Benign neoplasm of pituitary gland and craniopharyngeal  duct (pouch) (H)     Comparison:  MRI  2/1/ 2018 and MRI study on 11/3/2017.      Technique: Axial diffusion and FLAIR images of the whole brain  obtained without intravenous contrast. Sagittal T1 and T2-weighted,  coronal T2-weighted, coronal T1-weighted images with focus on the  sella were obtained without intravenous contrast. Post intravenous  contrast using gadolinium coronal and sagittal T1-weighted images were  obtained focused on the sella. Dynamic postcontrast coronal  T1-weighted images were also obtained.     Contrast: 10ml  Gadavist     Findings:    Stable postoperative changes of transnasal endoscopic resection of  pituitary since 2/1/2018. Unchanged leftward deviation of the  pituitary stalk. Partial empty sella also appears unchanged.  Previously noted small round focus of hypoenhancement along the medial  aspect of the right cavernous sinus measuring up to 4 mm, unchanged  (series 100, image 85).     The optic chiasm is intact and nondisplaced.     The major cavernous carotid vascular flow-voids are patent.       FLAIR images through the whole brain demonstrate mild leukoaraiosis.  Mild generalized parenchymal volume loss. No mass effect, midline  shift, or hydrocephalus. Bilateral pseudophakia.                                                                    Impression:   1. Stable postoperative changes of transnasal endoscopic pituitary  adenoma resection.  2. Unchanged small focus of hypoenhancement in the right cavernous  since 2/2018 most likely reflecting postoperative change  although  residual tumor cannot be entirely excluded.  MR BRAIN W/O & W CONTRAST 2/1/2018 12:05 PM     Again, thank you for allowing me to participate in the care of your patient.      Sincerely,    Gris Santoro MD

## 2019-09-20 LAB — TESTOST SERPL-MCNC: 180 NG/DL (ref 240–950)

## 2019-09-23 DIAGNOSIS — I25.10 CAD (CORONARY ARTERY DISEASE): ICD-10-CM

## 2019-09-23 DIAGNOSIS — E78.5 HYPERLIPIDEMIA LDL GOAL <70: ICD-10-CM

## 2019-09-23 DIAGNOSIS — I25.10 CORONARY ARTERY DISEASE INVOLVING NATIVE CORONARY ARTERY OF NATIVE HEART WITHOUT ANGINA PECTORIS: Chronic | ICD-10-CM

## 2019-09-23 DIAGNOSIS — I10 BENIGN ESSENTIAL HYPERTENSION: ICD-10-CM

## 2019-09-23 RX ORDER — ROSUVASTATIN CALCIUM 40 MG/1
40 TABLET, COATED ORAL DAILY
Qty: 90 TABLET | Refills: 3 | Status: SHIPPED | OUTPATIENT
Start: 2019-09-23 | End: 2020-08-03

## 2019-10-03 ENCOUNTER — HEALTH MAINTENANCE LETTER (OUTPATIENT)
Age: 74
End: 2019-10-03

## 2019-11-12 DIAGNOSIS — E03.8 SECONDARY HYPOTHYROIDISM: ICD-10-CM

## 2019-11-13 RX ORDER — LEVOTHYROXINE SODIUM 100 UG/1
TABLET ORAL
Qty: 90 TABLET | Refills: 3 | Status: SHIPPED | OUTPATIENT
Start: 2019-11-13 | End: 2020-10-27

## 2019-12-16 ENCOUNTER — HEALTH MAINTENANCE LETTER (OUTPATIENT)
Age: 74
End: 2019-12-16

## 2019-12-20 DIAGNOSIS — I25.10 CORONARY ARTERY DISEASE INVOLVING NATIVE CORONARY ARTERY OF NATIVE HEART WITHOUT ANGINA PECTORIS: ICD-10-CM

## 2019-12-20 RX ORDER — NITROGLYCERIN 0.4 MG/1
0.4 TABLET SUBLINGUAL EVERY 5 MIN PRN
Qty: 25 TABLET | Refills: 3 | Status: SHIPPED | OUTPATIENT
Start: 2019-12-20 | End: 2021-01-29

## 2020-01-28 ENCOUNTER — ANCILLARY PROCEDURE (OUTPATIENT)
Dept: MRI IMAGING | Facility: CLINIC | Age: 75
End: 2020-01-28
Attending: NEUROLOGICAL SURGERY
Payer: COMMERCIAL

## 2020-01-28 ENCOUNTER — OFFICE VISIT (OUTPATIENT)
Dept: NEUROSURGERY | Facility: CLINIC | Age: 75
End: 2020-01-28
Payer: COMMERCIAL

## 2020-01-28 VITALS
OXYGEN SATURATION: 95 % | SYSTOLIC BLOOD PRESSURE: 126 MMHG | DIASTOLIC BLOOD PRESSURE: 77 MMHG | BODY MASS INDEX: 34.62 KG/M2 | WEIGHT: 241.8 LBS | HEART RATE: 53 BPM

## 2020-01-28 DIAGNOSIS — D35.3 BENIGN NEOPLASM OF PITUITARY GLAND AND CRANIOPHARYNGEAL DUCT (POUCH) (H): Primary | ICD-10-CM

## 2020-01-28 DIAGNOSIS — D35.2 PITUITARY MACROADENOMA (H): ICD-10-CM

## 2020-01-28 DIAGNOSIS — D35.2 BENIGN NEOPLASM OF PITUITARY GLAND AND CRANIOPHARYNGEAL DUCT (POUCH) (H): Primary | ICD-10-CM

## 2020-01-28 RX ORDER — GADOBUTROL 604.72 MG/ML
10 INJECTION INTRAVENOUS ONCE
Status: COMPLETED | OUTPATIENT
Start: 2020-01-28 | End: 2020-01-28

## 2020-01-28 RX ORDER — MAG HYDROX/ALUMINUM HYD/SIMETH 400-400-40
450 SUSPENSION, ORAL (FINAL DOSE FORM) ORAL 2 TIMES DAILY
COMMUNITY

## 2020-01-28 RX ADMIN — GADOBUTROL 10 ML: 604.72 INJECTION INTRAVENOUS at 10:09

## 2020-01-28 ASSESSMENT — PAIN SCALES - GENERAL: PAINLEVEL: NO PAIN (0)

## 2020-01-28 NOTE — PROGRESS NOTES
Dear Dr. Hernandez:    We saw Mr. 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 His tumor was causing bitemporal hemianopsia. Currently, he is doing well and appears very pleasant. His vision is stable and he is on testosterone and thyroid replacement therapy, which is managed by Dr. Santoro. He will be seeing Dr. Herndon in April.    PHYSICAL EXAMINATION: On exam, his general appearance is good. He is obese. He has a right temporal hemianopia. The remaining gross neurologic examination is normal.    REVIEW OF STUDIES: We reviewed his MRI from today and compared it to previous images. There is a small area of hypoenhancement in the right side of the sella is unchanged. Overall, his MRI is very stable.     IMPRESSION AND PLAN: Given his clinical and radiographic stability, we will see him back in two years with another MRI.    Please do not hesitate to contact us with questions. We will keep you informed of his progress.    MD LYLA MARSHALL Asef Chowdhury, am serving as a scribe to document services personally performed by Amanda Bates MD, based upon my observations and the provider's statements to me. All documentation has been reviewed by the aforementioned doctor prior to being entered into the official medical record.

## 2020-01-28 NOTE — DISCHARGE INSTRUCTIONS
MRI Contrast Discharge Instructions    The IV contrast you received today will pass out of your body in your  urine. This will happen in the next 24 hours. You will not feel this process.  Your urine will not change color.    Drink at least 4 extra glasses of water or juice today (unless your doctor  has restricted your fluids). This reduces the stress on your kidneys.  You may take your regular medicines.    If you are on dialysis: It is best to have dialysis today.    If you have a reaction: Most reactions happen right away. If you have  any new symptoms after leaving the hospital (such as hives or swelling),  call your hospital at the correct number below. Or call your family doctor.  If you have breathing distress or wheezing, call 911.    Special instructions: ***    I have read and understand the above information.    Signature:______________________________________ Date:___________    Staff:__________________________________________ Date:___________     Time:__________    [facility] Departments:    ___[place]: [phone]  ___[facility]: [phone]  ___[place]: [phone] ___[facility]: [phone]  ___[facility]: [phone]  ___[facility]: [phone]  ___[phone]  ___[facility]: [phone]  ___[phone]

## 2020-01-28 NOTE — NURSING NOTE
Chief Complaint   Patient presents with     RECHECK     [facility] RETURN - YR F/U      Sadie Cortes, EMT

## 2020-01-28 NOTE — LETTER
1/28/2020     RE: Terrence Murillo  [address]  [place] [address]     Dear Colleague,    Thank you for referring your patient, Terrence Murillo, to the [facility]UROSURGERY at [facility]. Please see a copy of my visit note below.    Dear Dr. Hernandez:    We saw Mr. 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 His tumor was causing bitemporal hemianopsia. Currently, he is doing well and appears very pleasant. His vision is stable and he is on testosterone and thyroid replacement therapy, which is managed by Dr. Santoro. He will be seeing Dr. Herndon in April.    PHYSICAL EXAMINATION: On exam, his general appearance is good. He is obese. He has a right temporal hemianopia. The remaining gross neurologic examination is normal.    REVIEW OF STUDIES: We reviewed his MRI from today and compared it to previous images. There is a small area of hypoenhancement in the right side of the sella is unchanged. Overall, his MRI is very stable.     IMPRESSION AND PLAN: Given his clinical and radiographic stability, we will see him back in two years with another MRI.    Please do not hesitate to contact us with questions. We will keep you informed of his progress.    MD LYLA MARSHALL, Darshan Garcia, am serving as a scribe to document services personally performed by Amanda Bates MD, based upon my observations and the provider's statements to me. All documentation has been reviewed by the aforementioned doctor prior to being entered into the official medical record.

## 2020-03-29 DIAGNOSIS — E29.1 SECONDARY MALE HYPOGONADISM: ICD-10-CM

## 2020-03-30 RX ORDER — TESTOSTERONE 1.62 MG/G
GEL TRANSDERMAL
Qty: 225 G | Refills: 3 | Status: SHIPPED | OUTPATIENT
Start: 2020-03-30 | End: 2021-01-13

## 2020-03-30 NOTE — TELEPHONE ENCOUNTER
TESTOSTERONE 1.62% GEL PUMP       Last Written Prescription Date:  6/7/2019  Last Fill Quantity: 225,   # refills: 1  Last Office Visit : 9/18/2019  Future Office visit:  NOne    Routing refill request to provider for review/approval because:  Drug not on the [facility], [facility] or [facility] refill protocol or controlled substance      Sophie Anderson RN  Central Triage Red Flags/Med Refills

## 2020-05-22 DIAGNOSIS — I10 BENIGN ESSENTIAL HYPERTENSION: ICD-10-CM

## 2020-05-22 DIAGNOSIS — I25.10 CORONARY ARTERY DISEASE INVOLVING NATIVE CORONARY ARTERY OF NATIVE HEART WITHOUT ANGINA PECTORIS: Chronic | ICD-10-CM

## 2020-05-22 DIAGNOSIS — I25.118 ATHEROSCLEROSIS OF NATIVE CORONARY ARTERY OF NATIVE HEART WITH OTHER FORM OF ANGINA PECTORIS (H): ICD-10-CM

## 2020-05-22 DIAGNOSIS — E78.5 HYPERLIPIDEMIA LDL GOAL <70: ICD-10-CM

## 2020-05-22 RX ORDER — EZETIMIBE 10 MG/1
10 TABLET ORAL DAILY
Qty: 90 TABLET | Refills: 0 | Status: SHIPPED | OUTPATIENT
Start: 2020-05-22 | End: 2020-09-14

## 2020-05-22 RX ORDER — RAMIPRIL 5 MG/1
5 CAPSULE ORAL DAILY
Qty: 90 CAPSULE | Refills: 0 | Status: SHIPPED | OUTPATIENT
Start: 2020-05-22 | End: 2020-07-30

## 2020-06-30 DIAGNOSIS — E78.5 HYPERLIPIDEMIA LDL GOAL <70: ICD-10-CM

## 2020-06-30 DIAGNOSIS — I10 BENIGN ESSENTIAL HYPERTENSION: ICD-10-CM

## 2020-06-30 DIAGNOSIS — I25.10 CORONARY ARTERY DISEASE INVOLVING NATIVE CORONARY ARTERY OF NATIVE HEART WITHOUT ANGINA PECTORIS: Chronic | ICD-10-CM

## 2020-06-30 RX ORDER — METOPROLOL TARTRATE 25 MG/1
25 TABLET, FILM COATED ORAL 2 TIMES DAILY
Qty: 180 TABLET | Refills: 0 | Status: SHIPPED | OUTPATIENT
Start: 2020-06-30 | End: 2020-10-21

## 2020-07-30 DIAGNOSIS — E78.5 HYPERLIPIDEMIA LDL GOAL <70: ICD-10-CM

## 2020-07-30 DIAGNOSIS — I25.10 CORONARY ARTERY DISEASE INVOLVING NATIVE CORONARY ARTERY OF NATIVE HEART WITHOUT ANGINA PECTORIS: Chronic | ICD-10-CM

## 2020-07-30 DIAGNOSIS — I10 BENIGN ESSENTIAL HYPERTENSION: ICD-10-CM

## 2020-07-30 RX ORDER — RAMIPRIL 5 MG/1
5 CAPSULE ORAL DAILY
Qty: 90 CAPSULE | Refills: 0 | Status: SHIPPED | OUTPATIENT
Start: 2020-07-30 | End: 2020-10-12

## 2020-08-03 DIAGNOSIS — E78.5 HYPERLIPIDEMIA LDL GOAL <70: ICD-10-CM

## 2020-08-03 DIAGNOSIS — I25.10 CAD (CORONARY ARTERY DISEASE): ICD-10-CM

## 2020-08-03 DIAGNOSIS — I10 BENIGN ESSENTIAL HYPERTENSION: ICD-10-CM

## 2020-08-03 DIAGNOSIS — I25.10 CORONARY ARTERY DISEASE INVOLVING NATIVE CORONARY ARTERY OF NATIVE HEART WITHOUT ANGINA PECTORIS: Chronic | ICD-10-CM

## 2020-08-03 RX ORDER — ROSUVASTATIN CALCIUM 40 MG/1
40 TABLET, COATED ORAL DAILY
Qty: 90 TABLET | Refills: 0 | Status: SHIPPED | OUTPATIENT
Start: 2020-08-03 | End: 2020-10-21

## 2020-08-12 ENCOUNTER — TELEPHONE (OUTPATIENT)
Dept: CARDIOLOGY | Facility: CLINIC | Age: 75
End: 2020-08-12

## 2020-08-12 NOTE — TELEPHONE ENCOUNTER
Patient called for an overdue annual visit. Patient transferred to scheduling for Nov visit with Dr. Jolley.  Patient agrees with plan. Cardiac medication were reviewed in early Aug for 3 months.

## 2020-09-14 DIAGNOSIS — I25.118 ATHEROSCLEROSIS OF NATIVE CORONARY ARTERY OF NATIVE HEART WITH OTHER FORM OF ANGINA PECTORIS (H): ICD-10-CM

## 2020-09-14 RX ORDER — EZETIMIBE 10 MG/1
10 TABLET ORAL DAILY
Qty: 90 TABLET | Refills: 0 | Status: SHIPPED | OUTPATIENT
Start: 2020-09-14 | End: 2020-10-21

## 2020-10-07 DIAGNOSIS — D35.2 BENIGN NEOPLASM OF PITUITARY GLAND AND CRANIOPHARYNGEAL DUCT (POUCH) (H): ICD-10-CM

## 2020-10-07 DIAGNOSIS — I25.10 CORONARY ARTERY DISEASE INVOLVING NATIVE CORONARY ARTERY OF NATIVE HEART WITHOUT ANGINA PECTORIS: ICD-10-CM

## 2020-10-07 DIAGNOSIS — D35.3 BENIGN NEOPLASM OF PITUITARY GLAND AND CRANIOPHARYNGEAL DUCT (POUCH) (H): ICD-10-CM

## 2020-10-07 LAB
ANION GAP SERPL CALCULATED.3IONS-SCNC: 5 MMOL/L (ref 3–14)
BUN SERPL-MCNC: 19 MG/DL (ref 7–30)
CALCIUM SERPL-MCNC: 8.8 MG/DL (ref 8.5–10.1)
CHLORIDE SERPL-SCNC: 108 MMOL/L (ref 94–109)
CHOLEST SERPL-MCNC: 110 MG/DL
CO2 SERPL-SCNC: 26 MMOL/L (ref 20–32)
CREAT SERPL-MCNC: 0.86 MG/DL (ref 0.66–1.25)
GFR SERPL CREATININE-BSD FRML MDRD: 84 ML/MIN/{1.73_M2}
GLUCOSE SERPL-MCNC: 96 MG/DL (ref 70–99)
HDLC SERPL-MCNC: 41 MG/DL
LDLC SERPL CALC-MCNC: 49 MG/DL
NONHDLC SERPL-MCNC: 69 MG/DL
POTASSIUM SERPL-SCNC: 4.5 MMOL/L (ref 3.4–5.3)
SODIUM SERPL-SCNC: 139 MMOL/L (ref 133–144)
TRIGL SERPL-MCNC: 101 MG/DL

## 2020-10-07 PROCEDURE — 80048 BASIC METABOLIC PNL TOTAL CA: CPT | Performed by: INTERNAL MEDICINE

## 2020-10-07 PROCEDURE — 80061 LIPID PANEL: CPT | Performed by: INTERNAL MEDICINE

## 2020-10-08 ENCOUNTER — OFFICE VISIT (OUTPATIENT)
Dept: OPHTHALMOLOGY | Facility: CLINIC | Age: 75
End: 2020-10-08
Attending: OPHTHALMOLOGY
Payer: COMMERCIAL

## 2020-10-08 DIAGNOSIS — H53.40 VISUAL FIELD DEFECT: ICD-10-CM

## 2020-10-08 DIAGNOSIS — Z86.018 HISTORY OF PITUITARY ADENOMA: Primary | ICD-10-CM

## 2020-10-08 DIAGNOSIS — H53.40 VISUAL FIELD DEFECT: Primary | ICD-10-CM

## 2020-10-08 PROCEDURE — 92133 CPTRZD OPH DX IMG PST SGM ON: CPT | Performed by: OPHTHALMOLOGY

## 2020-10-08 PROCEDURE — G0463 HOSPITAL OUTPT CLINIC VISIT: HCPCS | Performed by: TECHNICIAN/TECHNOLOGIST

## 2020-10-08 PROCEDURE — 92083 EXTENDED VISUAL FIELD XM: CPT | Performed by: OPHTHALMOLOGY

## 2020-10-08 PROCEDURE — 92014 COMPRE OPH EXAM EST PT 1/>: CPT | Mod: GC | Performed by: OPHTHALMOLOGY

## 2020-10-08 ASSESSMENT — CONF VISUAL FIELD
OS_NORMAL: 1
METHOD: COUNTING FINGERS
OD_NORMAL: 1

## 2020-10-08 ASSESSMENT — SLIT LAMP EXAM - LIDS
COMMENTS: NORMAL
COMMENTS: NORMAL

## 2020-10-08 ASSESSMENT — REFRACTION_WEARINGRX
OD_AXIS: 167
OD_SPHERE: -0.25
OS_SPHERE: +0.25
OD_ADD: +2.50
OD_CYLINDER: +0.50
SPECS_TYPE: PAL
OS_ADD: +2.50

## 2020-10-08 ASSESSMENT — VISUAL ACUITY
CORRECTION_TYPE: GLASSES
OD_CC+: -1
OS_CC: 20/20
OD_CC: 20/20
METHOD: SNELLEN - LINEAR

## 2020-10-08 ASSESSMENT — CUP TO DISC RATIO
OD_RATIO: 0.2
OS_RATIO: 0.2

## 2020-10-08 ASSESSMENT — EXTERNAL EXAM - RIGHT EYE: OD_EXAM: NORMAL

## 2020-10-08 ASSESSMENT — TONOMETRY
OS_IOP_MMHG: 20
OD_IOP_MMHG: 19
IOP_METHOD: ICARE

## 2020-10-08 NOTE — PROGRESS NOTES
Assessment & Plan     Terrence Murillo is a 72 year old male with the following diagnoses:   1. History of pituitary adenoma    2. Visual field defect       Terrence Murillo is a 73 year old male who presents for 1 year follow up. He was initially seen for bitemporal incongrous hemianopia and was found to have 3.2 x 2.5 a 2.0 cm enhancing sellar/suprasellar presumed pituitary macroadenoma. He underwent resection in 11/2017. Pathology report showed pituitary adenoma with multifocal fibrosis     Since last visit, patient states that vision is unchanged. He denies diplopia, eye pain    MRI Brain (1/28/2020)  1. Unchanged small focus of hypoenhancement in the right cavernous  sinus since 2/1/2018, most likely reflecting postoperative change, but  cannot exclude residual tumor.     2. Unchanged partially empty sella.    On exam visual acuity is 20/20 in the right and 20/20 in the left. Pupil exam is normal with no afferent pupillary defect. Color plates are full with 11 out of 11 in both eyes. Extraocular movements are full and normal. Anterior segment exam is normal in both eyes. Fundus exam shows mild optic disc pallor in both eyes.    Visual field shows temporal hemianopia in the RIGHT eye and temporal hemianopia in LEFT eye. Retinal nerve fiber layer shows borderline thinning in both eyes. His ganglion cell layer analysis shows nasal loss both eyes that is stable compared to his scan from 2017.     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 I reviewed patient's recent MRI and there is no longer any compression of the chiasm status post resection of pituitary macroadenoma.  We discussed that his vision will likely remain this way.  We will continue yearly follow up with visual field and retinal nerve fiber layer scans.      Attending Physician Attestation:  Complete documentation of historical and exam elements from today's encounter can be found in the full encounter  summary report (not reduplicated in this progress note).  I personally obtained the chief complaint(s) and history of present illness.  I confirmed and edited as necessary the review of systems, past medical/surgical history, family history, social history, and examination findings as documented by others; and I examined the patient myself.  I personally reviewed the relevant tests, images, and reports as documented above.  I formulated and edited as necessary the assessment and plan and discussed the findings and management plan with the patient and family. I personally reviewed the ophthalmic test(s) associated with this encounter, agree with the interpretation(s) as documented by the resident/fellow, and have edited the corresponding report(s) as necessary.  - Kendell Chen MD  Ophthalmology Resident, PGY-3

## 2020-10-08 NOTE — LETTER
10/8/2020         RE:  :  MRN: Terrence Murillo  1945  [id number]     Dear Dr. Bates,    Your patient, Terrence Murillo, returned for neuro-ophthalmic follow up. My assessment and plan are below.  For further details, please see my attached clinic note.      Assessment & Plan      Terrence Murillo is a 72 year old male with the following diagnoses:   1. History of pituitary adenoma    2. Visual field defect       Terrence Murillo is a 73 year old male who presents for 1 year follow up. He was initially seen for bitemporal incongrous hemianopia and was found to have 3.2 x 2.5 a 2.0 cm enhancing sellar/suprasellar presumed pituitary macroadenoma. He underwent resection in 2017. Pathology report showed pituitary adenoma with multifocal fibrosis      Since last visit, patient states that vision is unchanged. He denies diplopia, eye pain     MRI Brain (2020)  1. Unchanged small focus of hypoenhancement in the right cavernous  sinus since 2018, most likely reflecting postoperative change, but  cannot exclude residual tumor.     2. Unchanged partially empty sella.     On exam visual acuity is 20/20 in the right and 20/20 in the left. Pupil exam is normal with no afferent pupillary defect. Color plates are full with 11 out of 11 in both eyes. Extraocular movements are full and normal. Anterior segment exam is normal in both eyes. Fundus exam shows mild optic disc pallor in both eyes.     Visual field shows temporal hemianopia in the RIGHT eye and temporal hemianopia in LEFT eye. Retinal nerve fiber layer shows borderline thinning in both eyes. His ganglion cell layer analysis shows nasal loss both eyes that is stable compared to his scan from 2017.      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 I reviewed patient's recent MRI and there is no longer any compression of the chiasm status post resection of pituitary macroadenoma.  We discussed that his vision will likely  remain this way.  We will continue yearly follow up with visual field and retinal nerve fiber layer scans.       Again, thank you for allowing me to participate in the care of your patient.      Sincerely,    Kendell Herndon MD  Professor  Ophthalmology Residency   Director of Neuro-Ophthalmology  [facility] [facility] Endowed Chair  Departments of Ophthalmology, Neurology, and Neurosurgery  [facility] [address] [place]  [address]  T - [phone]  F - [phone]  [email or web address]      CC: Amanda Bates MD  [address] [id number]  [facility] [address]  Via In Basket     Alexy Hernandez MD  [facility]  [address] [facility] [address]  Via Fax: [phone]

## 2020-10-08 NOTE — Clinical Note
10/8/2020       RE: Terrence Murillo  [address] [facility] [place] [address]     Dear Colleague,    Thank you for referring your patient, Terrence Murillo, to the [facility] at [facility]. Please see a copy of my visit note below.           Assessment & Plan     Terrence Murillo is a 72 year old male with the following diagnoses:   1. History of pituitary adenoma    2. Visual field defect       Terrence Murillo is a 73 year old male who presents for 1 year follow up. He was initially seen for bitemporal incongrous hemianopia and was found to have 3.2 x 2.5 a 2.0 cm enhancing sellar/suprasellar presumed pituitary macroadenoma. He underwent resection in 11/2017. Pathology report showed pituitary adenoma with multifocal fibrosis     Since last visit, patient states that vision is unchanged. He denies diplopia, eye pain    MRI Brain (1/28/2020)  1. Unchanged small focus of hypoenhancement in the right cavernous  sinus since 2/1/2018, most likely reflecting postoperative change, but  cannot exclude residual tumor.     2. Unchanged partially empty sella.    On exam visual acuity is 20/20 in the right and 20/20 in the left. Pupil exam is normal with no afferent pupillary defect. Color plates are full with 11 out of 11 in both eyes. Extraocular movements are full and normal. Anterior segment exam is normal in both eyes. Fundus exam shows mild optic disc pallor in both eyes.    Visual field shows temporal hemianopia in the RIGHT eye and temporal hemianopia in LEFT eye. Retinal nerve fiber layer shows borderline thinning in both eyes. His ganglion cell layer analysis shows nasal loss both eyes that is stable compared to his scan from 2017.     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 I reviewed patient's recent MRI and there is no longer any compression of the chiasm status post resection of pituitary macroadenoma.  We discussed that  his vision will likely remain this way.  We will continue yearly follow up with visual field and retinal nerve fiber layer scans.      Attending Physician Attestation:  Complete documentation of historical and exam elements from today's encounter can be found in the full encounter summary report (not reduplicated in this progress note).  I personally obtained the chief complaint(s) and history of present illness.  I confirmed and edited as necessary the review of systems, past medical/surgical history, family history, social history, and examination findings as documented by others; and I examined the patient myself.  I personally reviewed the relevant tests, images, and reports as documented above.  I formulated and edited as necessary the assessment and plan and discussed the findings and management plan with the patient and family. I personally reviewed the ophthalmic test(s) associated with this encounter, agree with the interpretation(s) as documented by the resident/fellow, and have edited the corresponding report(s) as necessary.  - Kendell Chen MD  Ophthalmology Resident, PGY-3              Again, thank you for allowing me to participate in the care of your patient.      Sincerely,    Kendell Herndon MD

## 2020-10-08 NOTE — NURSING NOTE
"Chief Complaint(s) and History of Present Illness(es)     Follow Up     In both eyes (Pituitary adenoma; Subjective visual disturbance; Optic atrophy).  Associated symptoms include Negative for eye pain, headache and double vision.              Comments     Patient states peripheral visual field is stable. States this past summer, did see some \"mice\" in his peripheral but they are gone now.   Vision fine each eye. Glasses are 4 years old but seeing local optometrist.    No eye pain or headaches. No double vision.     ELOISA Ribera 10/8/2020 10:07 AM                "

## 2020-10-12 DIAGNOSIS — E78.5 HYPERLIPIDEMIA LDL GOAL <70: ICD-10-CM

## 2020-10-12 DIAGNOSIS — I10 BENIGN ESSENTIAL HYPERTENSION: ICD-10-CM

## 2020-10-12 DIAGNOSIS — I25.10 CORONARY ARTERY DISEASE INVOLVING NATIVE CORONARY ARTERY OF NATIVE HEART WITHOUT ANGINA PECTORIS: Chronic | ICD-10-CM

## 2020-10-12 RX ORDER — RAMIPRIL 5 MG/1
5 CAPSULE ORAL DAILY
Qty: 90 CAPSULE | Refills: 0 | Status: SHIPPED | OUTPATIENT
Start: 2020-10-12 | End: 2020-10-21

## 2020-10-21 ENCOUNTER — OFFICE VISIT (OUTPATIENT)
Dept: CARDIOLOGY | Facility: CLINIC | Age: 75
End: 2020-10-21
Payer: COMMERCIAL

## 2020-10-21 VITALS
BODY MASS INDEX: 34.4 KG/M2 | HEIGHT: 70 IN | SYSTOLIC BLOOD PRESSURE: 137 MMHG | DIASTOLIC BLOOD PRESSURE: 73 MMHG | WEIGHT: 240.3 LBS | HEART RATE: 60 BPM

## 2020-10-21 DIAGNOSIS — I25.10 CORONARY ARTERY DISEASE INVOLVING NATIVE CORONARY ARTERY OF NATIVE HEART WITHOUT ANGINA PECTORIS: Chronic | ICD-10-CM

## 2020-10-21 DIAGNOSIS — E78.5 HYPERLIPIDEMIA LDL GOAL <70: ICD-10-CM

## 2020-10-21 DIAGNOSIS — I10 BENIGN ESSENTIAL HYPERTENSION: ICD-10-CM

## 2020-10-21 DIAGNOSIS — I25.118 ATHEROSCLEROSIS OF NATIVE CORONARY ARTERY OF NATIVE HEART WITH OTHER FORM OF ANGINA PECTORIS (H): ICD-10-CM

## 2020-10-21 PROCEDURE — 99214 OFFICE O/P EST MOD 30 MIN: CPT | Performed by: PHYSICIAN ASSISTANT

## 2020-10-21 RX ORDER — RAMIPRIL 5 MG/1
5 CAPSULE ORAL DAILY
Qty: 90 CAPSULE | Refills: 3 | Status: SHIPPED | OUTPATIENT
Start: 2020-10-21 | End: 2021-01-19

## 2020-10-21 RX ORDER — EZETIMIBE 10 MG/1
10 TABLET ORAL DAILY
Qty: 90 TABLET | Refills: 3 | Status: SHIPPED | OUTPATIENT
Start: 2020-10-21 | End: 2021-12-23

## 2020-10-21 RX ORDER — ROSUVASTATIN CALCIUM 40 MG/1
40 TABLET, COATED ORAL DAILY
Qty: 90 TABLET | Refills: 3 | Status: SHIPPED | OUTPATIENT
Start: 2020-10-21 | End: 2021-12-23

## 2020-10-21 RX ORDER — METOPROLOL TARTRATE 25 MG/1
25 TABLET, FILM COATED ORAL 2 TIMES DAILY
Qty: 180 TABLET | Refills: 3 | Status: SHIPPED | OUTPATIENT
Start: 2020-10-21 | End: 2021-01-29

## 2020-10-21 ASSESSMENT — MIFFLIN-ST. JEOR: SCORE: 1826.24

## 2020-10-21 NOTE — LETTER
10/21/2020    Alexy Hernandez MD  [facility] [address]  [place] [address]    RE: Terrence Murillo       Dear Colleague,    I had the pleasure of seeing Terrence Murillo in the [facility].    CARDIOLOGY CLINIC PROGRESS NOTE    DOS: 10/21/2020      Terrence Murillo  : 1945, 75 year old  MRN: [id number]      History:  I am meeting Terrence Murillo today in the cardiology clinic for annual follow up.  He is a patient of Dr. Jolley.    Terrence Murillo is a 75 year old man with history of CAD, HTN, dyslipidemia, pituitary macroadenoma s/p resection in 2017, sleep apnea.     19 last seen by Dr. Jolley.  He was doing well at that time.  No changes made.      20 MRI brain (unchanged).     20 PCP visit for urinary incontinence and tremor. Started Uroxatral. Declined tx for tremor.      He presents today for annual follow up.   He tells me that this year has been tough. He has gained some weight being home.  He said he used to see Dr. Jolley's wife, Destinee, and knows he needs to get back on a healthier diet.  However, despite his weight gain, he is still walking 4 days a week. He does back exercises daily.  He still mows, rakes, shovels.  Not limited with these activities. He also hunts.   He does not add salt to his food, but does not count sodium.       ROS:  Skin:  Negative     Eyes:  Positive for glasses  ENT:  Positive for hearing loss  Respiratory:  Positive for CPAP;sleep apnea  Cardiovascular:  Negative    Gastroenterology: Negative    Genitourinary:  Negative    Musculoskeletal:  Negative    Neurologic:  Negative    Psychiatric:  Negative    Heme/Lymph/Imm:  Negative    Endocrine:  Positive for thyroid disorder    PAST MEDICAL HISTORY:  Past Medical History:   Diagnosis Date     Coronary artery disease     cardiac cath 2016: medical management; cath : SUSANA x2 to LAD; cath : PTCA to LAD; cath : PTCA to LAD, 1992: atherectomy of LAD      Hearing problem      Hyperlipidaemia      Hypertension      Obese      Obstructive sleep apnea      Reduced vision      Sleep apnea     CPAP       PAST SURGICAL HISTORY:  Past Surgical History:   Procedure Laterality Date     APPENDECTOMY OPEN       ARTHROPLASTY KNEE Left      ARTHROPLASTY SHOULDER Right      ENT SURGERY       HEART CATH, ANGIOPLASTY      LAD  atherectomy with a DVI device      HEART CATH, ANGIOPLASTY  4-28-10    PTCA and stent proximal and mid LAD (2) stents Xience     HEART CATH, ANGIOPLASTY  2016    no stenosis greater than 25%-rec. medical management     OPTICAL TRACKING SYSTEM ENDOSCOPIC RESECTION TUMOR CRANIAL N/A 11/3/2017    Procedure: OPTICAL TRACKING SYSTEM ENDOSCOPIC RESECTION TUMOR CRANIAL;  Stealth Guided Endoscopic Transnasal Resection of Pituitary Tumor;  Surgeon: Amanda Bates MD;  Location:  OR       SOCIAL HISTORY:  Social History     Socioeconomic History     Marital status:      Spouse name: Not on file     Number of children: Not on file     Years of education: Not on file     Highest education level: Not on file   Occupational History     Not on file   Social Needs     Financial resource strain: Not on file     Food insecurity     Worry: Not on file     Inability: Not on file     Transportation needs     Medical: Not on file     Non-medical: Not on file   Tobacco Use     Smoking status: Former Smoker     Packs/day: 0.20     Years: 5.00     Pack years: 1.00     Types: Cigarettes     Start date:      Quit date: 1960     Years since quittin.8     Smokeless tobacco: Never Used     Tobacco comment: Social smoking only   Substance and Sexual Activity     Alcohol use: Yes     Alcohol/week: 1.0 - 2.0 standard drinks     Types: 1 - 2 Standard drinks or equivalent per week     Comment: FIVE DAYS A WEEK     Drug use: No     Sexual activity: Not Currently     Partners: Female     Birth control/protection: Male Surgical   Lifestyle      Physical activity     Days per week: Not on file     Minutes per session: Not on file     Stress: Not on file   Relationships     Social connections     Talks on phone: Not on file     Gets together: Not on file     Attends Holiness service: Not on file     Active member of club or organization: Not on file     Attends meetings of clubs or organizations: Not on file     Relationship status: Not on file     Intimate partner violence     Fear of current or ex partner: Not on file     Emotionally abused: Not on file     Physically abused: Not on file     Forced sexual activity: Not on file   Other Topics Concern     Parent/sibling w/ CABG, MI or angioplasty before 65F 55M? No      Service Not Asked     Blood Transfusions Not Asked     Caffeine Concern Yes     Comment: coffee      Occupational Exposure Not Asked     Hobby Hazards Not Asked     Sleep Concern No     Stress Concern No     Weight Concern Yes     Comment: Weight decrease 10 lbs     Special Diet Yes     Comment: Mediterranean diet     Back Care Not Asked     Exercise Yes     Comment: Walking 5 days per week 35- 60 minutes     Bike Helmet Not Asked     Seat Belt Yes     Self-Exams Not Asked   Social History Narrative     Not on file       FAMILY HISTORY:  Family History   Problem Relation Age of Onset     C.A.D. Father      Heart Disease Father          at age 44     Heart Disease Son 37        enlarged heart     Heart Disease Mother          at age 80 after heart surgery     Heart Surgery Mother         open heart, passed 10 days after     Diabetes Mother      Family History Negative Maternal Grandmother      Family History Negative Maternal Grandfather      Family History Negative Paternal Grandmother      Family History Negative Paternal Grandfather      Family History Negative Brother      Alzheimer Disease Brother      Family History Negative Sister      Family History Negative Daughter      Family History Negative Son      Family History  "Negative Daughter      Heart Disease Son          at age 37       MEDS:      aspirin 81 MG tablet, Take 1 tablet (81 mg) by mouth every evening       Glucosamine 500 MG CAPS, Take 500 mg by mouth 2 times daily        ibuprofen (ADVIL/MOTRIN) 200 MG tablet, Take 400 mg by mouth daily       levothyroxine (SYNTHROID/LEVOTHROID) 100 MCG tablet, TAKE 1 TABLET EVERY DAY       Multiple Vitamin (MULTI-VITAMIN) per tablet, Take 1 tablet by mouth every morning        nitroGLYcerin (NITROSTAT) 0.4 MG sublingual tablet, Place 1 tablet (0.4 mg) under the tongue every 5 minutes as needed May repeat x2, if chest pain contines after 3rd dose call 911       saw palmetto 450 MG CAPS capsule, Take 450 mg by mouth daily       testosterone (ANDROGEL 1.62 % PUMP) 20.25 MG/ACT gel, APPLY 2 PUMPS DAILY TO CLEAN, DRY, INTACT SKIN OF THE UPPER ARMS AND SHOULDERS    No current facility-administered medications on file prior to visit.       ALLERGIES:   Allergies   Allergen Reactions     No Clinical Screening - See Comments Hives     Cardizem [Diltiazem Hcl] Itching     Cats Other (See Comments) and Hives     WATERY EYES, SNEEZE, AND COUGH     Diltiazem Hives and Rash       PHYSICAL EXAM:  Vitals: /73 (BP Location: Right arm, Patient Position: Sitting, Cuff Size: Adult Regular)   Pulse 60   Ht 1.778 m (5' 10\")   Wt 109 kg (240 lb 4.8 oz)   BMI 34.48 kg/m    Constitutional:  cooperative, alert and oriented, well developed, well nourished, in no acute distress obese      Skin:  warm and dry to the touch, no apparent skin lesions or masses noted        Head:  normocephalic, no masses or lesions        Eyes:           ENT:  no pallor or cyanosis, dentition good        Neck:  no carotid bruit;JVP normal        Respiratory:  normal breath sounds, clear to auscultation, normal A-P diameter, normal symmetry, normal respiratory excursion, no use of accessory muscles        Cardiac: regular rhythm;normal S1 and S2;no S3 or S4;no murmurs, " gallops or rubs detected                  GI:  abdomen soft obese      Vascular: pulses full and equal                                      Extremities and Musculoskeletal:  no edema;no spinal abnormalities noted;normal muscle strength and tone        Neurological:  no gross motor deficits;affect appropriate            LABS/DATA:  I reviewed the following:  Component      Latest Ref Rng & Units 10/7/2020   Sodium      133 - 144 mmol/L 139   Potassium      3.4 - 5.3 mmol/L 4.5   Chloride      94 - 109 mmol/L 108   Carbon Dioxide      20 - 32 mmol/L 26   Anion Gap      3 - 14 mmol/L 5   Glucose      70 - 99 mg/dL 96   Urea Nitrogen      7 - 30 mg/dL 19   Creatinine      0.66 - 1.25 mg/dL 0.86   GFR Estimate      >60 mL/min/1.73:m2 84   GFR Estimate If Black      >60 mL/min/1.73:m2 >90   Calcium      8.5 - 10.1 mg/dL 8.8   Cholesterol      <200 mg/dL 110   Triglycerides      <150 mg/dL 101   HDL Cholesterol      >39 mg/dL 41   LDL Cholesterol Calculated      <100 mg/dL 49   Non HDL Cholesterol      <130 mg/dL 69         ASSESSMENT/PLAN:  CAD  - Doing well without clinical evidence of ischemia.    - Last evaluation of his coronary anatomy was an angiogram in 2016.  He did have branch vessel disease and a jailed diagonal that we ultimately decided to treat medically.   - Continue ASA, BB, ACEi, statin, Zetia  - Continued efforts at healthy lifestyle.  Weight is up this year to 240 lbs.  He knows he needs to work on regular exercise, healthier diet and weight loss.          HTN  - Blood pressure is controlled, though at the upper limits this year.  Again, we discussed the importance of dietary changes, weight loss, and exercise  - Electrolytes are within normal limits.        Dyslipidemia, controlled  - Fasting lipid profile 10/7/20: Total cholesterol is 110, HDL is 41, LDL is 49, triglycerides are 101.           Meds refilled for the year      Follow up:  Dr. Jolley 7/2021 with BMP/FLP      Thank you for allowing me to  participate in the care of your patient.    Sincerely,     Anna Marie Valdivia PA-C     [facility]

## 2020-10-21 NOTE — LETTER
10/21/2020    Alexy Hernandez MD  [facility] [address]  [place] [address]    RE: Terrence Murillo       Dear Colleague,    I had the pleasure of seeing Terrence Murillo in the [facility].    CARDIOLOGY CLINIC PROGRESS NOTE    DOS: 10/21/2020      Terrence Murillo  : 1945, 75 year old  MRN: [id number]      History:  I am meeting Terrence Murillo today in the cardiology clinic for annual follow up.  He is a patient of Dr. Jolley.    Terrence Murillo is a 75 year old man with history of CAD, HTN, dyslipidemia, pituitary macroadenoma s/p resection in 2017, sleep apnea.     19 last seen by Dr. Jolley.  He was doing well at that time.  No changes made.      20 MRI brain (unchanged).     20 PCP visit for urinary incontinence and tremor. Started Uroxatral. Declined tx for tremor.      He presents today for annual follow up.   He tells me that this year has been tough. He has gained some weight being home.  He said he used to see Dr. Jolley's wife, Destinee, and knows he needs to get back on a healthier diet.  However, despite his weight gain, he is still walking 4 days a week. He does back exercises daily.  He still mows, rakes, shovels.  Not limited with these activities. He also hunts.   He does not add salt to his food, but does not count sodium.       ROS:  Skin:  Negative     Eyes:  Positive for glasses  ENT:  Positive for hearing loss  Respiratory:  Positive for CPAP;sleep apnea  Cardiovascular:  Negative    Gastroenterology: Negative    Genitourinary:  Negative    Musculoskeletal:  Negative    Neurologic:  Negative    Psychiatric:  Negative    Heme/Lymph/Imm:  Negative    Endocrine:  Positive for thyroid disorder    PAST MEDICAL HISTORY:  Past Medical History:   Diagnosis Date     Coronary artery disease     cardiac cath 2016: medical management; cath : SUSANA x2 to LAD; cath : PTCA to LAD; cath : PTCA to LAD, 1992: atherectomy of LAD      Hearing problem      Hyperlipidaemia      Hypertension      Obese      Obstructive sleep apnea      Reduced vision      Sleep apnea     CPAP       PAST SURGICAL HISTORY:  Past Surgical History:   Procedure Laterality Date     APPENDECTOMY OPEN       ARTHROPLASTY KNEE Left      ARTHROPLASTY SHOULDER Right      ENT SURGERY       HEART CATH, ANGIOPLASTY      LAD  atherectomy with a DVI device      HEART CATH, ANGIOPLASTY  4-28-10    PTCA and stent proximal and mid LAD (2) stents Xience     HEART CATH, ANGIOPLASTY  2016    no stenosis greater than 25%-rec. medical management     OPTICAL TRACKING SYSTEM ENDOSCOPIC RESECTION TUMOR CRANIAL N/A 11/3/2017    Procedure: OPTICAL TRACKING SYSTEM ENDOSCOPIC RESECTION TUMOR CRANIAL;  Stealth Guided Endoscopic Transnasal Resection of Pituitary Tumor;  Surgeon: Amanda Bates MD;  Location:  OR       SOCIAL HISTORY:  Social History     Socioeconomic History     Marital status:      Spouse name: Not on file     Number of children: Not on file     Years of education: Not on file     Highest education level: Not on file   Occupational History     Not on file   Social Needs     Financial resource strain: Not on file     Food insecurity     Worry: Not on file     Inability: Not on file     Transportation needs     Medical: Not on file     Non-medical: Not on file   Tobacco Use     Smoking status: Former Smoker     Packs/day: 0.20     Years: 5.00     Pack years: 1.00     Types: Cigarettes     Start date:      Quit date: 1960     Years since quittin.8     Smokeless tobacco: Never Used     Tobacco comment: Social smoking only   Substance and Sexual Activity     Alcohol use: Yes     Alcohol/week: 1.0 - 2.0 standard drinks     Types: 1 - 2 Standard drinks or equivalent per week     Comment: FIVE DAYS A WEEK     Drug use: No     Sexual activity: Not Currently     Partners: Female     Birth control/protection: Male Surgical   Lifestyle      Physical activity     Days per week: Not on file     Minutes per session: Not on file     Stress: Not on file   Relationships     Social connections     Talks on phone: Not on file     Gets together: Not on file     Attends Muslim service: Not on file     Active member of club or organization: Not on file     Attends meetings of clubs or organizations: Not on file     Relationship status: Not on file     Intimate partner violence     Fear of current or ex partner: Not on file     Emotionally abused: Not on file     Physically abused: Not on file     Forced sexual activity: Not on file   Other Topics Concern     Parent/sibling w/ CABG, MI or angioplasty before 65F 55M? No      Service Not Asked     Blood Transfusions Not Asked     Caffeine Concern Yes     Comment: coffee      Occupational Exposure Not Asked     Hobby Hazards Not Asked     Sleep Concern No     Stress Concern No     Weight Concern Yes     Comment: Weight decrease 10 lbs     Special Diet Yes     Comment: Mediterranean diet     Back Care Not Asked     Exercise Yes     Comment: Walking 5 days per week 35- 60 minutes     Bike Helmet Not Asked     Seat Belt Yes     Self-Exams Not Asked   Social History Narrative     Not on file       FAMILY HISTORY:  Family History   Problem Relation Age of Onset     C.A.D. Father      Heart Disease Father          at age 44     Heart Disease Son 37        enlarged heart     Heart Disease Mother          at age 80 after heart surgery     Heart Surgery Mother         open heart, passed 10 days after     Diabetes Mother      Family History Negative Maternal Grandmother      Family History Negative Maternal Grandfather      Family History Negative Paternal Grandmother      Family History Negative Paternal Grandfather      Family History Negative Brother      Alzheimer Disease Brother      Family History Negative Sister      Family History Negative Daughter      Family History Negative Son      Family History  "Negative Daughter      Heart Disease Son          at age 37       MEDS:      aspirin 81 MG tablet, Take 1 tablet (81 mg) by mouth every evening       Glucosamine 500 MG CAPS, Take 500 mg by mouth 2 times daily        ibuprofen (ADVIL/MOTRIN) 200 MG tablet, Take 400 mg by mouth daily       levothyroxine (SYNTHROID/LEVOTHROID) 100 MCG tablet, TAKE 1 TABLET EVERY DAY       Multiple Vitamin (MULTI-VITAMIN) per tablet, Take 1 tablet by mouth every morning        nitroGLYcerin (NITROSTAT) 0.4 MG sublingual tablet, Place 1 tablet (0.4 mg) under the tongue every 5 minutes as needed May repeat x2, if chest pain contines after 3rd dose call 911       saw palmetto 450 MG CAPS capsule, Take 450 mg by mouth daily       testosterone (ANDROGEL 1.62 % PUMP) 20.25 MG/ACT gel, APPLY 2 PUMPS DAILY TO CLEAN, DRY, INTACT SKIN OF THE UPPER ARMS AND SHOULDERS    No current facility-administered medications on file prior to visit.       ALLERGIES:   Allergies   Allergen Reactions     No Clinical Screening - See Comments Hives     Cardizem [Diltiazem Hcl] Itching     Cats Other (See Comments) and Hives     WATERY EYES, SNEEZE, AND COUGH     Diltiazem Hives and Rash       PHYSICAL EXAM:  Vitals: /73 (BP Location: Right arm, Patient Position: Sitting, Cuff Size: Adult Regular)   Pulse 60   Ht 1.778 m (5' 10\")   Wt 109 kg (240 lb 4.8 oz)   BMI 34.48 kg/m    Constitutional:  cooperative, alert and oriented, well developed, well nourished, in no acute distress obese      Skin:  warm and dry to the touch, no apparent skin lesions or masses noted        Head:  normocephalic, no masses or lesions        Eyes:           ENT:  no pallor or cyanosis, dentition good        Neck:  no carotid bruit;JVP normal        Respiratory:  normal breath sounds, clear to auscultation, normal A-P diameter, normal symmetry, normal respiratory excursion, no use of accessory muscles        Cardiac: regular rhythm;normal S1 and S2;no S3 or S4;no murmurs, " gallops or rubs detected                  GI:  abdomen soft obese      Vascular: pulses full and equal                                      Extremities and Musculoskeletal:  no edema;no spinal abnormalities noted;normal muscle strength and tone        Neurological:  no gross motor deficits;affect appropriate            LABS/DATA:  I reviewed the following:  Component      Latest Ref Rng & Units 10/7/2020   Sodium      133 - 144 mmol/L 139   Potassium      3.4 - 5.3 mmol/L 4.5   Chloride      94 - 109 mmol/L 108   Carbon Dioxide      20 - 32 mmol/L 26   Anion Gap      3 - 14 mmol/L 5   Glucose      70 - 99 mg/dL 96   Urea Nitrogen      7 - 30 mg/dL 19   Creatinine      0.66 - 1.25 mg/dL 0.86   GFR Estimate      >60 mL/min/1.73:m2 84   GFR Estimate If Black      >60 mL/min/1.73:m2 >90   Calcium      8.5 - 10.1 mg/dL 8.8   Cholesterol      <200 mg/dL 110   Triglycerides      <150 mg/dL 101   HDL Cholesterol      >39 mg/dL 41   LDL Cholesterol Calculated      <100 mg/dL 49   Non HDL Cholesterol      <130 mg/dL 69         ASSESSMENT/PLAN:  CAD  - Doing well without clinical evidence of ischemia.    - Last evaluation of his coronary anatomy was an angiogram in 2016.  He did have branch vessel disease and a jailed diagonal that we ultimately decided to treat medically.   - Continue ASA, BB, ACEi, statin, Zetia  - Continued efforts at healthy lifestyle.  Weight is up this year to 240 lbs.  He knows he needs to work on regular exercise, healthier diet and weight loss.          HTN  - Blood pressure is controlled, though at the upper limits this year.  Again, we discussed the importance of dietary changes, weight loss, and exercise  - Electrolytes are within normal limits.        Dyslipidemia, controlled  - Fasting lipid profile 10/7/20: Total cholesterol is 110, HDL is 41, LDL is 49, triglycerides are 101.           Meds refilled for the year      Follow up:  Dr. Jolley 7/2021 with BMP/FLP    Anna Marie Valdivia  [facility]      Thank you for allowing me to participate in the care of your patient.      Sincerely,     Anna Marie Valdivia PA-C     [facility]    cc:   Alexy Hernandez MD  [facility]  [address] [place] [address]

## 2020-10-21 NOTE — PROGRESS NOTES
CARDIOLOGY CLINIC PROGRESS NOTE    DOS: 10/21/2020      Terrence Murillo  : 1945, 75 year old  MRN: [id number]      History:  I am meeting Terrence Murillo today in the cardiology clinic for annual follow up.  He is a patient of Dr. Jolley.    Terrence Murillo is a 75 year old man with history of CAD, HTN, dyslipidemia, pituitary macroadenoma s/p resection in 2017, sleep apnea.     19 last seen by Dr. Jolley.  He was doing well at that time.  No changes made.      20 MRI brain (unchanged).     20 PCP visit for urinary incontinence and tremor. Started Uroxatral. Declined tx for tremor.      He presents today for annual follow up.   He tells me that this year has been tough. He has gained some weight being home.  He said he used to see Dr. Jolley's wife, Destinee, and knows he needs to get back on a healthier diet.  However, despite his weight gain, he is still walking 4 days a week. He does back exercises daily.  He still mows, rakes, shovels.  Not limited with these activities. He also hunts.   He does not add salt to his food, but does not count sodium.       ROS:  Skin:  Negative     Eyes:  Positive for glasses  ENT:  Positive for hearing loss  Respiratory:  Positive for CPAP;sleep apnea  Cardiovascular:  Negative    Gastroenterology: Negative    Genitourinary:  Negative    Musculoskeletal:  Negative    Neurologic:  Negative    Psychiatric:  Negative    Heme/Lymph/Imm:  Negative    Endocrine:  Positive for thyroid disorder    PAST MEDICAL HISTORY:  Past Medical History:   Diagnosis Date     Coronary artery disease     cardiac cath 2016: medical management; cath : SUSANA x2 to LAD; cath : PTCA to LAD; cath : PTCA to LAD, 1992: atherectomy of LAD     Hearing problem      Hyperlipidaemia      Hypertension      Obese      Obstructive sleep apnea      Reduced vision      Sleep apnea     CPAP       PAST SURGICAL HISTORY:  Past Surgical History:   Procedure Laterality Date     APPENDECTOMY OPEN        ARTHROPLASTY KNEE Left      ARTHROPLASTY SHOULDER Right      ENT SURGERY       HEART CATH, ANGIOPLASTY      LAD  atherectomy with a DVI device      HEART CATH, ANGIOPLASTY  4-28-10    PTCA and stent proximal and mid LAD (2) stents Xience     HEART CATH, ANGIOPLASTY  2016    no stenosis greater than 25%-rec. medical management     OPTICAL TRACKING SYSTEM ENDOSCOPIC RESECTION TUMOR CRANIAL N/A 11/3/2017    Procedure: OPTICAL TRACKING SYSTEM ENDOSCOPIC RESECTION TUMOR CRANIAL;  Stealth Guided Endoscopic Transnasal Resection of Pituitary Tumor;  Surgeon: Amanda Bates MD;  Location:  OR       SOCIAL HISTORY:  Social History     Socioeconomic History     Marital status:      Spouse name: Not on file     Number of children: Not on file     Years of education: Not on file     Highest education level: Not on file   Occupational History     Not on file   Social Needs     Financial resource strain: Not on file     Food insecurity     Worry: Not on file     Inability: Not on file     Transportation needs     Medical: Not on file     Non-medical: Not on file   Tobacco Use     Smoking status: Former Smoker     Packs/day: 0.20     Years: 5.00     Pack years: 1.00     Types: Cigarettes     Start date:      Quit date: 1960     Years since quittin.8     Smokeless tobacco: Never Used     Tobacco comment: Social smoking only   Substance and Sexual Activity     Alcohol use: Yes     Alcohol/week: 1.0 - 2.0 standard drinks     Types: 1 - 2 Standard drinks or equivalent per week     Comment: FIVE DAYS A WEEK     Drug use: No     Sexual activity: Not Currently     Partners: Female     Birth control/protection: Male Surgical   Lifestyle     Physical activity     Days per week: Not on file     Minutes per session: Not on file     Stress: Not on file   Relationships     Social connections     Talks on phone: Not on file     Gets together: Not on file     Attends Holiness service: Not on file      Active member of club or organization: Not on file     Attends meetings of clubs or organizations: Not on file     Relationship status: Not on file     Intimate partner violence     Fear of current or ex partner: Not on file     Emotionally abused: Not on file     Physically abused: Not on file     Forced sexual activity: Not on file   Other Topics Concern     Parent/sibling w/ CABG, MI or angioplasty before 65F 55M? No      Service Not Asked     Blood Transfusions Not Asked     Caffeine Concern Yes     Comment: coffee      Occupational Exposure Not Asked     Hobby Hazards Not Asked     Sleep Concern No     Stress Concern No     Weight Concern Yes     Comment: Weight decrease 10 lbs     Special Diet Yes     Comment: Mediterranean diet     Back Care Not Asked     Exercise Yes     Comment: Walking 5 days per week 35- 60 minutes     Bike Helmet Not Asked     Seat Belt Yes     Self-Exams Not Asked   Social History Narrative     Not on file       FAMILY HISTORY:  Family History   Problem Relation Age of Onset     C.A.D. Father      Heart Disease Father          at age 44     Heart Disease Son 37        enlarged heart     Heart Disease Mother          at age 80 after heart surgery     Heart Surgery Mother         open heart, passed 10 days after     Diabetes Mother      Family History Negative Maternal Grandmother      Family History Negative Maternal Grandfather      Family History Negative Paternal Grandmother      Family History Negative Paternal Grandfather      Family History Negative Brother      Alzheimer Disease Brother      Family History Negative Sister      Family History Negative Daughter      Family History Negative Son      Family History Negative Daughter      Heart Disease Son          at age 37       MEDS:      aspirin 81 MG tablet, Take 1 tablet (81 mg) by mouth every evening       Glucosamine 500 MG CAPS, Take 500 mg by mouth 2 times daily        ibuprofen (ADVIL/MOTRIN) 200 MG  "tablet, Take 400 mg by mouth daily       levothyroxine (SYNTHROID/LEVOTHROID) 100 MCG tablet, TAKE 1 TABLET EVERY DAY       Multiple Vitamin (MULTI-VITAMIN) per tablet, Take 1 tablet by mouth every morning        nitroGLYcerin (NITROSTAT) 0.4 MG sublingual tablet, Place 1 tablet (0.4 mg) under the tongue every 5 minutes as needed May repeat x2, if chest pain contines after 3rd dose call 911       saw palmetto 450 MG CAPS capsule, Take 450 mg by mouth daily       testosterone (ANDROGEL 1.62 % PUMP) 20.25 MG/ACT gel, APPLY 2 PUMPS DAILY TO CLEAN, DRY, INTACT SKIN OF THE UPPER ARMS AND SHOULDERS    No current facility-administered medications on file prior to visit.       ALLERGIES:   Allergies   Allergen Reactions     No Clinical Screening - See Comments Hives     Cardizem [Diltiazem Hcl] Itching     Cats Other (See Comments) and Hives     WATERY EYES, SNEEZE, AND COUGH     Diltiazem Hives and Rash       PHYSICAL EXAM:  Vitals: /73 (BP Location: Right arm, Patient Position: Sitting, Cuff Size: Adult Regular)   Pulse 60   Ht 1.778 m (5' 10\")   Wt 109 kg (240 lb 4.8 oz)   BMI 34.48 kg/m    Constitutional:  cooperative, alert and oriented, well developed, well nourished, in no acute distress obese      Skin:  warm and dry to the touch, no apparent skin lesions or masses noted        Head:  normocephalic, no masses or lesions        Eyes:           ENT:  no pallor or cyanosis, dentition good        Neck:  no carotid bruit;JVP normal        Respiratory:  normal breath sounds, clear to auscultation, normal A-P diameter, normal symmetry, normal respiratory excursion, no use of accessory muscles        Cardiac: regular rhythm;normal S1 and S2;no S3 or S4;no murmurs, gallops or rubs detected                  GI:  abdomen soft obese      Vascular: pulses full and equal                                      Extremities and Musculoskeletal:  no edema;no spinal abnormalities noted;normal muscle strength and tone    "     Neurological:  no gross motor deficits;affect appropriate            LABS/DATA:  I reviewed the following:  Component      Latest Ref Rng & Units 10/7/2020   Sodium      133 - 144 mmol/L 139   Potassium      3.4 - 5.3 mmol/L 4.5   Chloride      94 - 109 mmol/L 108   Carbon Dioxide      20 - 32 mmol/L 26   Anion Gap      3 - 14 mmol/L 5   Glucose      70 - 99 mg/dL 96   Urea Nitrogen      7 - 30 mg/dL 19   Creatinine      0.66 - 1.25 mg/dL 0.86   GFR Estimate      >60 mL/min/1.73:m2 84   GFR Estimate If Black      >60 mL/min/1.73:m2 >90   Calcium      8.5 - 10.1 mg/dL 8.8   Cholesterol      <200 mg/dL 110   Triglycerides      <150 mg/dL 101   HDL Cholesterol      >39 mg/dL 41   LDL Cholesterol Calculated      <100 mg/dL 49   Non HDL Cholesterol      <130 mg/dL 69         ASSESSMENT/PLAN:  CAD  - Doing well without clinical evidence of ischemia.    - Last evaluation of his coronary anatomy was an angiogram in 2016.  He did have branch vessel disease and a jailed diagonal that we ultimately decided to treat medically.   - Continue ASA, BB, ACEi, statin, Zetia  - Continued efforts at healthy lifestyle.  Weight is up this year to 240 lbs.  He knows he needs to work on regular exercise, healthier diet and weight loss.          HTN  - Blood pressure is controlled, though at the upper limits this year.  Again, we discussed the importance of dietary changes, weight loss, and exercise  - Electrolytes are within normal limits.        Dyslipidemia, controlled  - Fasting lipid profile 10/7/20: Total cholesterol is 110, HDL is 41, LDL is 49, triglycerides are 101.           Meds refilled for the year      Follow up:  Dr. Jolley 7/2021 with BMP/FLP    Anna Marie Valdivia PA-C

## 2020-10-23 DIAGNOSIS — E03.8 SECONDARY HYPOTHYROIDISM: ICD-10-CM

## 2020-10-27 RX ORDER — LEVOTHYROXINE SODIUM 100 UG/1
100 TABLET ORAL DAILY
Qty: 90 TABLET | Refills: 3 | Status: SHIPPED | OUTPATIENT
Start: 2020-10-27 | End: 2022-01-05

## 2020-10-27 NOTE — TELEPHONE ENCOUNTER
LEVOTHYROXINE SODIUM 100 MCG Tablet   Last Written Prescription Date:  11/13/2019  Last Fill Quantity: 90,   # refills: 3  Last Office Visit : 9/18/2019  Future Office visit:  None    Routing refill request to provider for review/approval because:  Office Visit and Labs due: TSH     90 day pended  Refer to Provider for review       Sophie Anderson RN  Central Triage Red Flags/Med Refills

## 2020-12-28 ENCOUNTER — TELEPHONE (OUTPATIENT)
Dept: NEUROSURGERY | Facility: CLINIC | Age: 75
End: 2020-12-28

## 2020-12-28 NOTE — TELEPHONE ENCOUNTER
[facility] Call Center    Phone Message    May a detailed message be left on voicemail: yes at home phone - no at [place] phone    Reason for Call: Other: Patient calling to schedule a follow up with Dr. Bates - patient last saw Dr. Bates in January of 2020. Unsure if patient is needing a virtual or in clinic visit.     Patient states he is going to his cabin tomorrow afternoon and would like to be called there if it is after 10:00 on 12/29 but states no message can be left. Patient states he will be there until 1/7/21.    Please advise and call patient back at your earliest convenience to discuss scheduling options     Action Taken: Other: [facility]UROS[facility]     Travel Screening: Not Applicable

## 2021-01-11 NOTE — PROGRESS NOTES
pituitary macradenoma and hypogonadism  Eulalia Burciaga CMA    Terrence is a 75 year old who is being evaluated via a billable video visit.      How would you like to obtain your AVS? Mail a copy  If the video visit is dropped, the invitation should be resent by: Send to e-mail at: [email or web address]  Will anyone else be joining your video visit? Yes: wife. How would they like to receive their invitation? Send to e-mail at: [email or web address]      Video Start Time: 7:30 am  Video-Visit Details    Type of service:  Video Visit    Video End Time:8:00 am    Originating Location (pt. Location): Home    Distant Location (provider location):  [facility] [place]     Platform used for Video Visit: Embrane                                                                 ------ Endocrinology Follow up visit ------    Reason for consultation: pituitary macroadenoma, hypogonadism      Assessment: A 76 yo male post macro adenoma TSS on 11/3/2017, currently doing well, MRI showed small residual 3-4 mm on the right sellar, undercontrolled with tesosterone supplement and levothryoxine currently     Plan:   # Pituitary macroadenoma  S/p TSS on 11/3/2017, 3 month MRI 4 mm residual vs scar in 1/2019, patient recently experienced double vision and scheduled for MRI and follow up with Dr. Bates next week. Given previous images, possibility of pituitary cause of vision disturbance is low.     # Secondary hypogonadism  Currently on AndroGel 2 pump.      - total testosterone level     # Secondary hypothyroidism    Currently on levothyroxine 100 mcg    - TSH and free T4     # Follow-up plan  Once a year follow up for testosterone and thyroid check         RTC with me in 1 year      Gris Santoro MD  Staff Physician  Endocrinology and Metabolism  [facility]  License: [id number]  Pager: [phone]    Interval History as of 1/13/2021 : Patient has been doing well. Medication compliance  excellent   . New event includes  : started to have double vision and has appointment of MRI and Dr. Bates next week. .  Interval History as of 9/18/2019 : Patient has been doing well.  Stable body weight, no headache has been doing well, compliant to testosterone gel and thyroid medication which he is taking in the middle of the night.  Currently he is busy taking care of his wife who has pulmonary hypertension .   interval History: Last seen in 2/2018.  Nonfunctioning microadenoma, transsphenoidal surgery done November 3, 2017, today no symptoms of headache nausea no visual disturbance.  He has been compliant to testosterone gel currently using 1 pump.  His energy level increased, currently he is working on diet and exercise with lifestyle , he intentionally reduced his body weight 23 pounds for the past 6 months.  He also cut to third over the amount of his alcohol to take.     Subjective: A 72 year old male with a past medical history of pituitary macroadenoma who presents post-operative follow-up after endoscopic, transphenoidal resection of pituitary adenoma on 11/3/17. The mass was found to be non-secretory on initial labs. He was initially found to have the tumor on MRI after he presented to his PCP with complaints of vision problems. The tumor was 3.2 x 2.5 x 2.0 cm in size and compressed the optic chiasm. Pathology consistent with benign adenoma. He was started on prophylactic hydrocortisone prior to discharge. Since d/c, he has been doing well. Energy level is improved. He is walking more. Has occasional headaches behind his eyes but seems to be getting better. Denies increased urinary frequency or excessive thirst. Reports his libido is fine but is not currently sexually active with his wife due to medical issues that he has.  Denies nipple discharge. Feels his vision is about the same as before the surgery. Still has difficulty reading small print.     Interval history: Patient has been doing  well.  Currently using the testosterone gel daily basis.  No muscle weakness, no fatigue.  He is still taking levothyroxine.  Hydrocortisone was tapered last visit and is not taking right now. He has good appetite and good sleep.  No headache, no dizziness.  No change vision issues after the surgery especially when he reads.  Today he came to follow-up postop MRI on 2/1/2-018  and came here for joint appointment with endocrine and neurosurgery.  He is concerned about price of testosterone gel, and also wondering whether he can wean off testosterone.      Current Problem List:   Patient Active Problem List   Diagnosis     Sleep apnea     Coronary artery disease involving native coronary artery of native heart without angina pectoris     Benign essential hypertension     Pure hypercholesterolemia     Non morbid obesity due to excess calories     Chest pain     MORENO (dyspnea on exertion)     Intracranial tumor (H)     Secondary male hypogonadism     Pituitary adenoma (H)     S/P appendectomy     S/P knee replacement     S/P shoulder surgery     HDL deficiency       Past Medical and Past Surgical History:  Past Medical History:   Diagnosis Date     Coronary artery disease     cardiac cath 2016: medical management; cath 2010: SUSANA x2 to LAD; cath 1993: PTCA to LAD; cath 1992: PTCA to LAD, 1992: atherectomy of LAD     Hearing problem      Hyperlipidaemia      Hypertension      Obese      Obstructive sleep apnea      Reduced vision      Sleep apnea     CPAP       Past Surgical History:   Procedure Laterality Date     APPENDECTOMY OPEN       ARTHROPLASTY KNEE Left      ARTHROPLASTY SHOULDER Right      ENT SURGERY       HEART CATH, ANGIOPLASTY  1992    LAD  atherectomy with a DVI device      HEART CATH, ANGIOPLASTY  4-28-10    PTCA and stent proximal and mid LAD (2) stents Xience     HEART CATH, ANGIOPLASTY  04/20/2016    no stenosis greater than 25%-rec. medical management     OPTICAL TRACKING SYSTEM ENDOSCOPIC RESECTION  TUMOR CRANIAL N/A 11/3/2017    Procedure: OPTICAL TRACKING SYSTEM ENDOSCOPIC RESECTION TUMOR CRANIAL;  Stealth Guided Endoscopic Transnasal Resection of Pituitary Tumor;  Surgeon: Amanda Bates MD;  Location:  OR       Medications:   Current Outpatient Medications   Medication Sig Dispense Refill     alfuzosin ER (UROXATRAL) 10 MG 24 hr tablet Take 10 mg by mouth daily       aspirin 81 MG tablet Take 1 tablet (81 mg) by mouth every evening 30 tablet 0     ezetimibe (ZETIA) 10 MG tablet Take 1 tablet (10 mg) by mouth daily 90 tablet 3     Glucosamine 500 MG CAPS Take 500 mg by mouth 2 times daily        ibuprofen (ADVIL/MOTRIN) 200 MG tablet Take 400 mg by mouth daily       levothyroxine (SYNTHROID/LEVOTHROID) 100 MCG tablet Take 1 tablet (100 mcg) by mouth daily 90 tablet 3     metoprolol tartrate (LOPRESSOR) 25 MG tablet Take 1 tablet (25 mg) by mouth 2 times daily 180 tablet 3     Multiple Vitamin (MULTI-VITAMIN) per tablet Take 1 tablet by mouth every morning        nitroGLYcerin (NITROSTAT) 0.4 MG sublingual tablet Place 1 tablet (0.4 mg) under the tongue every 5 minutes as needed May repeat x2, if chest pain contines after 3rd dose call 911 25 tablet 3     ramipril (ALTACE) 5 MG capsule Take 1 capsule (5 mg) by mouth daily 90 capsule 3     rosuvastatin (CRESTOR) 40 MG tablet Take 1 tablet (40 mg) by mouth daily 90 tablet 3     saw palmetto 450 MG CAPS capsule Take 450 mg by mouth daily       testosterone (ANDROGEL 1.62 % PUMP) 20.25 MG/ACT gel APPLY 2 PUMPS DAILY TO CLEAN, DRY, INTACT SKIN OF THE UPPER ARMS AND SHOULDERS 225 g 3       Allergies:   Allergies   Allergen Reactions     No Clinical Screening - See Comments Hives     Cardizem [Diltiazem Hcl] Itching     Cats Other (See Comments) and Hives     WATERY EYES, SNEEZE, AND COUGH     Diltiazem Hives and Rash       Social History:  Social History     Tobacco Use     Smoking status: Former Smoker     Packs/day: 0.20     Years: 5.00     Pack  years: 1.00     Types: Cigarettes     Start date:      Quit date: 1960     Years since quittin.0     Smokeless tobacco: Never Used     Tobacco comment: Social smoking only   Substance Use Topics     Alcohol use: Yes     Alcohol/week: 1.0 - 2.0 standard drinks     Types: 1 - 2 Standard drinks or equivalent per week     Comment: FIVE DAYS A WEEK         Family History:  Family History   Problem Relation Age of Onset     C.A.D. Father      Heart Disease Father          at age 44     Heart Disease Son 37        enlarged heart     Heart Disease Mother          at age 80 after heart surgery     Heart Surgery Mother         open heart, passed 10 days after     Diabetes Mother      Family History Negative Maternal Grandmother      Family History Negative Maternal Grandfather      Family History Negative Paternal Grandmother      Family History Negative Paternal Grandfather      Family History Negative Brother      Alzheimer Disease Brother      Family History Negative Sister      Family History Negative Daughter      Family History Negative Son      Family History Negative Daughter      Heart Disease Son          at age 37       Review of Systems:  A 10-point ROS is otherwise negative except as noted in HPI.     Physical Examination:  There were no vitals taken for this visit.  There is no height or weight on file to calculate BMI.  Wt Readings from Last 4 Encounters:   10/21/20 109 kg (240 lb 4.8 oz)   20 109.7 kg (241 lb 12.8 oz)   19 106.9 kg (235 lb 11.2 oz)   19 104.3 kg (229 lb 14.4 oz)     GEN: Alert, no distress.   RESP:no acute distress  EXT: No peripheral edema.    SKIN:  no lesions.   NEURO: Non-focal deficit observed.       2018 00:00 2018 07:38 10/10/2018 08:47 2019 09:17   Testosterone Total 121 (A) 172 (L) 162 (L) 294   TSH 0.03 (A) 1.82 1.70 0.98   T4 Free 1.2  0.75 (L) 0.89       2019 MRI: I also personally reviewed images.   Brain/ Pituitary MRI  without and with contrast     History: Benign neoplasm of pituitary gland and craniopharyngeal duct  (pouch) (H); Benign neoplasm of pituitary gland and craniopharyngeal  duct (pouch) (H).  ICD-10: Benign neoplasm of pituitary gland and craniopharyngeal duct  (pouch) (H); Benign neoplasm of pituitary gland and craniopharyngeal  duct (pouch) (H)     Comparison:  MRI  2/1/ 2018 and MRI study on 11/3/2017.      Technique: Axial diffusion and FLAIR images of the whole brain  obtained without intravenous contrast. Sagittal T1 and T2-weighted,  coronal T2-weighted, coronal T1-weighted images with focus on the  sella were obtained without intravenous contrast. Post intravenous  contrast using gadolinium coronal and sagittal T1-weighted images were  obtained focused on the sella. Dynamic postcontrast coronal  T1-weighted images were also obtained.     Contrast: 10ml  Gadavist     Findings:    Stable postoperative changes of transnasal endoscopic resection of  pituitary since 2/1/2018. Unchanged leftward deviation of the  pituitary stalk. Partial empty sella also appears unchanged.  Previously noted small round focus of hypoenhancement along the medial  aspect of the right cavernous sinus measuring up to 4 mm, unchanged  (series 100, image 85).     The optic chiasm is intact and nondisplaced.     The major cavernous carotid vascular flow-voids are patent.       FLAIR images through the whole brain demonstrate mild leukoaraiosis.  Mild generalized parenchymal volume loss. No mass effect, midline  shift, or hydrocephalus. Bilateral pseudophakia.                                                                      Impression:   1. Stable postoperative changes of transnasal endoscopic pituitary  adenoma resection.  2. Unchanged small focus of hypoenhancement in the right cavernous  since 2/2018 most likely reflecting postoperative change although  residual tumor cannot be entirely excluded.  MR BRAIN W/O & W CONTRAST 2/1/2018  12:05 PM

## 2021-01-12 RX ORDER — ALFUZOSIN HYDROCHLORIDE 10 MG/1
10 TABLET, EXTENDED RELEASE ORAL DAILY
COMMUNITY

## 2021-01-13 ENCOUNTER — VIRTUAL VISIT (OUTPATIENT)
Dept: ENDOCRINOLOGY | Facility: CLINIC | Age: 76
End: 2021-01-13
Payer: COMMERCIAL

## 2021-01-13 DIAGNOSIS — E23.0 HYPOPITUITARISM (H): Primary | ICD-10-CM

## 2021-01-13 DIAGNOSIS — D35.2 PITUITARY ADENOMA (H): ICD-10-CM

## 2021-01-13 DIAGNOSIS — E29.1 SECONDARY MALE HYPOGONADISM: ICD-10-CM

## 2021-01-13 PROCEDURE — 99214 OFFICE O/P EST MOD 30 MIN: CPT | Mod: 95 | Performed by: INTERNAL MEDICINE

## 2021-01-13 RX ORDER — TESTOSTERONE 1.62 MG/G
GEL TRANSDERMAL
Qty: 225 G | Refills: 5 | Status: SHIPPED | OUTPATIENT
Start: 2021-01-13 | End: 2021-07-15

## 2021-01-13 NOTE — LETTER
1/13/2021       RE: Terrence Murillo  [address] [facility] [place] [address]     Dear Colleague,    Thank you for referring your patient, Terrence Murillo, to the [facility] ENDOCRINOLOGY CLINIC [place] at [facility]. Please see a copy of my visit note below.    pituitary macradenoma and hypogonadism  Eulalia Burciaga CMA    Terrence is a 75 year old who is being evaluated via a billable video visit.      How would you like to obtain your AVS? Mail a copy  If the video visit is dropped, the invitation should be resent by: Send to e-mail at: [email or web address]  Will anyone else be joining your video visit? Yes: wife. How would they like to receive their invitation? Send to e-mail at: [email or web address]      Video Start Time: 7:30 am  Video-Visit Details    Type of service:  Video Visit    Video End Time:8:00 am    Originating Location (pt. Location): Home    Distant Location (provider location):  [facility] ENDOCRINOLOGY [facility]     Platform used for Video Visit: [employer]                                                                 ------ Endocrinology Follow up visit ------    Reason for consultation: pituitary macroadenoma, hypogonadism      Assessment: A 74 yo male post macro adenoma TSS on 11/3/2017, currently doing well, MRI showed small residual 3-4 mm on the right sellar, undercontrolled with tesosterone supplement and levothryoxine currently     Plan:   # Pituitary macroadenoma  S/p TSS on 11/3/2017, 3 month MRI 4 mm residual vs scar in 1/2019, patient recently experienced double vision and scheduled for MRI and follow up with Dr. Bates next week. Given previous images, possibility of pituitary cause of vision disturbance is low.     # Secondary hypogonadism  Currently on AndroGel 2 pump.      - total testosterone level     # Secondary hypothyroidism    Currently on levothyroxine 100 mcg    - TSH and free T4     # Follow-up plan  Once a year  follow up for testosterone and thyroid check         RTC with me in 1 year      Gris Santoro MD  Staff Physician  Endocrinology and Metabolism  [facility]  License: [id number]  Pager: [phone]    Interval History as of 1/13/2021 : Patient has been doing well. Medication compliance excellent   . New event includes  : started to have double vision and has appointment of MRI and Dr. Bates next week. .  Interval History as of 9/18/2019 : Patient has been doing well.  Stable body weight, no headache has been doing well, compliant to testosterone gel and thyroid medication which he is taking in the middle of the night.  Currently he is busy taking care of his wife who has pulmonary hypertension .   interval History: Last seen in 2/2018.  Nonfunctioning microadenoma, transsphenoidal surgery done November 3, 2017, today no symptoms of headache nausea no visual disturbance.  He has been compliant to testosterone gel currently using 1 pump.  His energy level increased, currently he is working on diet and exercise with lifestyle , he intentionally reduced his body weight 23 pounds for the past 6 months.  He also cut to third over the amount of his alcohol to take.     Subjective: A 72 year old male with a past medical history of pituitary macroadenoma who presents post-operative follow-up after endoscopic, transphenoidal resection of pituitary adenoma on 11/3/17. The mass was found to be non-secretory on initial labs. He was initially found to have the tumor on MRI after he presented to his PCP with complaints of vision problems. The tumor was 3.2 x 2.5 x 2.0 cm in size and compressed the optic chiasm. Pathology consistent with benign adenoma. He was started on prophylactic hydrocortisone prior to discharge. Since d/c, he has been doing well. Energy level is improved. He is walking more. Has occasional headaches behind his eyes but seems to be getting better. Denies increased urinary frequency or  excessive thirst. Reports his libido is fine but is not currently sexually active with his wife due to medical issues that he has.  Denies nipple discharge. Feels his vision is about the same as before the surgery. Still has difficulty reading small print.     Interval history: Patient has been doing well.  Currently using the testosterone gel daily basis.  No muscle weakness, no fatigue.  He is still taking levothyroxine.  Hydrocortisone was tapered last visit and is not taking right now. He has good appetite and good sleep.  No headache, no dizziness.  No change vision issues after the surgery especially when he reads.  Today he came to follow-up postop MRI on 2/1/2-018  and came here for joint appointment with endocrine and neurosurgery.  He is concerned about price of testosterone gel, and also wondering whether he can wean off testosterone.      Current Problem List:   Patient Active Problem List   Diagnosis     Sleep apnea     Coronary artery disease involving native coronary artery of native heart without angina pectoris     Benign essential hypertension     Pure hypercholesterolemia     Non morbid obesity due to excess calories     Chest pain     MORENO (dyspnea on exertion)     Intracranial tumor (H)     Secondary male hypogonadism     Pituitary adenoma (H)     S/P appendectomy     S/P knee replacement     S/P shoulder surgery     HDL deficiency       Past Medical and Past Surgical History:  Past Medical History:   Diagnosis Date     Coronary artery disease     cardiac cath 2016: medical management; cath 2010: SUSANA x2 to LAD; cath 1993: PTCA to LAD; cath 1992: PTCA to LAD, 1992: atherectomy of LAD     Hearing problem      Hyperlipidaemia      Hypertension      Obese      Obstructive sleep apnea      Reduced vision      Sleep apnea     CPAP       Past Surgical History:   Procedure Laterality Date     APPENDECTOMY OPEN       ARTHROPLASTY KNEE Left      ARTHROPLASTY SHOULDER Right      ENT SURGERY       HEART  CATH, ANGIOPLASTY  1992    LAD  atherectomy with a DVI device      HEART CATH, ANGIOPLASTY  4-28-10    PTCA and stent proximal and mid LAD (2) stents Xience     HEART CATH, ANGIOPLASTY  04/20/2016    no stenosis greater than 25%-rec. medical management     OPTICAL TRACKING SYSTEM ENDOSCOPIC RESECTION TUMOR CRANIAL N/A 11/3/2017    Procedure: OPTICAL TRACKING SYSTEM ENDOSCOPIC RESECTION TUMOR CRANIAL;  Stealth Guided Endoscopic Transnasal Resection of Pituitary Tumor;  Surgeon: Amanda Bates MD;  Location:  OR       Medications:   Current Outpatient Medications   Medication Sig Dispense Refill     alfuzosin ER (UROXATRAL) 10 MG 24 hr tablet Take 10 mg by mouth daily       aspirin 81 MG tablet Take 1 tablet (81 mg) by mouth every evening 30 tablet 0     ezetimibe (ZETIA) 10 MG tablet Take 1 tablet (10 mg) by mouth daily 90 tablet 3     Glucosamine 500 MG CAPS Take 500 mg by mouth 2 times daily        ibuprofen (ADVIL/MOTRIN) 200 MG tablet Take 400 mg by mouth daily       levothyroxine (SYNTHROID/LEVOTHROID) 100 MCG tablet Take 1 tablet (100 mcg) by mouth daily 90 tablet 3     metoprolol tartrate (LOPRESSOR) 25 MG tablet Take 1 tablet (25 mg) by mouth 2 times daily 180 tablet 3     Multiple Vitamin (MULTI-VITAMIN) per tablet Take 1 tablet by mouth every morning        nitroGLYcerin (NITROSTAT) 0.4 MG sublingual tablet Place 1 tablet (0.4 mg) under the tongue every 5 minutes as needed May repeat x2, if chest pain contines after 3rd dose call 911 25 tablet 3     ramipril (ALTACE) 5 MG capsule Take 1 capsule (5 mg) by mouth daily 90 capsule 3     rosuvastatin (CRESTOR) 40 MG tablet Take 1 tablet (40 mg) by mouth daily 90 tablet 3     saw palmetto 450 MG CAPS capsule Take 450 mg by mouth daily       testosterone (ANDROGEL 1.62 % PUMP) 20.25 MG/ACT gel APPLY 2 PUMPS DAILY TO CLEAN, DRY, INTACT SKIN OF THE UPPER ARMS AND SHOULDERS 225 g 3       Allergies:   Allergies   Allergen Reactions     No Clinical  Screening - See Comments Hives     Cardizem [Diltiazem Hcl] Itching     Cats Other (See Comments) and Hives     WATERY EYES, SNEEZE, AND COUGH     Diltiazem Hives and Rash       Social History:  Social History     Tobacco Use     Smoking status: Former Smoker     Packs/day: 0.20     Years: 5.00     Pack years: 1.00     Types: Cigarettes     Start date:      Quit date: 1960     Years since quittin.0     Smokeless tobacco: Never Used     Tobacco comment: Social smoking only   Substance Use Topics     Alcohol use: Yes     Alcohol/week: 1.0 - 2.0 standard drinks     Types: 1 - 2 Standard drinks or equivalent per week     Comment: FIVE DAYS A WEEK         Family History:  Family History   Problem Relation Age of Onset     C.A.D. Father      Heart Disease Father          at age 44     Heart Disease Son 37        enlarged heart     Heart Disease Mother          at age 80 after heart surgery     Heart Surgery Mother         open heart, passed 10 days after     Diabetes Mother      Family History Negative Maternal Grandmother      Family History Negative Maternal Grandfather      Family History Negative Paternal Grandmother      Family History Negative Paternal Grandfather      Family History Negative Brother      Alzheimer Disease Brother      Family History Negative Sister      Family History Negative Daughter      Family History Negative Son      Family History Negative Daughter      Heart Disease Son          at age 37       Review of Systems:  A 10-point ROS is otherwise negative except as noted in HPI.     Physical Examination:  There were no vitals taken for this visit.  There is no height or weight on file to calculate BMI.  Wt Readings from Last 4 Encounters:   10/21/20 109 kg (240 lb 4.8 oz)   20 109.7 kg (241 lb 12.8 oz)   19 106.9 kg (235 lb 11.2 oz)   19 104.3 kg (229 lb 14.4 oz)     GEN: Alert, no distress.   RESP:no acute distress  EXT: No peripheral edema.    SKIN:   no lesions.   NEURO: Non-focal deficit observed.       2/28/2018 00:00 6/4/2018 07:38 10/10/2018 08:47 1/30/2019 09:17   Testosterone Total 121 (A) 172 (L) 162 (L) 294   TSH 0.03 (A) 1.82 1.70 0.98   T4 Free 1.2  0.75 (L) 0.89       1/30/2019 MRI: I also personally reviewed images.   Brain/ Pituitary MRI without and with contrast     History: Benign neoplasm of pituitary gland and craniopharyngeal duct  (pouch) (H); Benign neoplasm of pituitary gland and craniopharyngeal  duct (pouch) (H).  ICD-10: Benign neoplasm of pituitary gland and craniopharyngeal duct  (pouch) (H); Benign neoplasm of pituitary gland and craniopharyngeal  duct (pouch) (H)     Comparison:  MRI  2/1/ 2018 and MRI study on 11/3/2017.      Technique: Axial diffusion and FLAIR images of the whole brain  obtained without intravenous contrast. Sagittal T1 and T2-weighted,  coronal T2-weighted, coronal T1-weighted images with focus on the  sella were obtained without intravenous contrast. Post intravenous  contrast using gadolinium coronal and sagittal T1-weighted images were  obtained focused on the sella. Dynamic postcontrast coronal  T1-weighted images were also obtained.     Contrast: 10ml  Gadavist     Findings:    Stable postoperative changes of transnasal endoscopic resection of  pituitary since 2/1/2018. Unchanged leftward deviation of the  pituitary stalk. Partial empty sella also appears unchanged.  Previously noted small round focus of hypoenhancement along the medial  aspect of the right cavernous sinus measuring up to 4 mm, unchanged  (series 100, image 85).     The optic chiasm is intact and nondisplaced.     The major cavernous carotid vascular flow-voids are patent.       FLAIR images through the whole brain demonstrate mild leukoaraiosis.  Mild generalized parenchymal volume loss. No mass effect, midline  shift, or hydrocephalus. Bilateral pseudophakia.                                                                      Impression:    1. Stable postoperative changes of transnasal endoscopic pituitary  adenoma resection.  2. Unchanged small focus of hypoenhancement in the right cavernous  since 2/2018 most likely reflecting postoperative change although  residual tumor cannot be entirely excluded.  MR BRAIN W/O & W CONTRAST 2/1/2018 12:05 PM

## 2021-01-15 ENCOUNTER — HEALTH MAINTENANCE LETTER (OUTPATIENT)
Age: 76
End: 2021-01-15

## 2021-01-18 ENCOUNTER — TELEPHONE (OUTPATIENT)
Dept: CARDIOLOGY | Facility: CLINIC | Age: 76
End: 2021-01-18

## 2021-01-18 NOTE — TELEPHONE ENCOUNTER
Voicemail received from patient calling to request an appointment tomorrow with Dr Jolley. Pt states he will be in the area tomorrow for a brain MRI and wonders if he could be seen. Pt states he has been having increasing episodes of lightheadedness, as well as some exertional shortness of breath and angina while walking his daughter's dog. Pt states he is not very concerned at this time but just wants to be cautious.     Attempted to reach patient to discuss further but per patient's wife, was not available. Routed to Dr Jolley. Video slots available on 2/3 in [place], 2/4 in , or in-clinic on 2/9 in [place].

## 2021-01-19 ENCOUNTER — HOSPITAL ENCOUNTER (OUTPATIENT)
Dept: MRI IMAGING | Facility: CLINIC | Age: 76
End: 2021-01-19
Attending: NEUROLOGICAL SURGERY
Payer: COMMERCIAL

## 2021-01-19 ENCOUNTER — TELEPHONE (OUTPATIENT)
Dept: CARDIOLOGY | Facility: CLINIC | Age: 76
End: 2021-01-19

## 2021-01-19 ENCOUNTER — OFFICE VISIT (OUTPATIENT)
Dept: CARDIOLOGY | Facility: CLINIC | Age: 76
End: 2021-01-19
Payer: COMMERCIAL

## 2021-01-19 ENCOUNTER — PRE VISIT (OUTPATIENT)
Dept: CARDIOLOGY | Facility: CLINIC | Age: 76
End: 2021-01-19

## 2021-01-19 ENCOUNTER — HOSPITAL ENCOUNTER (OUTPATIENT)
Dept: LAB | Facility: CLINIC | Age: 76
End: 2021-01-19
Attending: NEUROLOGICAL SURGERY
Payer: COMMERCIAL

## 2021-01-19 VITALS
DIASTOLIC BLOOD PRESSURE: 80 MMHG | WEIGHT: 240.8 LBS | BODY MASS INDEX: 34.47 KG/M2 | HEART RATE: 59 BPM | HEIGHT: 70 IN | SYSTOLIC BLOOD PRESSURE: 144 MMHG | OXYGEN SATURATION: 95 %

## 2021-01-19 DIAGNOSIS — D35.2 PITUITARY ADENOMA (H): ICD-10-CM

## 2021-01-19 DIAGNOSIS — E66.09 NON MORBID OBESITY DUE TO EXCESS CALORIES: Chronic | ICD-10-CM

## 2021-01-19 DIAGNOSIS — D35.3 BENIGN NEOPLASM OF PITUITARY GLAND AND CRANIOPHARYNGEAL DUCT (POUCH) (H): ICD-10-CM

## 2021-01-19 DIAGNOSIS — R42 DIZZINESS: ICD-10-CM

## 2021-01-19 DIAGNOSIS — E78.5 HYPERLIPIDEMIA LDL GOAL <70: ICD-10-CM

## 2021-01-19 DIAGNOSIS — I25.110 CORONARY ARTERY DISEASE INVOLVING NATIVE CORONARY ARTERY OF NATIVE HEART WITH UNSTABLE ANGINA PECTORIS (H): ICD-10-CM

## 2021-01-19 DIAGNOSIS — I10 BENIGN ESSENTIAL HYPERTENSION: Primary | ICD-10-CM

## 2021-01-19 DIAGNOSIS — E78.6 HDL DEFICIENCY: ICD-10-CM

## 2021-01-19 DIAGNOSIS — I25.10 CORONARY ARTERY DISEASE INVOLVING NATIVE CORONARY ARTERY OF NATIVE HEART WITHOUT ANGINA PECTORIS: ICD-10-CM

## 2021-01-19 DIAGNOSIS — D35.2 BENIGN NEOPLASM OF PITUITARY GLAND AND CRANIOPHARYNGEAL DUCT (POUCH) (H): ICD-10-CM

## 2021-01-19 DIAGNOSIS — E78.00 PURE HYPERCHOLESTEROLEMIA: ICD-10-CM

## 2021-01-19 DIAGNOSIS — E23.0 HYPOPITUITARISM (H): ICD-10-CM

## 2021-01-19 LAB
CREAT BLD-MCNC: 1 MG/DL (ref 0.66–1.25)
GFR SERPL CREATININE-BSD FRML MDRD: 73 ML/MIN/{1.73_M2}
T4 FREE SERPL-MCNC: 0.98 NG/DL (ref 0.76–1.46)
TSH SERPL DL<=0.005 MIU/L-ACNC: 1.36 MU/L (ref 0.4–4)

## 2021-01-19 PROCEDURE — 93000 ELECTROCARDIOGRAM COMPLETE: CPT | Performed by: INTERNAL MEDICINE

## 2021-01-19 PROCEDURE — A9585 GADOBUTROL INJECTION: HCPCS | Performed by: NEUROLOGICAL SURGERY

## 2021-01-19 PROCEDURE — 84443 ASSAY THYROID STIM HORMONE: CPT | Performed by: INTERNAL MEDICINE

## 2021-01-19 PROCEDURE — 84403 ASSAY OF TOTAL TESTOSTERONE: CPT | Performed by: INTERNAL MEDICINE

## 2021-01-19 PROCEDURE — 82565 ASSAY OF CREATININE: CPT

## 2021-01-19 PROCEDURE — 255N000002 HC RX 255 OP 636: Performed by: NEUROLOGICAL SURGERY

## 2021-01-19 PROCEDURE — 84439 ASSAY OF FREE THYROXINE: CPT | Performed by: INTERNAL MEDICINE

## 2021-01-19 PROCEDURE — 70543 MRI ORBT/FAC/NCK W/O &W/DYE: CPT

## 2021-01-19 PROCEDURE — 99214 OFFICE O/P EST MOD 30 MIN: CPT | Performed by: INTERNAL MEDICINE

## 2021-01-19 RX ORDER — GADOBUTROL 604.72 MG/ML
10 INJECTION INTRAVENOUS ONCE
Status: COMPLETED | OUTPATIENT
Start: 2021-01-19 | End: 2021-01-19

## 2021-01-19 RX ORDER — RAMIPRIL 5 MG/1
10 CAPSULE ORAL DAILY
Qty: 90 CAPSULE | Refills: 3
Start: 2021-01-19 | End: 2021-02-18

## 2021-01-19 RX ADMIN — GADOBUTROL 10 ML: 604.72 INJECTION INTRAVENOUS at 13:50

## 2021-01-19 ASSESSMENT — MIFFLIN-ST. JEOR: SCORE: 1828.51

## 2021-01-19 NOTE — TELEPHONE ENCOUNTER
Eagle Jolley MD  P [facility] Heart Team 2             I will see Terrence in what ever slot he can make morning, afternoon or over lunch        Message to Dr Jolley to clarify what he prefers regarding appointment given conflicting messages.

## 2021-01-19 NOTE — TELEPHONE ENCOUNTER
Message from Dr Saul jiang to use 2:45pm locked spot. [facility] support staff notified. Spoke to patient to confirm appointment slot. Wellness screening completed as below.     Wellness Screening Tool    Symptom Screening:    Do you have one of the following NEW symptoms:      Fever (subjective or >100.0)?  No    New cough? No    Shortness of breath? Yes (intermittent, exertional only)    Chills? No    New loss of taste or smell? No    Generalized body aches? No    New persistent headache? No     New sore throat? No    Nausea, vomiting or diarrhea? No    Within the past 2 weeks, have you been exposed to someone with a known positive illness below?      COVID - 19 (known or suspected) No    Chicken pox?  No    Measles? No    Pertussis? No    Have you had a positive COVID-19 diagnostic test (nasal swab test) in the last 14 days or are you currently   on self-quarantine restrictions (i.e.travel restriction, exposure, etc?) No    Patient notified of visitor restriction: Yes  Patient informed to wear a mask: Yes    Patient's appointment status: Patient will be seen in clinic as scheduled on 2:45pm in [place].

## 2021-01-19 NOTE — PROGRESS NOTES
HPI and Plan:   See dictation    Orders Placed This Encounter   Procedures     Basic metabolic panel     Follow-Up with Cardiac Advanced Practice Provider     EKG 12-lead complete w/read - Clinics (performed today)       Orders Placed This Encounter   Medications     ramipril (ALTACE) 5 MG capsule     Sig: Take 2 capsules (10 mg) by mouth daily     Dispense:  90 capsule     Refill:  3       Medications Discontinued During This Encounter   Medication Reason     ramipril (ALTACE) 5 MG capsule          Encounter Diagnoses   Name Primary?     Benign essential hypertension Yes     Hyperlipidemia LDL goal <70      Coronary artery disease involving native coronary artery of native heart without angina pectoris      Coronary artery disease involving native coronary artery of native heart with unstable angina pectoris (H)      Pure hypercholesterolemia      HDL deficiency      Non morbid obesity due to excess calories      Dizziness        CURRENT MEDICATIONS:  Current Outpatient Medications   Medication Sig Dispense Refill     alfuzosin ER (UROXATRAL) 10 MG 24 hr tablet Take 10 mg by mouth daily       aspirin 81 MG tablet Take 1 tablet (81 mg) by mouth every evening 30 tablet 0     ezetimibe (ZETIA) 10 MG tablet Take 1 tablet (10 mg) by mouth daily 90 tablet 3     Glucosamine 500 MG CAPS Take 500 mg by mouth 2 times daily        ibuprofen (ADVIL/MOTRIN) 200 MG tablet Take 400 mg by mouth daily       levothyroxine (SYNTHROID/LEVOTHROID) 100 MCG tablet Take 1 tablet (100 mcg) by mouth daily 90 tablet 3     metoprolol tartrate (LOPRESSOR) 25 MG tablet Take 1 tablet (25 mg) by mouth 2 times daily 180 tablet 3     Multiple Vitamin (MULTI-VITAMIN) per tablet Take 1 tablet by mouth every morning        nitroGLYcerin (NITROSTAT) 0.4 MG sublingual tablet Place 1 tablet (0.4 mg) under the tongue every 5 minutes as needed May repeat x2, if chest pain contines after 3rd dose call 911 25 tablet 3     ramipril (ALTACE) 5 MG capsule Take  2 capsules (10 mg) by mouth daily 90 capsule 3     rosuvastatin (CRESTOR) 40 MG tablet Take 1 tablet (40 mg) by mouth daily 90 tablet 3     saw palmetto 450 MG CAPS capsule Take 450 mg by mouth daily       testosterone (ANDROGEL 1.62 % PUMP) 20.25 MG/ACT gel 2 pumps apply to the skin daily 225 g 5       ALLERGIES     Allergies   Allergen Reactions     No Clinical Screening - See Comments Hives     Cardizem [Diltiazem Hcl] Itching     Cats Other (See Comments) and Hives     WATERY EYES, SNEEZE, AND COUGH     Diltiazem Hives and Rash       PAST MEDICAL HISTORY:  Past Medical History:   Diagnosis Date     Coronary artery disease     cardiac cath 2016: medical management; cath : SUSANA x2 to LAD; cath : PTCA to LAD; cath : PTCA to LAD, : atherectomy of LAD     Hearing problem      Hyperlipidaemia      Hypertension      Obese      Obstructive sleep apnea      Reduced vision      Sleep apnea     CPAP       PAST SURGICAL HISTORY:  Past Surgical History:   Procedure Laterality Date     APPENDECTOMY OPEN       ARTHROPLASTY KNEE Left      ARTHROPLASTY SHOULDER Right      ENT SURGERY       HEART CATH, ANGIOPLASTY      LAD  atherectomy with a DVI device      HEART CATH, ANGIOPLASTY  4-28-10    PTCA and stent proximal and mid LAD (2) stents Xience     HEART CATH, ANGIOPLASTY  2016    no stenosis greater than 25%-rec. 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 Father      Heart Disease Father          at age 44     Heart Disease Son 37        enlarged heart     Heart Disease Mother          at age 80 after heart surgery     Heart Surgery Mother         open heart,  passed 10 days after     Diabetes Mother      Family History Negative Maternal Grandmother      Family History Negative Maternal Grandfather      Family History Negative Paternal Grandmother      Family History Negative Paternal Grandfather      Family History Negative Brother      Alzheimer Disease Brother      Family History Negative Sister      Family History Negative Daughter      Family History Negative Son      Family History Negative Daughter      Heart Disease Son          at age 37       SOCIAL HISTORY:  Social History     Socioeconomic History     Marital status:      Spouse name: None     Number of children: None     Years of education: None     Highest education level: None   Occupational History     None   Social Needs     Financial resource strain: None     Food insecurity     Worry: None     Inability: None     Transportation needs     Medical: None     Non-medical: None   Tobacco Use     Smoking status: Former Smoker     Packs/day: 0.20     Years: 5.00     Pack years: 1.00     Types: Cigarettes     Start date:      Quit date: 1960     Years since quittin.0     Smokeless tobacco: Never Used     Tobacco comment: Social smoking only   Substance and Sexual Activity     Alcohol use: Yes     Alcohol/week: 1.0 - 2.0 standard drinks     Types: 1 - 2 Standard drinks or equivalent per week     Comment: 2 cocktails per day     Drug use: No     Sexual activity: Not Currently     Partners: Female     Birth control/protection: Male Surgical   Lifestyle     Physical activity     Days per week: None     Minutes per session: None     Stress: None   Relationships     Social connections     Talks on phone: None     Gets together: None     Attends Taoism service: None     Active member of club or organization: None     Attends meetings of clubs or organizations: None     Relationship status: None     Intimate partner violence     Fear of current or ex partner: None     Emotionally abused: None  "    Physically abused: None     Forced sexual activity: None   Other Topics Concern     Parent/sibling w/ CABG, MI or angioplasty before 65F 55M? No      Service Not Asked     Blood Transfusions Not Asked     Caffeine Concern Yes     Comment: coffee      Occupational Exposure Not Asked     Hobby Hazards Not Asked     Sleep Concern No     Stress Concern No     Weight Concern Yes     Comment: Weight decrease 10 lbs     Special Diet Yes     Comment: Mediterranean diet     Back Care Not Asked     Exercise Yes     Comment: Walking 5 days per week 35- 60 minutes     Bike Helmet Not Asked     Seat Belt Yes     Self-Exams Not Asked   Social History Narrative     None       Review of Systems:  Skin:  Negative       Eyes:  Positive for glasses;visual blurring;double vision seeing optomitrist  ENT:  Positive for hearing loss wears hearing aides  Respiratory:  Positive for CPAP;sleep apnea;dyspnea on exertion SOB increasing   Cardiovascular:    chest pain;Positive for;lightheadedness with axertion and balance off a bit  Gastroenterology: Negative      Genitourinary:  Positive for nocturia;urinary frequency;incontinence    Musculoskeletal:  Positive for arthritis    Neurologic:  Positive for headaches couple a day-not bad and goes away but more frequent  Psychiatric:  Positive for depression since son's passing  2011  Heme/Lymph/Imm:  Positive for allergies    Endocrine:  Positive for thyroid disorder      Physical Exam:  Vitals: BP (!) 144/80   Pulse 59   Ht 1.778 m (5' 10\")   Wt 109.2 kg (240 lb 12.8 oz)   SpO2 95%   BMI 34.55 kg/m      Constitutional:  cooperative, alert and oriented, well developed, well nourished, in no acute distress obese      Skin:  warm and dry to the touch, no apparent skin lesions or masses noted          Head:  normocephalic, no masses or lesions        Eyes:  pupils equal and round, conjunctivae and lids unremarkable, sclera white, no xanthalasma, EOMS intact, no nystagmus        Lymph: "      ENT:  no pallor or cyanosis, dentition good        Neck:  no carotid bruit;carotid pulses are full and equal bilaterally        Respiratory:  normal breath sounds, clear to auscultation, normal A-P diameter, normal symmetry, normal respiratory excursion, no use of accessory muscles         Cardiac: regular rhythm;normal S1 and S2;no S3 or S4;no murmurs, gallops or rubs detected                pulses full and equal                                        GI:    obese      Extremities and Muscular Skeletal:  no edema;no spinal abnormalities noted;normal muscle strength and tone   varicose vein          Neurological:  no gross motor deficits        Psych:  affect appropriate, oriented to time, person and place        CC  No referring provider defined for this encounter.

## 2021-01-19 NOTE — LETTER
1/19/2021    Alexy Hernandez MD  [facility] [address]  [place] [address]    RE: Terrence Murillo       Dear Colleague,    I had the pleasure of seeing Terrence Murillo in the [facility].    HPI and Plan:   See dictation    Orders Placed This Encounter   Procedures     Basic metabolic panel     Follow-Up with Cardiac Advanced Practice Provider     EKG 12-lead complete w/read - Clinics (performed today)       Orders Placed This Encounter   Medications     ramipril (ALTACE) 5 MG capsule     Sig: Take 2 capsules (10 mg) by mouth daily     Dispense:  90 capsule     Refill:  3       Medications Discontinued During This Encounter   Medication Reason     ramipril (ALTACE) 5 MG capsule          Encounter Diagnoses   Name Primary?     Benign essential hypertension Yes     Hyperlipidemia LDL goal <70      Coronary artery disease involving native coronary artery of native heart without angina pectoris      Coronary artery disease involving native coronary artery of native heart with unstable angina pectoris (H)      Pure hypercholesterolemia      HDL deficiency      Non morbid obesity due to excess calories      Dizziness        CURRENT MEDICATIONS:  Current Outpatient Medications   Medication Sig Dispense Refill     alfuzosin ER (UROXATRAL) 10 MG 24 hr tablet Take 10 mg by mouth daily       aspirin 81 MG tablet Take 1 tablet (81 mg) by mouth every evening 30 tablet 0     ezetimibe (ZETIA) 10 MG tablet Take 1 tablet (10 mg) by mouth daily 90 tablet 3     Glucosamine 500 MG CAPS Take 500 mg by mouth 2 times daily        ibuprofen (ADVIL/MOTRIN) 200 MG tablet Take 400 mg by mouth daily       levothyroxine (SYNTHROID/LEVOTHROID) 100 MCG tablet Take 1 tablet (100 mcg) by mouth daily 90 tablet 3     metoprolol tartrate (LOPRESSOR) 25 MG tablet Take 1 tablet (25 mg) by mouth 2 times daily 180 tablet 3     Multiple Vitamin (MULTI-VITAMIN) per tablet Take 1 tablet by mouth  every morning        nitroGLYcerin (NITROSTAT) 0.4 MG sublingual tablet Place 1 tablet (0.4 mg) under the tongue every 5 minutes as needed May repeat x2, if chest pain contines after 3rd dose call 911 25 tablet 3     ramipril (ALTACE) 5 MG capsule Take 2 capsules (10 mg) by mouth daily 90 capsule 3     rosuvastatin (CRESTOR) 40 MG tablet Take 1 tablet (40 mg) by mouth daily 90 tablet 3     saw palmetto 450 MG CAPS capsule Take 450 mg by mouth daily       testosterone (ANDROGEL 1.62 % PUMP) 20.25 MG/ACT gel 2 pumps apply to the skin daily 225 g 5       ALLERGIES     Allergies   Allergen Reactions     No Clinical Screening - See Comments Hives     Cardizem [Diltiazem Hcl] Itching     Cats Other (See Comments) and Hives     WATERY EYES, SNEEZE, AND COUGH     Diltiazem Hives and Rash       PAST MEDICAL HISTORY:  Past Medical History:   Diagnosis Date     Coronary artery disease     cardiac cath 2016: medical management; cath 2010: SUSANA x2 to LAD; cath 1993: PTCA to LAD; cath 1992: PTCA to LAD, 1992: atherectomy of LAD     Hearing problem      Hyperlipidaemia      Hypertension      Obese      Obstructive sleep apnea      Reduced vision      Sleep apnea     CPAP       PAST SURGICAL HISTORY:  Past Surgical History:   Procedure Laterality Date     APPENDECTOMY OPEN       ARTHROPLASTY KNEE Left      ARTHROPLASTY SHOULDER Right      ENT SURGERY       HEART CATH, ANGIOPLASTY  1992    LAD  atherectomy with a DVI device      HEART CATH, ANGIOPLASTY  4-28-10    PTCA and stent proximal and mid LAD (2) stents Xience     HEART CATH, ANGIOPLASTY  04/20/2016    no stenosis greater than 25%-rec. medical management     OPTICAL TRACKING SYSTEM ENDOSCOPIC RESECTION TUMOR CRANIAL N/A 11/3/2017    Procedure: OPTICAL TRACKING SYSTEM ENDOSCOPIC RESECTION TUMOR CRANIAL;  Stealth Guided Endoscopic Transnasal Resection of Pituitary Tumor;  Surgeon: Amanda Bates MD;  Location:  OR       FAMILY HISTORY:  Family History   Problem  Relation Age of Onset     C.A.VESNA Father      Heart Disease Father          at age 44     Heart Disease Son 37        enlarged heart     Heart Disease Mother          at age 80 after heart surgery     Heart Surgery Mother         open heart, passed 10 days after     Diabetes Mother      Family History Negative Maternal Grandmother      Family History Negative Maternal Grandfather      Family History Negative Paternal Grandmother      Family History Negative Paternal Grandfather      Family History Negative Brother      Alzheimer Disease Brother      Family History Negative Sister      Family History Negative Daughter      Family History Negative Son      Family History Negative Daughter      Heart Disease Son          at age 37       SOCIAL HISTORY:  Social History     Socioeconomic History     Marital status:      Spouse name: None     Number of children: None     Years of education: None     Highest education level: None   Occupational History     None   Social Needs     Financial resource strain: None     Food insecurity     Worry: None     Inability: None     Transportation needs     Medical: None     Non-medical: None   Tobacco Use     Smoking status: Former Smoker     Packs/day: 0.20     Years: 5.00     Pack years: 1.00     Types: Cigarettes     Start date:      Quit date: 1960     Years since quittin.0     Smokeless tobacco: Never Used     Tobacco comment: Social smoking only   Substance and Sexual Activity     Alcohol use: Yes     Alcohol/week: 1.0 - 2.0 standard drinks     Types: 1 - 2 Standard drinks or equivalent per week     Comment: 2 cocktails per day     Drug use: No     Sexual activity: Not Currently     Partners: Female     Birth control/protection: Male Surgical   Lifestyle     Physical activity     Days per week: None     Minutes per session: None     Stress: None   Relationships     Social connections     Talks on phone: None     Gets together: None     Attends  "Orthodox service: None     Active member of club or organization: None     Attends meetings of clubs or organizations: None     Relationship status: None     Intimate partner violence     Fear of current or ex partner: None     Emotionally abused: None     Physically abused: None     Forced sexual activity: None   Other Topics Concern     Parent/sibling w/ CABG, MI or angioplasty before 65F 55M? No      Service Not Asked     Blood Transfusions Not Asked     Caffeine Concern Yes     Comment: coffee      Occupational Exposure Not Asked     Hobby Hazards Not Asked     Sleep Concern No     Stress Concern No     Weight Concern Yes     Comment: Weight decrease 10 lbs     Special Diet Yes     Comment: Mediterranean diet     Back Care Not Asked     Exercise Yes     Comment: Walking 5 days per week 35- 60 minutes     Bike Helmet Not Asked     Seat Belt Yes     Self-Exams Not Asked   Social History Narrative     None       Review of Systems:  Skin:  Negative       Eyes:  Positive for glasses;visual blurring;double vision seeing optomitrist  ENT:  Positive for hearing loss wears hearing aides  Respiratory:  Positive for CPAP;sleep apnea;dyspnea on exertion SOB increasing   Cardiovascular:    chest pain;Positive for;lightheadedness with axertion and balance off a bit  Gastroenterology: Negative      Genitourinary:  Positive for nocturia;urinary frequency;incontinence    Musculoskeletal:  Positive for arthritis    Neurologic:  Positive for headaches couple a day-not bad and goes away but more frequent  Psychiatric:  Positive for depression since son's passing  2011  Heme/Lymph/Imm:  Positive for allergies    Endocrine:  Positive for thyroid disorder      Physical Exam:  Vitals: BP (!) 144/80   Pulse 59   Ht 1.778 m (5' 10\")   Wt 109.2 kg (240 lb 12.8 oz)   SpO2 95%   BMI 34.55 kg/m      Constitutional:  cooperative, alert and oriented, well developed, well nourished, in no acute distress obese      Skin:  warm and " dry to the touch, no apparent skin lesions or masses noted          Head:  normocephalic, no masses or lesions        Eyes:  pupils equal and round, conjunctivae and lids unremarkable, sclera white, no xanthalasma, EOMS intact, no nystagmus        Lymph:      ENT:  no pallor or cyanosis, dentition good        Neck:  no carotid bruit;carotid pulses are full and equal bilaterally        Respiratory:  normal breath sounds, clear to auscultation, normal A-P diameter, normal symmetry, normal respiratory excursion, no use of accessory muscles         Cardiac: regular rhythm;normal S1 and S2;no S3 or S4;no murmurs, gallops or rubs detected                pulses full and equal                                        GI:    obese      Extremities and Muscular Skeletal:  no edema;no spinal abnormalities noted;normal muscle strength and tone   varicose vein          Neurological:  no gross motor deficits        Psych:  affect appropriate, oriented to time, person and place      Thank you for allowing me to participate in the care of your patient.    Sincerely,     Eagle Jolley MD     [facility] [place]    cc:   No referring provider defined for this encounter.

## 2021-01-20 LAB — TESTOST SERPL-MCNC: 225 NG/DL (ref 240–950)

## 2021-01-20 NOTE — PROGRESS NOTES
Service Date: 2021      CLINIC VISIT       HISTORY OF PRESENT ILLNESS:  Terrence is a very nice, 75-year-old gentleman with a past medical history significant for directional coronary atherectomy in  with subsequent restenosis x2, for which we treated by balloon angioplasty.  In , two Xience drug-eluting stents were placed in his proximal and mid left anterior descending artery.      Psychosocially, Terrence has had a tough go of it.  His son, Doug,  suddenly in  from what sounds like a cardiac arrest and possible heart attack.  Interestingly, his dad also  suddenly of an apparent heart attack at age 44.  His grandmother  suddenly at age 21 of uncertain reasons.  His wife follows through [facility] with pulmonary hypertension.      Unfortunately, Terrence has developed several orthopedic issues over the years and has decreased his ability to exercise.  He was always very good about controlling his weight by exercising to a high level and was never good about controlling his portions or his food choices.  He has seen my wife for health coaching and did well for a period of time, but has clearly backslid and has regained all the weight that he lost.      Most recently, he has been suffering with spinal stenosis, and this has interfered with his ability to exercise.      Other issues include a pituitary adenoma, which he has had resected and follows with Neurology on a regular basis.      I am seeing him urgently as an overbook patient today as he has developed some chest discomfort.  He noted last week out walking his daughter's dog, and he states he was walking quite slowly, he developed substernal chest discomfort reminiscent of his previous angina.  He states it was nowhere near as intense or as bad, but was there.  He also noted when he was walking up and down the stairs putting away Jarrett decorations, he was more short of breath than normal, but did not have any of the chest discomfort  with that.  A third thing that he has identified recently is that his vision is not right.  He states when he looks at red lights, the lights do not seem to line up properly and seem to be misaligned.  Finally, he states when he has been up walking around recently, he has this lightheadedness and dizziness.  He describes it as a disequilibrium, and then he describes that if he is in the shower and closes his eyes, he feels like he is going to lose his balance completely.  He does not describe orthostasis.      As it happens, he had his followup MRI of his pituitary today, and he is scheduled to see Neurology next week.      With his back problems and COVID, he unfortunately has not been exercising at all.  His weight is up to 240 pounds, giving a body mass index of 34.6.      ASSESSMENT AND PLAN:  I think Terrence had an episode of angina.  We talked about options.  At this time, he thinks he has only had one episode and would like to take a more conservative approach.  We talked about exercise, going directly to the Cath Lab, but at this time I told him we can just watch and see how he does, as this was out in the cold and with exercise.  That may be the explanation, but if it continues to come, then we might just go right to the Cath Lab.      He has no symptoms to suggest heart failure unless his walking up and down the stairs is low-grade diastolic heart failure.  I suspect it is more likely his weight and deconditioning.      He has no significant arrhythmias clinically.      Blood pressure is not well controlled at 144/80.  At this time, I will increase his ramipril to 10 mg daily.  I will have him follow up with my LUISA in 1 month.  We will see how he is doing from an angina standpoint.  We will recheck his blood pressure, and we will also recheck a basic metabolic profile.  If his blood pressure remains high, I would add a diuretic, probably chlorthalidone unless his potassium is low, and then I would use  spironolactone.  Basic metabolic profile from today shows his potassium is 4.5 and creatinine is 1.      Fasting lipid profile was checked in October and was outstanding.  Total cholesterol is 110, HDL is 41, LDL is 49, and triglycerides are 101.  We will continue his Zetia and rosuvastatin as is.      I have emphasized the importance of he has got to get back to some sort of exercise regimen, whether it is walking on his treadmill or pedaling his exercise bicycle, but he needs to exercise and lose weight.  We also talked about improving his diet, of which he knows all of this.      He currently has an appointment to see me back in July.  I will see him then unless, of course, he is having more angina, which we will get him in and get him to the Cath Lab sooner.      Regarding his vision problems, I think this is most likely due to his adenoma, of which he already notes he has lost some peripheral vision because of the surgery.  He did see an ophthalmologist, and the ophthalmologist commented that the arteries in the back of his eyes were more squiggly, and I think this is a sign of his high blood pressure not being well controlled, which we are addressing.      His dizziness and dysequilibrium clearly sound like either an inner ear or cerebellar problem and not cardiac.  We talked our way through it, and I am not going to evaluate any further.  As stated, he has an appointment with Neurology next week, and I have told him if nothing else, he can then follow up with his ENT.      Thank you for allowing me to participate in his care.         CHRISTY VELÁSQUEZ MD, [facility]             D: 2021   T: 2021   MT: aram      Name:     DANIEL RIGGINS   MRN:      [id number]        Account:      [id number]   :      1945           Service Date: 2021      Document: [id number]

## 2021-01-21 NOTE — RESULT ENCOUNTER NOTE
Yobani Ocampo,    Testosterone level is improved but still slight lower side,  Can you try 3 pumps Mon/Wed/Fri and the rest continue 2 pumps?    Gris Santoro MD

## 2021-01-26 ENCOUNTER — VIRTUAL VISIT (OUTPATIENT)
Dept: NEUROSURGERY | Facility: CLINIC | Age: 76
End: 2021-01-26
Payer: COMMERCIAL

## 2021-01-26 DIAGNOSIS — D35.2 PITUITARY MACROADENOMA (H): Primary | ICD-10-CM

## 2021-01-26 DIAGNOSIS — R42 DYSEQUILIBRIUM: ICD-10-CM

## 2021-01-26 PROCEDURE — 99442 PR PHYSICIAN TELEPHONE EVALUATION 11-20 MIN: CPT | Mod: 95 | Performed by: NEUROLOGICAL SURGERY

## 2021-01-26 NOTE — PROGRESS NOTES
Terrence is a 75 year old who is being evaluated via a billable telephone visit.      What phone number would you like to be contacted at? [phone]  How would you like to obtain your AVS? MyChart AND BY MAIL  Phone call duration: 15 minutes        Some intermittent diplopia starting in December 2020. First noticed when looking at a traffic sign. Seems like right eye is worse. Saw optometrist recently and confirmed that right eye is worse than left. Saw Dr. 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 On testosterone replacement. See dictated note.  RHONDA Bates MD

## 2021-01-26 NOTE — LETTER
2021       RE: Terrence Murillo  [address]  [place] [address]     Dear Colleague,    Thank you for referring your patient, Terrence Murillo, to the [facility] [place] at [facility]. Please see a copy of my visit note below.    Terrence is a 75 year old who is being evaluated via a billable telephone visit.          Some intermittent diplopia starting in 2020. First noticed when looking at a traffic sign. Seems like right eye is worse. Saw optometrist recently and confirmed that right eye is worse than left. Saw Dr. 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 On testosterone replacement.   RHONDA Bates MD        Service Date: 2021      Alexy Hernandez MD   [facility] [address] [place] [address]      RE: Terrence Murillo    MRN: [id number]   : 1945      Dear Dr. Hernandez:      We spoke to Mr. Murillo as part of a telephone visit in Pituitary Clinic on 2021.  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  He has been recovering reasonably well.  He is on testosterone and thyroid replacement.  He saw Dr. Herndon in Neuro-Ophthalmology Clinic in 10/2020, and his visual findings were stable with good acuity and persistent bitemporal visual field deficits.  For several weeks, he has been experiencing intermittent diplopia, and he believes his right eye is the source.  He first noticed this when looking at a traffic sign.  He recently saw his optometrist.  The reports were not available, but according to Mr. Murillo, his optometrist confirmed that right-sided vision is worse than the left.  He has also been experiencing some intermittent lightheadedness.  He has fallen at least once over the past year.  He recently developed some chest discomfort and was evaluated by Dr. Jolley.  He describes  his lightheadedness is more of a disequilibrium when he closes his eyes.  He does not fall to one side or the other.  He has not been dropping objects.      Over the phone, his phonation, speech and language were normal.      REVIEW OF STUDIES:  We went over his MRI of the pituitary from earlier this month.  We see extensive postsurgical changes in the sphenoid sinus.  There is some hypoenhancing tissue in the right parasellar space that likely represents some residual adenoma.  The suprasellar space looks good.  The optic apparatus remains completely decompressed.  His imaging findings are unchanged from a year ago.      IMPRESSION AND PLAN:  His visual symptoms are difficult to explain based on the MRI.  Dr. Herndon originally wanted to see him back in about a year, but we will try to arrange for an appointment sooner, around the 6 month faith.  He may benefit from a Neurology consultation for his disequilibrium and imbalance.  Regarding his small residual pituitary tumor, we will repeat an MRI in 2 years and see him back at that time.      Please do not hesitate to contact us with questions.      Sincerely,      Amanda Bates MD              D: 2021   T: 2021   MT: aram      Name:     DANIEL RIGGINS   MRN:      [id number]        Account:      [id number]   :      1945           Service Date: 2021      Document: [id number]

## 2021-01-26 NOTE — LETTER
2021      RE: Terrence Murillo  [address]  [place] [address]       Terrence is a 75 year old who is being evaluated via a billable telephone visit.      What phone number would you like to be contacted at? [phone]  How would you like to obtain your AVS? MyChart AND BY MAIL  Phone call duration: 15 minutes        Some intermittent diplopia starting in 2020. First noticed when looking at a traffic sign. Seems like right eye is worse. Saw optometrist recently and confirmed that right eye is worse than left. Saw Dr. 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 On testosterone replacement. See dictated note.  RHONDA Bates MD          Service Date: 2021      Alexy Hernandez MD   [facility] [address] [place] [address]      RE: Terrence Murillo    MRN: [id number]   : 1945      Dear Dr. Hernandez:      We spoke to Mr. Murillo as part of a telephone visit in Pituitary Clinic on 2021.  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  He has been recovering reasonably well.  He is on testosterone and thyroid replacement.  He saw Dr. Herndon in Neuro-Ophthalmology Clinic in 10/2020, and his visual findings were stable with good acuity and persistent bitemporal visual field deficits.  For several weeks, he has been experiencing intermittent diplopia, and he believes his right eye is the source.  He first noticed this when looking at a traffic sign.  He recently saw his optometrist.  The reports were not available, but according to Mr. Murillo, his optometrist confirmed that right-sided vision is worse than the left.  He has also been experiencing some intermittent lightheadedness.  He has fallen at least once over the past year.  He recently developed some chest discomfort and was evaluated by Dr. Jolley.  He describes his lightheadedness is more of a  disequilibrium when he closes his eyes.  He does not fall to one side or the other.  He has not been dropping objects.      Over the phone, his phonation, speech and language were normal.      REVIEW OF STUDIES:  We went over his MRI of the pituitary from earlier this month.  We see extensive postsurgical changes in the sphenoid sinus.  There is some hypoenhancing tissue in the right parasellar space that likely represents some residual adenoma.  The suprasellar space looks good.  The optic apparatus remains completely decompressed.  His imaging findings are unchanged from a year ago.      IMPRESSION AND PLAN:  His visual symptoms are difficult to explain based on the MRI.  Dr. Herndon originally wanted to see him back in about a year, but we will try to arrange for an appointment sooner, around the 6 month faith.  He may benefit from a Neurology consultation for his disequilibrium and imbalance.  Regarding his small residual pituitary tumor, we will repeat an MRI in 2 years and see him back at that time.      Please do not hesitate to contact us with questions.      Sincerely,      Amanda Bates MD              D: 2021   T: 2021   MT: aram      Name:     DANIEL RIGGINS   MRN:      [id number]        Account:      [id number]   :      1945           Service Date: 2021      Document: [id number]

## 2021-01-27 NOTE — PATIENT INSTRUCTIONS
We will try to move up your eye appt with Dr. Herndon from one year to 6 months (about 3 months from now)    Repeat MRI pituitary in two years (not ordered).    Follow up with Dr. Santoro as scheduled.    Based on your symptoms of dysequilibrium, we will refer you to general neurology clinic (ordered)

## 2021-01-27 NOTE — PROGRESS NOTES
Service Date: 2021      Alexy Hernandez MD   [facility] [address] [place] [address]      RE: Terrence Murillo    MRN: [id number]   : 1945      Dear Dr. Hernandez:      We spoke to Mr. Murillo as part of a telephone visit in [facility] on 2021.  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  He has been recovering reasonably well.  He is on testosterone and thyroid replacement.  He saw Dr. Herndon in [facility] in 10/2020, and his visual findings were stable with good acuity and persistent bitemporal visual field deficits.  For several weeks, he has been experiencing intermittent diplopia, and he believes his right eye is the source.  He first noticed this when looking at a traffic sign.  He recently saw his optometrist.  The reports were not available, but according to Mr. Murillo, his optometrist confirmed that right-sided vision is worse than the left.  He has also been experiencing some intermittent lightheadedness.  He has fallen at least once over the past year.  He recently developed some chest discomfort and was evaluated by Dr. Jolley.  He describes his lightheadedness is more of a disequilibrium when he closes his eyes.  He does not fall to one side or the other.  He has not been dropping objects.      Over the phone, his phonation, speech and language were normal.      REVIEW OF STUDIES:  We went over his MRI of the pituitary from earlier this month.  We see extensive postsurgical changes in the sphenoid sinus.  There is some hypoenhancing tissue in the right parasellar space that likely represents some residual adenoma.  The suprasellar space looks good.  The optic apparatus remains completely decompressed.  His imaging findings are unchanged from a year ago.      IMPRESSION AND PLAN:  His visual symptoms are difficult to explain based on the MRI.  Dr. Herndon originally  wanted to see him back in about a year, but we will try to arrange for an appointment sooner, around the 6 month faith.  He may benefit from a Neurology consultation for his disequilibrium and imbalance.  Regarding his small residual pituitary tumor, we will repeat an MRI in 2 years and see him back at that time.      Please do not hesitate to contact us with questions.      Sincerely,      MD FLOR Marr MD             D: 2021   T: 2021   MT: aram      Name:     DANIEL RIGGINS   MRN:      [id number]        Account:      [id number]   :      1945           Service Date: 2021      Document: [id number]

## 2021-01-29 DIAGNOSIS — E78.5 HYPERLIPIDEMIA LDL GOAL <70: ICD-10-CM

## 2021-01-29 DIAGNOSIS — I25.10 CORONARY ARTERY DISEASE INVOLVING NATIVE CORONARY ARTERY OF NATIVE HEART WITHOUT ANGINA PECTORIS: Chronic | ICD-10-CM

## 2021-01-29 DIAGNOSIS — I10 BENIGN ESSENTIAL HYPERTENSION: ICD-10-CM

## 2021-01-29 RX ORDER — NITROGLYCERIN 0.4 MG/1
0.4 TABLET SUBLINGUAL EVERY 5 MIN PRN
Qty: 25 TABLET | Refills: 3 | Status: SHIPPED | OUTPATIENT
Start: 2021-01-29 | End: 2022-03-28

## 2021-01-29 RX ORDER — METOPROLOL TARTRATE 25 MG/1
25 TABLET, FILM COATED ORAL 2 TIMES DAILY
Qty: 180 TABLET | Refills: 3 | Status: SHIPPED | OUTPATIENT
Start: 2021-01-29 | End: 2022-03-28

## 2021-01-31 ENCOUNTER — IMMUNIZATION (OUTPATIENT)
Dept: NURSING | Facility: CLINIC | Age: 76
End: 2021-01-31
Payer: COMMERCIAL

## 2021-01-31 PROCEDURE — 0001A PR COVID VAC PFIZER DIL RECON 30 MCG/0.3 ML IM: CPT

## 2021-01-31 PROCEDURE — 91300 PR COVID VAC PFIZER DIL RECON 30 MCG/0.3 ML IM: CPT

## 2021-02-18 ENCOUNTER — OFFICE VISIT (OUTPATIENT)
Dept: CARDIOLOGY | Facility: CLINIC | Age: 76
End: 2021-02-18
Attending: INTERNAL MEDICINE
Payer: COMMERCIAL

## 2021-02-18 VITALS
OXYGEN SATURATION: 96 % | BODY MASS INDEX: 35.07 KG/M2 | HEART RATE: 61 BPM | WEIGHT: 245 LBS | SYSTOLIC BLOOD PRESSURE: 117 MMHG | DIASTOLIC BLOOD PRESSURE: 76 MMHG | HEIGHT: 70 IN

## 2021-02-18 DIAGNOSIS — E78.5 HYPERLIPIDEMIA LDL GOAL <70: ICD-10-CM

## 2021-02-18 DIAGNOSIS — I10 BENIGN ESSENTIAL HYPERTENSION: ICD-10-CM

## 2021-02-18 DIAGNOSIS — E66.01 MORBID OBESITY (H): ICD-10-CM

## 2021-02-18 DIAGNOSIS — I25.10 CORONARY ARTERY DISEASE INVOLVING NATIVE CORONARY ARTERY OF NATIVE HEART WITHOUT ANGINA PECTORIS: ICD-10-CM

## 2021-02-18 LAB
ANION GAP SERPL CALCULATED.3IONS-SCNC: 3 MMOL/L (ref 3–14)
BUN SERPL-MCNC: 21 MG/DL (ref 7–30)
CALCIUM SERPL-MCNC: 8.5 MG/DL (ref 8.5–10.1)
CHLORIDE SERPL-SCNC: 110 MMOL/L (ref 94–109)
CO2 SERPL-SCNC: 28 MMOL/L (ref 20–32)
CREAT SERPL-MCNC: 0.95 MG/DL (ref 0.66–1.25)
GFR SERPL CREATININE-BSD FRML MDRD: 77 ML/MIN/{1.73_M2}
GLUCOSE SERPL-MCNC: 95 MG/DL (ref 70–99)
POTASSIUM SERPL-SCNC: 4.3 MMOL/L (ref 3.4–5.3)
SODIUM SERPL-SCNC: 141 MMOL/L (ref 133–144)

## 2021-02-18 PROCEDURE — 80048 BASIC METABOLIC PNL TOTAL CA: CPT | Performed by: INTERNAL MEDICINE

## 2021-02-18 PROCEDURE — 36415 COLL VENOUS BLD VENIPUNCTURE: CPT | Performed by: INTERNAL MEDICINE

## 2021-02-18 PROCEDURE — 99214 OFFICE O/P EST MOD 30 MIN: CPT | Performed by: PHYSICIAN ASSISTANT

## 2021-02-18 RX ORDER — RAMIPRIL 10 MG/1
10 CAPSULE ORAL DAILY
Qty: 90 CAPSULE | Refills: 3 | Status: SHIPPED | OUTPATIENT
Start: 2021-02-18 | End: 2022-03-30

## 2021-02-18 ASSESSMENT — MIFFLIN-ST. JEOR: SCORE: 1847.56

## 2021-02-18 NOTE — PROGRESS NOTES
CARDIOLOGY CLINIC PROGRESS NOTE    DOS: 2021      Terrence Murillo  : 1945, 75 year old  MRN: [id number]      History:  I am following up with Terrence Murillo today in the cardiology clinic.  He is a patient of Dr. Jolley.    Terrence Murillo is a 75 year old man with history of CAD. He underwent coronary atherectomy in  with subsequent restenosis x2, treated by balloon angioplasty.  In , two Xience drug-eluting stents were placed in his proximal and mid left anterior descending artery. Also HTN, dyslipidemia, pituitary macroadenoma s/p resection in , sleep apnea, spinal stenosis, orthopedic issues, tremor (declines treatment).     I saw Terrence 10/21/20.  He was doing well at that time from a cardiovascular standpoint.     21 last visit with Dr. Jolley for an episode of chest discomfort similar to prior angina, more SOB with some activity, LH and dizziness, and vision changes.  His BP was elevated.  Dr. Jolley increased ramipril to 10 mg.     He presents today for follow up.   He says he saw neurosurgery 21 to review MRI.  He reports that they didn't think his sxs were related to his cerebellum.   He is taking and tolerating the increased dose of ramipril.   BMP is stable.   BP is improved.   He does report some occasional tingling in his hands a couple times a week.   His weight is up.  He said he used to see Dr. Jolley's wife, Destinee, and knows he needs to get back on a healthier diet.  However, despite his weight gain, he is still walking 4 days a week. He does back exercises daily.  He still mows, rakes, shovels.  Not limited with these activities. He also hunts.   He does not add salt to his food, but does not count sodium.       ROS:  Skin:  Negative     Eyes:  Positive for visual blurring;glasses  ENT:  Positive for    Respiratory:  Positive for sleep apnea;CPAP  Cardiovascular:  Negative    Gastroenterology: Negative    Genitourinary:  not assessed    Musculoskeletal:   Negative    Neurologic:  Positive for numbness or tingling of hands  Psychiatric:  Negative    Heme/Lymph/Imm:  Positive for allergies  Endocrine:  Negative      PAST MEDICAL HISTORY:  Past Medical History:   Diagnosis Date     Coronary artery disease     cardiac cath 2016: medical management; cath : SUSANA x2 to LAD; cath : PTCA to LAD; cath : PTCA to LAD, : atherectomy of LAD     Hearing problem      Hyperlipidaemia      Hypertension      Obese      Obstructive sleep apnea      Reduced vision      Sleep apnea     CPAP       PAST SURGICAL HISTORY:  Past Surgical History:   Procedure Laterality Date     APPENDECTOMY OPEN       ARTHROPLASTY KNEE Left      ARTHROPLASTY SHOULDER Right      ENT SURGERY       HEART CATH, ANGIOPLASTY      LAD  atherectomy with a DVI device      HEART CATH, ANGIOPLASTY  4-28-10    PTCA and stent proximal and mid LAD (2) stents Xience     HEART CATH, ANGIOPLASTY  2016    no stenosis greater than 25%-rec. medical management     OPTICAL TRACKING SYSTEM ENDOSCOPIC RESECTION TUMOR CRANIAL N/A 11/3/2017    Procedure: OPTICAL TRACKING SYSTEM ENDOSCOPIC RESECTION TUMOR CRANIAL;  Stealth Guided Endoscopic Transnasal Resection of Pituitary Tumor;  Surgeon: Amanda Bates MD;  Location:  OR       SOCIAL HISTORY:  Social History     Socioeconomic History     Marital status:      Spouse name: Not on file     Number of children: Not on file     Years of education: Not on file     Highest education level: Not on file   Occupational History     Not on file   Social Needs     Financial resource strain: Not on file     Food insecurity     Worry: Not on file     Inability: Not on file     Transportation needs     Medical: Not on file     Non-medical: Not on file   Tobacco Use     Smoking status: Former Smoker     Packs/day: 0.20     Years: 5.00     Pack years: 1.00     Types: Cigarettes     Start date:      Quit date: 1960     Years since quittin.8      Smokeless tobacco: Never Used     Tobacco comment: Social smoking only   Substance and Sexual Activity     Alcohol use: Yes     Alcohol/week: 1.0 - 2.0 standard drinks     Types: 1 - 2 Standard drinks or equivalent per week     Comment: FIVE DAYS A WEEK     Drug use: No     Sexual activity: Not Currently     Partners: Female     Birth control/protection: Male Surgical   Lifestyle     Physical activity     Days per week: Not on file     Minutes per session: Not on file     Stress: Not on file   Relationships     Social connections     Talks on phone: Not on file     Gets together: Not on file     Attends Gnosticist service: Not on file     Active member of club or organization: Not on file     Attends meetings of clubs or organizations: Not on file     Relationship status: Not on file     Intimate partner violence     Fear of current or ex partner: Not on file     Emotionally abused: Not on file     Physically abused: Not on file     Forced sexual activity: Not on file   Other Topics Concern     Parent/sibling w/ CABG, MI or angioplasty before 65F 55M? No      Service Not Asked     Blood Transfusions Not Asked     Caffeine Concern Yes     Comment: coffee      Occupational Exposure Not Asked     Hobby Hazards Not Asked     Sleep Concern No     Stress Concern No     Weight Concern Yes     Comment: Weight decrease 10 lbs     Special Diet Yes     Comment: Mediterranean diet     Back Care Not Asked     Exercise Yes     Comment: Walking 5 days per week 35- 60 minutes     Bike Helmet Not Asked     Seat Belt Yes     Self-Exams Not Asked   Social History Narrative     Not on file       FAMILY HISTORY:  Family History   Problem Relation Age of Onset     C.A.D. Father      Heart Disease Father          at age 44     Heart Disease Son 37        enlarged heart     Heart Disease Mother          at age 80 after heart surgery     Heart Surgery Mother         open heart, passed 10 days after     Diabetes Mother   "    Family History Negative Maternal Grandmother      Family History Negative Maternal Grandfather      Family History Negative Paternal Grandmother      Family History Negative Paternal Grandfather      Family History Negative Brother      Alzheimer Disease Brother      Family History Negative Sister      Family History Negative Daughter      Family History Negative Son      Family History Negative Daughter      Heart Disease Son          at age 37       MEDS: alfuzosin ER (UROXATRAL) 10 MG 24 hr tablet, Take 10 mg by mouth daily  aspirin 81 MG tablet, Take 1 tablet (81 mg) by mouth every evening  ezetimibe (ZETIA) 10 MG tablet, Take 1 tablet (10 mg) by mouth daily  Glucosamine 500 MG CAPS, Take 500 mg by mouth 2 times daily   ibuprofen (ADVIL/MOTRIN) 200 MG tablet, Take 400 mg by mouth daily  levothyroxine (SYNTHROID/LEVOTHROID) 100 MCG tablet, Take 1 tablet (100 mcg) by mouth daily  metoprolol tartrate (LOPRESSOR) 25 MG tablet, Take 1 tablet (25 mg) by mouth 2 times daily  Multiple Vitamin (MULTI-VITAMIN) per tablet, Take 1 tablet by mouth every morning   nitroGLYcerin (NITROSTAT) 0.4 MG sublingual tablet, Place 1 tablet (0.4 mg) under the tongue every 5 minutes as needed May repeat x2, if chest pain contines after 3rd dose call 911  rosuvastatin (CRESTOR) 40 MG tablet, Take 1 tablet (40 mg) by mouth daily  saw palmetto 450 MG CAPS capsule, Take 450 mg by mouth daily  testosterone (ANDROGEL 1.62 % PUMP) 20.25 MG/ACT gel, 2 pumps apply to the skin daily    No current facility-administered medications on file prior to visit.       ALLERGIES:   Allergies   Allergen Reactions     No Clinical Screening - See Comments Hives     Cardizem [Diltiazem Hcl] Itching     Cats Other (See Comments) and Hives     WATERY EYES, SNEEZE, AND COUGH     Diltiazem Hives and Rash       PHYSICAL EXAM:  Vitals: /76 (BP Location: Right arm)   Pulse 61   Ht 1.778 m (5' 10\")   Wt 111.1 kg (245 lb)   SpO2 96%   BMI 35.15 kg/m  "   Constitutional:  cooperative, alert and oriented, well developed, well nourished, in no acute distress obese      Skin:  warm and dry to the touch, no apparent skin lesions or masses noted        Head:  normocephalic, no masses or lesions        Eyes:  pupils equal and round, conjunctivae and lids unremarkable, sclera white, no xanthalasma, EOMS intact, no nystagmus        ENT:  no pallor or cyanosis        Neck:  no carotid bruit;JVP normal        Respiratory:  normal breath sounds, clear to auscultation, normal A-P diameter, normal symmetry, normal respiratory excursion, no use of accessory muscles        Cardiac: regular rhythm;normal S1 and S2;no S3 or S4;no murmurs, gallops or rubs detected                  GI:  abdomen soft;BS normoactive obese      Vascular: pulses full and equal                                      Extremities and Musculoskeletal:  no edema;no spinal abnormalities noted;normal muscle strength and tone        Neurological:  no gross motor deficits;affect appropriate            LABS/DATA:  I reviewed the following:  Component      Latest Ref Rng & Units 2/18/2021   Sodium      133 - 144 mmol/L 141   Potassium      3.4 - 5.3 mmol/L 4.3   Chloride      94 - 109 mmol/L 110 (H)   Carbon Dioxide      20 - 32 mmol/L 28   Anion Gap      3 - 14 mmol/L 3   Glucose      70 - 99 mg/dL 95   Urea Nitrogen      7 - 30 mg/dL 21   Creatinine      0.66 - 1.25 mg/dL 0.95   GFR Estimate      >60 mL/min/1.73:m2 77   GFR Estimate If Black      >60 mL/min/1.73:m2 90   Calcium      8.5 - 10.1 mg/dL 8.5         ASSESSMENT/PLAN:  CAD  - Doing well.  With better BP control, he has had no further episodes of chest discomfort   - Last evaluation of his coronary anatomy was an angiogram in 2016.  He did have branch vessel disease and a jailed diagonal that we ultimately decided to treat medically.   - If he has recurrent episodes of chest pain, Dr. Jolley would consider going to cath lab  - Continue ASA, BB, ACEi,  statin, Zetia  - Continued efforts at healthy lifestyle.  Weight is up this year to 245 lbs.  He knows he needs to work on regular exercise, healthier diet and weight loss.       HTN  - BP recently uncontrolled and ramipril increased to 10 mg  - BMP today stable, BP much improved and controlled at 117/76  - Continue metoprolol 25 mg BID and ramipril 10 mg.  If we need further BP control in the future, could add diuretic like chlorthalidone unless K is low then would use spironolactone.        Dyslipidemia, controlled  - Fasting lipid profile 10/7/20: Total cholesterol is 110, HDL is 41, LDL is 49, triglycerides are 101 on Crestor 40 mg and Zetia 10 mg.           Follow up:  7/2021 Shashi Valdivia PA-C

## 2021-02-18 NOTE — LETTER
2021    Alexy Hernandez MD  [facility] [address]  [place] [address]    RE: Terrnece Murillo       Dear Colleague,    I had the pleasure of seeing Terrence Murillo in the [facility].    CARDIOLOGY CLINIC PROGRESS NOTE    DOS: 2021      Terrence Murillo  : 1945, 75 year old  MRN: [id number]      History:  I am following up with Terrence Murillo today in the cardiology clinic.  He is a patient of Dr. Jolley.    Terrence Murillo is a 75 year old man with history of CAD. He underwent coronary atherectomy in  with subsequent restenosis x2, treated by balloon angioplasty.  In , two Xience drug-eluting stents were placed in his proximal and mid left anterior descending artery. Also HTN, dyslipidemia, pituitary macroadenoma s/p resection in , sleep apnea, spinal stenosis, orthopedic issues, tremor (declines treatment).     I saw Terrence 10/21/20.  He was doing well at that time from a cardiovascular standpoint.     21 last visit with Dr. Jolley for an episode of chest discomfort similar to prior angina, more SOB with some activity, LH and dizziness, and vision changes.  His BP was elevated.  Dr. Jolley increased ramipril to 10 mg.     He presents today for follow up.   He says he saw neurosurgery 21 to review MRI.  He reports that they didn't think his sxs were related to his cerebellum.   He is taking and tolerating the increased dose of ramipril.   BMP is stable.   BP is improved.   He does report some occasional tingling in his hands a couple times a week.   His weight is up.  He said he used to see Dr. Jolley's wife, Destinee, and knows he needs to get back on a healthier diet.  However, despite his weight gain, he is still walking 4 days a week. He does back exercises daily.  He still mows, rakes, shovels.  Not limited with these activities. He also hunts.   He does not add salt to his food, but does not  count sodium.       ROS:  Skin:  Negative     Eyes:  Positive for visual blurring;glasses  ENT:  Positive for    Respiratory:  Positive for sleep apnea;CPAP  Cardiovascular:  Negative    Gastroenterology: Negative    Genitourinary:  not assessed    Musculoskeletal:  Negative    Neurologic:  Positive for numbness or tingling of hands  Psychiatric:  Negative    Heme/Lymph/Imm:  Positive for allergies  Endocrine:  Negative      PAST MEDICAL HISTORY:  Past Medical History:   Diagnosis Date     Coronary artery disease     cardiac cath 2016: medical management; cath 2010: SUSANA x2 to LAD; cath 1993: PTCA to LAD; cath 1992: PTCA to LAD, 1992: atherectomy of LAD     Hearing problem      Hyperlipidaemia      Hypertension      Obese      Obstructive sleep apnea      Reduced vision      Sleep apnea     CPAP       PAST SURGICAL HISTORY:  Past Surgical History:   Procedure Laterality Date     APPENDECTOMY OPEN       ARTHROPLASTY KNEE Left      ARTHROPLASTY SHOULDER Right      ENT SURGERY       HEART CATH, ANGIOPLASTY  1992    LAD  atherectomy with a DVI device      HEART CATH, ANGIOPLASTY  4-28-10    PTCA and stent proximal and mid LAD (2) stents Xience     HEART CATH, ANGIOPLASTY  04/20/2016    no stenosis greater than 25%-rec. medical management     OPTICAL TRACKING SYSTEM ENDOSCOPIC RESECTION TUMOR CRANIAL N/A 11/3/2017    Procedure: OPTICAL TRACKING SYSTEM ENDOSCOPIC RESECTION TUMOR CRANIAL;  Stealth Guided Endoscopic Transnasal Resection of Pituitary Tumor;  Surgeon: Amanda Bates MD;  Location:  OR       SOCIAL HISTORY:  Social History     Socioeconomic History     Marital status:      Spouse name: Not on file     Number of children: Not on file     Years of education: Not on file     Highest education level: Not on file   Occupational History     Not on file   Social Needs     Financial resource strain: Not on file     Food insecurity     Worry: Not on file     Inability: Not on file      Transportation needs     Medical: Not on file     Non-medical: Not on file   Tobacco Use     Smoking status: Former Smoker     Packs/day: 0.20     Years: 5.00     Pack years: 1.00     Types: Cigarettes     Start date:      Quit date: 1960     Years since quittin.8     Smokeless tobacco: Never Used     Tobacco comment: Social smoking only   Substance and Sexual Activity     Alcohol use: Yes     Alcohol/week: 1.0 - 2.0 standard drinks     Types: 1 - 2 Standard drinks or equivalent per week     Comment: FIVE DAYS A WEEK     Drug use: No     Sexual activity: Not Currently     Partners: Female     Birth control/protection: Male Surgical   Lifestyle     Physical activity     Days per week: Not on file     Minutes per session: Not on file     Stress: Not on file   Relationships     Social connections     Talks on phone: Not on file     Gets together: Not on file     Attends Congregational service: Not on file     Active member of club or organization: Not on file     Attends meetings of clubs or organizations: Not on file     Relationship status: Not on file     Intimate partner violence     Fear of current or ex partner: Not on file     Emotionally abused: Not on file     Physically abused: Not on file     Forced sexual activity: Not on file   Other Topics Concern     Parent/sibling w/ CABG, MI or angioplasty before 65F 55M? No      Service Not Asked     Blood Transfusions Not Asked     Caffeine Concern Yes     Comment: coffee      Occupational Exposure Not Asked     Hobby Hazards Not Asked     Sleep Concern No     Stress Concern No     Weight Concern Yes     Comment: Weight decrease 10 lbs     Special Diet Yes     Comment: Mediterranean diet     Back Care Not Asked     Exercise Yes     Comment: Walking 5 days per week 35- 60 minutes     Bike Helmet Not Asked     Seat Belt Yes     Self-Exams Not Asked   Social History Narrative     Not on file       FAMILY HISTORY:  Family History   Problem Relation Age of  Onset     C.A.D. Father      Heart Disease Father          at age 44     Heart Disease Son 37        enlarged heart     Heart Disease Mother          at age 80 after heart surgery     Heart Surgery Mother         open heart, passed 10 days after     Diabetes Mother      Family History Negative Maternal Grandmother      Family History Negative Maternal Grandfather      Family History Negative Paternal Grandmother      Family History Negative Paternal Grandfather      Family History Negative Brother      Alzheimer Disease Brother      Family History Negative Sister      Family History Negative Daughter      Family History Negative Son      Family History Negative Daughter      Heart Disease Son          at age 37       MEDS: alfuzosin ER (UROXATRAL) 10 MG 24 hr tablet, Take 10 mg by mouth daily  aspirin 81 MG tablet, Take 1 tablet (81 mg) by mouth every evening  ezetimibe (ZETIA) 10 MG tablet, Take 1 tablet (10 mg) by mouth daily  Glucosamine 500 MG CAPS, Take 500 mg by mouth 2 times daily   ibuprofen (ADVIL/MOTRIN) 200 MG tablet, Take 400 mg by mouth daily  levothyroxine (SYNTHROID/LEVOTHROID) 100 MCG tablet, Take 1 tablet (100 mcg) by mouth daily  metoprolol tartrate (LOPRESSOR) 25 MG tablet, Take 1 tablet (25 mg) by mouth 2 times daily  Multiple Vitamin (MULTI-VITAMIN) per tablet, Take 1 tablet by mouth every morning   nitroGLYcerin (NITROSTAT) 0.4 MG sublingual tablet, Place 1 tablet (0.4 mg) under the tongue every 5 minutes as needed May repeat x2, if chest pain contines after 3rd dose call 911  rosuvastatin (CRESTOR) 40 MG tablet, Take 1 tablet (40 mg) by mouth daily  saw palmetto 450 MG CAPS capsule, Take 450 mg by mouth daily  testosterone (ANDROGEL 1.62 % PUMP) 20.25 MG/ACT gel, 2 pumps apply to the skin daily    No current facility-administered medications on file prior to visit.       ALLERGIES:   Allergies   Allergen Reactions     No Clinical Screening - See Comments Hives     Cardizem  "[Diltiazem Hcl] Itching     Cats Other (See Comments) and Hives     WATERY EYES, SNEEZE, AND COUGH     Diltiazem Hives and Rash       PHYSICAL EXAM:  Vitals: /76 (BP Location: Right arm)   Pulse 61   Ht 1.778 m (5' 10\")   Wt 111.1 kg (245 lb)   SpO2 96%   BMI 35.15 kg/m    Constitutional:  cooperative, alert and oriented, well developed, well nourished, in no acute distress obese      Skin:  warm and dry to the touch, no apparent skin lesions or masses noted        Head:  normocephalic, no masses or lesions        Eyes:  pupils equal and round, conjunctivae and lids unremarkable, sclera white, no xanthalasma, EOMS intact, no nystagmus        ENT:  no pallor or cyanosis        Neck:  no carotid bruit;JVP normal        Respiratory:  normal breath sounds, clear to auscultation, normal A-P diameter, normal symmetry, normal respiratory excursion, no use of accessory muscles        Cardiac: regular rhythm;normal S1 and S2;no S3 or S4;no murmurs, gallops or rubs detected                  GI:  abdomen soft;BS normoactive obese      Vascular: pulses full and equal                                      Extremities and Musculoskeletal:  no edema;no spinal abnormalities noted;normal muscle strength and tone        Neurological:  no gross motor deficits;affect appropriate            LABS/DATA:  I reviewed the following:  Component      Latest Ref Rng & Units 2/18/2021   Sodium      133 - 144 mmol/L 141   Potassium      3.4 - 5.3 mmol/L 4.3   Chloride      94 - 109 mmol/L 110 (H)   Carbon Dioxide      20 - 32 mmol/L 28   Anion Gap      3 - 14 mmol/L 3   Glucose      70 - 99 mg/dL 95   Urea Nitrogen      7 - 30 mg/dL 21   Creatinine      0.66 - 1.25 mg/dL 0.95   GFR Estimate      >60 mL/min/1.73:m2 77   GFR Estimate If Black      >60 mL/min/1.73:m2 90   Calcium      8.5 - 10.1 mg/dL 8.5         ASSESSMENT/PLAN:  CAD  - Doing well.  With better BP control, he has had no further episodes of chest discomfort   - Last " evaluation of his coronary anatomy was an angiogram in 2016.  He did have branch vessel disease and a jailed diagonal that we ultimately decided to treat medically.   - If he has recurrent episodes of chest pain, Dr. Jolley would consider going to cath lab  - Continue ASA, BB, ACEi, statin, Zetia  - Continued efforts at healthy lifestyle.  Weight is up this year to 245 lbs.  He knows he needs to work on regular exercise, healthier diet and weight loss.       HTN  - BP recently uncontrolled and ramipril increased to 10 mg  - BMP today stable, BP much improved and controlled at 117/76  - Continue metoprolol 25 mg BID and ramipril 10 mg.  If we need further BP control in the future, could add diuretic like chlorthalidone unless K is low then would use spironolactone.        Dyslipidemia, controlled  - Fasting lipid profile 10/7/20: Total cholesterol is 110, HDL is 41, LDL is 49, triglycerides are 101 on Crestor 40 mg and Zetia 10 mg.           Follow up:  7/2021 Shashi Jolley         Thank you for allowing me to participate in the care of your patient.    Sincerely,     RONALD Nuñez [facility]

## 2021-02-18 NOTE — LETTER
2021    Alexy Hernandez MD  [facility] [address]  [place] [address]    RE: Terrence Murillo       Dear Colleague,    I had the pleasure of seeing Terrence Murillo in the [facility].    CARDIOLOGY CLINIC PROGRESS NOTE    DOS: 2021      Terrence Murillo  : 1945, 75 year old  MRN: [id number]      History:  I am following up with Terrence Murillo today in the cardiology clinic.  He is a patient of Dr. Jolley.    Terrence Murillo is a 75 year old man with history of CAD. He underwent coronary atherectomy in  with subsequent restenosis x2, treated by balloon angioplasty.  In , two Xience drug-eluting stents were placed in his proximal and mid left anterior descending artery. Also HTN, dyslipidemia, pituitary macroadenoma s/p resection in , sleep apnea, spinal stenosis, orthopedic issues, tremor (declines treatment).     I saw Terrence 10/21/20.  He was doing well at that time from a cardiovascular standpoint.     21 last visit with Dr. Jolley for an episode of chest discomfort similar to prior angina, more SOB with some activity, LH and dizziness, and vision changes.  His BP was elevated.  Dr. Jolley increased ramipril to 10 mg.     He presents today for follow up.   He says he saw neurosurgery 21 to review MRI.  He reports that they didn't think his sxs were related to his cerebellum.   He is taking and tolerating the increased dose of ramipril.   BMP is stable.   BP is improved.   He does report some occasional tingling in his hands a couple times a week.   His weight is up.  He said he used to see Dr. Jolley's wife, Destinee, and knows he needs to get back on a healthier diet.  However, despite his weight gain, he is still walking 4 days a week. He does back exercises daily.  He still mows, rakes, shovels.  Not limited with these activities. He also hunts.   He does not add salt to his food, but does not  count sodium.       ROS:  Skin:  Negative     Eyes:  Positive for visual blurring;glasses  ENT:  Positive for    Respiratory:  Positive for sleep apnea;CPAP  Cardiovascular:  Negative    Gastroenterology: Negative    Genitourinary:  not assessed    Musculoskeletal:  Negative    Neurologic:  Positive for numbness or tingling of hands  Psychiatric:  Negative    Heme/Lymph/Imm:  Positive for allergies  Endocrine:  Negative      PAST MEDICAL HISTORY:  Past Medical History:   Diagnosis Date     Coronary artery disease     cardiac cath 2016: medical management; cath 2010: SUSANA x2 to LAD; cath 1993: PTCA to LAD; cath 1992: PTCA to LAD, 1992: atherectomy of LAD     Hearing problem      Hyperlipidaemia      Hypertension      Obese      Obstructive sleep apnea      Reduced vision      Sleep apnea     CPAP       PAST SURGICAL HISTORY:  Past Surgical History:   Procedure Laterality Date     APPENDECTOMY OPEN       ARTHROPLASTY KNEE Left      ARTHROPLASTY SHOULDER Right      ENT SURGERY       HEART CATH, ANGIOPLASTY  1992    LAD  atherectomy with a DVI device      HEART CATH, ANGIOPLASTY  4-28-10    PTCA and stent proximal and mid LAD (2) stents Xience     HEART CATH, ANGIOPLASTY  04/20/2016    no stenosis greater than 25%-rec. medical management     OPTICAL TRACKING SYSTEM ENDOSCOPIC RESECTION TUMOR CRANIAL N/A 11/3/2017    Procedure: OPTICAL TRACKING SYSTEM ENDOSCOPIC RESECTION TUMOR CRANIAL;  Stealth Guided Endoscopic Transnasal Resection of Pituitary Tumor;  Surgeon: Amanda Bates MD;  Location:  OR       SOCIAL HISTORY:  Social History     Socioeconomic History     Marital status:      Spouse name: Not on file     Number of children: Not on file     Years of education: Not on file     Highest education level: Not on file   Occupational History     Not on file   Social Needs     Financial resource strain: Not on file     Food insecurity     Worry: Not on file     Inability: Not on file      Transportation needs     Medical: Not on file     Non-medical: Not on file   Tobacco Use     Smoking status: Former Smoker     Packs/day: 0.20     Years: 5.00     Pack years: 1.00     Types: Cigarettes     Start date:      Quit date: 1960     Years since quittin.8     Smokeless tobacco: Never Used     Tobacco comment: Social smoking only   Substance and Sexual Activity     Alcohol use: Yes     Alcohol/week: 1.0 - 2.0 standard drinks     Types: 1 - 2 Standard drinks or equivalent per week     Comment: FIVE DAYS A WEEK     Drug use: No     Sexual activity: Not Currently     Partners: Female     Birth control/protection: Male Surgical   Lifestyle     Physical activity     Days per week: Not on file     Minutes per session: Not on file     Stress: Not on file   Relationships     Social connections     Talks on phone: Not on file     Gets together: Not on file     Attends Synagogue service: Not on file     Active member of club or organization: Not on file     Attends meetings of clubs or organizations: Not on file     Relationship status: Not on file     Intimate partner violence     Fear of current or ex partner: Not on file     Emotionally abused: Not on file     Physically abused: Not on file     Forced sexual activity: Not on file   Other Topics Concern     Parent/sibling w/ CABG, MI or angioplasty before 65F 55M? No      Service Not Asked     Blood Transfusions Not Asked     Caffeine Concern Yes     Comment: coffee      Occupational Exposure Not Asked     Hobby Hazards Not Asked     Sleep Concern No     Stress Concern No     Weight Concern Yes     Comment: Weight decrease 10 lbs     Special Diet Yes     Comment: Mediterranean diet     Back Care Not Asked     Exercise Yes     Comment: Walking 5 days per week 35- 60 minutes     Bike Helmet Not Asked     Seat Belt Yes     Self-Exams Not Asked   Social History Narrative     Not on file       FAMILY HISTORY:  Family History   Problem Relation Age of  Onset     C.A.D. Father      Heart Disease Father          at age 44     Heart Disease Son 37        enlarged heart     Heart Disease Mother          at age 80 after heart surgery     Heart Surgery Mother         open heart, passed 10 days after     Diabetes Mother      Family History Negative Maternal Grandmother      Family History Negative Maternal Grandfather      Family History Negative Paternal Grandmother      Family History Negative Paternal Grandfather      Family History Negative Brother      Alzheimer Disease Brother      Family History Negative Sister      Family History Negative Daughter      Family History Negative Son      Family History Negative Daughter      Heart Disease Son          at age 37       MEDS: alfuzosin ER (UROXATRAL) 10 MG 24 hr tablet, Take 10 mg by mouth daily  aspirin 81 MG tablet, Take 1 tablet (81 mg) by mouth every evening  ezetimibe (ZETIA) 10 MG tablet, Take 1 tablet (10 mg) by mouth daily  Glucosamine 500 MG CAPS, Take 500 mg by mouth 2 times daily   ibuprofen (ADVIL/MOTRIN) 200 MG tablet, Take 400 mg by mouth daily  levothyroxine (SYNTHROID/LEVOTHROID) 100 MCG tablet, Take 1 tablet (100 mcg) by mouth daily  metoprolol tartrate (LOPRESSOR) 25 MG tablet, Take 1 tablet (25 mg) by mouth 2 times daily  Multiple Vitamin (MULTI-VITAMIN) per tablet, Take 1 tablet by mouth every morning   nitroGLYcerin (NITROSTAT) 0.4 MG sublingual tablet, Place 1 tablet (0.4 mg) under the tongue every 5 minutes as needed May repeat x2, if chest pain contines after 3rd dose call 911  rosuvastatin (CRESTOR) 40 MG tablet, Take 1 tablet (40 mg) by mouth daily  saw palmetto 450 MG CAPS capsule, Take 450 mg by mouth daily  testosterone (ANDROGEL 1.62 % PUMP) 20.25 MG/ACT gel, 2 pumps apply to the skin daily    No current facility-administered medications on file prior to visit.       ALLERGIES:   Allergies   Allergen Reactions     No Clinical Screening - See Comments Hives     Cardizem  "[Diltiazem Hcl] Itching     Cats Other (See Comments) and Hives     WATERY EYES, SNEEZE, AND COUGH     Diltiazem Hives and Rash       PHYSICAL EXAM:  Vitals: /76 (BP Location: Right arm)   Pulse 61   Ht 1.778 m (5' 10\")   Wt 111.1 kg (245 lb)   SpO2 96%   BMI 35.15 kg/m    Constitutional:  cooperative, alert and oriented, well developed, well nourished, in no acute distress obese      Skin:  warm and dry to the touch, no apparent skin lesions or masses noted        Head:  normocephalic, no masses or lesions        Eyes:  pupils equal and round, conjunctivae and lids unremarkable, sclera white, no xanthalasma, EOMS intact, no nystagmus        ENT:  no pallor or cyanosis        Neck:  no carotid bruit;JVP normal        Respiratory:  normal breath sounds, clear to auscultation, normal A-P diameter, normal symmetry, normal respiratory excursion, no use of accessory muscles        Cardiac: regular rhythm;normal S1 and S2;no S3 or S4;no murmurs, gallops or rubs detected                  GI:  abdomen soft;BS normoactive obese      Vascular: pulses full and equal                                      Extremities and Musculoskeletal:  no edema;no spinal abnormalities noted;normal muscle strength and tone        Neurological:  no gross motor deficits;affect appropriate            LABS/DATA:  I reviewed the following:  Component      Latest Ref Rng & Units 2/18/2021   Sodium      133 - 144 mmol/L 141   Potassium      3.4 - 5.3 mmol/L 4.3   Chloride      94 - 109 mmol/L 110 (H)   Carbon Dioxide      20 - 32 mmol/L 28   Anion Gap      3 - 14 mmol/L 3   Glucose      70 - 99 mg/dL 95   Urea Nitrogen      7 - 30 mg/dL 21   Creatinine      0.66 - 1.25 mg/dL 0.95   GFR Estimate      >60 mL/min/1.73:m2 77   GFR Estimate If Black      >60 mL/min/1.73:m2 90   Calcium      8.5 - 10.1 mg/dL 8.5         ASSESSMENT/PLAN:  CAD  - Doing well.  With better BP control, he has had no further episodes of chest discomfort   - Last " evaluation of his coronary anatomy was an angiogram in 2016.  He did have branch vessel disease and a jailed diagonal that we ultimately decided to treat medically.   - If he has recurrent episodes of chest pain, Dr. Jolley would consider going to cath lab  - Continue ASA, BB, ACEi, statin, Zetia  - Continued efforts at healthy lifestyle.  Weight is up this year to 245 lbs.  He knows he needs to work on regular exercise, healthier diet and weight loss.       HTN  - BP recently uncontrolled and ramipril increased to 10 mg  - BMP today stable, BP much improved and controlled at 117/76  - Continue metoprolol 25 mg BID and ramipril 10 mg.  If we need further BP control in the future, could add diuretic like chlorthalidone unless K is low then would use spironolactone.        Dyslipidemia, controlled  - Fasting lipid profile 10/7/20: Total cholesterol is 110, HDL is 41, LDL is 49, triglycerides are 101 on Crestor 40 mg and Zetia 10 mg.           Follow up:  7/2021 Shashi Valdivia PA-C      Thank you for allowing me to participate in the care of your patient.      Sincerely,     Anna Marie Valdivia PA-C     [facility]  cc:   Eagle Jolley MD  [address] [facility]  [place] [id number]

## 2021-02-21 ENCOUNTER — IMMUNIZATION (OUTPATIENT)
Dept: NURSING | Facility: CLINIC | Age: 76
End: 2021-02-21
Attending: INTERNAL MEDICINE
Payer: COMMERCIAL

## 2021-02-21 PROCEDURE — 91300 PR COVID VAC PFIZER DIL RECON 30 MCG/0.3 ML IM: CPT

## 2021-02-21 PROCEDURE — 0002A PR COVID VAC PFIZER DIL RECON 30 MCG/0.3 ML IM: CPT

## 2021-03-02 ENCOUNTER — OFFICE VISIT (OUTPATIENT)
Dept: OPHTHALMOLOGY | Facility: CLINIC | Age: 76
End: 2021-03-02
Attending: OPHTHALMOLOGY
Payer: COMMERCIAL

## 2021-03-02 DIAGNOSIS — H47.20 OPTIC ATROPHY: ICD-10-CM

## 2021-03-02 DIAGNOSIS — H53.40 VISUAL FIELD DEFECT: Primary | ICD-10-CM

## 2021-03-02 DIAGNOSIS — H53.40 VISUAL FIELD DEFECT: ICD-10-CM

## 2021-03-02 DIAGNOSIS — Z86.018 HISTORY OF PITUITARY ADENOMA: Primary | ICD-10-CM

## 2021-03-02 PROCEDURE — 92133 CPTRZD OPH DX IMG PST SGM ON: CPT | Performed by: OPHTHALMOLOGY

## 2021-03-02 PROCEDURE — 99214 OFFICE O/P EST MOD 30 MIN: CPT | Mod: GC | Performed by: OPHTHALMOLOGY

## 2021-03-02 PROCEDURE — 92083 EXTENDED VISUAL FIELD XM: CPT | Performed by: OPHTHALMOLOGY

## 2021-03-02 PROCEDURE — G0463 HOSPITAL OUTPT CLINIC VISIT: HCPCS | Mod: 25

## 2021-03-02 ASSESSMENT — VISUAL ACUITY
OS_CC: 20/20
OD_CC: 20/20
METHOD: SNELLEN - LINEAR
CORRECTION_TYPE: GLASSES

## 2021-03-02 ASSESSMENT — REFRACTION_WEARINGRX
SPECS_TYPE: PAL
OD_AXIS: 167
OD_SPHERE: -0.25
OD_ADD: +2.50
OS_SPHERE: +0.25
OS_ADD: +2.50
OD_CYLINDER: +0.50

## 2021-03-02 ASSESSMENT — CUP TO DISC RATIO
OD_RATIO: 0.2
OS_RATIO: 0.2

## 2021-03-02 ASSESSMENT — TONOMETRY
IOP_METHOD: ICARE
OS_IOP_MMHG: 19
OD_IOP_MMHG: 18

## 2021-03-02 ASSESSMENT — CONF VISUAL FIELD
OS_NORMAL: 1
OD_NORMAL: 1

## 2021-03-02 ASSESSMENT — EXTERNAL EXAM - RIGHT EYE: OD_EXAM: NORMAL

## 2021-03-02 NOTE — PATIENT INSTRUCTIONS
You have been diagnosed with Dry eye syndrome.  Symptoms of Dry eye syndrome can include double vision, eye pain, blurry vision, stinging, burning, tearing, eye redness.      Some useful instructions are listed below:     1.  Use artificial tears on a regular basis.  If you use artificial tear drops with preservative, you can use them up to 4-6 times per day. If you use artificial tear drops without preservatives, then you can use them more often.      2.  Wash your eyelashes with hot water.  Do not make the water so hot that you burn yourself, but the water should be more than just warm.  Often patients will wash their eyelashes in the shower under the hot water.  You should rub gently along the base of your eyelashes.      3.  Taking fish oil capsules twice a day for a month and then once a day after that can help improve Dry eye symptoms.      4.  Drink a lot of water.  Hydration can be helpful.      5.  Humidify your environment.      6.  If this is not beneficial, other treatments can include punctal plugs or cautery, corticosteroid eye drops, Restasis, Lipiflow, or autologous serum.  If you are still struggling with dry eye symptoms, call Dr. Herndon's office for other therapies or a referral to a dry eye specialist.

## 2021-03-02 NOTE — NURSING NOTE
Chief Complaints and History of Present Illnesses   Patient presents with     Blurred Vision Follow-Up     Chief Complaint(s) and History of Present Illness(es)     Blurred Vision Follow-Up               Comments     Terrence Murillo is a 75 year old male with the following diagnoses:   1. History of pituitary adenoma   2. Visual field defect      S/p resection     Around sohail time, patient started noticing intermittent vertical diplopia in the right eye at night when looking at lights. Struggling with clarity of images. Patient also noticing that eyes are watering frequently.   Had an MRI done since the last visit.  Xander AMIN 8:02 AM March 2, 2021

## 2021-03-02 NOTE — PROGRESS NOTES
"       Assessment & Plan     Terrence Murillo is a 72 year old male with the following diagnoses:   1. Visual field defect       Terrence Murillo is a 73 year old male who presents for 1 year follow up. He was initially seen for bitemporal incongrous hemianopia and was found to have 3.2 x 2.5 a 2.0 cm enhancing sellar/suprasellar presumed pituitary macroadenoma. He underwent resection in 11/2017. Pathology report showed pituitary adenoma with multifocal fibrosis     Since his last visit on October 8, 2020, patient reports progressive \"clarity issues\" since December 2020. He explains that from 15 feet away, he has trouble distinguishing between different birds. He also had trouble being able to tell who is grandchildren were from 40 yards away. This clarity issue usually improves after blinking several times. Moreover, when he comes up to a stop light, he will notice right horizontal monocular diplopia that overlaps. Additionally, he reports it takes longer for him to read a sign. He was concerned his pituitary adenoma may be recurring, although MRI from 1/19/2021 was unchanged compared to 1 year prior.     He uses Systane artificial tears rarely. He states the eyes are constantly wet but rarely does he have epiphora. The eye irritation and watering is essentially the same throughout the day, without worsening in the morning when he first wakes up or in the afternoon.       MRI Brain (1/19/2022)  1. Postoperative changes consistent with transsphenoidal  hypophysectomy again noted. Residual enhancing tissue along the floor  of the sella again noted. No evidence for tumor recurrence or  progression.  2. Otherwise, normal brain MRI.    On exam visual acuity is 20/20 in the right and 20/20 in the left. Pupil exam is normal with no afferent pupillary defect. Color plates are full with 11 out of 11 in both eyes. Extraocular movements are full and normal. Anterior segment exam is normal in both eyes. Fundus exam shows mild optic " disc pallor in both eyes.    Visual field shows temporal hemianopia in the RIGHT eye and temporal hemianopia in LEFT eye. Retinal nerve fiber layer shows borderline thinning in both eyes. His ganglion cell layer analysis shows nasal loss both eyes that is stable compared to his scan from 2017.     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 I reviewed patient's recent MRI and there is no longer any compression of the chiasm status post resection of pituitary macroadenoma.  We discussed that his vision will likely remain this way.  We will continue yearly follow up with visual field and retinal nerve fiber layer scans.     Patient's intermittent blurred vision and monocular diplopia is likely due to dry eyes.  I will have him use increased artificial tears as well as warm compresses to the eyelid margin  on a regular basis.  I asked the patient to take fish oil capsules 2x/day for a month and then 1x/d thereafter.   If that is not beneficial we could consider punctal plugs, corticosteroid eye drops and Restasis.            Attending Physician Attestation:  Complete documentation of historical and exam elements from today's encounter can be found in the full encounter summary report (not reduplicated in this progress note).  I personally obtained the chief complaint(s) and history of present illness.  I confirmed and edited as necessary the review of systems, past medical/surgical history, family history, social history, and examination findings as documented by others; and I examined the patient myself.  I personally reviewed the relevant tests, images, and reports as documented above.  I formulated and edited as necessary the assessment and plan and discussed the findings and management plan with the patient and family. I personally reviewed the ophthalmic test(s) associated with this encounter, agree with the interpretation(s) as documented by the resident/fellow, and have  edited the corresponding report(s) as necessary.  - Kendell Diggs MD  Ophthalmology PGY-3  [facility]

## 2021-07-15 DIAGNOSIS — E23.0 HYPOPITUITARISM (H): ICD-10-CM

## 2021-07-15 DIAGNOSIS — E29.1 SECONDARY MALE HYPOGONADISM: ICD-10-CM

## 2021-07-15 RX ORDER — TESTOSTERONE 1.62 MG/G
GEL TRANSDERMAL
Qty: 225 G | Refills: 5 | Status: SHIPPED | OUTPATIENT
Start: 2021-07-15 | End: 2022-02-23

## 2021-07-15 NOTE — TELEPHONE ENCOUNTER
TESTOSTERONE 1.62% GEL PUMP   Last Written Prescription Date:  1/13/2021  Last Fill Quantity: 225,   # refills: 5  Last Office Visit : 1/13/2021  Future Office visit:  None    Routing refill request to provider for review/approval because:  Drug not on the [facility] [place] [facility], [facility] or [facility] refill protocol or controlled substance      Sophie Anderson RN  Central Triage Red Flags/Med Refills

## 2021-09-05 ENCOUNTER — HEALTH MAINTENANCE LETTER (OUTPATIENT)
Age: 76
End: 2021-09-05

## 2021-09-17 ENCOUNTER — PRE VISIT (OUTPATIENT)
Dept: CARDIOLOGY | Facility: CLINIC | Age: 76
End: 2021-09-17

## 2021-09-17 DIAGNOSIS — E78.00 PURE HYPERCHOLESTEROLEMIA: ICD-10-CM

## 2021-09-17 DIAGNOSIS — I25.110 CORONARY ARTERY DISEASE INVOLVING NATIVE CORONARY ARTERY OF NATIVE HEART WITH UNSTABLE ANGINA PECTORIS (H): ICD-10-CM

## 2021-09-17 DIAGNOSIS — I10 BENIGN ESSENTIAL HYPERTENSION: Primary | ICD-10-CM

## 2021-09-29 ENCOUNTER — LAB (OUTPATIENT)
Dept: LAB | Facility: CLINIC | Age: 76
End: 2021-09-29
Payer: COMMERCIAL

## 2021-09-29 ENCOUNTER — OFFICE VISIT (OUTPATIENT)
Dept: CARDIOLOGY | Facility: CLINIC | Age: 76
End: 2021-09-29
Payer: COMMERCIAL

## 2021-09-29 VITALS
OXYGEN SATURATION: 98 % | WEIGHT: 236.4 LBS | SYSTOLIC BLOOD PRESSURE: 138 MMHG | HEART RATE: 54 BPM | DIASTOLIC BLOOD PRESSURE: 68 MMHG | HEIGHT: 70 IN | BODY MASS INDEX: 33.84 KG/M2

## 2021-09-29 DIAGNOSIS — I25.110 CORONARY ARTERY DISEASE INVOLVING NATIVE CORONARY ARTERY OF NATIVE HEART WITH UNSTABLE ANGINA PECTORIS (H): ICD-10-CM

## 2021-09-29 DIAGNOSIS — E78.00 PURE HYPERCHOLESTEROLEMIA: ICD-10-CM

## 2021-09-29 DIAGNOSIS — I10 BENIGN ESSENTIAL HYPERTENSION: ICD-10-CM

## 2021-09-29 DIAGNOSIS — E78.6 HDL DEFICIENCY: ICD-10-CM

## 2021-09-29 DIAGNOSIS — E66.09 NON MORBID OBESITY DUE TO EXCESS CALORIES: Chronic | ICD-10-CM

## 2021-09-29 DIAGNOSIS — I25.118 CORONARY ARTERY DISEASE OF NATIVE ARTERY OF NATIVE HEART WITH STABLE ANGINA PECTORIS (H): Primary | ICD-10-CM

## 2021-09-29 PROBLEM — E66.01 MORBID OBESITY (H): Status: RESOLVED | Noted: 2021-02-18 | Resolved: 2021-09-29

## 2021-09-29 LAB
ALT SERPL W P-5'-P-CCNC: 25 U/L (ref 0–70)
ANION GAP SERPL CALCULATED.3IONS-SCNC: 5 MMOL/L (ref 3–14)
BUN SERPL-MCNC: 22 MG/DL (ref 7–30)
CALCIUM SERPL-MCNC: 8.7 MG/DL (ref 8.5–10.1)
CHLORIDE BLD-SCNC: 107 MMOL/L (ref 94–109)
CHOLEST SERPL-MCNC: 106 MG/DL
CO2 SERPL-SCNC: 28 MMOL/L (ref 20–32)
CREAT SERPL-MCNC: 0.89 MG/DL (ref 0.66–1.25)
FASTING STATUS PATIENT QL REPORTED: YES
GFR SERPL CREATININE-BSD FRML MDRD: 82 ML/MIN/1.73M2
GLUCOSE BLD-MCNC: 93 MG/DL (ref 70–99)
HDLC SERPL-MCNC: 47 MG/DL
LDLC SERPL CALC-MCNC: 44 MG/DL
NONHDLC SERPL-MCNC: 59 MG/DL
POTASSIUM BLD-SCNC: 4.3 MMOL/L (ref 3.4–5.3)
SODIUM SERPL-SCNC: 140 MMOL/L (ref 133–144)
TRIGL SERPL-MCNC: 74 MG/DL

## 2021-09-29 PROCEDURE — 99214 OFFICE O/P EST MOD 30 MIN: CPT | Performed by: INTERNAL MEDICINE

## 2021-09-29 PROCEDURE — 36415 COLL VENOUS BLD VENIPUNCTURE: CPT | Performed by: INTERNAL MEDICINE

## 2021-09-29 PROCEDURE — 80061 LIPID PANEL: CPT | Performed by: INTERNAL MEDICINE

## 2021-09-29 PROCEDURE — 80048 BASIC METABOLIC PNL TOTAL CA: CPT | Performed by: INTERNAL MEDICINE

## 2021-09-29 PROCEDURE — 84460 ALANINE AMINO (ALT) (SGPT): CPT | Performed by: INTERNAL MEDICINE

## 2021-09-29 ASSESSMENT — MIFFLIN-ST. JEOR: SCORE: 1803.55

## 2021-09-29 NOTE — LETTER
9/29/2021    Alexy Hernandez MD  [facility] [address]  [place] [address]    RE: Terrence Murillo       Dear Colleague,    I had the pleasure of seeing Terrence Murillo in the [facility].    HPI and Plan:   See dictation    Orders Placed This Encounter   Procedures     Basic metabolic panel     Lipid Profile     Follow-Up with Cardiologist       No orders of the defined types were placed in this encounter.      There are no discontinued medications.      Encounter Diagnoses   Name Primary?     Coronary artery disease involving native coronary artery of native heart without angina pectoris Yes     Benign essential hypertension      Pure hypercholesterolemia      HDL deficiency      Non morbid obesity due to excess calories        CURRENT MEDICATIONS:  Current Outpatient Medications   Medication Sig Dispense Refill     alfuzosin ER (UROXATRAL) 10 MG 24 hr tablet Take 10 mg by mouth daily       aspirin 81 MG tablet Take 1 tablet (81 mg) by mouth every evening 30 tablet 0     ezetimibe (ZETIA) 10 MG tablet Take 1 tablet (10 mg) by mouth daily 90 tablet 3     Glucosamine 500 MG CAPS Take 500 mg by mouth 2 times daily        ibuprofen (ADVIL/MOTRIN) 200 MG tablet Take 400 mg by mouth daily       levothyroxine (SYNTHROID/LEVOTHROID) 100 MCG tablet Take 1 tablet (100 mcg) by mouth daily 90 tablet 3     metoprolol tartrate (LOPRESSOR) 25 MG tablet Take 1 tablet (25 mg) by mouth 2 times daily 180 tablet 3     Multiple Vitamin (MULTI-VITAMIN) per tablet Take 1 tablet by mouth every morning        nitroGLYcerin (NITROSTAT) 0.4 MG sublingual tablet Place 1 tablet (0.4 mg) under the tongue every 5 minutes as needed May repeat x2, if chest pain contines after 3rd dose call 911 25 tablet 3     ramipril (ALTACE) 10 MG capsule Take 1 capsule (10 mg) by mouth daily 90 capsule 3     rosuvastatin (CRESTOR) 40 MG tablet Take 1 tablet (40 mg) by mouth  daily 90 tablet 3     saw palmetto 450 MG CAPS capsule Take 450 mg by mouth daily       testosterone (ANDROGEL 1.62 % PUMP) 20.25 MG/ACT gel 2 pumps apply to the skin daily 225 g 5       ALLERGIES     Allergies   Allergen Reactions     No Clinical Screening - See Comments Hives     Cardizem [Diltiazem Hcl] Itching     Cats Other (See Comments) and Hives     WATERY EYES, SNEEZE, AND COUGH     Diltiazem Hives and Rash       PAST MEDICAL HISTORY:  Past Medical History:   Diagnosis Date     Coronary artery disease     cardiac cath 2016: medical management; cath : SUSANA x2 to LAD; cath : PTCA to LAD; cath : PTCA to LAD, : atherectomy of LAD     Hearing problem      Hyperlipidaemia      Hypertension      Obese      Obstructive sleep apnea      Reduced vision      Sleep apnea     CPAP       PAST SURGICAL HISTORY:  Past Surgical History:   Procedure Laterality Date     APPENDECTOMY OPEN       ARTHROPLASTY KNEE Left      ARTHROPLASTY SHOULDER Right      ENT SURGERY       HEART CATH, ANGIOPLASTY      LAD  atherectomy with a DVI device      HEART CATH, ANGIOPLASTY  4-28-10    PTCA and stent proximal and mid LAD (2) stents Xience     HEART CATH, ANGIOPLASTY  2016    no stenosis greater than 25%-rec. 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 Father      Heart Disease Father          at age 44     Heart Disease Son 37        enlarged heart     Heart Disease Mother          at age 80 after heart surgery     Heart Surgery Mother         open heart, passed 10 days after     Diabetes Mother      Family History Negative Maternal Grandmother      Family History Negative  Maternal Grandfather      Family History Negative Paternal Grandmother      Family History Negative Paternal Grandfather      Family History Negative Brother      Alzheimer Disease Brother      Family History Negative Sister      Family History Negative Daughter      Family History Negative Son      Family History Negative Daughter      Heart Disease Son          at age 37       SOCIAL HISTORY:  Social History     Socioeconomic History     Marital status:      Spouse name: None     Number of children: None     Years of education: None     Highest education level: None   Occupational History     None   Tobacco Use     Smoking status: Former Smoker     Packs/day: 0.20     Years: 5.00     Pack years: 1.00     Types: Cigarettes     Start date:      Quit date: 1960     Years since quittin.7     Smokeless tobacco: Never Used   Substance and Sexual Activity     Alcohol use: Yes     Alcohol/week: 1.0 - 2.0 standard drinks     Types: 1 - 2 Standard drinks or equivalent per week     Comment: 2 cocktails per day     Drug use: No     Sexual activity: Not Currently     Partners: Female     Birth control/protection: Male Surgical   Other Topics Concern     Parent/sibling w/ CABG, MI or angioplasty before 65F 55M? No      Service Not Asked     Blood Transfusions Not Asked     Caffeine Concern Yes     Comment: coffee      Occupational Exposure Not Asked     Hobby Hazards Not Asked     Sleep Concern No     Stress Concern No     Weight Concern Yes     Comment: Weight decrease 10 lbs     Special Diet Yes     Comment: Mediterranean diet     Back Care Not Asked     Exercise Yes     Comment: Walking 5 days per week 35- 60 minutes     Bike Helmet Not Asked     Seat Belt Yes     Self-Exams Not Asked   Social History Narrative     None     Social Determinants of Health     Financial Resource Strain:      Difficulty of Paying Living Expenses:    Food Insecurity:      Worried About Running Out of Food in the  "Last Year:      Ran Out of Food in the Last Year:    Transportation Needs:      Lack of Transportation (Medical):      Lack of Transportation (Non-Medical):    Physical Activity:      Days of Exercise per Week:      Minutes of Exercise per Session:    Stress:      Feeling of Stress :    Social Connections:      Frequency of Communication with Friends and Family:      Frequency of Social Gatherings with Friends and Family:      Attends [employer] Services:      Active Member of Clubs or Organizations:      Attends Club or Organization Meetings:      Marital Status:    Intimate Partner Violence:      Fear of Current or Ex-Partner:      Emotionally Abused:      Physically Abused:      Sexually Abused:        Review of Systems:  Skin:  Negative       Eyes:  not assessed      ENT:  not assessed      Respiratory:  Positive for sleep apnea;CPAP     Cardiovascular:  Negative      Gastroenterology: Negative      Genitourinary:  Positive for urinary frequency    Musculoskeletal:  Negative      Neurologic:  Negative      Psychiatric:  Positive for sleep disturbances going to the bathroom  Heme/Lymph/Imm:  Negative      Endocrine:  Positive for thyroid disorder      Physical Exam:  Vitals: /68 (BP Location: Right arm, Patient Position: Sitting, Cuff Size: Adult Regular)   Pulse 54   Ht 1.778 m (5' 10\")   Wt 107.2 kg (236 lb 6.4 oz)   SpO2 98%   BMI 33.92 kg/m      Constitutional:  cooperative, alert and oriented, well developed, well nourished, in no acute distress obese [place] hearing aids    Skin:  warm and dry to the touch, no apparent skin lesions or masses noted          Head:  normocephalic, no masses or lesions        Eyes:  pupils equal and round, conjunctivae and lids unremarkable, sclera white, no xanthalasma, EOMS intact, no nystagmus        Lymph:      ENT:  no pallor or cyanosis, dentition good        Neck:  no carotid bruit;carotid pulses are full and equal bilaterally        Respiratory:  normal breath " sounds, clear to auscultation, normal A-P diameter, normal symmetry, normal respiratory excursion, no use of accessory muscles         Cardiac: regular rhythm;normal S1 and S2;no S3 or S4;no murmurs, gallops or rubs detected                pulses full and equal                                        GI:    obese      Extremities and Muscular Skeletal:  no edema;no spinal abnormalities noted;normal muscle strength and tone   varicose vein          Neurological:  no gross motor deficits        Psych:  affect appropriate, oriented to time, person and place          CC  No referring provider defined for this encounter.                      Thank you for allowing me to participate in the care of your patient.      Sincerely,     Eagle Jolley MD     [facility]  cc:   No referring provider defined for this encounter.

## 2021-09-29 NOTE — PROGRESS NOTES
HPI and Plan:   See dictation    Orders Placed This Encounter   Procedures     Basic metabolic panel     Lipid Profile     Follow-Up with Cardiologist       No orders of the defined types were placed in this encounter.      There are no discontinued medications.      Encounter Diagnoses   Name Primary?     Coronary artery disease involving native coronary artery of native heart without angina pectoris Yes     Benign essential hypertension      Pure hypercholesterolemia      HDL deficiency      Non morbid obesity due to excess calories        CURRENT MEDICATIONS:  Current Outpatient Medications   Medication Sig Dispense Refill     alfuzosin ER (UROXATRAL) 10 MG 24 hr tablet Take 10 mg by mouth daily       aspirin 81 MG tablet Take 1 tablet (81 mg) by mouth every evening 30 tablet 0     ezetimibe (ZETIA) 10 MG tablet Take 1 tablet (10 mg) by mouth daily 90 tablet 3     Glucosamine 500 MG CAPS Take 500 mg by mouth 2 times daily        ibuprofen (ADVIL/MOTRIN) 200 MG tablet Take 400 mg by mouth daily       levothyroxine (SYNTHROID/LEVOTHROID) 100 MCG tablet Take 1 tablet (100 mcg) by mouth daily 90 tablet 3     metoprolol tartrate (LOPRESSOR) 25 MG tablet Take 1 tablet (25 mg) by mouth 2 times daily 180 tablet 3     Multiple Vitamin (MULTI-VITAMIN) per tablet Take 1 tablet by mouth every morning        nitroGLYcerin (NITROSTAT) 0.4 MG sublingual tablet Place 1 tablet (0.4 mg) under the tongue every 5 minutes as needed May repeat x2, if chest pain contines after 3rd dose call 911 25 tablet 3     ramipril (ALTACE) 10 MG capsule Take 1 capsule (10 mg) by mouth daily 90 capsule 3     rosuvastatin (CRESTOR) 40 MG tablet Take 1 tablet (40 mg) by mouth daily 90 tablet 3     saw palmetto 450 MG CAPS capsule Take 450 mg by mouth daily       testosterone (ANDROGEL 1.62 % PUMP) 20.25 MG/ACT gel 2 pumps apply to the skin daily 225 g 5       ALLERGIES     Allergies   Allergen Reactions     No Clinical Screening - See Comments Hives      Cardizem [Diltiazem Hcl] Itching     Cats Other (See Comments) and Hives     WATERY EYES, SNEEZE, AND COUGH     Diltiazem Hives and Rash       PAST MEDICAL HISTORY:  Past Medical History:   Diagnosis Date     Coronary artery disease     cardiac cath 2016: medical management; cath : SUSANA x2 to LAD; cath : PTCA to LAD; cath : PTCA to LAD, : atherectomy of LAD     Hearing problem      Hyperlipidaemia      Hypertension      Obese      Obstructive sleep apnea      Reduced vision      Sleep apnea     CPAP       PAST SURGICAL HISTORY:  Past Surgical History:   Procedure Laterality Date     APPENDECTOMY OPEN       ARTHROPLASTY KNEE Left      ARTHROPLASTY SHOULDER Right      ENT SURGERY       HEART CATH, ANGIOPLASTY      LAD  atherectomy with a DVI device      HEART CATH, ANGIOPLASTY  4-28-10    PTCA and stent proximal and mid LAD (2) stents Xience     HEART CATH, ANGIOPLASTY  2016    no stenosis greater than 25%-rec. 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 Father      Heart Disease Father          at age 44     Heart Disease Son 37        enlarged heart     Heart Disease Mother          at age 80 after heart surgery     Heart Surgery Mother         open heart, passed 10 days after     Diabetes Mother      Family History Negative Maternal Grandmother      Family History Negative Maternal Grandfather      Family History Negative Paternal Grandmother      Family History Negative Paternal Grandfather      Family History Negative Brother      Alzheimer Disease Brother      Family History Negative Sister      Family History Negative Daughter      Family History  Negative Son      Family History Negative Daughter      Heart Disease Son          at age 37       SOCIAL HISTORY:  Social History     Socioeconomic History     Marital status:      Spouse name: None     Number of children: None     Years of education: None     Highest education level: None   Occupational History     None   Tobacco Use     Smoking status: Former Smoker     Packs/day: 0.20     Years: 5.00     Pack years: 1.00     Types: Cigarettes     Start date:      Quit date: 1960     Years since quittin.7     Smokeless tobacco: Never Used   Substance and Sexual Activity     Alcohol use: Yes     Alcohol/week: 1.0 - 2.0 standard drinks     Types: 1 - 2 Standard drinks or equivalent per week     Comment: 2 cocktails per day     Drug use: No     Sexual activity: Not Currently     Partners: Female     Birth control/protection: Male Surgical   Other Topics Concern     Parent/sibling w/ CABG, MI or angioplasty before 65F 55M? No      Service Not Asked     Blood Transfusions Not Asked     Caffeine Concern Yes     Comment: coffee      Occupational Exposure Not Asked     Hobby Hazards Not Asked     Sleep Concern No     Stress Concern No     Weight Concern Yes     Comment: Weight decrease 10 lbs     Special Diet Yes     Comment: Mediterranean diet     Back Care Not Asked     Exercise Yes     Comment: Walking 5 days per week 35- 60 minutes     Bike Helmet Not Asked     Seat Belt Yes     Self-Exams Not Asked   Social History Narrative     None     Social Determinants of Health     Financial Resource Strain:      Difficulty of Paying Living Expenses:    Food Insecurity:      Worried About Running Out of Food in the Last Year:      Ran Out of Food in the Last Year:    Transportation Needs:      Lack of Transportation (Medical):      Lack of Transportation (Non-Medical):    Physical Activity:      Days of Exercise per Week:      Minutes of Exercise per Session:    Stress:      Feeling of Stress :   "  Social Connections:      Frequency of Communication with Friends and Family:      Frequency of Social Gatherings with Friends and Family:      Attends [employer] Services:      Active Member of Clubs or Organizations:      Attends Club or Organization Meetings:      Marital Status:    Intimate Partner Violence:      Fear of Current or Ex-Partner:      Emotionally Abused:      Physically Abused:      Sexually Abused:        Review of Systems:  Skin:  Negative       Eyes:  not assessed      ENT:  not assessed      Respiratory:  Positive for sleep apnea;CPAP     Cardiovascular:  Negative      Gastroenterology: Negative      Genitourinary:  Positive for urinary frequency    Musculoskeletal:  Negative      Neurologic:  Negative      Psychiatric:  Positive for sleep disturbances going to the bathroom  Heme/Lymph/Imm:  Negative      Endocrine:  Positive for thyroid disorder      Physical Exam:  Vitals: /68 (BP Location: Right arm, Patient Position: Sitting, Cuff Size: Adult Regular)   Pulse 54   Ht 1.778 m (5' 10\")   Wt 107.2 kg (236 lb 6.4 oz)   SpO2 98%   BMI 33.92 kg/m      Constitutional:  cooperative, alert and oriented, well developed, well nourished, in no acute distress obese [place] hearing aids    Skin:  warm and dry to the touch, no apparent skin lesions or masses noted          Head:  normocephalic, no masses or lesions        Eyes:  pupils equal and round, conjunctivae and lids unremarkable, sclera white, no xanthalasma, EOMS intact, no nystagmus        Lymph:      ENT:  no pallor or cyanosis, dentition good        Neck:  no carotid bruit;carotid pulses are full and equal bilaterally        Respiratory:  normal breath sounds, clear to auscultation, normal A-P diameter, normal symmetry, normal respiratory excursion, no use of accessory muscles         Cardiac: regular rhythm;normal S1 and S2;no S3 or S4;no murmurs, gallops or rubs detected                pulses full and equal                             "            GI:    obese      Extremities and Muscular Skeletal:  no edema;no spinal abnormalities noted;normal muscle strength and tone   varicose vein          Neurological:  no gross motor deficits        Psych:  affect appropriate, oriented to time, person and place          CC  No referring provider defined for this encounter.

## 2021-09-29 NOTE — PROGRESS NOTES
Service Date: 2021    HISTORY OF PRESENT ILLNESS:  Terrence is a very nice 76-year-old gentleman with a past medical history significant for directional coronary atherectomy in , with subsequent restenosis x2 for which we treated with balloon angioplasty.  In , two Xience drug-eluting stents were placed in his proximal and mid left anterior descending artery.    Psychosocially, Terrence has had a tough go of it.  His son, Doug,  suddenly in  from what sounds like a cardiac arrest.  Interestingly, his dad also  of an apparent heart attack at age 44.  His grandmother  suddenly at 21 for uncertain reasons.  His wife follows through the [facility] system with pulmonary hypertension.    Unfortunately, Terrence has developed several orthopedic issues over the years, decreasing his ability to exercise, which is how he always controlled his weight.  He was never very disciplined about eating and drinking but was always good about exercise.  He has seen my wife in the past for health coaching.  He did well for a period of time, but now because of spinal stenosis he has gained a significant amount of weight.    Other issues include a pituitary adenoma, which he had resected in 2018 and follows with Neurology on an annual basis.    When I last saw him in 2021, he was having chest discomfort that did sound anginal.  He was under quite a bit of stress, and by the time I saw him, things had settled down.  After some discussion, we decided we would take a conservative approach, but if he were to have a recrudescence of symptoms, we would proceed to the Cardiac Catheterization Lab.    Two followup appointments with my LUISA were met with no cardiac symptoms and he felt much better.  He was also having quite a bit of problems with visual issues, which has all settled down.    Terrence returns to clinic and states he thinks he is doing better.  He states he got sick and tired of his weight and decided to do  abstinence from all alcohol for 40 days, and this resulted in a 15-pound weight loss.  He is back drinking again and eating again and unfortunately is not doing any sort of exercise, but he is still down 9 pounds from his visit last January.  He states that he is not having any chest, arm, neck, jaw or shoulder discomfort.  He states the only real exercise he gets is he walks his daughter's dog, and he states he is able to do that with the spinal stenosis because the dog stops to urinate periodically and this is enough of a break that he can continue to exercise.    He notes no side effects or problems with his current medical regimen.  He has no lightheadedness, dizziness, syncope or near syncope.  No dyspnea on exertion, orthopnea or PND, but as stated, activity is very limited.    ASSESSMENT AND PLAN:  Terrence appears to be doing well from a cardiac standpoint without clinical evidence of ischemia at his current workload.    He has no symptoms to suggest heart failure or significant arrhythmia.    Blood pressure control is fair at 138/68 with a pulse of 54.    As stated, weight is 236 pounds, down 9 pounds from last February, but body mass index is still 33.9.    Fasting lipid profile is improved with his 9-pound weight loss.  Total cholesterol is 106, HDL is 47, LDL is 44, triglycerides are 74, and I emphasized the difference over the last year and told him just 9 pounds of weight loss have resulted in a significant improvement in his cholesterol profile.    Creatinine is normal with normal electrolytes.  Glucose is also normal.    We spent most of the time talking about the importance of getting back to his regular exercise regimen.  I have told him with the spinal stenosis, if he were to walk on his treadmill, put a little bit of a grade and lean forward, he could probably exercise adequately, as that would open up his spinal stenosis.  I also recommended trying a bicycle.  Again, that would flex his back and he  would be able to exercise, but I emphasized the importance of getting back to a daily exercise regimen.  We also talked about abstinence from alcohol, as that is an unnecessary source of calories.  I have challenged him to lose another 5-10 pounds before we meet again.  I will have him follow up with my LUISA in 6 months.  I will see him back in a year.  If he should have any problems, I would be glad to see him sooner.    Thank you for allowing me to participate in his care.    Over 30 minutes was spent with Daniel today.    Eagle Jolley MD, [facility]        D: 2021   T: 2021   MT: [email or web address]    Name:     DANIEL RIGGNIS  MRN:      [id number]        Account:      [id number]   :      1945           Service Date: 2021       Document: [id number]

## 2021-12-23 DIAGNOSIS — I10 BENIGN ESSENTIAL HYPERTENSION: ICD-10-CM

## 2021-12-23 DIAGNOSIS — E78.5 HYPERLIPIDEMIA LDL GOAL <70: ICD-10-CM

## 2021-12-23 DIAGNOSIS — I25.118 ATHEROSCLEROSIS OF NATIVE CORONARY ARTERY OF NATIVE HEART WITH OTHER FORM OF ANGINA PECTORIS (H): ICD-10-CM

## 2021-12-23 DIAGNOSIS — I25.10 CORONARY ARTERY DISEASE INVOLVING NATIVE CORONARY ARTERY OF NATIVE HEART WITHOUT ANGINA PECTORIS: Chronic | ICD-10-CM

## 2021-12-23 RX ORDER — ROSUVASTATIN CALCIUM 40 MG/1
40 TABLET, COATED ORAL DAILY
Qty: 90 TABLET | Refills: 2 | Status: SHIPPED | OUTPATIENT
Start: 2021-12-23 | End: 2022-08-31

## 2021-12-23 RX ORDER — EZETIMIBE 10 MG/1
10 TABLET ORAL DAILY
Qty: 90 TABLET | Refills: 2 | Status: SHIPPED | OUTPATIENT
Start: 2021-12-23 | End: 2022-08-31

## 2022-01-02 DIAGNOSIS — E03.8 SECONDARY HYPOTHYROIDISM: Primary | ICD-10-CM

## 2022-01-05 RX ORDER — LEVOTHYROXINE SODIUM 100 UG/1
100 TABLET ORAL DAILY
Qty: 90 TABLET | Refills: 0 | Status: SHIPPED | OUTPATIENT
Start: 2022-01-05 | End: 2022-03-30

## 2022-02-20 ENCOUNTER — HEALTH MAINTENANCE LETTER (OUTPATIENT)
Age: 77
End: 2022-02-20

## 2022-02-23 ENCOUNTER — OFFICE VISIT (OUTPATIENT)
Dept: ENDOCRINOLOGY | Facility: CLINIC | Age: 77
End: 2022-02-23
Payer: COMMERCIAL

## 2022-02-23 VITALS
BODY MASS INDEX: 34.51 KG/M2 | DIASTOLIC BLOOD PRESSURE: 59 MMHG | SYSTOLIC BLOOD PRESSURE: 127 MMHG | HEART RATE: 58 BPM | WEIGHT: 240.5 LBS

## 2022-02-23 DIAGNOSIS — E29.1 SECONDARY MALE HYPOGONADISM: ICD-10-CM

## 2022-02-23 DIAGNOSIS — D35.2 PITUITARY ADENOMA (H): Primary | ICD-10-CM

## 2022-02-23 DIAGNOSIS — E23.0 HYPOPITUITARISM (H): ICD-10-CM

## 2022-02-23 PROCEDURE — 99215 OFFICE O/P EST HI 40 MIN: CPT | Performed by: INTERNAL MEDICINE

## 2022-02-23 RX ORDER — TESTOSTERONE 1.62 MG/G
GEL TRANSDERMAL
Qty: 300 G | Refills: 5 | Status: SHIPPED | OUTPATIENT
Start: 2022-02-23 | End: 2022-11-21

## 2022-02-23 ASSESSMENT — PAIN SCALES - GENERAL: PAINLEVEL: NO PAIN (0)

## 2022-02-23 NOTE — LETTER
2/23/2022       RE: Terrence Murillo  [address]  [place] [address]     Dear Colleague,    Thank you for referring your patient, Terrence Murillo, to the [facility]OCRINOLOG[facility] [place] at [facility]. Please see a copy of my visit note below.    pituitary macradenoma and hypogonadism  Eulalia Burciaga CMA                                                                  ------ Endocrinology Follow up visit ------    Reason for consultation: pituitary macroadenoma, hypogonadism      Assessment: A 75 yo male post macro adenoma TSS on 11/3/2017, currently doing well, MRI showed small residual 3-4 mm on the right sellar, undercontrolled with tesosterone supplement and levothryoxine currently     Plan:   # Pituitary macroadenoma  S/p TSS on 11/3/2017, 3 month MRI 4 mm residual vs scar in 1/2019, patient recently experienced double vision and scheduled for MRI and follow up with Dr. Bates next week. Given previous images, possibility of pituitary cause of vision disturbance is low.   Most recent MRI 1/2021 showed no obvious sign of recurrence.       # Secondary hypogonadism  Currently on AndroGel 2 pumps Tuesday/Thursday/Saturday/Sunday  3 pumps Mondays/Wednesday/Fridays      - total testosterone level today     # Secondary hypothyroidism  - TSH, free T4 today  Currently on levothyroxine 100 mcg    - TSH and free T4     # Follow-up plan  Once a year follow up for testosterone and thyroid check       RTC with me in 1 year      Total time 45 minutes spent on the date of the encounter doing chart review, history and exam, reviewed serial brain MRI and documentation and further activities as noted above.    Gris Santoro MD  Staff Physician  Endocrinology and Metabolism  [facility]  License: [id number]  Pager: [phone]    Interval History as of 2/23/2022 : Patient has been doing well. edication compliance excellent   . New event includes  : no pertinent medical event noted, no changes visions .  Interval History as of 1/13/2021 : Patient has been doing well. Medication compliance excellent   . New event includes  : started to have double vision and has appointment of MRI and Dr. Bates next week. .  Interval History as of 9/18/2019 : Patient has been doing well.  Stable body weight, no headache has been doing well, compliant to testosterone gel and thyroid medication which he is taking in the middle of the night.  Currently he is busy taking care of his wife who has pulmonary hypertension .   interval History: Last seen in 2/2018.  Nonfunctioning microadenoma, transsphenoidal surgery done November 3, 2017, today no symptoms of headache nausea no visual disturbance.  He has been compliant to testosterone gel currently using 1 pump.  His energy level increased, currently he is working on diet and exercise with lifestyle , he intentionally reduced his body weight 23 pounds for the past 6 months.  He also cut to third over the amount of his alcohol to take.     Subjective: A 72 year old male with a past medical history of pituitary macroadenoma who presents post-operative follow-up after endoscopic, transphenoidal resection of pituitary adenoma on 11/3/17. The mass was found to be non-secretory on initial labs. He was initially found to have the tumor on MRI after he presented to his PCP with complaints of vision problems. The tumor was 3.2 x 2.5 x 2.0 cm in size and compressed the optic chiasm. Pathology consistent with benign adenoma. He was started on prophylactic hydrocortisone prior to discharge. Since d/c, he has been doing well. Energy level is improved. He is walking more. Has occasional headaches behind his eyes but seems to be getting better. Denies increased urinary frequency or excessive thirst. Reports his libido is fine but is not currently sexually active with his wife due to medical issues that he has.  Denies nipple discharge.  Feels his vision is about the same as before the surgery. Still has difficulty reading small print.     Interval history: Patient has been doing well.  Currently using the testosterone gel daily basis.  No muscle weakness, no fatigue.  He is still taking levothyroxine.  Hydrocortisone was tapered last visit and is not taking right now. He has good appetite and good sleep.  No headache, no dizziness.  No change vision issues after the surgery especially when he reads.  Today he came to follow-up postop MRI on 2/1/2-018  and came here for joint appointment with endocrine and neurosurgery.  He is concerned about price of testosterone gel, and also wondering whether he can wean off testosterone.      Current Problem List:   Patient Active Problem List   Diagnosis     Sleep apnea     Coronary artery disease of native artery of native heart with stable angina pectoris (H)     Benign essential hypertension     Pure hypercholesterolemia     Non morbid obesity due to excess calories     Chest pain     MORENO (dyspnea on exertion)     Intracranial tumor (H)     Secondary male hypogonadism     Pituitary adenoma (H)     S/P appendectomy     S/P knee replacement     S/P shoulder surgery     HDL deficiency     Dizziness       Past Medical and Past Surgical History:  Past Medical History:   Diagnosis Date     Coronary artery disease     cardiac cath 2016: medical management; cath 2010: SUSANA x2 to LAD; cath 1993: PTCA to LAD; cath 1992: PTCA to LAD, 1992: atherectomy of LAD     Hearing problem      Hyperlipidaemia      Hypertension      Obese      Obstructive sleep apnea      Reduced vision      Sleep apnea     CPAP       Past Surgical History:   Procedure Laterality Date     APPENDECTOMY OPEN       ARTHROPLASTY KNEE Left      ARTHROPLASTY SHOULDER Right      ENT SURGERY       HEART CATH, ANGIOPLASTY  1992    LAD  atherectomy with a DVI device      HEART CATH, ANGIOPLASTY  4-28-10    PTCA and stent proximal and mid LAD (2) stents  [facility]     HEART CATH, ANGIOPLASTY  04/20/2016    no stenosis greater than 25%-rec. medical management     OPTICAL TRACKING SYSTEM ENDOSCOPIC RESECTION TUMOR CRANIAL N/A 11/3/2017    Procedure: OPTICAL TRACKING SYSTEM ENDOSCOPIC RESECTION TUMOR CRANIAL;  Stealth Guided Endoscopic Transnasal Resection of Pituitary Tumor;  Surgeon: Amanda Bates MD;  Location:  OR       Medications:   Current Outpatient Medications   Medication Sig Dispense Refill     alfuzosin ER (UROXATRAL) 10 MG 24 hr tablet Take 10 mg by mouth daily       aspirin 81 MG tablet Take 1 tablet (81 mg) by mouth every evening 30 tablet 0     ezetimibe (ZETIA) 10 MG tablet Take 1 tablet (10 mg) by mouth daily 90 tablet 2     Glucosamine 500 MG CAPS Take 500 mg by mouth 2 times daily        ibuprofen (ADVIL/MOTRIN) 200 MG tablet Take 400 mg by mouth daily       levothyroxine (SYNTHROID/LEVOTHROID) 100 MCG tablet Take 1 tablet (100 mcg) by mouth daily 90 tablet 0     metoprolol tartrate (LOPRESSOR) 25 MG tablet Take 1 tablet (25 mg) by mouth 2 times daily 180 tablet 3     Multiple Vitamin (MULTI-VITAMIN) per tablet Take 1 tablet by mouth every morning        nitroGLYcerin (NITROSTAT) 0.4 MG sublingual tablet Place 1 tablet (0.4 mg) under the tongue every 5 minutes as needed May repeat x2, if chest pain contines after 3rd dose call 911 25 tablet 3     ramipril (ALTACE) 10 MG capsule Take 1 capsule (10 mg) by mouth daily 90 capsule 3     rosuvastatin (CRESTOR) 40 MG tablet Take 1 tablet (40 mg) by mouth daily 90 tablet 2     saw palmetto 450 MG CAPS capsule Take 450 mg by mouth daily       testosterone (ANDROGEL 1.62 % PUMP) 20.25 MG/ACT gel 2 pumps apply to the skin daily 225 g 5       Allergies:   Allergies   Allergen Reactions     No Clinical Screening - See Comments Hives     Cardizem [Diltiazem Hcl] Itching     Cats Other (See Comments) and Hives     WATERY EYES, SNEEZE, AND COUGH     Diltiazem Hives and Rash       Social  History:  Social History     Tobacco Use     Smoking status: Former Smoker     Packs/day: 0.20     Years: 5.00     Pack years: 1.00     Types: Cigarettes     Start date:      Quit date: 1960     Years since quittin.1     Smokeless tobacco: Never Used   Substance Use Topics     Alcohol use: Yes     Alcohol/week: 1.0 - 2.0 standard drink     Types: 1 - 2 Standard drinks or equivalent per week     Comment: 2 cocktails per day         Family History:  Family History   Problem Relation Age of Onset     C.A.D. Father      Heart Disease Father          at age 44     Heart Disease Son 37        enlarged heart     Heart Disease Mother          at age 80 after heart surgery     Heart Surgery Mother         open heart, passed 10 days after     Diabetes Mother      Family History Negative Maternal Grandmother      Family History Negative Maternal Grandfather      Family History Negative Paternal Grandmother      Family History Negative Paternal Grandfather      Family History Negative Brother      Alzheimer Disease Brother      Family History Negative Sister      Family History Negative Daughter      Family History Negative Son      Family History Negative Daughter      Heart Disease Son          at age 37       Review of Systems:  A 10-point ROS is otherwise negative except as noted in HPI.     Physical Examination:  Blood pressure (!) 157/76, pulse 58, weight 109.1 kg (240 lb 8 oz).  Body mass index is 34.51 kg/m .  Wt Readings from Last 4 Encounters:   22 109.1 kg (240 lb 8 oz)   21 107.2 kg (236 lb 6.4 oz)   21 111.1 kg (245 lb)   21 109.2 kg (240 lb 12.8 oz)     GEN: Alert, no distress.   RESP:no acute distress  EXT: No peripheral edema.    SKIN:  no lesions.   NEURO: Non-focal deficit observed.       2018 00:00 2018 07:38 10/10/2018 08:47 2019 09:17   Testosterone Total 121 (A) 172 (L) 162 (L) 294   TSH 0.03 (A) 1.82 1.70 0.98   T4 Free 1.2  0.75 (L) 0.89        1/30/2019 MRI: I also personally reviewed images.   Brain/ Pituitary MRI without and with contrast     History: Benign neoplasm of pituitary gland and craniopharyngeal duct  (pouch) (H); Benign neoplasm of pituitary gland and craniopharyngeal  duct (pouch) (H).  ICD-10: Benign neoplasm of pituitary gland and craniopharyngeal duct  (pouch) (H); Benign neoplasm of pituitary gland and craniopharyngeal  duct (pouch) (H)     Comparison:  MRI  2/1/ 2018 and MRI study on 11/3/2017.      Technique: Axial diffusion and FLAIR images of the whole brain  obtained without intravenous contrast. Sagittal T1 and T2-weighted,  coronal T2-weighted, coronal T1-weighted images with focus on the  sella were obtained without intravenous contrast. Post intravenous  contrast using gadolinium coronal and sagittal T1-weighted images were  obtained focused on the sella. Dynamic postcontrast coronal  T1-weighted images were also obtained.     Contrast: 10ml  Gadavist     Findings:    Stable postoperative changes of transnasal endoscopic resection of  pituitary since 2/1/2018. Unchanged leftward deviation of the  pituitary stalk. Partial empty sella also appears unchanged.  Previously noted small round focus of hypoenhancement along the medial  aspect of the right cavernous sinus measuring up to 4 mm, unchanged  (series 100, image 85).     The optic chiasm is intact and nondisplaced.     The major cavernous carotid vascular flow-voids are patent.       FLAIR images through the whole brain demonstrate mild leukoaraiosis.  Mild generalized parenchymal volume loss. No mass effect, midline  shift, or hydrocephalus. Bilateral pseudophakia.                                                                      Impression:   1. Stable postoperative changes of transnasal endoscopic pituitary  adenoma resection.  2. Unchanged small focus of hypoenhancement in the right cavernous  since 2/2018 most likely reflecting postoperative change  although  residual tumor cannot be entirely excluded.

## 2022-02-23 NOTE — PROGRESS NOTES
pituitary macradenoma and hypogonadism  Eulalia Burciaga CMA                                                                  ------ Endocrinology Follow up visit ------    Reason for consultation: pituitary macroadenoma, hypogonadism      Assessment: A 75 yo male post macro adenoma TSS on 11/3/2017, currently doing well, MRI showed small residual 3-4 mm on the right sellar, undercontrolled with tesosterone supplement and levothryoxine currently     Plan:   # Pituitary macroadenoma  S/p TSS on 11/3/2017, 3 month MRI 4 mm residual vs scar in 1/2019, patient recently experienced double vision and scheduled for MRI and follow up with Dr. Bates next week. Given previous images, possibility of pituitary cause of vision disturbance is low.   Most recent MRI 1/2021 showed no obvious sign of recurrence.       # Secondary hypogonadism  Currently on AndroGel 2 pumps Tuesday/Thursday/Saturday/Sunday  3 pumps Mondays/Wednesday/Fridays      - total testosterone level today     # Secondary hypothyroidism  - TSH, free T4 today  Currently on levothyroxine 100 mcg    - TSH and free T4     # Follow-up plan  Once a year follow up for testosterone and thyroid check       RTC with me in 1 year      Total time 45 minutes spent on the date of the encounter doing chart review, history and exam, reviewed serial brain MRI and documentation and further activities as noted above.    Gris Santoro MD  Staff Physician  Endocrinology and Metabolism  [facility]  License: [id number]  Pager: [phone]    Interval History as of 2/23/2022 : Patient has been doing well. edication compliance excellent   . New event includes : no pertinent medical event noted, no changes visions .  Interval History as of 1/13/2021 : Patient has been doing well. Medication compliance excellent   . New event includes  : started to have double vision and has appointment of MRI and Dr. Bates next week. .  Interval History as of 9/18/2019 : Patient has  been doing well.  Stable body weight, no headache has been doing well, compliant to testosterone gel and thyroid medication which he is taking in the middle of the night.  Currently he is busy taking care of his wife who has pulmonary hypertension .   interval History: Last seen in 2/2018.  Nonfunctioning microadenoma, transsphenoidal surgery done November 3, 2017, today no symptoms of headache nausea no visual disturbance.  He has been compliant to testosterone gel currently using 1 pump.  His energy level increased, currently he is working on diet and exercise with lifestyle , he intentionally reduced his body weight 23 pounds for the past 6 months.  He also cut to third over the amount of his alcohol to take.     Subjective: A 72 year old male with a past medical history of pituitary macroadenoma who presents post-operative follow-up after endoscopic, transphenoidal resection of pituitary adenoma on 11/3/17. The mass was found to be non-secretory on initial labs. He was initially found to have the tumor on MRI after he presented to his PCP with complaints of vision problems. The tumor was 3.2 x 2.5 x 2.0 cm in size and compressed the optic chiasm. Pathology consistent with benign adenoma. He was started on prophylactic hydrocortisone prior to discharge. Since d/c, he has been doing well. Energy level is improved. He is walking more. Has occasional headaches behind his eyes but seems to be getting better. Denies increased urinary frequency or excessive thirst. Reports his libido is fine but is not currently sexually active with his wife due to medical issues that he has.  Denies nipple discharge. Feels his vision is about the same as before the surgery. Still has difficulty reading small print.     Interval history: Patient has been doing well.  Currently using the testosterone gel daily basis.  No muscle weakness, no fatigue.  He is still taking levothyroxine.  Hydrocortisone was tapered last visit and is  not taking right now. He has good appetite and good sleep.  No headache, no dizziness.  No change vision issues after the surgery especially when he reads.  Today he came to follow-up postop MRI on 2/1/2-018  and came here for joint appointment with endocrine and neurosurgery.  He is concerned about price of testosterone gel, and also wondering whether he can wean off testosterone.      Current Problem List:   Patient Active Problem List   Diagnosis     Sleep apnea     Coronary artery disease of native artery of native heart with stable angina pectoris (H)     Benign essential hypertension     Pure hypercholesterolemia     Non morbid obesity due to excess calories     Chest pain     MORENO (dyspnea on exertion)     Intracranial tumor (H)     Secondary male hypogonadism     Pituitary adenoma (H)     S/P appendectomy     S/P knee replacement     S/P shoulder surgery     HDL deficiency     Dizziness       Past Medical and Past Surgical History:  Past Medical History:   Diagnosis Date     Coronary artery disease     cardiac cath 2016: medical management; cath 2010: SUSANA x2 to LAD; cath 1993: PTCA to LAD; cath 1992: PTCA to LAD, 1992: atherectomy of LAD     Hearing problem      Hyperlipidaemia      Hypertension      Obese      Obstructive sleep apnea      Reduced vision      Sleep apnea     CPAP       Past Surgical History:   Procedure Laterality Date     APPENDECTOMY OPEN       ARTHROPLASTY KNEE Left      ARTHROPLASTY SHOULDER Right      ENT SURGERY       HEART CATH, ANGIOPLASTY  1992    LAD  atherectomy with a DVI device      HEART CATH, ANGIOPLASTY  4-28-10    PTCA and stent proximal and mid LAD (2) stents [facility]     HEART CATH, ANGIOPLASTY  04/20/2016    no stenosis greater than 25%-rec. medical management     OPTICAL TRACKING SYSTEM ENDOSCOPIC RESECTION TUMOR CRANIAL N/A 11/3/2017    Procedure: OPTICAL TRACKING SYSTEM ENDOSCOPIC RESECTION TUMOR CRANIAL;  Stealth Guided Endoscopic Transnasal Resection of Pituitary Tumor;   Surgeon: Amanad Bates MD;  Location:  OR       Medications:   Current Outpatient Medications   Medication Sig Dispense Refill     alfuzosin ER (UROXATRAL) 10 MG 24 hr tablet Take 10 mg by mouth daily       aspirin 81 MG tablet Take 1 tablet (81 mg) by mouth every evening 30 tablet 0     ezetimibe (ZETIA) 10 MG tablet Take 1 tablet (10 mg) by mouth daily 90 tablet 2     Glucosamine 500 MG CAPS Take 500 mg by mouth 2 times daily        ibuprofen (ADVIL/MOTRIN) 200 MG tablet Take 400 mg by mouth daily       levothyroxine (SYNTHROID/LEVOTHROID) 100 MCG tablet Take 1 tablet (100 mcg) by mouth daily 90 tablet 0     metoprolol tartrate (LOPRESSOR) 25 MG tablet Take 1 tablet (25 mg) by mouth 2 times daily 180 tablet 3     Multiple Vitamin (MULTI-VITAMIN) per tablet Take 1 tablet by mouth every morning        nitroGLYcerin (NITROSTAT) 0.4 MG sublingual tablet Place 1 tablet (0.4 mg) under the tongue every 5 minutes as needed May repeat x2, if chest pain contines after 3rd dose call 911 25 tablet 3     ramipril (ALTACE) 10 MG capsule Take 1 capsule (10 mg) by mouth daily 90 capsule 3     rosuvastatin (CRESTOR) 40 MG tablet Take 1 tablet (40 mg) by mouth daily 90 tablet 2     saw palmetto 450 MG CAPS capsule Take 450 mg by mouth daily       testosterone (ANDROGEL 1.62 % PUMP) 20.25 MG/ACT gel 2 pumps apply to the skin daily 225 g 5       Allergies:   Allergies   Allergen Reactions     No Clinical Screening - See Comments Hives     Cardizem [Diltiazem Hcl] Itching     Cats Other (See Comments) and Hives     WATERY EYES, SNEEZE, AND COUGH     Diltiazem Hives and Rash       Social History:  Social History     Tobacco Use     Smoking status: Former Smoker     Packs/day: 0.20     Years: 5.00     Pack years: 1.00     Types: Cigarettes     Start date:      Quit date: 1960     Years since quittin.1     Smokeless tobacco: Never Used   Substance Use Topics     Alcohol use: Yes     Alcohol/week: 1.0 - 2.0  standard drink     Types: 1 - 2 Standard drinks or equivalent per week     Comment: 2 cocktails per day         Family History:  Family History   Problem Relation Age of Onset     C.A.D. Father      Heart Disease Father          at age 44     Heart Disease Son 37        enlarged heart     Heart Disease Mother          at age 80 after heart surgery     Heart Surgery Mother         open heart, passed 10 days after     Diabetes Mother      Family History Negative Maternal Grandmother      Family History Negative Maternal Grandfather      Family History Negative Paternal Grandmother      Family History Negative Paternal Grandfather      Family History Negative Brother      Alzheimer Disease Brother      Family History Negative Sister      Family History Negative Daughter      Family History Negative Son      Family History Negative Daughter      Heart Disease Son          at age 37       Review of Systems:  A 10-point ROS is otherwise negative except as noted in HPI.     Physical Examination:  Blood pressure (!) 157/76, pulse 58, weight 109.1 kg (240 lb 8 oz).  Body mass index is 34.51 kg/m .  Wt Readings from Last 4 Encounters:   22 109.1 kg (240 lb 8 oz)   21 107.2 kg (236 lb 6.4 oz)   21 111.1 kg (245 lb)   21 109.2 kg (240 lb 12.8 oz)     GEN: Alert, no distress.   RESP:no acute distress  EXT: No peripheral edema.    SKIN:  no lesions.   NEURO: Non-focal deficit observed.       2018 00:00 2018 07:38 10/10/2018 08:47 2019 09:17   Testosterone Total 121 (A) 172 (L) 162 (L) 294   TSH 0.03 (A) 1.82 1.70 0.98   T4 Free 1.2  0.75 (L) 0.89       2019 MRI: I also personally reviewed images.   Brain/ Pituitary MRI without and with contrast     History: Benign neoplasm of pituitary gland and craniopharyngeal duct  (pouch) (H); Benign neoplasm of pituitary gland and craniopharyngeal  duct (pouch) (H).  ICD-10: Benign neoplasm of pituitary gland and craniopharyngeal  duct  (pouch) (H); Benign neoplasm of pituitary gland and craniopharyngeal  duct (pouch) (H)     Comparison:  MRI  2/1/ 2018 and MRI study on 11/3/2017.      Technique: Axial diffusion and FLAIR images of the whole brain  obtained without intravenous contrast. Sagittal T1 and T2-weighted,  coronal T2-weighted, coronal T1-weighted images with focus on the  sella were obtained without intravenous contrast. Post intravenous  contrast using gadolinium coronal and sagittal T1-weighted images were  obtained focused on the sella. Dynamic postcontrast coronal  T1-weighted images were also obtained.     Contrast: 10ml  Gadavist     Findings:    Stable postoperative changes of transnasal endoscopic resection of  pituitary since 2/1/2018. Unchanged leftward deviation of the  pituitary stalk. Partial empty sella also appears unchanged.  Previously noted small round focus of hypoenhancement along the medial  aspect of the right cavernous sinus measuring up to 4 mm, unchanged  (series 100, image 85).     The optic chiasm is intact and nondisplaced.     The major cavernous carotid vascular flow-voids are patent.       FLAIR images through the whole brain demonstrate mild leukoaraiosis.  Mild generalized parenchymal volume loss. No mass effect, midline  shift, or hydrocephalus. Bilateral pseudophakia.                                                                      Impression:   1. Stable postoperative changes of transnasal endoscopic pituitary  adenoma resection.  2. Unchanged small focus of hypoenhancement in the right cavernous  since 2/2018 most likely reflecting postoperative change although  residual tumor cannot be entirely excluded.

## 2022-03-08 ENCOUNTER — LAB (OUTPATIENT)
Dept: LAB | Facility: CLINIC | Age: 77
End: 2022-03-08
Payer: COMMERCIAL

## 2022-03-08 DIAGNOSIS — E23.0 HYPOPITUITARISM (H): ICD-10-CM

## 2022-03-08 LAB
T4 FREE SERPL-MCNC: 0.97 NG/DL (ref 0.76–1.46)
TSH SERPL DL<=0.005 MIU/L-ACNC: 1.12 MU/L (ref 0.4–4)

## 2022-03-08 PROCEDURE — 84443 ASSAY THYROID STIM HORMONE: CPT

## 2022-03-08 PROCEDURE — 36415 COLL VENOUS BLD VENIPUNCTURE: CPT

## 2022-03-08 PROCEDURE — 84439 ASSAY OF FREE THYROXINE: CPT

## 2022-03-08 PROCEDURE — 84403 ASSAY OF TOTAL TESTOSTERONE: CPT

## 2022-03-10 LAB — TESTOST SERPL-MCNC: 259 NG/DL (ref 240–950)

## 2022-03-28 DIAGNOSIS — I25.10 CORONARY ARTERY DISEASE INVOLVING NATIVE CORONARY ARTERY OF NATIVE HEART WITHOUT ANGINA PECTORIS: Chronic | ICD-10-CM

## 2022-03-28 DIAGNOSIS — I10 BENIGN ESSENTIAL HYPERTENSION: ICD-10-CM

## 2022-03-28 DIAGNOSIS — E03.8 SECONDARY HYPOTHYROIDISM: ICD-10-CM

## 2022-03-28 DIAGNOSIS — E78.5 HYPERLIPIDEMIA LDL GOAL <70: ICD-10-CM

## 2022-03-28 RX ORDER — NITROGLYCERIN 0.4 MG/1
0.4 TABLET SUBLINGUAL EVERY 5 MIN PRN
Qty: 25 TABLET | Refills: 3 | Status: SHIPPED | OUTPATIENT
Start: 2022-03-28 | End: 2022-11-29

## 2022-03-28 RX ORDER — METOPROLOL TARTRATE 25 MG/1
25 TABLET, FILM COATED ORAL 2 TIMES DAILY
Qty: 180 TABLET | Refills: 1 | Status: SHIPPED | OUTPATIENT
Start: 2022-03-28 | End: 2022-08-31

## 2022-03-30 DIAGNOSIS — I10 BENIGN ESSENTIAL HYPERTENSION: ICD-10-CM

## 2022-03-30 DIAGNOSIS — I25.10 CORONARY ARTERY DISEASE INVOLVING NATIVE CORONARY ARTERY OF NATIVE HEART WITHOUT ANGINA PECTORIS: ICD-10-CM

## 2022-03-30 DIAGNOSIS — E78.5 HYPERLIPIDEMIA LDL GOAL <70: ICD-10-CM

## 2022-03-30 RX ORDER — LEVOTHYROXINE SODIUM 100 UG/1
100 TABLET ORAL DAILY
Qty: 90 TABLET | Refills: 3 | Status: SHIPPED | OUTPATIENT
Start: 2022-03-30 | End: 2024-02-23

## 2022-03-30 RX ORDER — RAMIPRIL 10 MG/1
10 CAPSULE ORAL DAILY
Qty: 90 CAPSULE | Refills: 1 | Status: SHIPPED | OUTPATIENT
Start: 2022-03-30 | End: 2022-08-31

## 2022-03-30 NOTE — TELEPHONE ENCOUNTER
LEVOTHYROXINE 100 MCG TABLET  Last Written Prescription Date:   1/5/2022  Last Fill Quantity: 90,   # refills: 0  Last Office Visit :  2/23/2022  Future Office visit:   2/8/2023   90 Tabs, 3 Refills sent to pharm 3/30/2022      Sophie Anderson RN  Central Triage Red Flags/Med Refills

## 2022-05-27 NOTE — PROGRESS NOTES
----- Message from Enedina Gilman sent at 5/26/2022 12:52 PM CDT -----  Type:  Patient  Call    Who Called: Nancy   Would the patient rather a call back or a response via [facility]? 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      TREATMENT:  The patient underwent transnasal endoscopic resection of pituitary macroadenoma on 11/03/2017.  Reconstruction was done with right nasal septal flap.      HISTORY OF PRESENT ILLNESS:  Mr. Murillo is back to the Otolaryngology Clinic for his first postoperative visit.  The patient still complains of some headaches that he is managing with Tylenol.  He denies clear rhinorrhea.  He denies diplopia.  His peripheral vision is still not improved.      PHYSICAL EXAMINATION:    Constitutional: The patient was well-groomed and in no acute distress.    Skin: Warm and pink.    Neurologic: Alert and oriented x3. Cranial nerves III-XII within normal limits. Voice quality is normal.    Psychiatric: The patient's affect was calm, cooperative and appropriate.    Respiratory: Breathing comfortably without stridor or exertion of accessory muscles.    Eyes: Pupils were equal and reactive. Extraocular movements are intact.    Head: Normocephalic and atraumatic. No lesions or scars.    Ears: External auditory canals and tympanic membranes were clear.    Nose: Sinuses were nontender. Anterior rhinoscopy revealed midline septum and absence of purulence or polyps.    Oral cavity/Oropharynx: Normal tongue, floor of mouth, buccal mucosa and palate. No lesions or masses on inspection or palpation. No abnormal lymph tissue in the oropharynx.    Neck: The parotids are soft without masses. Supple with normal laryngeal and tracheal landmarks.    Lymphatic: There is no palpable lymphadenopathy or other masses in the neck or parotids.       PROCEDURE: Nasal endoscopy: The risks and benefits of the procedure were discussed, and written consent was obtained. A timeout was performed. The patient's nose was sprayed with lidocaine and Afrin, and a 0-degree nasal endoscope was used to gain access into the nasal cavity.  I removed the Cole splints.  I did  suction abundant clots, dried blood, and pieces of Gelfoam from the sphenoid sinus.  The right-sided septal cartilage is exposed.      ASSESSMENT AND PLAN:  Mr. Murillo is a 72-year-old gentleman status post transnasal endoscopic excision of pituitary macroadenoma.  He is doing well.  I would like to see him back in three weeks.  He should continue the sinonasal precautions.  I am telling him not to start his CPAP until I give him the okay.

## 2022-07-19 ENCOUNTER — TELEPHONE (OUTPATIENT)
Dept: NEUROSURGERY | Facility: CLINIC | Age: 77
End: 2022-07-19

## 2022-07-19 DIAGNOSIS — D35.2 PITUITARY MACROADENOMA (H): Primary | ICD-10-CM

## 2022-07-19 NOTE — TELEPHONE ENCOUNTER
[facility] Call Center    Phone Message    May a detailed message be left on voicemail: yes     Reason for Call: Pt is requesting a call back to get his 2 year follow up with Dr. Bates and MRI scheduled.  Thanks.

## 2022-07-19 NOTE — TELEPHONE ENCOUNTER
HANNAH Brain with and without contrast, Pituitary protocol completed and note sent to scheduling, due January 2023 with Dr Bates.

## 2022-08-31 DIAGNOSIS — E78.5 HYPERLIPIDEMIA LDL GOAL <70: ICD-10-CM

## 2022-08-31 DIAGNOSIS — I25.10 CORONARY ARTERY DISEASE INVOLVING NATIVE CORONARY ARTERY OF NATIVE HEART WITHOUT ANGINA PECTORIS: Chronic | ICD-10-CM

## 2022-08-31 DIAGNOSIS — I10 BENIGN ESSENTIAL HYPERTENSION: ICD-10-CM

## 2022-08-31 DIAGNOSIS — I25.118 ATHEROSCLEROSIS OF NATIVE CORONARY ARTERY OF NATIVE HEART WITH OTHER FORM OF ANGINA PECTORIS (H): ICD-10-CM

## 2022-08-31 RX ORDER — ROSUVASTATIN CALCIUM 40 MG/1
40 TABLET, COATED ORAL DAILY
Qty: 90 TABLET | Refills: 1 | Status: SHIPPED | OUTPATIENT
Start: 2022-08-31 | End: 2022-11-29

## 2022-08-31 RX ORDER — RAMIPRIL 10 MG/1
10 CAPSULE ORAL DAILY
Qty: 90 CAPSULE | Refills: 1 | Status: SHIPPED | OUTPATIENT
Start: 2022-08-31 | End: 2022-11-29

## 2022-08-31 RX ORDER — EZETIMIBE 10 MG/1
10 TABLET ORAL DAILY
Qty: 90 TABLET | Refills: 1 | Status: SHIPPED | OUTPATIENT
Start: 2022-08-31 | End: 2022-11-29

## 2022-08-31 RX ORDER — METOPROLOL TARTRATE 25 MG/1
25 TABLET, FILM COATED ORAL 2 TIMES DAILY
Qty: 180 TABLET | Refills: 1 | Status: SHIPPED | OUTPATIENT
Start: 2022-08-31 | End: 2022-11-29

## 2022-08-31 NOTE — TELEPHONE ENCOUNTER
Received refill request for:  Zetia, Metoprolol, Ramipril, Rosuvatatin  Last OV was: 21 Dr. Jolley  Labs/EK21 BMP/Lipids  F/U scheduled:   Date Time Provider Department Center   2022  8:15 AM [facility]   2022 10:15 AM Eagle Jolley MD [facility]   2023 11:45 AM [id number] [facility]   2023  1:00 PM Amanda Bates MD [facility]   2023 10:30 AM Gris Santoro MD [facility]     Pharmacy: [facility] Cardiology Refill Guideline reviewed.  Medication meets criteria for refill.    Rosita Ewing RN, BSN  22 at 10:44 AM

## 2022-10-23 ENCOUNTER — HEALTH MAINTENANCE LETTER (OUTPATIENT)
Age: 77
End: 2022-10-23

## 2022-11-15 ENCOUNTER — OFFICE VISIT (OUTPATIENT)
Dept: OPHTHALMOLOGY | Facility: CLINIC | Age: 77
End: 2022-11-15
Attending: OPHTHALMOLOGY
Payer: COMMERCIAL

## 2022-11-15 DIAGNOSIS — H53.40 VISUAL FIELD DEFECT: ICD-10-CM

## 2022-11-15 DIAGNOSIS — D49.7 PITUITARY TUMOR: Primary | ICD-10-CM

## 2022-11-15 DIAGNOSIS — H47.20 OPTIC ATROPHY: ICD-10-CM

## 2022-11-15 DIAGNOSIS — H53.40 VISUAL FIELD DEFECT: Primary | ICD-10-CM

## 2022-11-15 PROCEDURE — 92014 COMPRE OPH EXAM EST PT 1/>: CPT | Mod: GC | Performed by: OPHTHALMOLOGY

## 2022-11-15 PROCEDURE — 92133 CPTRZD OPH DX IMG PST SGM ON: CPT | Performed by: OPHTHALMOLOGY

## 2022-11-15 PROCEDURE — G0463 HOSPITAL OUTPT CLINIC VISIT: HCPCS | Mod: 25

## 2022-11-15 PROCEDURE — 92083 EXTENDED VISUAL FIELD XM: CPT | Performed by: OPHTHALMOLOGY

## 2022-11-15 ASSESSMENT — VISUAL ACUITY
OD_CC+: -1
OD_CC: 20/20
METHOD: SNELLEN - LINEAR
OS_CC+: -1
OS_CC: 20/20

## 2022-11-15 ASSESSMENT — TONOMETRY
OD_IOP_MMHG: 14
OS_IOP_MMHG: 16
IOP_METHOD: ICARE

## 2022-11-15 ASSESSMENT — CUP TO DISC RATIO
OS_RATIO: 0.2
OD_RATIO: 0.2

## 2022-11-15 ASSESSMENT — CONF VISUAL FIELD
OS_NORMAL: 1
OS_SUPERIOR_NASAL_RESTRICTION: 0
METHOD: COUNTING FINGERS
OS_INFERIOR_NASAL_RESTRICTION: 0
OD_NORMAL: 1
OS_INFERIOR_TEMPORAL_RESTRICTION: 0
OD_INFERIOR_NASAL_RESTRICTION: 0
OD_INFERIOR_TEMPORAL_RESTRICTION: 0
OS_SUPERIOR_TEMPORAL_RESTRICTION: 0
OD_SUPERIOR_TEMPORAL_RESTRICTION: 0
OD_SUPERIOR_NASAL_RESTRICTION: 0

## 2022-11-15 ASSESSMENT — REFRACTION_WEARINGRX
OS_CYLINDER: SPHERE
OS_ADD: +2.50
OD_ADD: +2.50
SPECS_TYPE: PAL
OD_SPHERE: -0.25
OD_CYLINDER: +0.50
OS_SPHERE: +0.25
OD_AXIS: 167

## 2022-11-15 ASSESSMENT — EXTERNAL EXAM - RIGHT EYE: OD_EXAM: NORMAL

## 2022-11-15 NOTE — NURSING NOTE
Chief Complaints and History of Present Illnesses   Patient presents with     Visual Field Defect Follow Up     Chief Complaint(s) and History of Present Illness(es)     Visual Field Defect Follow Up            Laterality: both eyes    Course: stable    Associated symptoms: floaters (historic - no changes).  Negative for eye pain, redness and flashes    Treatments tried: no treatments    Pain scale: 0/10          Comments    New pt here today for annual pituitary adenoma eye evaluation.  States eyes have been stable - no new concerns.  Pt reports that there is no eye pain or discomfort.    Ocular meds = none    Deborah BARRIOS, DANISH 12:48 PM 11/15/2022

## 2022-11-15 NOTE — PROGRESS NOTES
Assessment & Plan     Terrence Murillo is a 77 year old male with the following diagnoses:   1. Pituitary tumor    2. Visual field defect    3. Optic atrophy          Terrence Murillo is a 77 year old male who presents for 1 year follow up. He was initially seen for bitemporal incongrous hemianopia and was found to have 3.2 x 2.5 a 2.0 cm enhancing sellar/suprasellar presumed pituitary macroadenoma. He underwent resection in 11/2017. Pathology report showed pituitary adenoma with multifocal fibrosis.     Since last visit 3/2/21 he reports vision seems essentially stable. He previously experienced blurred vision and monocular diplopia, but this has been less prominent. His dryness has improved and he uses drops as needed.    Corrected distance visual acuity was 20/20 -1 in the right eye and 20/20 -1 in the left eye. Intraocular pressure was 14 in the right eye and 16 in the left eye using ICare.  Color vision 14/14 right eye and 13/14 left eye.  Pupils isocoric without RAPD.  Anterior segment exam with PCIOL OU.  Fundus exam with stable optic atrophy.     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  We will follow annually.            Attending Physician Attestation:  Complete documentation of historical and exam elements from today's encounter can be found in the full encounter summary report (not reduplicated in this progress note).  I personally obtained the chief complaint(s) and history of present illness.  I confirmed and edited as necessary the review of systems, past medical/surgical history, family history, social history, and examination findings as documented by others; and I examined the patient myself.  I personally reviewed the relevant tests, images, and reports as documented above.  I formulated and edited as necessary the assessment and plan and discussed the findings and management plan with the patient and family. I personally reviewed  the ophthalmic test(s) associated with this encounter, agree with the interpretation(s) as documented by the resident/fellow, and have edited the corresponding report(s) as necessary.  - Kendell Mills MD PhD  Fellow, Neuro-Ophthalmology

## 2022-11-17 DIAGNOSIS — I25.10 CORONARY ARTERY DISEASE INVOLVING NATIVE CORONARY ARTERY OF NATIVE HEART WITHOUT ANGINA PECTORIS: ICD-10-CM

## 2022-11-17 DIAGNOSIS — E78.5 HYPERLIPIDEMIA LDL GOAL <70: Primary | ICD-10-CM

## 2022-11-21 DIAGNOSIS — E23.0 HYPOPITUITARISM (H): ICD-10-CM

## 2022-11-21 DIAGNOSIS — E29.1 SECONDARY MALE HYPOGONADISM: ICD-10-CM

## 2022-11-21 NOTE — TELEPHONE ENCOUNTER
TESTOSTERONE 1.62% GEL PUMP     Last Written Prescription Date:  2/23/22  Last Fill Quantity: 300,   # refills: 5  Last Office Visit : 2/23/22  Future Office visit:  2/8/23    Routing refill request to provider for review/approval because:  Drug not on the Endocrine refill protocol

## 2022-11-22 RX ORDER — TESTOSTERONE 1.62 MG/G
GEL TRANSDERMAL
Qty: 300 G | Refills: 5 | Status: SHIPPED | OUTPATIENT
Start: 2022-11-22 | End: 2023-02-10

## 2022-11-29 ENCOUNTER — OFFICE VISIT (OUTPATIENT)
Dept: CARDIOLOGY | Facility: CLINIC | Age: 77
End: 2022-11-29
Payer: COMMERCIAL

## 2022-11-29 ENCOUNTER — LAB (OUTPATIENT)
Dept: LAB | Facility: CLINIC | Age: 77
End: 2022-11-29
Payer: COMMERCIAL

## 2022-11-29 VITALS
HEIGHT: 70 IN | OXYGEN SATURATION: 96 % | HEART RATE: 52 BPM | BODY MASS INDEX: 32.34 KG/M2 | SYSTOLIC BLOOD PRESSURE: 154 MMHG | DIASTOLIC BLOOD PRESSURE: 66 MMHG | WEIGHT: 225.9 LBS

## 2022-11-29 DIAGNOSIS — I10 BENIGN ESSENTIAL HYPERTENSION: ICD-10-CM

## 2022-11-29 DIAGNOSIS — I25.10 CORONARY ARTERY DISEASE INVOLVING NATIVE CORONARY ARTERY OF NATIVE HEART WITHOUT ANGINA PECTORIS: Primary | Chronic | ICD-10-CM

## 2022-11-29 DIAGNOSIS — E78.5 HYPERLIPIDEMIA LDL GOAL <70: ICD-10-CM

## 2022-11-29 DIAGNOSIS — E78.6 HDL DEFICIENCY: ICD-10-CM

## 2022-11-29 DIAGNOSIS — I25.10 CORONARY ARTERY DISEASE INVOLVING NATIVE CORONARY ARTERY OF NATIVE HEART WITHOUT ANGINA PECTORIS: ICD-10-CM

## 2022-11-29 DIAGNOSIS — E66.09 NON MORBID OBESITY DUE TO EXCESS CALORIES: Chronic | ICD-10-CM

## 2022-11-29 DIAGNOSIS — I25.118 CORONARY ARTERY DISEASE OF NATIVE ARTERY OF NATIVE HEART WITH STABLE ANGINA PECTORIS (H): ICD-10-CM

## 2022-11-29 LAB
CHOLEST SERPL-MCNC: 105 MG/DL
FASTING STATUS PATIENT QL REPORTED: YES
HDLC SERPL-MCNC: 50 MG/DL
LDLC SERPL CALC-MCNC: 40 MG/DL
NONHDLC SERPL-MCNC: 55 MG/DL
TRIGL SERPL-MCNC: 74 MG/DL

## 2022-11-29 PROCEDURE — 36415 COLL VENOUS BLD VENIPUNCTURE: CPT | Performed by: INTERNAL MEDICINE

## 2022-11-29 PROCEDURE — 99214 OFFICE O/P EST MOD 30 MIN: CPT | Performed by: INTERNAL MEDICINE

## 2022-11-29 PROCEDURE — 80061 LIPID PANEL: CPT | Performed by: INTERNAL MEDICINE

## 2022-11-29 RX ORDER — RAMIPRIL 10 MG/1
10 CAPSULE ORAL DAILY
Qty: 90 CAPSULE | Refills: 3 | Status: CANCELLED | OUTPATIENT
Start: 2022-11-29

## 2022-11-29 RX ORDER — EZETIMIBE 10 MG/1
10 TABLET ORAL DAILY
Qty: 90 TABLET | Refills: 3 | Status: SHIPPED | OUTPATIENT
Start: 2022-11-29 | End: 2023-11-02

## 2022-11-29 RX ORDER — ROSUVASTATIN CALCIUM 40 MG/1
40 TABLET, COATED ORAL DAILY
Qty: 90 TABLET | Refills: 3 | Status: SHIPPED | OUTPATIENT
Start: 2022-11-29 | End: 2023-11-02

## 2022-11-29 RX ORDER — RAMIPRIL 10 MG/1
10 CAPSULE ORAL DAILY
Qty: 90 CAPSULE | Refills: 4 | Status: SHIPPED | OUTPATIENT
Start: 2022-11-29 | End: 2023-12-21

## 2022-11-29 RX ORDER — METOPROLOL TARTRATE 25 MG/1
25 TABLET, FILM COATED ORAL 2 TIMES DAILY
Qty: 180 TABLET | Refills: 3 | Status: SHIPPED | OUTPATIENT
Start: 2022-11-29 | End: 2023-11-02

## 2022-11-29 RX ORDER — NITROGLYCERIN 0.4 MG/1
0.4 TABLET SUBLINGUAL EVERY 5 MIN PRN
Qty: 25 TABLET | Refills: 3 | Status: SHIPPED | OUTPATIENT
Start: 2022-11-29 | End: 2023-09-06

## 2022-11-29 NOTE — LETTER
11/29/2022    Alexy Hernandez MD  [facility] [address] [place] [address]    RE: Terrence Murillo       Dear Colleague,     I had the pleasure of seeing Terrence Murillo in the [facility].  HPI and Plan:   See dictation    Today's clinic visit entailed:  Review of the result(s) of each unique test - Lab work  Ordering of each unique test  Prescription drug management  35 minutes spent on the date of the encounter doing chart review, history and exam, documentation and further activities per the note  Provider  Link to [facility] Help Grid           Orders Placed This Encounter   Procedures     Basic metabolic panel     Lipid Profile     ALT     Follow-Up with Cardiology Nurse     Follow-Up with Cardiology       Orders Placed This Encounter   Medications     ezetimibe (ZETIA) 10 MG tablet     Sig: Take 1 tablet (10 mg) by mouth daily     Dispense:  90 tablet     Refill:  3     metoprolol tartrate (LOPRESSOR) 25 MG tablet     Sig: Take 1 tablet (25 mg) by mouth 2 times daily     Dispense:  180 tablet     Refill:  3     nitroGLYcerin (NITROSTAT) 0.4 MG sublingual tablet     Sig: Place 1 tablet (0.4 mg) under the tongue every 5 minutes as needed for chest pain May repeat x2, if chest pain contines after 3rd dose call 911     Dispense:  25 tablet     Refill:  3     rosuvastatin (CRESTOR) 40 MG tablet     Sig: Take 1 tablet (40 mg) by mouth daily     Dispense:  90 tablet     Refill:  3     ramipril (ALTACE) 10 MG capsule     Sig: Take 1 capsule (10 mg) by mouth daily     Dispense:  90 capsule     Refill:  4       Medications Discontinued During This Encounter   Medication Reason     nitroGLYcerin (NITROSTAT) 0.4 MG sublingual tablet Reorder     ezetimibe (ZETIA) 10 MG tablet Reorder     metoprolol tartrate (LOPRESSOR) 25 MG tablet Reorder     rosuvastatin (CRESTOR) 40 MG tablet Reorder     ramipril (ALTACE) 10 MG capsule          Encounter Diagnoses   Name Primary?     Coronary  artery disease involving native coronary artery of native heart without angina pectoris Yes     Hyperlipidemia LDL goal <70      Benign essential hypertension      Coronary artery disease of native artery of native heart with stable angina pectoris (H)      HDL deficiency      Non morbid obesity due to excess calories        CURRENT MEDICATIONS:  Current Outpatient Medications   Medication Sig Dispense Refill     alfuzosin ER (UROXATRAL) 10 MG 24 hr tablet Take 10 mg by mouth daily       aspirin 81 MG tablet Take 1 tablet (81 mg) by mouth every evening 30 tablet 0     ezetimibe (ZETIA) 10 MG tablet Take 1 tablet (10 mg) by mouth daily 90 tablet 3     Glucosamine 500 MG CAPS Take 500 mg by mouth 2 times daily        ibuprofen (ADVIL/MOTRIN) 200 MG tablet Take 400 mg by mouth daily       levothyroxine (SYNTHROID/LEVOTHROID) 100 MCG tablet Take 1 tablet (100 mcg) by mouth daily 90 tablet 3     metoprolol tartrate (LOPRESSOR) 25 MG tablet Take 1 tablet (25 mg) by mouth 2 times daily 180 tablet 3     Multiple Vitamin (MULTI-VITAMIN) per tablet Take 1 tablet by mouth every morning        nitroGLYcerin (NITROSTAT) 0.4 MG sublingual tablet Place 1 tablet (0.4 mg) under the tongue every 5 minutes as needed for chest pain May repeat x2, if chest pain contines after 3rd dose call 911 25 tablet 3     ramipril (ALTACE) 10 MG capsule Take 1 capsule (10 mg) by mouth daily 90 capsule 4     rosuvastatin (CRESTOR) 40 MG tablet Take 1 tablet (40 mg) by mouth daily 90 tablet 3     saw palmetto 450 MG CAPS capsule Take 450 mg by mouth daily       testosterone (ANDROGEL 1.62 % PUMP) 20.25 MG/ACT gel Take 2 pumps daily to skin on Tuesdays/Thursdays/Saturdays/Sundays, take 3 pumps daily on Mondays/Wednesdays/Fridays 300 g 5       ALLERGIES     Allergies   Allergen Reactions     No Clinical Screening - See Comments Hives     Cardizem [Diltiazem Hcl] Itching     Cats Other (See Comments) and Hives     WATERY EYES, SNEEZE, AND COUGH      Diltiazem Hives and Rash       PAST MEDICAL HISTORY:  Past Medical History:   Diagnosis Date     Coronary artery disease     cardiac cath 2016: medical management; cath : SUSANA x2 to LAD; cath : PTCA to LAD; cath : PTCA to LAD, : atherectomy of LAD     Hearing problem      Hyperlipidaemia      Hypertension      Obese      Obstructive sleep apnea      Reduced vision      Sleep apnea     CPAP       PAST SURGICAL HISTORY:  Past Surgical History:   Procedure Laterality Date     APPENDECTOMY OPEN       ARTHROPLASTY KNEE Left      ARTHROPLASTY SHOULDER Right      ENT SURGERY       HEART CATH, ANGIOPLASTY      LAD  atherectomy with a DVI device      HEART CATH, ANGIOPLASTY  4-28-10    PTCA and stent proximal and mid LAD (2) stents Xience     HEART CATH, ANGIOPLASTY  2016    no stenosis greater than 25%-rec. 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 Father      Heart Disease Father          at age 44     Heart Disease Son 37        enlarged heart     Heart Disease Mother          at age 80 after heart surgery     Heart Surgery Mother         open heart, passed 10 days after     Diabetes Mother      Family History Negative Maternal Grandmother      Family History Negative Maternal Grandfather      Family History Negative Paternal Grandmother      Family History Negative Paternal Grandfather      Family History Negative Brother      Alzheimer Disease Brother      Family History Negative Sister      Family History Negative Daughter      Family History Negative Son      Family History Negative Daughter      Heart Disease Son          at age 37       SOCIAL  "HISTORY:  Social History     Socioeconomic History     Marital status:      Spouse name: None     Number of children: None     Years of education: None     Highest education level: None   Tobacco Use     Smoking status: Former     Packs/day: 0.20     Years: 5.00     Pack years: 1.00     Types: Cigarettes     Start date:      Quit date: 1960     Years since quittin.9     Smokeless tobacco: Never   Substance and Sexual Activity     Alcohol use: Yes     Alcohol/week: 1.0 - 2.0 standard drink     Types: 1 - 2 Standard drinks or equivalent per week     Comment: 2 cocktails per day     Drug use: No     Sexual activity: Not Currently     Partners: Female     Birth control/protection: Male Surgical   Other Topics Concern     Parent/sibling w/ CABG, MI or angioplasty before 65F 55M? No     Caffeine Concern Yes     Comment: coffee      Sleep Concern No     Stress Concern No     Weight Concern Yes     Comment: Weight decrease 10 lbs     Special Diet Yes     Comment: Mediterranean diet     Exercise Yes     Comment: Walking 5 days per week 35- 60 minutes     Seat Belt Yes       Review of Systems:  Skin:  Negative       Eyes:  Positive for glasses    ENT:  Negative      Respiratory:  Positive for sleep apnea;CPAP     Cardiovascular:  Negative      Gastroenterology: Negative      Genitourinary:  Positive for   imcomplete emptying, has been happening for a few years  Musculoskeletal:  Positive for arthritis;joint pain had R shoulder and L knee replaced, got cortisone shot in R knee so no replacement needed at this time  Neurologic:  Negative      Psychiatric:  Positive for excessive stress Wife recently passed  Heme/Lymph/Imm:  Negative      Endocrine:  Positive for thyroid disorder takes levothyroixine, patuitary gland isn't doing as well as it should.    Physical Exam:  Vitals: BP (!) 154/66 (BP Location: Left arm, Patient Position: Sitting)   Pulse 52   Ht 1.778 m (5' 10\")   Wt 102.5 kg (225 lb 14.4 oz)   " SpO2 96%   BMI 32.41 kg/m      Constitutional:  cooperative, alert and oriented, well developed, well nourished, in no acute distress obese [place] hearing aids    Skin:  warm and dry to the touch, no apparent skin lesions or masses noted          Head:  normocephalic, no masses or lesions        Eyes:  pupils equal and round, conjunctivae and lids unremarkable, sclera white, no xanthalasma, EOMS intact, no nystagmus        Lymph:      ENT:  no pallor or cyanosis, dentition good        Neck:  no carotid bruit;carotid pulses are full and equal bilaterally        Respiratory:  normal breath sounds, clear to auscultation, normal A-P diameter, normal symmetry, normal respiratory excursion, no use of accessory muscles         Cardiac: regular rhythm;normal S1 and S2;no S3 or S4;no murmurs, gallops or rubs detected                pulses full and equal                                        GI:    obese      Extremities and Muscular Skeletal:  no edema;no spinal abnormalities noted;normal muscle strength and tone   varicose vein          Neurological:  no gross motor deficits        Psych:  affect appropriate, oriented to time, person and place        CC  No referring provider defined for this encounter.                Service Date: 2022    CLINIC VISIT    HISTORY OF PRESENT ILLNESS:  Terrence is a very nice 77-year-old gentleman with past medical history significant for directional coronary atherectomy in  with subsequent restenosis x2 for which we treated with balloon angioplasty.  In , 2 Xience drug-eluting stents were placed in his proximal and mid left anterior descending artery.      Psychosocially, Terrence has had a tough go of it.  His son, Doug,  suddenly in  from what sounds like a cardiac arrest.  Interestingly, his dad also  of an apparent heart attack at age 44.  His grandmother  suddenly at 21 for unclear reasons.  His wife recently passed away this July.  She followed in the [facility]  system with her pulmonary hypertension.  She had lost a significant amount of weight, lived far longer than anybody anticipated but passed this July and this, psychologically, has haunted Terrence.  As we talk about it, he becomes quite tearful.    Unfortunately, Terrence has had several orthopedic issues over the years, decreasing his ability to exercise and this is how he always controlled his weight.  He was never very disciplined about eating and drinking but was always very good about his exercise.    Other issues include a pituitary adenoma which he had resected in 2018 and follows with Neurology.    Terrence returns to clinic stating he thinks his heart is doing well.  He is not having any chest, arm, neck, jaw or shoulder discomfort.  He has no dyspnea on exertion, orthopnea or PND.  He has no palpitations, lightheadedness, dizziness, syncope or near syncope.  He has lost a significant amount of weight and attributes this to stress from his wife's passing.  He has also moved from his home into a senior living arrangement which was in the process when his wife .  He notes no side effects or problems with his current medical regimen.  He states he has gained a little bit of weight back.  He got down to 213 on his scale at home and is now up to 219.    ASSESSMENT AND PLAN:  Terrence appears to be doing well from a cardiac standpoint without clinical evidence of ischemia, heart failure or significant arrhythmia.    Blood pressure is high.  Even on my retake, it is high, although clearly he is emotional today.  It is a snowy day so getting in was treacherous.  I will have him follow up with a nurse visit in [place] for a recheck and obviously, if his blood pressure is high, we will intensify his regimen but review of the chart shows that usually his blood pressure is very well controlled and I would suspect, with his weight loss, it should be better.  Body mass index is 32.  He is 225 and when I saw him last, he was 245.   This is on my scale, obviously with clothes on.    Cholesterol profile does reflect his improvement in his weight loss.  His total cholesterol is 105, HDL is 50, LDL is 40 and triglycerides are 74.    Creatinine is 0.89 with normal electrolytes.    We reviewed and I refilled all of his medications.  I will plan on seeing him in 1 year.  Obviously, we will follow up on his blood pressure if it needs addressing.    Thank you for allowing me to participate in his care.    Eagle Jolley MD, [facility]        D: 2022   T: 2022   MT: [email or web address]    Name:     DANIEL RIGGINS  MRN:      [id number]        Account:      [id number]   :      1945           Service Date: 2022       Document: [id number]      Thank you for allowing me to participate in the care of your patient.      Sincerely,     Eagle Jolley MD     [facility]  cc:   No referring provider defined for this encounter.

## 2022-11-29 NOTE — PROGRESS NOTES
HPI and Plan:   See dictation    Today's clinic visit entailed:  Review of the result(s) of each unique test - Lab work  Ordering of each unique test  Prescription drug management  35 minutes spent on the date of the encounter doing chart review, history and exam, documentation and further activities per the note  Provider  Link to [facility] Help Grid           Orders Placed This Encounter   Procedures     Basic metabolic panel     Lipid Profile     ALT     Follow-Up with Cardiology Nurse     Follow-Up with Cardiology       Orders Placed This Encounter   Medications     ezetimibe (ZETIA) 10 MG tablet     Sig: Take 1 tablet (10 mg) by mouth daily     Dispense:  90 tablet     Refill:  3     metoprolol tartrate (LOPRESSOR) 25 MG tablet     Sig: Take 1 tablet (25 mg) by mouth 2 times daily     Dispense:  180 tablet     Refill:  3     nitroGLYcerin (NITROSTAT) 0.4 MG sublingual tablet     Sig: Place 1 tablet (0.4 mg) under the tongue every 5 minutes as needed for chest pain May repeat x2, if chest pain contines after 3rd dose call 911     Dispense:  25 tablet     Refill:  3     rosuvastatin (CRESTOR) 40 MG tablet     Sig: Take 1 tablet (40 mg) by mouth daily     Dispense:  90 tablet     Refill:  3     ramipril (ALTACE) 10 MG capsule     Sig: Take 1 capsule (10 mg) by mouth daily     Dispense:  90 capsule     Refill:  4       Medications Discontinued During This Encounter   Medication Reason     nitroGLYcerin (NITROSTAT) 0.4 MG sublingual tablet Reorder     ezetimibe (ZETIA) 10 MG tablet Reorder     metoprolol tartrate (LOPRESSOR) 25 MG tablet Reorder     rosuvastatin (CRESTOR) 40 MG tablet Reorder     ramipril (ALTACE) 10 MG capsule          Encounter Diagnoses   Name Primary?     Coronary artery disease involving native coronary artery of native heart without angina pectoris Yes     Hyperlipidemia LDL goal <70      Benign essential hypertension      Coronary artery disease of native artery of native heart with stable angina  pectoris (H)      HDL deficiency      Non morbid obesity due to excess calories        CURRENT MEDICATIONS:  Current Outpatient Medications   Medication Sig Dispense Refill     alfuzosin ER (UROXATRAL) 10 MG 24 hr tablet Take 10 mg by mouth daily       aspirin 81 MG tablet Take 1 tablet (81 mg) by mouth every evening 30 tablet 0     ezetimibe (ZETIA) 10 MG tablet Take 1 tablet (10 mg) by mouth daily 90 tablet 3     Glucosamine 500 MG CAPS Take 500 mg by mouth 2 times daily        ibuprofen (ADVIL/MOTRIN) 200 MG tablet Take 400 mg by mouth daily       levothyroxine (SYNTHROID/LEVOTHROID) 100 MCG tablet Take 1 tablet (100 mcg) by mouth daily 90 tablet 3     metoprolol tartrate (LOPRESSOR) 25 MG tablet Take 1 tablet (25 mg) by mouth 2 times daily 180 tablet 3     Multiple Vitamin (MULTI-VITAMIN) per tablet Take 1 tablet by mouth every morning        nitroGLYcerin (NITROSTAT) 0.4 MG sublingual tablet Place 1 tablet (0.4 mg) under the tongue every 5 minutes as needed for chest pain May repeat x2, if chest pain contines after 3rd dose call 911 25 tablet 3     ramipril (ALTACE) 10 MG capsule Take 1 capsule (10 mg) by mouth daily 90 capsule 4     rosuvastatin (CRESTOR) 40 MG tablet Take 1 tablet (40 mg) by mouth daily 90 tablet 3     saw palmetto 450 MG CAPS capsule Take 450 mg by mouth daily       testosterone (ANDROGEL 1.62 % PUMP) 20.25 MG/ACT gel Take 2 pumps daily to skin on Tuesdays/Thursdays/Saturdays/Sundays, take 3 pumps daily on Mondays/Wednesdays/Fridays 300 g 5       ALLERGIES     Allergies   Allergen Reactions     No Clinical Screening - See Comments Hives     Cardizem [Diltiazem Hcl] Itching     Cats Other (See Comments) and Hives     WATERY EYES, SNEEZE, AND COUGH     Diltiazem Hives and Rash       PAST MEDICAL HISTORY:  Past Medical History:   Diagnosis Date     Coronary artery disease     cardiac cath 2016: medical management; cath 2010: SUSANA x2 to LAD; cath 1993: PTCA to LAD; cath 1992: PTCA to LAD, 1992:  atherectomy of LAD     Hearing problem      Hyperlipidaemia      Hypertension      Obese      Obstructive sleep apnea      Reduced vision      Sleep apnea     CPAP       PAST SURGICAL HISTORY:  Past Surgical History:   Procedure Laterality Date     APPENDECTOMY OPEN       ARTHROPLASTY KNEE Left      ARTHROPLASTY SHOULDER Right      ENT SURGERY       HEART CATH, ANGIOPLASTY      LAD  atherectomy with a DVI device      HEART CATH, ANGIOPLASTY  4-28-10    PTCA and stent proximal and mid LAD (2) stents Xience     HEART CATH, ANGIOPLASTY  2016    no stenosis greater than 25%-rec. 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 Father      Heart Disease Father          at age 44     Heart Disease Son 37        enlarged heart     Heart Disease Mother          at age 80 after heart surgery     Heart Surgery Mother         open heart, passed 10 days after     Diabetes Mother      Family History Negative Maternal Grandmother      Family History Negative Maternal Grandfather      Family History Negative Paternal Grandmother      Family History Negative Paternal Grandfather      Family History Negative Brother      Alzheimer Disease Brother      Family History Negative Sister      Family History Negative Daughter      Family History Negative Son      Family History Negative Daughter      Heart Disease Son          at age 37       SOCIAL HISTORY:  Social History     Socioeconomic History     Marital status:      Spouse name: None     Number of children: None     Years of education: None     Highest education level: None   Tobacco Use     Smoking status: Former     Packs/day:  "0.20     Years: 5.00     Pack years: 1.00     Types: Cigarettes     Start date:      Quit date: 1960     Years since quittin.9     Smokeless tobacco: Never   Substance and Sexual Activity     Alcohol use: Yes     Alcohol/week: 1.0 - 2.0 standard drink     Types: 1 - 2 Standard drinks or equivalent per week     Comment: 2 cocktails per day     Drug use: No     Sexual activity: Not Currently     Partners: Female     Birth control/protection: Male Surgical   Other Topics Concern     Parent/sibling w/ CABG, MI or angioplasty before 65F 55M? No     Caffeine Concern Yes     Comment: coffee      Sleep Concern No     Stress Concern No     Weight Concern Yes     Comment: Weight decrease 10 lbs     Special Diet Yes     Comment: Mediterranean diet     Exercise Yes     Comment: Walking 5 days per week 35- 60 minutes     Seat Belt Yes       Review of Systems:  Skin:  Negative       Eyes:  Positive for glasses    ENT:  Negative      Respiratory:  Positive for sleep apnea;CPAP     Cardiovascular:  Negative      Gastroenterology: Negative      Genitourinary:  Positive for   imcomplete emptying, has been happening for a few years  Musculoskeletal:  Positive for arthritis;joint pain had R shoulder and L knee replaced, got cortisone shot in R knee so no replacement needed at this time  Neurologic:  Negative      Psychiatric:  Positive for excessive stress Wife recently passed  Heme/Lymph/Imm:  Negative      Endocrine:  Positive for thyroid disorder takes levothyroixine, patuitary gland isn't doing as well as it should.    Physical Exam:  Vitals: BP (!) 154/66 (BP Location: Left arm, Patient Position: Sitting)   Pulse 52   Ht 1.778 m (5' 10\")   Wt 102.5 kg (225 lb 14.4 oz)   SpO2 96%   BMI 32.41 kg/m      Constitutional:  cooperative, alert and oriented, well developed, well nourished, in no acute distress obese [place] hearing aids    Skin:  warm and dry to the touch, no apparent skin lesions or masses noted      "     Head:  normocephalic, no masses or lesions        Eyes:  pupils equal and round, conjunctivae and lids unremarkable, sclera white, no xanthalasma, EOMS intact, no nystagmus        Lymph:      ENT:  no pallor or cyanosis, dentition good        Neck:  no carotid bruit;carotid pulses are full and equal bilaterally        Respiratory:  normal breath sounds, clear to auscultation, normal A-P diameter, normal symmetry, normal respiratory excursion, no use of accessory muscles         Cardiac: regular rhythm;normal S1 and S2;no S3 or S4;no murmurs, gallops or rubs detected                pulses full and equal                                        GI:    obese      Extremities and Muscular Skeletal:  no edema;no spinal abnormalities noted;normal muscle strength and tone   varicose vein          Neurological:  no gross motor deficits        Psych:  affect appropriate, oriented to time, person and place        CC  No referring provider defined for this encounter.

## 2022-11-29 NOTE — PROGRESS NOTES
Service Date: 2022    CLINIC VISIT    HISTORY OF PRESENT ILLNESS:  Terrence is a very nice 77-year-old gentleman with past medical history significant for directional coronary atherectomy in  with subsequent restenosis x2 for which we treated with balloon angioplasty.  In , 2 Xience drug-eluting stents were placed in his proximal and mid left anterior descending artery.      Psychosocially, Terrence has had a tough go of it.  His son, Doug,  suddenly in  from what sounds like a cardiac arrest.  Interestingly, his dad also  of an apparent heart attack at age 44.  His grandmother  suddenly at 21 for unclear reasons.  His wife recently passed away this July.  She followed in the [facility] system with her pulmonary hypertension.  She had lost a significant amount of weight, lived far longer than anybody anticipated but passed this July and this, psychologically, has haunted Terrence.  As we talk about it, he becomes quite tearful.    Unfortunately, Terrence has had several orthopedic issues over the years, decreasing his ability to exercise and this is how he always controlled his weight.  He was never very disciplined about eating and drinking but was always very good about his exercise.    Other issues include a pituitary adenoma which he had resected in 2018 and follows with Neurology.    Terrence returns to clinic stating he thinks his heart is doing well.  He is not having any chest, arm, neck, jaw or shoulder discomfort.  He has no dyspnea on exertion, orthopnea or PND.  He has no palpitations, lightheadedness, dizziness, syncope or near syncope.  He has lost a significant amount of weight and attributes this to stress from his wife's passing.  He has also moved from his home into a senior living arrangement which was in the process when his wife .  He notes no side effects or problems with his current medical regimen.  He states he has gained a little bit of weight back.  He got down to 213 on  his scale at home and is now up to 219.    ASSESSMENT AND PLAN:  Daniel appears to be doing well from a cardiac standpoint without clinical evidence of ischemia, heart failure or significant arrhythmia.    Blood pressure is high.  Even on my retake, it is high, although clearly he is emotional today.  It is a snowy day so getting in was treacherous.  I will have him follow up with a nurse visit in [place] for a recheck and obviously, if his blood pressure is high, we will intensify his regimen but review of the chart shows that usually his blood pressure is very well controlled and I would suspect, with his weight loss, it should be better.  Body mass index is 32.  He is 225 and when I saw him last, he was 245.  This is on my scale, obviously with clothes on.    Cholesterol profile does reflect his improvement in his weight loss.  His total cholesterol is 105, HDL is 50, LDL is 40 and triglycerides are 74.    Creatinine is 0.89 with normal electrolytes.    We reviewed and I refilled all of his medications.  I will plan on seeing him in 1 year.  Obviously, we will follow up on his blood pressure if it needs addressing.    Thank you for allowing me to participate in his care.    Eagle Jolley MD, [facility]        D: 2022   T: 2022   MT: [email or web address]    Name:     DANIEL RIGGINS  MRN:      [id number]        Account:      [id number]   :      1945           Service Date: 2022       Document: [id number]

## 2022-12-09 ENCOUNTER — TELEPHONE (OUTPATIENT)
Dept: CARDIOLOGY | Facility: CLINIC | Age: 77
End: 2022-12-09

## 2022-12-09 DIAGNOSIS — I10 BENIGN ESSENTIAL HYPERTENSION: Primary | ICD-10-CM

## 2022-12-09 NOTE — TELEPHONE ENCOUNTER
[facility] Call Center    Phone Message    May a detailed message be left on voicemail: yes     Reason for Call: Other: BP Readings    12/3 - 143/64 at rest 3 pm  12/4 - 143/71 at rest 9 am   12/9 - 147/62 at rest 7:30 am    Please call pt with any recommendations    Action Taken: Other: Cardiology    Travel Screening: Not Applicable     Thank you!  [facility]

## 2022-12-09 NOTE — TELEPHONE ENCOUNTER
"Patient's readings routed to Dr Jolley to review. Last OV 11/29/22- \"Blood pressure is high.  Even on my retake, it is high, although clearly he is emotional today.  It is a snowy day so getting in was treacherous.  I will have him follow up with a nurse visit in [place] for a recheck and obviously, if his blood pressure is high, we will intensify his regimen but review of the chart shows that usually his blood pressure is very well controlled and I would suspect, with his weight loss, it should be better.  Body mass index is 32.  He is 225 and when I saw him last, he was 245.  This is on my scale, obviously with clothes on.\"     Pt does not have an appointment for an in-clinic BP check. Routed back to Dr Jolley.   "

## 2022-12-12 RX ORDER — CHLORTHALIDONE 25 MG/1
25 TABLET ORAL DAILY
Qty: 90 TABLET | Refills: 3 | Status: SHIPPED | OUTPATIENT
Start: 2022-12-12 | End: 2023-11-08

## 2022-12-12 NOTE — TELEPHONE ENCOUNTER
Reply from Dr. Jolley:  Chlorthalidone 25 daily BMP and LUISA visit in 2-3 weeks       Called patient to discuss Dr. Jolley's recommendation to start chlorthalidone 25mg daily. Rx escripted. Reviewed with patient to plan LUISA visit and bmp in 3 weeks. He will try to reach central scheduling to set this up.    Message to scheduling team for orders less than 30 days.

## 2023-01-10 ENCOUNTER — LAB (OUTPATIENT)
Dept: LAB | Facility: CLINIC | Age: 78
End: 2023-01-10
Payer: COMMERCIAL

## 2023-01-10 DIAGNOSIS — I10 BENIGN ESSENTIAL HYPERTENSION: ICD-10-CM

## 2023-01-10 LAB
ANION GAP SERPL CALCULATED.3IONS-SCNC: 9 MMOL/L (ref 7–15)
BUN SERPL-MCNC: 24 MG/DL (ref 8–23)
CALCIUM SERPL-MCNC: 9.2 MG/DL (ref 8.8–10.2)
CHLORIDE SERPL-SCNC: 103 MMOL/L (ref 98–107)
CREAT SERPL-MCNC: 0.88 MG/DL (ref 0.67–1.17)
DEPRECATED HCO3 PLAS-SCNC: 28 MMOL/L (ref 22–29)
GFR SERPL CREATININE-BSD FRML MDRD: 88 ML/MIN/1.73M2
GLUCOSE SERPL-MCNC: 98 MG/DL (ref 70–99)
POTASSIUM SERPL-SCNC: 4.6 MMOL/L (ref 3.4–5.3)
SODIUM SERPL-SCNC: 140 MMOL/L (ref 136–145)

## 2023-01-10 PROCEDURE — 80048 BASIC METABOLIC PNL TOTAL CA: CPT | Performed by: INTERNAL MEDICINE

## 2023-01-10 PROCEDURE — 36415 COLL VENOUS BLD VENIPUNCTURE: CPT | Performed by: INTERNAL MEDICINE

## 2023-01-11 ENCOUNTER — OFFICE VISIT (OUTPATIENT)
Dept: CARDIOLOGY | Facility: CLINIC | Age: 78
End: 2023-01-11
Payer: COMMERCIAL

## 2023-01-11 VITALS
SYSTOLIC BLOOD PRESSURE: 120 MMHG | HEART RATE: 55 BPM | HEIGHT: 70 IN | OXYGEN SATURATION: 97 % | WEIGHT: 224.9 LBS | BODY MASS INDEX: 32.2 KG/M2 | DIASTOLIC BLOOD PRESSURE: 60 MMHG

## 2023-01-11 DIAGNOSIS — I10 BENIGN ESSENTIAL HYPERTENSION: ICD-10-CM

## 2023-01-11 PROCEDURE — 99214 OFFICE O/P EST MOD 30 MIN: CPT | Performed by: PHYSICIAN ASSISTANT

## 2023-01-11 NOTE — PROGRESS NOTES
CARDIOLOGY CLINIC PROGRESS NOTE    DOS: 2023      Terrence Murillo  : 1945, 77 year old  MRN: [id number]      History:   I am following up with Terrence Murillo today in the cardiology clinic.  He is a patient of Dr. Jolley.     Terrence Murillo is a very pleasant 77 year old man with history of CAD. He underwent coronary atherectomy in  with subsequent restenosis x2, treated by balloon angioplasty.  In , two Xience drug-eluting stents were placed in his proximal and mid left anterior descending artery. Also HTN, dyslipidemia, pituitary macroadenoma s/p resection in , sleep apnea, spinal stenosis, orthopedic issues, tremor (declines treatment).     His son, Doug,  suddenly in  from what sounds like a cardiac arrest.  His dad also  of an apparent heart attack at age 44.  His grandmother  suddenly at 21 for uncertain reasons.  His wife had pulmonary artery hypertension, liver for almost a decade after diagnosis, but passed away in 2022.      Unfortunately, Terrence has developed several orthopedic issues over the years, decreasing his ability to exercise, which is how he always controlled his weight.  He was never very disciplined about eating and drinking but was always good about exercise.       2021, he was having chest discomfort that did sound anginal. After some discussion, we decided we would take a conservative approach, but if he were to have a recrudescence of symptoms, we would proceed to the Cardiac Catheterization Lab.     Interval History:   He last saw Dr. Jolley 22. He had lost about 20 lbs and FLP was well controlled.  BP was elevated, however.  He sent in readings consistently in the 140s, so Dr. Jolley added chlorthalidone 25 mg.     He presents today for follow up.   3-4 days after starting chlorthalidone, he noticed his BPs started to come down. At home he runs 120s, occasionally 130s.     He is tolerating the chlorthalidone.   BP much improved  as noted above.   BMP is stable.   He tells me that sine his wife  he is ok, but not great anymore. Tomorrow is her birthday and he and his children are going to celebrate her.       ROS:  Skin:        Eyes:  Positive for glasses  ENT:  Positive for hearing loss  Respiratory:  Positive for sleep apnea;CPAP  Cardiovascular:  Negative    Gastroenterology: Negative    Genitourinary:  Negative    Musculoskeletal:  Negative    Neurologic:  Negative    Psychiatric:  Negative    Heme/Lymph/Imm:  Negative    Endocrine:         PAST MEDICAL HISTORY:  Past Medical History:   Diagnosis Date     Coronary artery disease     cardiac cath 2016: medical management; cath : SUSANA x2 to LAD; cath : PTCA to LAD; cath : PTCA to LAD, : atherectomy of LAD     Hearing problem      Hyperlipidaemia      Hypertension      Obese      Obstructive sleep apnea      Reduced vision      Sleep apnea     CPAP       PAST SURGICAL HISTORY:  Past Surgical History:   Procedure Laterality Date     APPENDECTOMY OPEN       ARTHROPLASTY KNEE Left      ARTHROPLASTY SHOULDER Right      ENT SURGERY       HEART CATH, ANGIOPLASTY      LAD  atherectomy with a DVI device      HEART CATH, ANGIOPLASTY  4-28-10    PTCA and stent proximal and mid LAD (2) stents Xience     HEART CATH, ANGIOPLASTY  2016    no stenosis greater than 25%-rec. medical management     OPTICAL TRACKING SYSTEM ENDOSCOPIC RESECTION TUMOR CRANIAL N/A 11/3/2017    Procedure: OPTICAL TRACKING SYSTEM ENDOSCOPIC RESECTION TUMOR CRANIAL;  Stealth Guided Endoscopic Transnasal Resection of Pituitary Tumor;  Surgeon: Amanda Bates MD;  Location:  OR       SOCIAL HISTORY:  Social History     Socioeconomic History     Marital status:    Tobacco Use     Smoking status: Former     Packs/day: 0.20     Years: 5.00     Pack years: 1.00     Types: Cigarettes     Start date:      Quit date: 1960     Years since quittin.0     Smokeless tobacco: Never    Substance and Sexual Activity     Alcohol use: Yes     Alcohol/week: 1.0 - 2.0 standard drink     Types: 1 - 2 Standard drinks or equivalent per week     Comment: 2 cocktails per day     Drug use: No     Sexual activity: Not Currently     Partners: Female     Birth control/protection: Male Surgical   Other Topics Concern     Parent/sibling w/ CABG, MI or angioplasty before 65F 55M? No     Caffeine Concern Yes     Comment: coffee      Sleep Concern No     Stress Concern No     Weight Concern Yes     Comment: Weight decrease 10 lbs     Special Diet Yes     Comment: Mediterranean diet     Exercise Yes     Comment: Walking 5 days per week 35- 60 minutes     Seat Belt Yes       FAMILY HISTORY:  Family History   Problem Relation Age of Onset     C.A.D. Father      Heart Disease Father          at age 44     Heart Disease Son 37        enlarged heart     Heart Disease Mother          at age 80 after heart surgery     Heart Surgery Mother         open heart, passed 10 days after     Diabetes Mother      Family History Negative Maternal Grandmother      Family History Negative Maternal Grandfather      Family History Negative Paternal Grandmother      Family History Negative Paternal Grandfather      Family History Negative Brother      Alzheimer Disease Brother      Family History Negative Sister      Family History Negative Daughter      Family History Negative Son      Family History Negative Daughter      Heart Disease Son          at age 37       MEDS: alfuzosin ER (UROXATRAL) 10 MG 24 hr tablet, Take 10 mg by mouth daily  aspirin 81 MG tablet, Take 1 tablet (81 mg) by mouth every evening  chlorthalidone (HYGROTON) 25 MG tablet, Take 1 tablet (25 mg) by mouth daily  ezetimibe (ZETIA) 10 MG tablet, Take 1 tablet (10 mg) by mouth daily  Glucosamine 500 MG CAPS, Take 500 mg by mouth 2 times daily   ibuprofen (ADVIL/MOTRIN) 200 MG tablet, Take 400 mg by mouth daily  levothyroxine (SYNTHROID/LEVOTHROID) 100 MCG  "tablet, Take 1 tablet (100 mcg) by mouth daily  metoprolol tartrate (LOPRESSOR) 25 MG tablet, Take 1 tablet (25 mg) by mouth 2 times daily  Multiple Vitamin (MULTI-VITAMIN) per tablet, Take 1 tablet by mouth every morning   nitroGLYcerin (NITROSTAT) 0.4 MG sublingual tablet, Place 1 tablet (0.4 mg) under the tongue every 5 minutes as needed for chest pain May repeat x2, if chest pain contines after 3rd dose call 911  ramipril (ALTACE) 10 MG capsule, Take 1 capsule (10 mg) by mouth daily  rosuvastatin (CRESTOR) 40 MG tablet, Take 1 tablet (40 mg) by mouth daily  saw palmetto 450 MG CAPS capsule, Take 450 mg by mouth daily  testosterone (ANDROGEL 1.62 % PUMP) 20.25 MG/ACT gel, Take 2 pumps daily to skin on Tuesdays/Thursdays/Saturdays/Sundays, take 3 pumps daily on Mondays/Wednesdays/Fridays    No current facility-administered medications on file prior to visit.      ALLERGIES:   Allergies   Allergen Reactions     Cardizem [Diltiazem Hcl] Itching     Cats Hives     WATERY EYES, SNEEZE, AND COUGH     Diltiazem Hives and Rash       PHYSICAL EXAM:  Vitals: /60 (BP Location: Right arm, Patient Position: Sitting, Cuff Size: Adult Regular)   Pulse 55   Ht 1.778 m (5' 10\")   Wt 102 kg (224 lb 14.4 oz)   SpO2 97%   BMI 32.27 kg/m    Constitutional:  cooperative, alert and oriented, well developed, well nourished, in no acute distress obese [place] hearing aids    Skin:  warm and dry to the touch, no apparent skin lesions or masses noted        Head:  normocephalic, no masses or lesions        Eyes:  pupils equal and round;conjunctivae and lids unremarkable;sclera white        ENT:  no pallor or cyanosis        Neck:  not assessed this visit        Respiratory:  clear to auscultation;normal symmetry        Cardiac: regular rhythm;normal S1 and S2;no S3 or S4;no murmurs, gallops or rubs detected                  GI:  abdomen soft;BS normoactive obese      Vascular: pulses full and equal                                  "     Extremities and Musculoskeletal:  no edema;no spinal abnormalities noted;normal muscle strength and tone        Neurological:  no gross motor deficits              LABS/DATA:  I reviewed the following:  Component      Latest Ref Rng & Units 1/10/2023   Sodium      136 - 145 mmol/L 140   Potassium      3.4 - 5.3 mmol/L 4.6   Chloride      98 - 107 mmol/L 103   Carbon Dioxide (CO2)      22 - 29 mmol/L 28   Anion Gap      7 - 15 mmol/L 9   Urea Nitrogen      8.0 - 23.0 mg/dL 24.0 (H)   Creatinine      0.67 - 1.17 mg/dL 0.88   Calcium      8.8 - 10.2 mg/dL 9.2   Glucose      70 - 99 mg/dL 98   GFR Estimate      >60 mL/min/1.73m2 88         ASSESSMENT/PLAN:  CAD  - Last evaluation of his coronary anatomy was an angiogram in 2016.  He did have branch vessel disease and a jailed diagonal that we ultimately decided to treat medically.   - Currently no angina, if he has recurrent episodes of chest pain, Dr. Jolley would consider going to cath lab  - Continue ASA, BB, ACEi, statin, Zetia  - Continued efforts at healthy lifestyle.  Weight is 224 lbs. He is working on weight loss         HTN  - BP now controlled on ramipril 10 mg, metoprolol 25 mg BID, chlorthalidone 25 mg  - BMP is stable  - Also working on weight loss        Dyslipidemia, controlled  - Fasting lipid profile 11/29/22: Total cholesterol is 105, HDL is 50, LDL is 40, triglycerides are 74 on Crestor 40 mg and Zetia 10 mg.             Follow up:  Dr. Jolley with FLP, ALT, BMP in late Nov/early Dec        Anna Marie Valdivia PA-C

## 2023-01-11 NOTE — LETTER
2023    Alexy Hernandez MD  [facility] [address]  [place] [address]    RE: Terrence Murillo       Dear Colleague,     I had the pleasure of seeing Terrence Murillo in the [facility].      CARDIOLOGY CLINIC PROGRESS NOTE    DOS: 2023      Terrence Murillo  : 1945, 77 year old  MRN: [id number]      History:   I am following up with Terrence Murillo today in the cardiology clinic.  He is a patient of Dr. Jolley.     Terrence Murillo is a very pleasant 77 year old man with history of CAD. He underwent coronary atherectomy in  with subsequent restenosis x2, treated by balloon angioplasty.  In , two Xience drug-eluting stents were placed in his proximal and mid left anterior descending artery. Also HTN, dyslipidemia, pituitary macroadenoma s/p resection in , sleep apnea, spinal stenosis, orthopedic issues, tremor (declines treatment).     His son, Doug,  suddenly in  from what sounds like a cardiac arrest.  His dad also  of an apparent heart attack at age 44.  His grandmother  suddenly at 21 for uncertain reasons.  His wife had pulmonary artery hypertension, liver for almost a decade after diagnosis, but passed away in 2022.      Unfortunately, Terrence has developed several orthopedic issues over the years, decreasing his ability to exercise, which is how he always controlled his weight.  He was never very disciplined about eating and drinking but was always good about exercise.       2021, he was having chest discomfort that did sound anginal. After some discussion, we decided we would take a conservative approach, but if he were to have a recrudescence of symptoms, we would proceed to the Cardiac Catheterization Lab.     Interval History:   He last saw Dr. Jolley 22. He had lost about 20 lbs and FLP was well controlled.  BP was elevated, however.  He sent in readings consistently in the 140s, so Dr. Jolley added  chlorthalidone 25 mg.     He presents today for follow up.   3-4 days after starting chlorthalidone, he noticed his BPs started to come down. At home he runs 120s, occasionally 130s.     He is tolerating the chlorthalidone.   BP much improved as noted above.   BMP is stable.   He tells me that sine his wife  he is ok, but not great anymore. Tomorrow is her birthday and he and his children are going to celebrate her.       ROS:  Skin:        Eyes:  Positive for glasses  ENT:  Positive for hearing loss  Respiratory:  Positive for sleep apnea;CPAP  Cardiovascular:  Negative    Gastroenterology: Negative    Genitourinary:  Negative    Musculoskeletal:  Negative    Neurologic:  Negative    Psychiatric:  Negative    Heme/Lymph/Imm:  Negative    Endocrine:         PAST MEDICAL HISTORY:  Past Medical History:   Diagnosis Date     Coronary artery disease     cardiac cath 2016: medical management; cath : SUSANA x2 to LAD; cath : PTCA to LAD; cath : PTCA to LAD, 1992: atherectomy of LAD     Hearing problem      Hyperlipidaemia      Hypertension      Obese      Obstructive sleep apnea      Reduced vision      Sleep apnea     CPAP       PAST SURGICAL HISTORY:  Past Surgical History:   Procedure Laterality Date     APPENDECTOMY OPEN       ARTHROPLASTY KNEE Left      ARTHROPLASTY SHOULDER Right      ENT SURGERY       HEART CATH, ANGIOPLASTY      LAD  atherectomy with a DVI device      HEART CATH, ANGIOPLASTY  4-28-10    PTCA and stent proximal and mid LAD (2) stents Xience     HEART CATH, ANGIOPLASTY  2016    no stenosis greater than 25%-rec. medical management     OPTICAL TRACKING SYSTEM ENDOSCOPIC RESECTION TUMOR CRANIAL N/A 11/3/2017    Procedure: OPTICAL TRACKING SYSTEM ENDOSCOPIC RESECTION TUMOR CRANIAL;  Stealth Guided Endoscopic Transnasal Resection of Pituitary Tumor;  Surgeon: Amanda Bates MD;  Location:  OR       SOCIAL HISTORY:  Social History     Socioeconomic History      Marital status:    Tobacco Use     Smoking status: Former     Packs/day: 0.20     Years: 5.00     Pack years: 1.00     Types: Cigarettes     Start date:      Quit date: 1960     Years since quittin.0     Smokeless tobacco: Never   Substance and Sexual Activity     Alcohol use: Yes     Alcohol/week: 1.0 - 2.0 standard drink     Types: 1 - 2 Standard drinks or equivalent per week     Comment: 2 cocktails per day     Drug use: No     Sexual activity: Not Currently     Partners: Female     Birth control/protection: Male Surgical   Other Topics Concern     Parent/sibling w/ CABG, MI or angioplasty before 65F 55M? No     Caffeine Concern Yes     Comment: coffee      Sleep Concern No     Stress Concern No     Weight Concern Yes     Comment: Weight decrease 10 lbs     Special Diet Yes     Comment: Mediterranean diet     Exercise Yes     Comment: Walking 5 days per week 35- 60 minutes     Seat Belt Yes       FAMILY HISTORY:  Family History   Problem Relation Age of Onset     C.A.D. Father      Heart Disease Father          at age 44     Heart Disease Son 37        enlarged heart     Heart Disease Mother          at age 80 after heart surgery     Heart Surgery Mother         open heart, passed 10 days after     Diabetes Mother      Family History Negative Maternal Grandmother      Family History Negative Maternal Grandfather      Family History Negative Paternal Grandmother      Family History Negative Paternal Grandfather      Family History Negative Brother      Alzheimer Disease Brother      Family History Negative Sister      Family History Negative Daughter      Family History Negative Son      Family History Negative Daughter      Heart Disease Son          at age 37       MEDS: alfuzosin ER (UROXATRAL) 10 MG 24 hr tablet, Take 10 mg by mouth daily  aspirin 81 MG tablet, Take 1 tablet (81 mg) by mouth every evening  chlorthalidone (HYGROTON) 25 MG tablet, Take 1 tablet (25 mg) by mouth  "daily  ezetimibe (ZETIA) 10 MG tablet, Take 1 tablet (10 mg) by mouth daily  Glucosamine 500 MG CAPS, Take 500 mg by mouth 2 times daily   ibuprofen (ADVIL/MOTRIN) 200 MG tablet, Take 400 mg by mouth daily  levothyroxine (SYNTHROID/LEVOTHROID) 100 MCG tablet, Take 1 tablet (100 mcg) by mouth daily  metoprolol tartrate (LOPRESSOR) 25 MG tablet, Take 1 tablet (25 mg) by mouth 2 times daily  Multiple Vitamin (MULTI-VITAMIN) per tablet, Take 1 tablet by mouth every morning   nitroGLYcerin (NITROSTAT) 0.4 MG sublingual tablet, Place 1 tablet (0.4 mg) under the tongue every 5 minutes as needed for chest pain May repeat x2, if chest pain contines after 3rd dose call 911  ramipril (ALTACE) 10 MG capsule, Take 1 capsule (10 mg) by mouth daily  rosuvastatin (CRESTOR) 40 MG tablet, Take 1 tablet (40 mg) by mouth daily  saw palmetto 450 MG CAPS capsule, Take 450 mg by mouth daily  testosterone (ANDROGEL 1.62 % PUMP) 20.25 MG/ACT gel, Take 2 pumps daily to skin on Tuesdays/Thursdays/Saturdays/Sundays, take 3 pumps daily on Mondays/Wednesdays/Fridays    No current facility-administered medications on file prior to visit.      ALLERGIES:   Allergies   Allergen Reactions     Cardizem [Diltiazem Hcl] Itching     Cats Hives     WATERY EYES, SNEEZE, AND COUGH     Diltiazem Hives and Rash       PHYSICAL EXAM:  Vitals: /60 (BP Location: Right arm, Patient Position: Sitting, Cuff Size: Adult Regular)   Pulse 55   Ht 1.778 m (5' 10\")   Wt 102 kg (224 lb 14.4 oz)   SpO2 97%   BMI 32.27 kg/m    Constitutional:  cooperative, alert and oriented, well developed, well nourished, in no acute distress obese [place] hearing aids    Skin:  warm and dry to the touch, no apparent skin lesions or masses noted        Head:  normocephalic, no masses or lesions        Eyes:  pupils equal and round;conjunctivae and lids unremarkable;sclera white        ENT:  no pallor or cyanosis        Neck:  not assessed this visit        Respiratory:  clear to " auscultation;normal symmetry        Cardiac: regular rhythm;normal S1 and S2;no S3 or S4;no murmurs, gallops or rubs detected                  GI:  abdomen soft;BS normoactive obese      Vascular: pulses full and equal                                      Extremities and Musculoskeletal:  no edema;no spinal abnormalities noted;normal muscle strength and tone        Neurological:  no gross motor deficits              LABS/DATA:  I reviewed the following:  Component      Latest Ref Rng & Units 1/10/2023   Sodium      136 - 145 mmol/L 140   Potassium      3.4 - 5.3 mmol/L 4.6   Chloride      98 - 107 mmol/L 103   Carbon Dioxide (CO2)      22 - 29 mmol/L 28   Anion Gap      7 - 15 mmol/L 9   Urea Nitrogen      8.0 - 23.0 mg/dL 24.0 (H)   Creatinine      0.67 - 1.17 mg/dL 0.88   Calcium      8.8 - 10.2 mg/dL 9.2   Glucose      70 - 99 mg/dL 98   GFR Estimate      >60 mL/min/1.73m2 88         ASSESSMENT/PLAN:  CAD  - Last evaluation of his coronary anatomy was an angiogram in 2016.  He did have branch vessel disease and a jailed diagonal that we ultimately decided to treat medically.   - Currently no angina, if he has recurrent episodes of chest pain, Dr. Jolley would consider going to cath lab  - Continue ASA, BB, ACEi, statin, Zetia  - Continued efforts at healthy lifestyle.  Weight is 224 lbs. He is working on weight loss         HTN  - BP now controlled on ramipril 10 mg, metoprolol 25 mg BID, chlorthalidone 25 mg  - BMP is stable  - Also working on weight loss        Dyslipidemia, controlled  - Fasting lipid profile 11/29/22: Total cholesterol is 105, HDL is 50, LDL is 40, triglycerides are 74 on Crestor 40 mg and Zetia 10 mg.             Follow up:  Dr. Jolley with FLP, ALT, BMP in late Nov/early Dec        Anna Marie Valdivia PA-C      Thank you for allowing me to participate in the care of your patient.      Sincerely,     Anna Marie Valdivia PA-C     [facility]  cc:   Eagle Jolley MD  [address]  [place] [id number]

## 2023-01-11 NOTE — PATIENT INSTRUCTIONS
With the chlorthalidone, your blood pressure is much improved.   Your labs look stable.   Continue your current regimen.   See Dr. Jolley in Nov or Dec 2023 with labs before.  You will need to come a day or two before to have the results back for your appointment.

## 2023-01-24 ENCOUNTER — OFFICE VISIT (OUTPATIENT)
Dept: NEUROSURGERY | Facility: CLINIC | Age: 78
End: 2023-01-24
Payer: COMMERCIAL

## 2023-01-24 ENCOUNTER — ANCILLARY PROCEDURE (OUTPATIENT)
Dept: MRI IMAGING | Facility: CLINIC | Age: 78
End: 2023-01-24
Attending: NEUROLOGICAL SURGERY
Payer: COMMERCIAL

## 2023-01-24 VITALS — HEART RATE: 54 BPM | DIASTOLIC BLOOD PRESSURE: 72 MMHG | SYSTOLIC BLOOD PRESSURE: 132 MMHG | OXYGEN SATURATION: 97 %

## 2023-01-24 DIAGNOSIS — E29.1 SECONDARY MALE HYPOGONADISM: ICD-10-CM

## 2023-01-24 DIAGNOSIS — D35.2 PITUITARY ADENOMA (H): Primary | ICD-10-CM

## 2023-01-24 DIAGNOSIS — D35.2 PITUITARY MACROADENOMA (H): ICD-10-CM

## 2023-01-24 DIAGNOSIS — D35.2 PITUITARY MACROADENOMA (H): Primary | ICD-10-CM

## 2023-01-24 PROCEDURE — 70553 MRI BRAIN STEM W/O & W/DYE: CPT | Mod: GC | Performed by: RADIOLOGY

## 2023-01-24 PROCEDURE — A9585 GADOBUTROL INJECTION: HCPCS | Performed by: RADIOLOGY

## 2023-01-24 PROCEDURE — 99213 OFFICE O/P EST LOW 20 MIN: CPT | Performed by: NEUROLOGICAL SURGERY

## 2023-01-24 RX ORDER — AMOXICILLIN 500 MG/1
CAPSULE ORAL
COMMUNITY
Start: 2022-08-16

## 2023-01-24 RX ORDER — GADOBUTROL 604.72 MG/ML
10 INJECTION INTRAVENOUS ONCE
Status: COMPLETED | OUTPATIENT
Start: 2023-01-24 | End: 2023-01-24

## 2023-01-24 RX ADMIN — GADOBUTROL 10 ML: 604.72 INJECTION INTRAVENOUS at 11:44

## 2023-01-24 ASSESSMENT — PAIN SCALES - GENERAL: PAINLEVEL: NO PAIN (0)

## 2023-01-24 NOTE — LETTER
2023      RE: Terrence Murillo  [address]  [address]  [place] [address]       See dictated note.  Visit 25 minutes.  RHONDA Bates MD    Service Date: 2023    Alexy Hernandez MD  [facility]  [address] [facility] [address]  [place], [address]    RE:  Terrence Murillo  MRN:  [id number]  :  1945    Dear Dr. Hernandez:      We saw Mr. Murillo back in Pituitary Clinic on 2023.  He is over 5 years out from an endoscopic endonasal resection of a large pituitary macroadenoma.  He remains on levothyroxine and testosterone replacement.  He saw Dr. Herndon in Neuro-Ophthalmology about 2 months ago.  His corrected visual acuity was 20/20, minus 1 bilaterally.  He had stable optic atrophy.  His bitemporal field cuts were also stable.  Mr. Murillo also feels his vision has been stable.  He does not drive at night very often because of his visual deficits.  Otherwise, he does not feel limited.  His main physical ailment is chronic low back pain and he is being evaluated at [facility].    Mr. Murillo has experienced major transitions over the past year.  His wife  last summer secondary to chronic pulmonary hypertension.  He now lives in a senior housing community.  He is still very engaged with his remaining children and seems to be active.    PHYSICAL EXAMINATION:  On exam, he was tearful at times, especially when discussing his wife.  He did not appear physically uncomfortable.  His blood pressure is 132/72, heart rate 54 and a BMI of 32.27.  Pupils are equal and reactive.  Extraocular movements are full.  He has a bitemporal visual field deficits on confrontation, which appears stable.  He moves all extremities with normal strength and coordination.  His speech and language were normal.    REVIEW OF STUDIES:  We went over his MRI from earlier today 2023 and compared it to previous studies.  We do not see any obvious recurrent tumor.  There is some enhancing tissue along the floor of the sella, which  may just represent residual pituitary gland.  The optic apparatus is completely decompressed.  The pituitary stalk remains deviated slightly to the left.  Overall, his imaging looks favorable.    IMPRESSION AND PLAN:  Given his clinical and radiographic stability, we will repeat an MRI in 2 years.  He will follow up with Dr. Herndon as scheduled and also with our Endocrinology team.  Please do not hesitate to contact us with questions.    Sincerely,    Amanda Bates MD        D: 2023   T: 2023   MT: roberta    Name:     DANIEL RIGGINS  MRN:      [id number]        Account:      [id number]   :      1945           Service Date: 2023       Document: [id number]

## 2023-01-24 NOTE — LETTER
1/24/2023       RE: Terrence Murillo  [address]  [address]  [facility] [address]     Dear Colleague,    Thank you for referring your patient, Terrence Murillo, to the [facility]. Please see a copy of my visit note below.    See dictated note.  Visit 25 minutes.  RHONDA Bates MD      Again, thank you for allowing me to participate in the care of your patient.      Sincerely,    Amanda Bates MD

## 2023-01-24 NOTE — PATIENT INSTRUCTIONS
Follow up with Dr Bates in 2 years with MRI Brain with and without contrast.    Call Nuria RN  Neurosurgery Care Coordinator with questions/concerns     Thank you for using [facility]

## 2023-01-30 NOTE — PROGRESS NOTES
Service Date: 2023    Alexy Hernandez MD  [facility]  [address] [place], [address]    RE:  Terrence Murillo  MRN:  [id number]  :  1945    Dear Dr. Hernandez:      We saw Mr. Murillo back in Pituitary Clinic on 2023.  He is over 5 years out from an endoscopic endonasal resection of a large pituitary macroadenoma.  He remains on levothyroxine and testosterone replacement.  He saw Dr. Herndon in Neuro-Ophthalmology about 2 months ago.  His corrected visual acuity was 20/20, minus 1 bilaterally.  He had stable optic atrophy.  His bitemporal field cuts were also stable.  Mr. Murillo also feels his vision has been stable.  He does not drive at night very often because of his visual deficits.  Otherwise, he does not feel limited.  His main physical ailment is chronic low back pain and he is being evaluated at [facility].    Mr. Murillo has experienced major transitions over the past year.  His wife  last summer secondary to chronic pulmonary hypertension.  He now lives in a senior housing community.  He is still very engaged with his remaining children and seems to be active.    PHYSICAL EXAMINATION:  On exam, he was tearful at times, especially when discussing his wife.  He did not appear physically uncomfortable.  His blood pressure is 132/72, heart rate 54 and a BMI of 32.27.  Pupils are equal and reactive.  Extraocular movements are full.  He has a bitemporal visual field deficits on confrontation, which appears stable.  He moves all extremities with normal strength and coordination.  His speech and language were normal.    REVIEW OF STUDIES:  We went over his MRI from earlier today 2023 and compared it to previous studies.  We do not see any obvious recurrent tumor.  There is some enhancing tissue along the floor of the sella, which may just represent residual pituitary gland.  The optic apparatus is completely decompressed.  The pituitary stalk remains deviated slightly to the  left.  Overall, his imaging looks favorable.    IMPRESSION AND PLAN:  Given his clinical and radiographic stability, we will repeat an MRI in 2 years.  He will follow up with Dr. Herndon as scheduled and also with our Endocrinology team.  Please do not hesitate to contact us with questions.    Sincerely,    Amanda Bates MD        D: 2023   T: 2023   MT: roberta    Name:     DANIEL RIGGINS  MRN:      [id number]        Account:      [id number]   :      1945           Service Date: 2023       Document: [id number]

## 2023-02-07 ENCOUNTER — TELEPHONE (OUTPATIENT)
Dept: ENDOCRINOLOGY | Facility: CLINIC | Age: 78
End: 2023-02-07

## 2023-02-07 NOTE — TELEPHONE ENCOUNTER
MARIKA and sent [employer] 2/7/23 for pt to c/b to schedule appointment with Dr. Santoro to be either a video visit or a telephone visit. If pt would prefer an in person appointment next available in person is recommended.

## 2023-02-10 ENCOUNTER — OFFICE VISIT (OUTPATIENT)
Dept: ENDOCRINOLOGY | Facility: CLINIC | Age: 78
End: 2023-02-10
Payer: COMMERCIAL

## 2023-02-10 VITALS
OXYGEN SATURATION: 96 % | SYSTOLIC BLOOD PRESSURE: 129 MMHG | BODY MASS INDEX: 32 KG/M2 | HEART RATE: 52 BPM | WEIGHT: 223 LBS | DIASTOLIC BLOOD PRESSURE: 70 MMHG

## 2023-02-10 DIAGNOSIS — E03.8 SECONDARY HYPOTHYROIDISM: ICD-10-CM

## 2023-02-10 DIAGNOSIS — E29.1 SECONDARY MALE HYPOGONADISM: ICD-10-CM

## 2023-02-10 DIAGNOSIS — D35.2 PITUITARY ADENOMA (H): ICD-10-CM

## 2023-02-10 DIAGNOSIS — E23.0 HYPOPITUITARISM (H): Primary | ICD-10-CM

## 2023-02-10 LAB
T4 FREE SERPL-MCNC: 0.9 NG/DL (ref 0.76–1.46)
TSH SERPL DL<=0.005 MIU/L-ACNC: 2.07 MU/L (ref 0.4–4)

## 2023-02-10 PROCEDURE — 84443 ASSAY THYROID STIM HORMONE: CPT | Performed by: INTERNAL MEDICINE

## 2023-02-10 PROCEDURE — 84270 ASSAY OF SEX HORMONE GLOBUL: CPT | Performed by: INTERNAL MEDICINE

## 2023-02-10 PROCEDURE — 84403 ASSAY OF TOTAL TESTOSTERONE: CPT | Performed by: INTERNAL MEDICINE

## 2023-02-10 PROCEDURE — 84439 ASSAY OF FREE THYROXINE: CPT | Performed by: INTERNAL MEDICINE

## 2023-02-10 PROCEDURE — 36415 COLL VENOUS BLD VENIPUNCTURE: CPT | Performed by: INTERNAL MEDICINE

## 2023-02-10 PROCEDURE — 99214 OFFICE O/P EST MOD 30 MIN: CPT | Performed by: INTERNAL MEDICINE

## 2023-02-10 RX ORDER — LEVOTHYROXINE SODIUM 100 UG/1
100 TABLET ORAL DAILY
Qty: 90 TABLET | Refills: 3 | Status: CANCELLED | OUTPATIENT
Start: 2023-02-10

## 2023-02-10 RX ORDER — TESTOSTERONE 1.62 MG/G
GEL TRANSDERMAL
Qty: 300 G | Refills: 5 | Status: SHIPPED | OUTPATIENT
Start: 2023-02-10 | End: 2023-10-24

## 2023-02-10 NOTE — LETTER
2/10/2023         RE: Terrence Murillo  [address]  [address]  [place] [address]        Dear Colleague,    Thank you for referring your patient, Terrence Murillo, to the [facility]. Please see a copy of my visit note below.                                                                    ------ Endocrinology Follow up visit ------    Reason for consultation: pituitary macroadenoma, hypogonadism      Assessment: A 78 yo male post macro adenoma TSS on 11/3/2017 no recurrence, hypopituitarism undercontrolled with tesosterone supplement and levothryoxine currently       # Pituitary macroadenoma  S/p TSS on 11/3/2017, 3 month MRI 4 mm residual vs scar in 1/2019, patient recently experienced double vision and scheduled for MRI and follow up with Dr. Bates next week. Given previous images, possibility of pituitary cause of vision disturbance is low.   Most recent MRI no recurrence.     # Secondary hypogonadism  Currently on AndroGel 2 pumps Tuesday/Thursday/Saturday/Sunday  3 pumps Mondays/Wednesday/Fridays      - total testosterone level today     # Secondary hypothyroidism  - TSH, free T4 today    His LT4 dose was reduced to 75 mcg by PMD? Per patient, we will repeat lab and will reassess    - TSH and free T4     # Follow-up plan  Once a year follow up for testosterone and thyroid check       RTC with me in 1 year      Total time 30 minutes spent on the date of the encounter doing chart review, history and exam, reviewed serial brain MRI and documentation and further activities as noted above.    Gris Santoro MD  Staff Physician  Endocrinology and Metabolism  [facility]  License: [id number]  Pager: [phone]    Interval History as of 2/10/2023 : Patient has been doing well. Medication compliance: good  . New event includes : he had family loss and moved to senior house but health wise doing well .  Interval History as of 2/23/2022 : Patient has been doing well.  edication compliance excellent   . New event includes : no pertinent medical event noted, no changes visions .  Interval History as of 1/13/2021 : Patient has been doing well. Medication compliance excellent   . New event includes  : started to have double vision and has appointment of MRI and Dr. Bates next week. .  Interval History as of 9/18/2019 : Patient has been doing well.  Stable body weight, no headache has been doing well, compliant to testosterone gel and thyroid medication which he is taking in the middle of the night.  Currently he is busy taking care of his wife who has pulmonary hypertension .   interval History: Last seen in 2/2018.  Nonfunctioning microadenoma, transsphenoidal surgery done November 3, 2017, today no symptoms of headache nausea no visual disturbance.  He has been compliant to testosterone gel currently using 1 pump.  His energy level increased, currently he is working on diet and exercise with lifestyle , he intentionally reduced his body weight 23 pounds for the past 6 months.  He also cut to third over the amount of his alcohol to take.     Subjective: A 72 year old male with a past medical history of pituitary macroadenoma who presents post-operative follow-up after endoscopic, transphenoidal resection of pituitary adenoma on 11/3/17. The mass was found to be non-secretory on initial labs. He was initially found to have the tumor on MRI after he presented to his PCP with complaints of vision problems. The tumor was 3.2 x 2.5 x 2.0 cm in size and compressed the optic chiasm. Pathology consistent with benign adenoma. He was started on prophylactic hydrocortisone prior to discharge. Since d/c, he has been doing well. Energy level is improved. He is walking more. Has occasional headaches behind his eyes but seems to be getting better. Denies increased urinary frequency or excessive thirst. Reports his libido is fine but is not currently sexually active with his wife due to  medical issues that he has.  Denies nipple discharge. Feels his vision is about the same as before the surgery. Still has difficulty reading small print.     Interval history: Patient has been doing well.  Currently using the testosterone gel daily basis.  No muscle weakness, no fatigue.  He is still taking levothyroxine.  Hydrocortisone was tapered last visit and is not taking right now. He has good appetite and good sleep.  No headache, no dizziness.  No change vision issues after the surgery especially when he reads.  Today he came to follow-up postop MRI on 2/1/2-018  and came here for joint appointment with endocrine and neurosurgery.  He is concerned about price of testosterone gel, and also wondering whether he can wean off testosterone.      Current Problem List:   Patient Active Problem List   Diagnosis     Sleep apnea     Coronary artery disease involving native coronary artery of native heart without angina pectoris     Benign essential hypertension     Pure hypercholesterolemia     Non morbid obesity due to excess calories     Chest pain     MORENO (dyspnea on exertion)     Intracranial tumor (H)     Secondary male hypogonadism     Pituitary adenoma (H)     S/P appendectomy     S/P knee replacement     S/P shoulder surgery     HDL deficiency     Dizziness     Hyperlipidemia LDL goal <70       Past Medical and Past Surgical History:  Past Medical History:   Diagnosis Date     Coronary artery disease     cardiac cath 2016: medical management; cath 2010: SUSANA x2 to LAD; cath 1993: PTCA to LAD; cath 1992: PTCA to LAD, 1992: atherectomy of LAD     Hearing problem      Hyperlipidaemia      Hypertension      Obese      Obstructive sleep apnea      Reduced vision      Sleep apnea     CPAP       Past Surgical History:   Procedure Laterality Date     APPENDECTOMY OPEN       ARTHROPLASTY KNEE Left      ARTHROPLASTY SHOULDER Right      ENT SURGERY       HEART CATH, ANGIOPLASTY  1992    LAD  atherectomy with a DVI device       HEART CATH, ANGIOPLASTY  4-28-10    PTCA and stent proximal and mid LAD (2) stents Xience     HEART CATH, ANGIOPLASTY  04/20/2016    no stenosis greater than 25%-rec. medical management     OPTICAL TRACKING SYSTEM ENDOSCOPIC RESECTION TUMOR CRANIAL N/A 11/3/2017    Procedure: OPTICAL TRACKING SYSTEM ENDOSCOPIC RESECTION TUMOR CRANIAL;  Stealth Guided Endoscopic Transnasal Resection of Pituitary Tumor;  Surgeon: Amanda Bates MD;  Location: [facility]       Medications:   Current Outpatient Medications   Medication Sig Dispense Refill     alfuzosin ER (UROXATRAL) 10 MG 24 hr tablet Take 10 mg by mouth daily       amoxicillin (AMOXIL) 500 MG capsule Take before dental appointments       aspirin 81 MG tablet Take 1 tablet (81 mg) by mouth every evening 30 tablet 0     chlorthalidone (HYGROTON) 25 MG tablet Take 1 tablet (25 mg) by mouth daily 90 tablet 3     ezetimibe (ZETIA) 10 MG tablet Take 1 tablet (10 mg) by mouth daily 90 tablet 3     Glucosamine 500 MG CAPS Take 500 mg by mouth 2 times daily        ibuprofen (ADVIL/MOTRIN) 200 MG tablet Take 400 mg by mouth daily       levothyroxine (SYNTHROID/LEVOTHROID) 100 MCG tablet Take 1 tablet (100 mcg) by mouth daily (Patient taking differently: Take 100 mcg by mouth daily Patient currently has 75 mcg tablets, believes he should have 100 mcg. Will be seeing prescribing provider to sort out the issue.) 90 tablet 3     metoprolol tartrate (LOPRESSOR) 25 MG tablet Take 1 tablet (25 mg) by mouth 2 times daily 180 tablet 3     Multiple Vitamin (MULTI-VITAMIN) per tablet Take 1 tablet by mouth every morning        nitroGLYcerin (NITROSTAT) 0.4 MG sublingual tablet Place 1 tablet (0.4 mg) under the tongue every 5 minutes as needed for chest pain May repeat x2, if chest pain contines after 3rd dose call 911 25 tablet 3     ramipril (ALTACE) 10 MG capsule Take 1 capsule (10 mg) by mouth daily 90 capsule 4     rosuvastatin (CRESTOR) 40 MG tablet Take 1 tablet (40  mg) by mouth daily 90 tablet 3     saw palmetto 450 MG CAPS capsule Take 450 mg by mouth daily       testosterone (ANDROGEL 1.62 % PUMP) 20.25 MG/ACT gel Take 2 pumps daily to skin on ///Sundays, take 3 pumps daily on // 300 g 5       Allergies:   Allergies   Allergen Reactions     Cardizem [Diltiazem Hcl] Itching     Cats Hives     WATERY EYES, SNEEZE, AND COUGH     Diltiazem Hives and Rash       Social History:  Social History     Tobacco Use     Smoking status: Never     Smokeless tobacco: Never   Substance Use Topics     Alcohol use: Yes     Alcohol/week: 1.0 - 2.0 standard drink     Types: 1 - 2 Standard drinks or equivalent per week     Comment: 2 cocktails per day         Family History:  Family History   Problem Relation Age of Onset     C.A.D. Father      Heart Disease Father          at age 44     Heart Disease Son 37        enlarged heart     Heart Disease Mother          at age 80 after heart surgery     Heart Surgery Mother         open heart, passed 10 days after     Diabetes Mother      Family History Negative Maternal Grandmother      Family History Negative Maternal Grandfather      Family History Negative Paternal Grandmother      Family History Negative Paternal Grandfather      Family History Negative Brother      Alzheimer Disease Brother      Family History Negative Sister      Family History Negative Daughter      Family History Negative Son      Family History Negative Daughter      Heart Disease Son          at age 37       Review of Systems:  A 10-point ROS is otherwise negative except as noted in HPI.     Physical Examination:  Blood pressure 129/70, pulse 52, weight 101.2 kg (223 lb), SpO2 96 %.  Body mass index is 32 kg/m .  Wt Readings from Last 4 Encounters:   02/10/23 101.2 kg (223 lb)   23 102 kg (224 lb 14.4 oz)   22 102.5 kg (225 lb 14.4 oz)   22 109.1 kg (240 lb 8 oz)     GEN: Alert, no distress.    RESP:no acute distress  EXT: No peripheral edema.    SKIN:  no lesions.   NEURO: Non-focal deficit observed.       2/28/2018 00:00 6/4/2018 07:38 10/10/2018 08:47 1/30/2019 09:17   Testosterone Total 121 (A) 172 (L) 162 (L) 294   TSH 0.03 (A) 1.82 1.70 0.98   T4 Free 1.2  0.75 (L) 0.89       1/30/2019 MRI: I also personally reviewed images.   Brain/ Pituitary MRI without and with contrast     History: Benign neoplasm of pituitary gland and craniopharyngeal duct  (pouch) (H); Benign neoplasm of pituitary gland and craniopharyngeal  duct (pouch) (H).  ICD-10: Benign neoplasm of pituitary gland and craniopharyngeal duct  (pouch) (H); Benign neoplasm of pituitary gland and craniopharyngeal  duct (pouch) (H)     Comparison:  MRI  2/1/ 2018 and MRI study on 11/3/2017.      Technique: Axial diffusion and FLAIR images of the whole brain  obtained without intravenous contrast. Sagittal T1 and T2-weighted,  coronal T2-weighted, coronal T1-weighted images with focus on the  sella were obtained without intravenous contrast. Post intravenous  contrast using gadolinium coronal and sagittal T1-weighted images were  obtained focused on the sella. Dynamic postcontrast coronal  T1-weighted images were also obtained.     Contrast: 10ml  Gadavist     Findings:    Stable postoperative changes of transnasal endoscopic resection of  pituitary since 2/1/2018. Unchanged leftward deviation of the  pituitary stalk. Partial empty sella also appears unchanged.  Previously noted small round focus of hypoenhancement along the medial  aspect of the right cavernous sinus measuring up to 4 mm, unchanged  (series 100, image 85).     The optic chiasm is intact and nondisplaced.     The major cavernous carotid vascular flow-voids are patent.       FLAIR images through the whole brain demonstrate mild leukoaraiosis.  Mild generalized parenchymal volume loss. No mass effect, midline  shift, or hydrocephalus. Bilateral pseudophakia.                                                                       Impression:   1. Stable postoperative changes of transnasal endoscopic pituitary  adenoma resection.  2. Unchanged small focus of hypoenhancement in the right cavernous  since 2/2018 most likely reflecting postoperative change although  residual tumor cannot be entirely excluded.             Again, thank you for allowing me to participate in the care of your patient.        Sincerely,        Gris Santoro MD

## 2023-02-10 NOTE — NURSING NOTE
Terrence Murillo's goals for this visit include:   Chief Complaint   Patient presents with     RECHECK     He requests these members of his care team be copied on today's visit information: yes    PCP: Alexy Hernandez    Referring Provider:  Alexy Hernandez MD  [facility]  [address] [place] [address]    /70 (BP Location: Left arm, Patient Position: Sitting, Cuff Size: Adult Large)   Pulse 52   Wt 101.2 kg (223 lb)   SpO2 96%   BMI 32.00 kg/m      Do you need any medication refills at today's visit? no    Javan Hester [facility]  Adult Endocrinology  [facility]

## 2023-02-10 NOTE — PROGRESS NOTES
------ Endocrinology Follow up visit ------    Reason for consultation: pituitary macroadenoma, hypogonadism      Assessment: A 78 yo male post macro adenoma TSS on 11/3/2017 no recurrence, hypopituitarism undercontrolled with tesosterone supplement and levothryoxine currently       # Pituitary macroadenoma  S/p TSS on 11/3/2017, 3 month MRI 4 mm residual vs scar in 1/2019, patient recently experienced double vision and scheduled for MRI and follow up with Dr. Bates next week. Given previous images, possibility of pituitary cause of vision disturbance is low.   Most recent MRI no recurrence.     # Secondary hypogonadism  Currently on AndroGel 2 pumps Tuesday/Thursday/Saturday/Sunday  3 pumps Mondays/Wednesday/Fridays      - total testosterone level today     # Secondary hypothyroidism  - TSH, free T4 today    His LT4 dose was reduced to 75 mcg by PMD? Per patient, we will repeat lab and will reassess    - TSH and free T4     # Follow-up plan  Once a year follow up for testosterone and thyroid check       RTC with me in 1 year      Total time 30 minutes spent on the date of the encounter doing chart review, history and exam, reviewed serial brain MRI and documentation and further activities as noted above.    Gris Santoro MD  Staff Physician  Endocrinology and Metabolism  [facility]  License: [id number]  Pager: [phone]    Interval History as of 2/10/2023 : Patient has been doing well. Medication compliance: good  . New event includes : he had family loss and moved to senior house but health wise doing well .  Interval History as of 2/23/2022 : Patient has been doing well. edication compliance excellent   . New event includes : no pertinent medical event noted, no changes visions .  Interval History as of 1/13/2021 : Patient has been doing well. Medication compliance excellent   . New event includes  : started to have double  vision and has appointment of MRI and Dr. Bates next week. .  Interval History as of 9/18/2019 : Patient has been doing well.  Stable body weight, no headache has been doing well, compliant to testosterone gel and thyroid medication which he is taking in the middle of the night.  Currently he is busy taking care of his wife who has pulmonary hypertension .   interval History: Last seen in 2/2018.  Nonfunctioning microadenoma, transsphenoidal surgery done November 3, 2017, today no symptoms of headache nausea no visual disturbance.  He has been compliant to testosterone gel currently using 1 pump.  His energy level increased, currently he is working on diet and exercise with lifestyle , he intentionally reduced his body weight 23 pounds for the past 6 months.  He also cut to third over the amount of his alcohol to take.     Subjective: A 72 year old male with a past medical history of pituitary macroadenoma who presents post-operative follow-up after endoscopic, transphenoidal resection of pituitary adenoma on 11/3/17. The mass was found to be non-secretory on initial labs. He was initially found to have the tumor on MRI after he presented to his PCP with complaints of vision problems. The tumor was 3.2 x 2.5 x 2.0 cm in size and compressed the optic chiasm. Pathology consistent with benign adenoma. He was started on prophylactic hydrocortisone prior to discharge. Since d/c, he has been doing well. Energy level is improved. He is walking more. Has occasional headaches behind his eyes but seems to be getting better. Denies increased urinary frequency or excessive thirst. Reports his libido is fine but is not currently sexually active with his wife due to medical issues that he has.  Denies nipple discharge. Feels his vision is about the same as before the surgery. Still has difficulty reading small print.     Interval history: Patient has been doing well.  Currently using the testosterone gel daily basis.  No  muscle weakness, no fatigue.  He is still taking levothyroxine.  Hydrocortisone was tapered last visit and is not taking right now. He has good appetite and good sleep.  No headache, no dizziness.  No change vision issues after the surgery especially when he reads.  Today he came to follow-up postop MRI on 2/1/2-018  and came here for joint appointment with endocrine and neurosurgery.  He is concerned about price of testosterone gel, and also wondering whether he can wean off testosterone.      Current Problem List:   Patient Active Problem List   Diagnosis     Sleep apnea     Coronary artery disease involving native coronary artery of native heart without angina pectoris     Benign essential hypertension     Pure hypercholesterolemia     Non morbid obesity due to excess calories     Chest pain     MORENO (dyspnea on exertion)     Intracranial tumor (H)     Secondary male hypogonadism     Pituitary adenoma (H)     S/P appendectomy     S/P knee replacement     S/P shoulder surgery     HDL deficiency     Dizziness     Hyperlipidemia LDL goal <70       Past Medical and Past Surgical History:  Past Medical History:   Diagnosis Date     Coronary artery disease     cardiac cath 2016: medical management; cath 2010: SUSANA x2 to LAD; cath 1993: PTCA to LAD; cath 1992: PTCA to LAD, 1992: atherectomy of LAD     Hearing problem      Hyperlipidaemia      Hypertension      Obese      Obstructive sleep apnea      Reduced vision      Sleep apnea     CPAP       Past Surgical History:   Procedure Laterality Date     APPENDECTOMY OPEN       ARTHROPLASTY KNEE Left      ARTHROPLASTY SHOULDER Right      ENT SURGERY       HEART CATH, ANGIOPLASTY  1992    LAD  atherectomy with a DVI device      HEART CATH, ANGIOPLASTY  4-28-10    PTCA and stent proximal and mid LAD (2) stents Xience     HEART CATH, ANGIOPLASTY  04/20/2016    no stenosis greater than 25%-rec. medical management     OPTICAL TRACKING SYSTEM ENDOSCOPIC RESECTION TUMOR CRANIAL N/A  11/3/2017    Procedure: OPTICAL TRACKING SYSTEM ENDOSCOPIC RESECTION TUMOR CRANIAL;  Stealth Guided Endoscopic Transnasal Resection of Pituitary Tumor;  Surgeon: Amanda Bates MD;  Location:  OR       Medications:   Current Outpatient Medications   Medication Sig Dispense Refill     alfuzosin ER (UROXATRAL) 10 MG 24 hr tablet Take 10 mg by mouth daily       amoxicillin (AMOXIL) 500 MG capsule Take before dental appointments       aspirin 81 MG tablet Take 1 tablet (81 mg) by mouth every evening 30 tablet 0     chlorthalidone (HYGROTON) 25 MG tablet Take 1 tablet (25 mg) by mouth daily 90 tablet 3     ezetimibe (ZETIA) 10 MG tablet Take 1 tablet (10 mg) by mouth daily 90 tablet 3     Glucosamine 500 MG CAPS Take 500 mg by mouth 2 times daily        ibuprofen (ADVIL/MOTRIN) 200 MG tablet Take 400 mg by mouth daily       levothyroxine (SYNTHROID/LEVOTHROID) 100 MCG tablet Take 1 tablet (100 mcg) by mouth daily (Patient taking differently: Take 100 mcg by mouth daily Patient currently has 75 mcg tablets, believes he should have 100 mcg. Will be seeing prescribing provider to sort out the issue.) 90 tablet 3     metoprolol tartrate (LOPRESSOR) 25 MG tablet Take 1 tablet (25 mg) by mouth 2 times daily 180 tablet 3     Multiple Vitamin (MULTI-VITAMIN) per tablet Take 1 tablet by mouth every morning        nitroGLYcerin (NITROSTAT) 0.4 MG sublingual tablet Place 1 tablet (0.4 mg) under the tongue every 5 minutes as needed for chest pain May repeat x2, if chest pain contines after 3rd dose call 911 25 tablet 3     ramipril (ALTACE) 10 MG capsule Take 1 capsule (10 mg) by mouth daily 90 capsule 4     rosuvastatin (CRESTOR) 40 MG tablet Take 1 tablet (40 mg) by mouth daily 90 tablet 3     saw palmetto 450 MG CAPS capsule Take 450 mg by mouth daily       testosterone (ANDROGEL 1.62 % PUMP) 20.25 MG/ACT gel Take 2 pumps daily to skin on Tuesdays/Thursdays/Saturdays/Sundays, take 3 pumps daily on  // 300 g 5       Allergies:   Allergies   Allergen Reactions     Cardizem [Diltiazem Hcl] Itching     Cats Hives     WATERY EYES, SNEEZE, AND COUGH     Diltiazem Hives and Rash       Social History:  Social History     Tobacco Use     Smoking status: Never     Smokeless tobacco: Never   Substance Use Topics     Alcohol use: Yes     Alcohol/week: 1.0 - 2.0 standard drink     Types: 1 - 2 Standard drinks or equivalent per week     Comment: 2 cocktails per day         Family History:  Family History   Problem Relation Age of Onset     C.A.D. Father      Heart Disease Father          at age 44     Heart Disease Son 37        enlarged heart     Heart Disease Mother          at age 80 after heart surgery     Heart Surgery Mother         open heart, passed 10 days after     Diabetes Mother      Family History Negative Maternal Grandmother      Family History Negative Maternal Grandfather      Family History Negative Paternal Grandmother      Family History Negative Paternal Grandfather      Family History Negative Brother      Alzheimer Disease Brother      Family History Negative Sister      Family History Negative Daughter      Family History Negative Son      Family History Negative Daughter      Heart Disease Son          at age 37       Review of Systems:  A 10-point ROS is otherwise negative except as noted in HPI.     Physical Examination:  Blood pressure 129/70, pulse 52, weight 101.2 kg (223 lb), SpO2 96 %.  Body mass index is 32 kg/m .  Wt Readings from Last 4 Encounters:   02/10/23 101.2 kg (223 lb)   23 102 kg (224 lb 14.4 oz)   22 102.5 kg (225 lb 14.4 oz)   22 109.1 kg (240 lb 8 oz)     GEN: Alert, no distress.   RESP:no acute distress  EXT: No peripheral edema.    SKIN:  no lesions.   NEURO: Non-focal deficit observed.       2018 00:00 2018 07:38 10/10/2018 08:47 2019 09:17   Testosterone Total 121 (A) 172 (L) 162 (L) 294   TSH 0.03 (A) 1.82  1.70 0.98   T4 Free 1.2  0.75 (L) 0.89       1/30/2019 MRI: I also personally reviewed images.   Brain/ Pituitary MRI without and with contrast     History: Benign neoplasm of pituitary gland and craniopharyngeal duct  (pouch) (H); Benign neoplasm of pituitary gland and craniopharyngeal  duct (pouch) (H).  ICD-10: Benign neoplasm of pituitary gland and craniopharyngeal duct  (pouch) (H); Benign neoplasm of pituitary gland and craniopharyngeal  duct (pouch) (H)     Comparison:  MRI  2/1/ 2018 and MRI study on 11/3/2017.      Technique: Axial diffusion and FLAIR images of the whole brain  obtained without intravenous contrast. Sagittal T1 and T2-weighted,  coronal T2-weighted, coronal T1-weighted images with focus on the  sella were obtained without intravenous contrast. Post intravenous  contrast using gadolinium coronal and sagittal T1-weighted images were  obtained focused on the sella. Dynamic postcontrast coronal  T1-weighted images were also obtained.     Contrast: 10ml  Gadavist     Findings:    Stable postoperative changes of transnasal endoscopic resection of  pituitary since 2/1/2018. Unchanged leftward deviation of the  pituitary stalk. Partial empty sella also appears unchanged.  Previously noted small round focus of hypoenhancement along the medial  aspect of the right cavernous sinus measuring up to 4 mm, unchanged  (series 100, image 85).     The optic chiasm is intact and nondisplaced.     The major cavernous carotid vascular flow-voids are patent.       FLAIR images through the whole brain demonstrate mild leukoaraiosis.  Mild generalized parenchymal volume loss. No mass effect, midline  shift, or hydrocephalus. Bilateral pseudophakia.                                                                      Impression:   1. Stable postoperative changes of transnasal endoscopic pituitary  adenoma resection.  2. Unchanged small focus of hypoenhancement in the right cavernous  since 2/2018 most likely  reflecting postoperative change although  residual tumor cannot be entirely excluded.

## 2023-02-13 LAB — SHBG SERPL-SCNC: 35 NMOL/L (ref 11–80)

## 2023-02-14 LAB
TESTOST FREE SERPL-MCNC: 4.15 NG/DL
TESTOST SERPL-MCNC: 224 NG/DL (ref 240–950)

## 2023-03-06 NOTE — PROGRESS NOTES
11/05/17 1100   Quick Adds   Type of Visit Initial PT Evaluation   Living Environment   Lives With spouse   Living Arrangements house   Number of Stairs to Enter Home 2   Living Environment Comment Rambler style home.  Rarely accesses basement level.  Wife available to assist PRN at d/c.   Self-Care   Dominant Hand right   Usual Activity Tolerance good   Current Activity Tolerance moderate   Regular Exercise yes   Activity/Exercise Type walking   Exercise Amount/Frequency 30 mins;5-7 times/wk   Functional Level Prior   Ambulation 0-->independent   Transferring 0-->independent   Toileting 0-->independent   Bathing 0-->independent   Dressing 0-->independent   Eating 0-->independent   Communication 0-->understands/communicates without difficulty   Swallowing 0-->swallows foods/liquids without difficulty   Cognition 0 - no cognition issues reported   Fall history within last six months yes   Number of times patient has fallen within last six months 3   Prior Functional Level Comment Falls occurred when catching foot on thresholds or steps.   General Information   Onset of Illness/Injury or Date of Surgery - Date 11/03/17   Referring Physician Gregory Espinoza MD   Patient/Family Goals Statement Be able to hunt again   Pertinent History of Current Problem (include personal factors and/or comorbidities that impact the POC) 72-year-old male  s/p transnasal endoscopic resection of pituitary macroadenoma.  PMH significant for DANISH, HTN, HLD, and CAD.   Precautions/Limitations (nasal)   Cognitive Status Examination   Orientation orientation to person, place and time   Level of Consciousness alert   Follows Commands and Answers Questions 100% of the time   Personal Safety and Judgment intact   Memory intact   Pain Assessment   Patient Currently in Pain No  (endorses photophobia)   Integumentary/Edema   Integumentary/Edema no deficits were identifed   Posture    Posture Not impaired   Range of Motion (ROM)   ROM Comment  On home dose of Levothyroxine ( 50mcg) "WFL BLE   Strength   Strength Comments 5/5 BLE with exception 4+/5 R DF.   Bed Mobility   Bed Mobility Comments (I)   Transfer Skills   Transfer Comments (I)   Gait   Gait Comments Mild inconsistencies in step lengths, but grossly symmetric gait.  Mild decreased in BUE swing.  Prefers straight forward gaze.  Climbs 3 steps using step-to pattern on descent per his baseline, mod I, with rail.   Balance   Balance Comments Generally good balance.  CAn turn 360 degrees in 3sec with good stability.  Sharpened Romberg negative x30sec.    Sensory Examination   Sensory Perception Comments Baseline B hearing loss - wears hearing aids.  Acute B temporal visual field loss worse at L eye.  Light touch sensation intact all extremities.  Proprioception intact B ankles.   Coordination   Coordination no deficits were identified   General Therapy Interventions   Planned Therapy Interventions gait training;home program guidelines;progressive activity/exercise   Clinical Impression   Criteria for Skilled Therapeutic Intervention yes, treatment indicated   PT Diagnosis impaired gait   Influenced by the following impairments R ankle weakness, visual field loss   Functional limitations due to impairments decreased (I) community gait   Clinical Presentation Stable/Uncomplicated   Clinical Presentation Rationale Vitals and labs stable post-op   Clinical Decision Making (Complexity) Low complexity   Therapy Frequency` 1 time/week   Predicted Duration of Therapy Intervention (days/wks) 1 visit   Anticipated Discharge Disposition Home with Assist   Risk & Benefits of therapy have been explained Yes   Patient, Family & other staff in agreement with plan of care Yes   Clinical Impression Comments Pt will benefit from skilled therapy to maximize (I) with gait and for instruction in walking program   [facility] AM-PAC  \"6 Clicks\" V.2 Basic Mobility Inpatient Short Form   1. Turning from your back to your side while in a flat bed without " using bedrails? 4 - None   2. Moving from lying on your back to sitting on the side of a flat bed without using bedrails? 4 - None   3. Moving to and from a bed to a chair (including a wheelchair)? 4 - None   4. Standing up from a chair using your arms (e.g., wheelchair, or bedside chair)? 4 - None   5. To walk in hospital room? 4 - None   6. Climbing 3-5 steps with a railing? 4 - None   Basic Mobility Raw Score (Score out of 24.Lower scores equate to lower levels of function) 24   Total Evaluation Time   Total Evaluation Time (Minutes) 10

## 2023-04-02 ENCOUNTER — HEALTH MAINTENANCE LETTER (OUTPATIENT)
Age: 78
End: 2023-04-02

## 2023-06-30 ENCOUNTER — TELEPHONE (OUTPATIENT)
Dept: ENDOCRINOLOGY | Facility: CLINIC | Age: 78
End: 2023-06-30
Payer: COMMERCIAL

## 2023-07-06 NOTE — TELEPHONE ENCOUNTER
PA Initiation    Medication: TESTOSTERONE 20.25 MG/ACT (1.62%) TD GEL  Insurance Company: [employer] - Phone [phone] Fax [phone]  Pharmacy Filling the Rx: [facility]/PH[facility]ACY #[id number] - [place] - [address]  Filling Pharmacy Phone: [phone]  Filling Pharmacy Fax: [phone]  Start Date: 7/6/2023

## 2023-07-07 NOTE — TELEPHONE ENCOUNTER
Prior Authorization Approval    Medication: TESTOSTERONE 20.25 MG/ACT (1.62%) TD GEL  Authorization Effective Date: 6/7/2023  Authorization Expiration Date: 7/6/2024  Approved Dose/Quantity:   Reference #:     Insurance Company: [employer] - Phone [phone] Fax [phone]  Expected CoPay:       CoPay Card Available:      Financial Assistance Needed:   Which Pharmacy is filling the prescription: [facility] #[id number] - [place] - [address] [facility]  Pharmacy Notified: Yes  Patient Notified: Yes **Instructed pharmacy to notify patient when script is ready to /ship.**

## 2023-08-29 ENCOUNTER — LAB (OUTPATIENT)
Dept: LAB | Facility: CLINIC | Age: 78
End: 2023-08-29
Payer: COMMERCIAL

## 2023-08-29 DIAGNOSIS — I25.10 CORONARY ARTERY DISEASE INVOLVING NATIVE CORONARY ARTERY OF NATIVE HEART WITHOUT ANGINA PECTORIS: Chronic | ICD-10-CM

## 2023-08-29 LAB
ALT SERPL W P-5'-P-CCNC: 18 U/L (ref 0–70)
ANION GAP SERPL CALCULATED.3IONS-SCNC: 11 MMOL/L (ref 7–15)
BUN SERPL-MCNC: 25.2 MG/DL (ref 8–23)
CALCIUM SERPL-MCNC: 9.3 MG/DL (ref 8.8–10.2)
CHLORIDE SERPL-SCNC: 102 MMOL/L (ref 98–107)
CHOLEST SERPL-MCNC: 106 MG/DL
CREAT SERPL-MCNC: 1.07 MG/DL (ref 0.67–1.17)
DEPRECATED HCO3 PLAS-SCNC: 25 MMOL/L (ref 22–29)
GFR SERPL CREATININE-BSD FRML MDRD: 71 ML/MIN/1.73M2
GLUCOSE SERPL-MCNC: 106 MG/DL (ref 70–99)
HDLC SERPL-MCNC: 43 MG/DL
LDLC SERPL CALC-MCNC: 51 MG/DL
NONHDLC SERPL-MCNC: 63 MG/DL
POTASSIUM SERPL-SCNC: 4.5 MMOL/L (ref 3.4–5.3)
SODIUM SERPL-SCNC: 138 MMOL/L (ref 136–145)
TRIGL SERPL-MCNC: 62 MG/DL

## 2023-08-29 PROCEDURE — 84460 ALANINE AMINO (ALT) (SGPT): CPT | Performed by: INTERNAL MEDICINE

## 2023-08-29 PROCEDURE — 80048 BASIC METABOLIC PNL TOTAL CA: CPT | Performed by: INTERNAL MEDICINE

## 2023-08-29 PROCEDURE — 80061 LIPID PANEL: CPT | Performed by: INTERNAL MEDICINE

## 2023-08-29 PROCEDURE — 36415 COLL VENOUS BLD VENIPUNCTURE: CPT | Performed by: INTERNAL MEDICINE

## 2023-09-05 ENCOUNTER — OFFICE VISIT (OUTPATIENT)
Dept: CARDIOLOGY | Facility: CLINIC | Age: 78
End: 2023-09-05
Attending: INTERNAL MEDICINE
Payer: COMMERCIAL

## 2023-09-05 VITALS
HEART RATE: 61 BPM | SYSTOLIC BLOOD PRESSURE: 131 MMHG | HEIGHT: 70 IN | DIASTOLIC BLOOD PRESSURE: 66 MMHG | WEIGHT: 226.8 LBS | BODY MASS INDEX: 32.47 KG/M2

## 2023-09-05 DIAGNOSIS — E66.09 NON MORBID OBESITY DUE TO EXCESS CALORIES: Chronic | ICD-10-CM

## 2023-09-05 DIAGNOSIS — I25.10 CORONARY ARTERY DISEASE INVOLVING NATIVE CORONARY ARTERY OF NATIVE HEART WITHOUT ANGINA PECTORIS: Primary | Chronic | ICD-10-CM

## 2023-09-05 DIAGNOSIS — I10 BENIGN ESSENTIAL HYPERTENSION: ICD-10-CM

## 2023-09-05 DIAGNOSIS — E78.00 PURE HYPERCHOLESTEROLEMIA: ICD-10-CM

## 2023-09-05 DIAGNOSIS — R73.01 IFG (IMPAIRED FASTING GLUCOSE): ICD-10-CM

## 2023-09-05 DIAGNOSIS — E78.6 HDL DEFICIENCY: ICD-10-CM

## 2023-09-05 PROCEDURE — 99215 OFFICE O/P EST HI 40 MIN: CPT | Performed by: INTERNAL MEDICINE

## 2023-09-05 NOTE — PROGRESS NOTES
HPI and Plan:    Terrence is a very nice 77-year-old gentleman with past medical history significant for directional coronary atherectomy in  with subsequent restenosis x2 for which we treated with balloon angioplasty.  In , 2 Xience drug-eluting stents were placed in his proximal and mid left anterior descending artery.       Psychosocially, Terrence has had a tough go of it.  His son, Doug,  suddenly in  from what sounds like a cardiac arrest.  Interestingly, his dad also  of an apparent heart attack at age 44.  His grandmother  suddenly at 21 for unclear reasons.  His wife  passed away 2022 in the [facility] system with her pulmonary hypertension.  She had lost a significant amount of weight, lived far longer than anybody anticipated but passed psychologically, this continues to haunt Terrence.  As we talk about it, he becomes tearful.  Has made changes including moving into senior living.  He is determined to try to make it healthy and is cooking his own meals.  Unfortunately it sounds like he may be having up to 3 drinks a day.       Unfortunately, Terrence has had several orthopedic issues over the years, decreasing his ability to exercise and this is how he always controlled his weight.  He was never very disciplined about eating and drinking but was always very good about his exercise.     Other issues include a pituitary adenoma which he had resected in 2018 and follows with Neurology.     Terrence returns to clinic stating he thinks his heart is doing well.  He is not having any chest, arm, neck, jaw or shoulder discomfort.  He has no dyspnea on exertion, orthopnea or PND.  He has no palpitations, lightheadedness, dizziness, syncope or near syncope.  He had lost a significant amount of weight and attributes this to stress from his wife's passing.  He unfortunately has gained most of the weight back and is up to 226 pounds giving a body mass index of 32.5     ASSESSMENT AND PLAN:  Terrence  appears to be doing well from a cardiac standpoint without clinical evidence of ischemia, heart failure or significant arrhythmia.     Blood pressure is better.  When we saw him last year we had added chlorthalidone.  I also told him the alcohol contributes to hypertension, lack of exercise, weight gain, salt in his diet and probably his ibuprofen.  He thinks he can cut his ibuprofen out and is planning on cutting back on his alcohol.  He is going to try to exercise more diligently.     Cholesterol is still quite good although it is backslid from last year when he had lost the weight.  Total cholesterol is 106, HDL is 43, LDL 51 and triglycerides are 62.  LDL had been as low as 40.  Again we talked about the importance of lifestyle including carbohydrate intake, alcohol, aerobic and resistance exercise and weight loss     Creatinine is 0 1.07 with normal electrolytes.    His glucose is higher than its been.  He states this is probably due to dietary indiscretion the night before his lab work.  He may have had 2 cocktails and a glass of wine.     We reviewed and I refilled all of his medications.  I will plan on seeing him in 1 year.  He enjoyed seeing on a for his blood pressure control last year and I will have him follow-up again in 6 months.  Terrence knows he needs to focus on his lifestyle but is clearly having a hard time with the passing of Xi.     Thank you for allowing me to participate in his care.     Eagle Jolley MD, [facility]    Today's clinic visit entailed:  Review of the result(s) of each unique test - lab work  Ordering of each unique test  Prescription drug management  45 minutes spent by me on the date of the encounter doing chart review, history and exam, documentation and further activities per the note  Provider  Link to [facility] Help Grid     The level of medical decision making during this visit was of moderate complexity.      No orders of the defined types were placed in this  encounter.      No orders of the defined types were placed in this encounter.      There are no discontinued medications.      Encounter Diagnoses   Name Primary?    Coronary artery disease involving native coronary artery of native heart without angina pectoris Yes    Non morbid obesity due to excess calories     Benign essential hypertension     Pure hypercholesterolemia     HDL deficiency     IFG (impaired fasting glucose)        CURRENT MEDICATIONS:  Current Outpatient Medications   Medication Sig Dispense Refill    alfuzosin ER (UROXATRAL) 10 MG 24 hr tablet Take 10 mg by mouth daily      amoxicillin (AMOXIL) 500 MG capsule Take before dental appointments      aspirin 81 MG tablet Take 1 tablet (81 mg) by mouth every evening 30 tablet 0    chlorthalidone (HYGROTON) 25 MG tablet Take 1 tablet (25 mg) by mouth daily 90 tablet 3    ezetimibe (ZETIA) 10 MG tablet Take 1 tablet (10 mg) by mouth daily 90 tablet 3    Glucosamine 500 MG CAPS Take 500 mg by mouth 2 times daily       ibuprofen (ADVIL/MOTRIN) 200 MG tablet Take 400 mg by mouth daily      levothyroxine (SYNTHROID/LEVOTHROID) 100 MCG tablet Take 1 tablet (100 mcg) by mouth daily (Patient taking differently: Take 100 mcg by mouth daily Patient currently has 75 mcg tablets, believes he should have 100 mcg. Will be seeing prescribing provider to sort out the issue.) 90 tablet 3    metoprolol tartrate (LOPRESSOR) 25 MG tablet Take 1 tablet (25 mg) by mouth 2 times daily 180 tablet 3    Multiple Vitamin (MULTI-VITAMIN) per tablet Take 1 tablet by mouth every morning       nitroGLYcerin (NITROSTAT) 0.4 MG sublingual tablet Place 1 tablet (0.4 mg) under the tongue every 5 minutes as needed for chest pain May repeat x2, if chest pain contines after 3rd dose call 911 25 tablet 3    ramipril (ALTACE) 10 MG capsule Take 1 capsule (10 mg) by mouth daily 90 capsule 4    rosuvastatin (CRESTOR) 40 MG tablet Take 1 tablet (40 mg) by mouth daily 90 tablet 3    saw palmetto  450 MG CAPS capsule Take 450 mg by mouth daily      testosterone (ANDROGEL 1.62 % PUMP) 20.25 MG/ACT gel Take 2 pumps daily to skin on ///Sundays, take 3 pumps daily on // 300 g 5       ALLERGIES     Allergies   Allergen Reactions    Cardizem [Diltiazem Hcl] Itching    Cats Hives     WATERY EYES, SNEEZE, AND COUGH    Diltiazem Hives and Rash       PAST MEDICAL HISTORY:  Past Medical History:   Diagnosis Date    Coronary artery disease     cardiac cath 2016: medical management; cath : SUSANA x2 to LAD; cath : PTCA to LAD; cath : PTCA to LAD, : atherectomy of LAD    Hearing problem     Hyperlipidaemia     Hypertension     Obese     Obstructive sleep apnea     Reduced vision     Sleep apnea     CPAP       PAST SURGICAL HISTORY:  Past Surgical History:   Procedure Laterality Date    APPENDECTOMY OPEN      ARTHROPLASTY KNEE Left     ARTHROPLASTY SHOULDER Right     ENT SURGERY      HEART CATH, ANGIOPLASTY      LAD  atherectomy with a DVI device     HEART CATH, ANGIOPLASTY  4-28-10    PTCA and stent proximal and mid LAD (2) stents Xience    HEART CATH, ANGIOPLASTY  2016    no stenosis greater than 25%-rec. medical management    OPTICAL TRACKING SYSTEM ENDOSCOPIC RESECTION TUMOR CRANIAL N/A 11/3/2017    Procedure: OPTICAL TRACKING SYSTEM ENDOSCOPIC RESECTION TUMOR CRANIAL;  Stealth Guided Endoscopic Transnasal Resection of Pituitary Tumor;  Surgeon: Amanda Bates MD;  Location:  OR       FAMILY HISTORY:  Family History   Problem Relation Age of Onset    C.A.D. Father     Heart Disease Father          at age 44    Heart Disease Son 37        enlarged heart    Heart Disease Mother          at age 80 after heart surgery    Heart Surgery Mother         open heart, passed 10 days after    Diabetes Mother     Family History Negative Maternal Grandmother     Family History Negative Maternal Grandfather     Family History Negative  Paternal Grandmother     Family History Negative Paternal Grandfather     Family History Negative Brother     Alzheimer Disease Brother     Family History Negative Sister     Family History Negative Daughter     Family History Negative Son     Family History Negative Daughter     Heart Disease Son          at age 37       SOCIAL HISTORY:  Social History     Socioeconomic History    Marital status:      Spouse name: None    Number of children: None    Years of education: None    Highest education level: None   Tobacco Use    Smoking status: Never    Smokeless tobacco: Never   Substance and Sexual Activity    Alcohol use: Yes     Alcohol/week: 1.0 - 2.0 standard drink of alcohol     Types: 1 - 2 Standard drinks or equivalent per week     Comment: 2 cocktails per day    Drug use: No    Sexual activity: Not Currently     Partners: Female     Birth control/protection: Male Surgical   Other Topics Concern    Parent/sibling w/ CABG, MI or angioplasty before 65F 55M? No    Caffeine Concern Yes     Comment: coffee     Sleep Concern No    Stress Concern No    Weight Concern Yes     Comment: Weight decrease 10 lbs    Special Diet Yes     Comment: Mediterranean diet    Exercise Yes     Comment: Walking 5 days per week 35- 60 minutes    Seat Belt Yes       Review of Systems:  Skin:  Negative       Eyes:  Positive for glasses seeing optomitrist  ENT:  Positive for hearing loss    Respiratory:  Positive for sleep apnea;CPAP     Cardiovascular:  palpitations;chest pain;edema;syncope or near-syncope;fatigue;exercise intolerance;cyanosis Positive for;lightheadedness    Gastroenterology: Negative      Genitourinary:  Positive for incontinence    Musculoskeletal:  Positive for arthritis;joint pain had R shoulder and L knee replaced, got cortisone shot in R knee so no replacement needed at this time  Neurologic:  Negative      Psychiatric:  Negative      Heme/Lymph/Imm:  Negative allergies    Endocrine:  Positive for thyroid  "disorder takes levothyroixine, patuitary gland isn't doing as well as it should.    Physical Exam:  Vitals: /66   Pulse 61   Ht 1.778 m (5' 10\")   Wt 102.9 kg (226 lb 12.8 oz)   BMI 32.54 kg/m      Constitutional:  cooperative, alert and oriented, well developed, well nourished, in no acute distress obese [place] hearing aids    Skin:  warm and dry to the touch, no apparent skin lesions or masses noted          Head:  normocephalic, no masses or lesions        Eyes:  pupils equal and round, conjunctivae and lids unremarkable, sclera white, no xanthalasma, EOMS intact, no nystagmus        Lymph:      ENT:  no pallor or cyanosis, dentition good        Neck:  no carotid bruit        Respiratory:  normal breath sounds, clear to auscultation, normal A-P diameter, normal symmetry, normal respiratory excursion, no use of accessory muscles         Cardiac: regular rhythm;normal S1 and S2;no S3 or S4;no murmurs, gallops or rubs detected                pulses full and equal                                        GI:  abdomen soft obese      Extremities and Muscular Skeletal:  no spinal abnormalities noted;normal muscle strength and tone   varicose vein;bilateral LE edema;trace          Neurological:  no gross motor deficits        Psych:  affect appropriate, oriented to time, person and place        CC  Eagle Jolley MD  [address]  [place] [id number]                "

## 2023-09-05 NOTE — LETTER
2023    Alexy Hernandez MD  [address] [facility] [place] [address]    RE: Terrence Murillo       Dear Colleague,     I had the pleasure of seeing Terrence Murillo in the [facility].  HPI and Plan:    Terrence is a very nice 77-year-old gentleman with past medical history significant for directional coronary atherectomy in  with subsequent restenosis x2 for which we treated with balloon angioplasty.  In , 2 Xience drug-eluting stents were placed in his proximal and mid left anterior descending artery.       Psychosocially, Terrence has had a tough go of it.  His son, Doug,  suddenly in  from what sounds like a cardiac arrest.  Interestingly, his dad also  of an apparent heart attack at age 44.  His grandmother  suddenly at 21 for unclear reasons.  His wife  passed away 2022 in the [facility] system with her pulmonary hypertension.  She had lost a significant amount of weight, lived far longer than anybody anticipated but passed psychologically, this continues to haunt Terrence.  As we talk about it, he becomes tearful.  Has made changes including moving into senior living.  He is determined to try to make it healthy and is cooking his own meals.  Unfortunately it sounds like he may be having up to 3 drinks a day.       Unfortunately, Terrence has had several orthopedic issues over the years, decreasing his ability to exercise and this is how he always controlled his weight.  He was never very disciplined about eating and drinking but was always very good about his exercise.     Other issues include a pituitary adenoma which he had resected in 2018 and follows with Neurology.     Terrence returns to clinic stating he thinks his heart is doing well.  He is not having any chest, arm, neck, jaw or shoulder discomfort.  He has no dyspnea on exertion, orthopnea or PND.  He has no palpitations, lightheadedness, dizziness, syncope or near syncope.  He had lost a significant amount of  weight and attributes this to stress from his wife's passing.  He unfortunately has gained most of the weight back and is up to 226 pounds giving a body mass index of 32.5     ASSESSMENT AND PLAN:  Terrence appears to be doing well from a cardiac standpoint without clinical evidence of ischemia, heart failure or significant arrhythmia.     Blood pressure is better.  When we saw him last year we had added chlorthalidone.  I also told him the alcohol contributes to hypertension, lack of exercise, weight gain, salt in his diet and probably his ibuprofen.  He thinks he can cut his ibuprofen out and is planning on cutting back on his alcohol.  He is going to try to exercise more diligently.     Cholesterol is still quite good although it is backslid from last year when he had lost the weight.  Total cholesterol is 106, HDL is 43, LDL 51 and triglycerides are 62.  LDL had been as low as 40.  Again we talked about the importance of lifestyle including carbohydrate intake, alcohol, aerobic and resistance exercise and weight loss     Creatinine is 0 1.07 with normal electrolytes.    His glucose is higher than its been.  He states this is probably due to dietary indiscretion the night before his lab work.  He may have had 2 cocktails and a glass of wine.     We reviewed and I refilled all of his medications.  I will plan on seeing him in 1 year.  He enjoyed seeing on a for his blood pressure control last year and I will have him follow-up again in 6 months.  Terrence knows he needs to focus on his lifestyle but is clearly having a hard time with the passing of Xi.     Thank you for allowing me to participate in his care.     Eagle Jolley MD, [facility]    Today's clinic visit entailed:  Review of the result(s) of each unique test - lab work  Ordering of each unique test  Prescription drug management  45 minutes spent by me on the date of the encounter doing chart review, history and exam, documentation and further activities  per the note  Provider  Link to [facility] Help Grid     The level of medical decision making during this visit was of moderate complexity.      No orders of the defined types were placed in this encounter.      No orders of the defined types were placed in this encounter.      There are no discontinued medications.      Encounter Diagnoses   Name Primary?    Coronary artery disease involving native coronary artery of native heart without angina pectoris Yes    Non morbid obesity due to excess calories     Benign essential hypertension     Pure hypercholesterolemia     HDL deficiency     IFG (impaired fasting glucose)        CURRENT MEDICATIONS:  Current Outpatient Medications   Medication Sig Dispense Refill    alfuzosin ER (UROXATRAL) 10 MG 24 hr tablet Take 10 mg by mouth daily      amoxicillin (AMOXIL) 500 MG capsule Take before dental appointments      aspirin 81 MG tablet Take 1 tablet (81 mg) by mouth every evening 30 tablet 0    chlorthalidone (HYGROTON) 25 MG tablet Take 1 tablet (25 mg) by mouth daily 90 tablet 3    ezetimibe (ZETIA) 10 MG tablet Take 1 tablet (10 mg) by mouth daily 90 tablet 3    Glucosamine 500 MG CAPS Take 500 mg by mouth 2 times daily       ibuprofen (ADVIL/MOTRIN) 200 MG tablet Take 400 mg by mouth daily      levothyroxine (SYNTHROID/LEVOTHROID) 100 MCG tablet Take 1 tablet (100 mcg) by mouth daily (Patient taking differently: Take 100 mcg by mouth daily Patient currently has 75 mcg tablets, believes he should have 100 mcg. Will be seeing prescribing provider to sort out the issue.) 90 tablet 3    metoprolol tartrate (LOPRESSOR) 25 MG tablet Take 1 tablet (25 mg) by mouth 2 times daily 180 tablet 3    Multiple Vitamin (MULTI-VITAMIN) per tablet Take 1 tablet by mouth every morning       nitroGLYcerin (NITROSTAT) 0.4 MG sublingual tablet Place 1 tablet (0.4 mg) under the tongue every 5 minutes as needed for chest pain May repeat x2, if chest pain contines after 3rd dose call 911 25 tablet 3     ramipril (ALTACE) 10 MG capsule Take 1 capsule (10 mg) by mouth daily 90 capsule 4    rosuvastatin (CRESTOR) 40 MG tablet Take 1 tablet (40 mg) by mouth daily 90 tablet 3    saw palmetto 450 MG CAPS capsule Take 450 mg by mouth daily      testosterone (ANDROGEL 1.62 % PUMP) 20.25 MG/ACT gel Take 2 pumps daily to skin on ///Sundays, take 3 pumps daily on // 300 g 5       ALLERGIES     Allergies   Allergen Reactions    Cardizem [Diltiazem Hcl] Itching    Cats Hives     WATERY EYES, SNEEZE, AND COUGH    Diltiazem Hives and Rash       PAST MEDICAL HISTORY:  Past Medical History:   Diagnosis Date    Coronary artery disease     cardiac cath 2016: medical management; cath : SUSANA x2 to LAD; cath : PTCA to LAD; cath : PTCA to LAD, : atherectomy of LAD    Hearing problem     Hyperlipidaemia     Hypertension     Obese     Obstructive sleep apnea     Reduced vision     Sleep apnea     CPAP       PAST SURGICAL HISTORY:  Past Surgical History:   Procedure Laterality Date    APPENDECTOMY OPEN      ARTHROPLASTY KNEE Left     ARTHROPLASTY SHOULDER Right     ENT SURGERY      HEART CATH, ANGIOPLASTY      LAD  atherectomy with a DVI device     HEART CATH, ANGIOPLASTY  4-28-10    PTCA and stent proximal and mid LAD (2) stents Xience    HEART CATH, ANGIOPLASTY  2016    no stenosis greater than 25%-rec. medical management    OPTICAL TRACKING SYSTEM ENDOSCOPIC RESECTION TUMOR CRANIAL N/A 11/3/2017    Procedure: OPTICAL TRACKING SYSTEM ENDOSCOPIC RESECTION TUMOR CRANIAL;  Stealth Guided Endoscopic Transnasal Resection of Pituitary Tumor;  Surgeon: Amanda Bates MD;  Location:  OR       FAMILY HISTORY:  Family History   Problem Relation Age of Onset    C.A.D. Father     Heart Disease Father          at age 44    Heart Disease Son 37        enlarged heart    Heart Disease Mother          at age 80 after heart surgery    Heart Surgery Mother          open heart, passed 10 days after    Diabetes Mother     Family History Negative Maternal Grandmother     Family History Negative Maternal Grandfather     Family History Negative Paternal Grandmother     Family History Negative Paternal Grandfather     Family History Negative Brother     Alzheimer Disease Brother     Family History Negative Sister     Family History Negative Daughter     Family History Negative Son     Family History Negative Daughter     Heart Disease Son          at age 37       SOCIAL HISTORY:  Social History     Socioeconomic History    Marital status:      Spouse name: None    Number of children: None    Years of education: None    Highest education level: None   Tobacco Use    Smoking status: Never    Smokeless tobacco: Never   Substance and Sexual Activity    Alcohol use: Yes     Alcohol/week: 1.0 - 2.0 standard drink of alcohol     Types: 1 - 2 Standard drinks or equivalent per week     Comment: 2 cocktails per day    Drug use: No    Sexual activity: Not Currently     Partners: Female     Birth control/protection: Male Surgical   Other Topics Concern    Parent/sibling w/ CABG, MI or angioplasty before 65F 55M? No    Caffeine Concern Yes     Comment: coffee     Sleep Concern No    Stress Concern No    Weight Concern Yes     Comment: Weight decrease 10 lbs    Special Diet Yes     Comment: Mediterranean diet    Exercise Yes     Comment: Walking 5 days per week 35- 60 minutes    Seat Belt Yes       Review of Systems:  Skin:  Negative       Eyes:  Positive for glasses seeing optomitrist  ENT:  Positive for hearing loss    Respiratory:  Positive for sleep apnea;CPAP     Cardiovascular:  palpitations;chest pain;edema;syncope or near-syncope;fatigue;exercise intolerance;cyanosis Positive for;lightheadedness    Gastroenterology: Negative      Genitourinary:  Positive for incontinence    Musculoskeletal:  Positive for arthritis;joint pain had R shoulder and L knee replaced, got  "cortisone shot in R knee so no replacement needed at this time  Neurologic:  Negative      Psychiatric:  Negative      Heme/Lymph/Imm:  Negative allergies    Endocrine:  Positive for thyroid disorder takes levothyroixine, patuitary gland isn't doing as well as it should.    Physical Exam:  Vitals: /66   Pulse 61   Ht 1.778 m (5' 10\")   Wt 102.9 kg (226 lb 12.8 oz)   BMI 32.54 kg/m      Constitutional:  cooperative, alert and oriented, well developed, well nourished, in no acute distress obese [place] hearing aids    Skin:  warm and dry to the touch, no apparent skin lesions or masses noted          Head:  normocephalic, no masses or lesions        Eyes:  pupils equal and round, conjunctivae and lids unremarkable, sclera white, no xanthalasma, EOMS intact, no nystagmus        Lymph:      ENT:  no pallor or cyanosis, dentition good        Neck:  no carotid bruit        Respiratory:  normal breath sounds, clear to auscultation, normal A-P diameter, normal symmetry, normal respiratory excursion, no use of accessory muscles         Cardiac: regular rhythm;normal S1 and S2;no S3 or S4;no murmurs, gallops or rubs detected                pulses full and equal                                        GI:  abdomen soft obese      Extremities and Muscular Skeletal:  no spinal abnormalities noted;normal muscle strength and tone   varicose vein;bilateral LE edema;trace          Neurological:  no gross motor deficits        Psych:  affect appropriate, oriented to time, person and place        CC  Eagle Jolley MD  [address]  [place] [address]    Thank you for allowing me to participate in the care of your patient.      Sincerely,     Eagle Jolley MD     [facility]  "

## 2023-09-06 DIAGNOSIS — I25.10 CORONARY ARTERY DISEASE INVOLVING NATIVE CORONARY ARTERY OF NATIVE HEART WITHOUT ANGINA PECTORIS: Chronic | ICD-10-CM

## 2023-09-06 RX ORDER — NITROGLYCERIN 0.4 MG/1
0.4 TABLET SUBLINGUAL EVERY 5 MIN PRN
Qty: 25 TABLET | Refills: 3 | Status: SHIPPED | OUTPATIENT
Start: 2023-09-06 | End: 2024-04-30

## 2023-10-22 DIAGNOSIS — E23.0 HYPOPITUITARISM (H): ICD-10-CM

## 2023-10-22 DIAGNOSIS — E29.1 SECONDARY MALE HYPOGONADISM: ICD-10-CM

## 2023-10-24 RX ORDER — TESTOSTERONE 1.62 MG/G
GEL TRANSDERMAL
Qty: 300 G | Refills: 5 | Status: SHIPPED | OUTPATIENT
Start: 2023-10-24

## 2023-10-24 NOTE — TELEPHONE ENCOUNTER
TESTOSTERONE 1.62% GEL PUMP   Last Written Prescription Date:   2/10/2023  Last Fill Quantity: 300,   # refills: 5  Last Office Visit :  2/10/2023  Future Office visit:  2/23/2024    Routing refill request to provider for review/approval because:  Drug not on the [facility], [facility] or [facility] refill protocol or controlled substance    Sophie Anderson RN  Central Triage Red Flags/Med Refills

## 2023-11-02 ENCOUNTER — TELEPHONE (OUTPATIENT)
Dept: NEUROSURGERY | Facility: CLINIC | Age: 78
End: 2023-11-02
Payer: COMMERCIAL

## 2023-11-02 DIAGNOSIS — E78.5 HYPERLIPIDEMIA LDL GOAL <70: ICD-10-CM

## 2023-11-02 DIAGNOSIS — I25.10 CORONARY ARTERY DISEASE INVOLVING NATIVE CORONARY ARTERY OF NATIVE HEART WITHOUT ANGINA PECTORIS: Chronic | ICD-10-CM

## 2023-11-02 DIAGNOSIS — I10 BENIGN ESSENTIAL HYPERTENSION: ICD-10-CM

## 2023-11-02 RX ORDER — METOPROLOL TARTRATE 25 MG/1
25 TABLET, FILM COATED ORAL 2 TIMES DAILY
Qty: 180 TABLET | Refills: 3 | Status: SHIPPED | OUTPATIENT
Start: 2023-11-02

## 2023-11-02 RX ORDER — EZETIMIBE 10 MG/1
10 TABLET ORAL DAILY
Qty: 90 TABLET | Refills: 3 | Status: SHIPPED | OUTPATIENT
Start: 2023-11-02

## 2023-11-02 RX ORDER — ROSUVASTATIN CALCIUM 40 MG/1
40 TABLET, COATED ORAL DAILY
Qty: 90 TABLET | Refills: 3 | Status: SHIPPED | OUTPATIENT
Start: 2023-11-02

## 2023-11-02 NOTE — TELEPHONE ENCOUNTER
[facility] Call Center    Phone Message    May a detailed message be left on voicemail: yes     Reason for Call: Other: Patient is calling requesting to schedule a 1 year follow up, writer is unable to schedule without instructions. Please review and call patient back to schedule.      Action Taken: Message routed to:  [facility] ([facility]    Travel Screening: Not Applicable

## 2023-11-08 DIAGNOSIS — I10 BENIGN ESSENTIAL HYPERTENSION: ICD-10-CM

## 2023-11-08 RX ORDER — CHLORTHALIDONE 25 MG/1
25 TABLET ORAL DAILY
Qty: 90 TABLET | Refills: 3 | Status: SHIPPED | OUTPATIENT
Start: 2023-11-08 | End: 2023-11-08

## 2023-11-08 RX ORDER — CHLORTHALIDONE 25 MG/1
25 TABLET ORAL DAILY
Qty: 90 TABLET | Refills: 3 | Status: SHIPPED | OUTPATIENT
Start: 2023-11-08 | End: 2024-03-07

## 2023-12-21 DIAGNOSIS — I25.10 CORONARY ARTERY DISEASE INVOLVING NATIVE CORONARY ARTERY OF NATIVE HEART WITHOUT ANGINA PECTORIS: Chronic | ICD-10-CM

## 2023-12-21 RX ORDER — RAMIPRIL 10 MG/1
10 CAPSULE ORAL DAILY
Qty: 90 CAPSULE | Refills: 3 | Status: SHIPPED | OUTPATIENT
Start: 2023-12-21

## 2024-02-23 ENCOUNTER — OFFICE VISIT (OUTPATIENT)
Dept: ENDOCRINOLOGY | Facility: CLINIC | Age: 79
End: 2024-02-23
Payer: COMMERCIAL

## 2024-02-23 VITALS
BODY MASS INDEX: 32.56 KG/M2 | RESPIRATION RATE: 18 BRPM | HEART RATE: 72 BPM | SYSTOLIC BLOOD PRESSURE: 129 MMHG | DIASTOLIC BLOOD PRESSURE: 78 MMHG | WEIGHT: 226.9 LBS | OXYGEN SATURATION: 97 %

## 2024-02-23 DIAGNOSIS — E03.8 SECONDARY HYPOTHYROIDISM: ICD-10-CM

## 2024-02-23 LAB
T4 FREE SERPL-MCNC: 1.29 NG/DL (ref 0.9–1.7)
TSH SERPL DL<=0.005 MIU/L-ACNC: 2.08 UIU/ML (ref 0.3–4.2)

## 2024-02-23 PROCEDURE — 36415 COLL VENOUS BLD VENIPUNCTURE: CPT | Performed by: INTERNAL MEDICINE

## 2024-02-23 PROCEDURE — G2211 COMPLEX E/M VISIT ADD ON: HCPCS | Performed by: INTERNAL MEDICINE

## 2024-02-23 PROCEDURE — 84439 ASSAY OF FREE THYROXINE: CPT | Performed by: INTERNAL MEDICINE

## 2024-02-23 PROCEDURE — 84443 ASSAY THYROID STIM HORMONE: CPT | Performed by: INTERNAL MEDICINE

## 2024-02-23 PROCEDURE — 99214 OFFICE O/P EST MOD 30 MIN: CPT | Performed by: INTERNAL MEDICINE

## 2024-02-23 PROCEDURE — 84403 ASSAY OF TOTAL TESTOSTERONE: CPT | Performed by: INTERNAL MEDICINE

## 2024-02-23 RX ORDER — LEVOTHYROXINE SODIUM 100 UG/1
100 TABLET ORAL DAILY
Qty: 90 TABLET | Refills: 3 | Status: SHIPPED | OUTPATIENT
Start: 2024-02-23

## 2024-02-23 NOTE — PROGRESS NOTES
------ Endocrinology Follow up visit ------    Reason for consultation: pituitary macroadenoma, hypogonadism      Assessment: A 77 yo male post macro adenoma TSS on 11/3/2017 no recurrence, hypopituitarism undercontrolled with tesosterone supplement and levothryoxine currently       # Pituitary macroadenoma  S/p TSS on 11/3/2017, no recurrence at this point. Most recent MRI no recurrence 2021, followed by Dr. Bates    # Secondary hypogonadism    - continue AndroGel 2 pumps Tuesday/Thursday/Saturday/Sunday  3 pumps Mondays/Wednesday/Fridays      - total testosterone level today     # Secondary hypothyroidism  - TSH, free T4 today    - resume LT4 100 mcg daily     # Follow-up plan  Patient request to send result by letter to his [facility]      RTC with me in 1 year      Total time 30 minutes spent on the date of the encounter doing chart review, history and exam, reviewed serial brain MRI and documentation and further activities as noted above.    The longitudinal plan of care for the diagnosis(es)/condition(s) as documented were addressed during this visit. Due to the added complexity in care, I will continue to support Terrence in the subsequent management and with ongoing continuity of care.     Gris Santoro MD  Staff Physician  Endocrinology and Metabolism  [facility]  License: [id number]  Pager: [phone]    Interval History as of 2/23/2024 : Patient has been doing well.. Medication compliance excellent  . New event includes no pertinent medical event noted .   Interval History as of 2/10/2023 : Patient has been doing well. Medication compliance: good  . New event includes : he had family loss and moved to [facility] but health wise doing well .  Interval History as of 2/23/2022 : Patient has been doing well. edication compliance excellent   . New event includes : no pertinent medical event noted, no changes visions  .  Interval History as of 1/13/2021 : Patient has been doing well. Medication compliance excellent   . New event includes  : started to have double vision and has appointment of MRI and Dr. Bates next week. .  Interval History as of 9/18/2019 : Patient has been doing well.  Stable body weight, no headache has been doing well, compliant to testosterone gel and thyroid medication which he is taking in the middle of the night.  Currently he is busy taking care of his wife who has pulmonary hypertension .   interval History: Last seen in 2/2018.  Nonfunctioning microadenoma, transsphenoidal surgery done November 3, 2017, today no symptoms of headache nausea no visual disturbance.  He has been compliant to testosterone gel currently using 1 pump.  His energy level increased, currently he is working on diet and exercise with lifestyle , he intentionally reduced his body weight 23 pounds for the past 6 months.  He also cut to third over the amount of his alcohol to take.     Subjective: A 72 year old male with a past medical history of pituitary macroadenoma who presents post-operative follow-up after endoscopic, transphenoidal resection of pituitary adenoma on 11/3/17. The mass was found to be non-secretory on initial labs. He was initially found to have the tumor on MRI after he presented to his PCP with complaints of vision problems. The tumor was 3.2 x 2.5 x 2.0 cm in size and compressed the optic chiasm. Pathology consistent with benign adenoma. He was started on prophylactic hydrocortisone prior to discharge. Since d/c, he has been doing well. Energy level is improved. He is walking more. Has occasional headaches behind his eyes but seems to be getting better. Denies increased urinary frequency or excessive thirst. Reports his libido is fine but is not currently sexually active with his wife due to medical issues that he has.  Denies nipple discharge. Feels his vision is about the same as before the surgery.  Still has difficulty reading small print.     Interval history: Patient has been doing well.  Currently using the testosterone gel daily basis.  No muscle weakness, no fatigue.  He is still taking levothyroxine.  Hydrocortisone was tapered last visit and is not taking right now. He has good appetite and good sleep.  No headache, no dizziness.  No change vision issues after the surgery especially when he reads.  Today he came to follow-up postop MRI on 2/1/2-018  and came here for joint appointment with endocrine and neurosurgery.  He is concerned about price of testosterone gel, and also wondering whether he can wean off testosterone.      Current Problem List:   Patient Active Problem List   Diagnosis    Sleep apnea    Coronary artery disease involving native coronary artery of native heart without angina pectoris    Benign essential hypertension    Pure hypercholesterolemia    Non morbid obesity due to excess calories    Chest pain    MORENO (dyspnea on exertion)    Intracranial tumor (H)    Secondary male hypogonadism    Pituitary adenoma (H)    S/P appendectomy    S/P knee replacement    S/P shoulder surgery    HDL deficiency    Dizziness    Hyperlipidemia LDL goal <70    IFG (impaired fasting glucose)       Past Medical and Past Surgical History:  Past Medical History:   Diagnosis Date    Coronary artery disease     cardiac cath 2016: medical management; cath 2010: SUSANA x2 to LAD; cath 1993: PTCA to LAD; cath 1992: PTCA to LAD, 1992: atherectomy of LAD    Hearing problem     Hyperlipidaemia     Hypertension     Obese     Obstructive sleep apnea     Reduced vision     Sleep apnea     CPAP       Past Surgical History:   Procedure Laterality Date    APPENDECTOMY OPEN      ARTHROPLASTY KNEE Left     ARTHROPLASTY SHOULDER Right     ENT SURGERY      HEART CATH, ANGIOPLASTY  1992    LAD  atherectomy with a DVI device     HEART CATH, ANGIOPLASTY  4-28-10    PTCA and stent proximal and mid LAD (2) stents Xience    HEART  CATH, ANGIOPLASTY  04/20/2016    no stenosis greater than 25%-rec. medical management    OPTICAL TRACKING SYSTEM ENDOSCOPIC RESECTION TUMOR CRANIAL N/A 11/3/2017    Procedure: OPTICAL TRACKING SYSTEM ENDOSCOPIC RESECTION TUMOR CRANIAL;  Stealth Guided Endoscopic Transnasal Resection of Pituitary Tumor;  Surgeon: Amanda Bates MD;  Location:  OR       Medications:   Current Outpatient Medications   Medication Sig Dispense Refill    alfuzosin ER (UROXATRAL) 10 MG 24 hr tablet Take 10 mg by mouth daily      amoxicillin (AMOXIL) 500 MG capsule Take before dental appointments      aspirin 81 MG tablet Take 1 tablet (81 mg) by mouth every evening 30 tablet 0    chlorthalidone (HYGROTON) 25 MG tablet Take 1 tablet (25 mg) by mouth daily 90 tablet 3    ezetimibe (ZETIA) 10 MG tablet Take 1 tablet (10 mg) by mouth daily 90 tablet 3    Glucosamine 500 MG CAPS Take 500 mg by mouth 2 times daily       ibuprofen (ADVIL/MOTRIN) 200 MG tablet Take 400 mg by mouth daily      levothyroxine (SYNTHROID/LEVOTHROID) 100 MCG tablet Take 1 tablet (100 mcg) by mouth daily (Patient taking differently: Take 100 mcg by mouth daily Patient currently has 75 mcg tablets, believes he should have 100 mcg. Will be seeing prescribing provider to sort out the issue.) 90 tablet 3    metoprolol tartrate (LOPRESSOR) 25 MG tablet Take 1 tablet (25 mg) by mouth 2 times daily 180 tablet 3    Multiple Vitamin (MULTI-VITAMIN) per tablet Take 1 tablet by mouth every morning       nitroGLYcerin (NITROSTAT) 0.4 MG sublingual tablet Place 1 tablet (0.4 mg) under the tongue every 5 minutes as needed for chest pain May repeat x2, if chest pain contines after 3rd dose call 911 25 tablet 3    ramipril (ALTACE) 10 MG capsule Take 1 capsule (10 mg) by mouth daily 90 capsule 3    rosuvastatin (CRESTOR) 40 MG tablet Take 1 tablet (40 mg) by mouth daily 90 tablet 3    saw palmetto 450 MG CAPS capsule Take 450 mg by mouth daily      testosterone (ANDROGEL  1.62 % PUMP) 20.25 MG/ACT gel TAKE 2 PUMPS DAILY TO SKIN ON ///SUNDAYS, TAKE 3 PUMPS DAILY ON // 300 g 5       Allergies:   Allergies   Allergen Reactions    Cardizem [Diltiazem Hcl] Itching    Cats Hives     WATERY EYES, SNEEZE, AND COUGH    Diltiazem Hives and Rash       Social History:  Social History     Tobacco Use    Smoking status: Never    Smokeless tobacco: Never   Substance Use Topics    Alcohol use: Yes     Alcohol/week: 1.0 - 2.0 standard drink of alcohol     Types: 1 - 2 Standard drinks or equivalent per week     Comment: 2 cocktails per day         Family History:  Family History   Problem Relation Age of Onset    C.A.D. Father     Heart Disease Father          at age 44    Heart Disease Son 37        enlarged heart    Heart Disease Mother          at age 80 after heart surgery    Heart Surgery Mother         open heart, passed 10 days after    Diabetes Mother     Family History Negative Maternal Grandmother     Family History Negative Maternal Grandfather     Family History Negative Paternal Grandmother     Family History Negative Paternal Grandfather     Family History Negative Brother     Alzheimer Disease Brother     Family History Negative Sister     Family History Negative Daughter     Family History Negative Son     Family History Negative Daughter     Heart Disease Son          at age 37       Review of Systems:  A 10-point ROS is otherwise negative except as noted in HPI.     Physical Examination:  Blood pressure (!) 153/81, pulse 62, resp. rate 18, weight 102.9 kg (226 lb 14.4 oz), SpO2 97%.  Body mass index is 32.56 kg/m .  Wt Readings from Last 4 Encounters:   24 102.9 kg (226 lb 14.4 oz)   23 102.9 kg (226 lb 12.8 oz)   02/10/23 101.2 kg (223 lb)   23 102 kg (224 lb 14.4 oz)     GEN: Alert, no distress.   RESP:no acute distress  EXT: No peripheral edema.    SKIN:  no lesions.   NEURO: Non-focal deficit  observed.       2/28/2018 00:00 6/4/2018 07:38 10/10/2018 08:47 1/30/2019 09:17   Testosterone Total 121 (A) 172 (L) 162 (L) 294   TSH 0.03 (A) 1.82 1.70 0.98   T4 Free 1.2  0.75 (L) 0.89       1/30/2019 MRI: I also personally reviewed images.   Brain/ Pituitary MRI without and with contrast     History: Benign neoplasm of pituitary gland and craniopharyngeal duct  (pouch) (H); Benign neoplasm of pituitary gland and craniopharyngeal  duct (pouch) (H).  ICD-10: Benign neoplasm of pituitary gland and craniopharyngeal duct  (pouch) (H); Benign neoplasm of pituitary gland and craniopharyngeal  duct (pouch) (H)     Comparison:  MRI  2/1/ 2018 and MRI study on 11/3/2017.      Technique: Axial diffusion and FLAIR images of the whole brain  obtained without intravenous contrast. Sagittal T1 and T2-weighted,  coronal T2-weighted, coronal T1-weighted images with focus on the  sella were obtained without intravenous contrast. Post intravenous  contrast using gadolinium coronal and sagittal T1-weighted images were  obtained focused on the sella. Dynamic postcontrast coronal  T1-weighted images were also obtained.     Contrast: 10ml  Gadavist     Findings:    Stable postoperative changes of transnasal endoscopic resection of  pituitary since 2/1/2018. Unchanged leftward deviation of the  pituitary stalk. Partial empty sella also appears unchanged.  Previously noted small round focus of hypoenhancement along the medial  aspect of the right cavernous sinus measuring up to 4 mm, unchanged  (series 100, image 85).     The optic chiasm is intact and nondisplaced.     The major cavernous carotid vascular flow-voids are patent.       FLAIR images through the whole brain demonstrate mild leukoaraiosis.  Mild generalized parenchymal volume loss. No mass effect, midline  shift, or hydrocephalus. Bilateral pseudophakia.                                                                      Impression:   1. Stable postoperative changes of  transnasal endoscopic pituitary  adenoma resection.  2. Unchanged small focus of hypoenhancement in the right cavernous  since 2/2018 most likely reflecting postoperative change although  residual tumor cannot be entirely excluded.

## 2024-02-23 NOTE — NURSING NOTE
Terrence Murillo's goals for this visit include:   Chief Complaint   Patient presents with    Follow Up     hypothyroid       He requests these members of his care team be copied on today's visit information: yes    PCP: Alexy Hernandez    Referring Provider:  No referring provider defined for this encounter.    BP (!) 153/81 (BP Location: Left arm, Patient Position: Sitting, Cuff Size: Adult Large)   Pulse 62   Resp 18   Wt 102.9 kg (226 lb 14.4 oz)   SpO2 97%   BMI 32.56 kg/m      Do you need any medication refills at today's visit? Yes      Benjy Melendez, EMT

## 2024-02-23 NOTE — LETTER
2/23/2024         RE: Terrence Murillo  [address]  [address]  [facility] [place] [address]        Dear Colleague,    Thank you for referring your patient, Terrence Murillo, to the [facility]. Please see a copy of my visit note below.                                                                    ------ Endocrinology Follow up visit ------    Reason for consultation: pituitary macroadenoma, hypogonadism      Assessment: A 77 yo male post macro adenoma TSS on 11/3/2017 no recurrence, hypopituitarism undercontrolled with tesosterone supplement and levothryoxine currently       # Pituitary macroadenoma  S/p TSS on 11/3/2017, no recurrence at this point. Most recent MRI no recurrence 2021, followed by Dr. Bates    # Secondary hypogonadism    - continue AndroGel 2 pumps Tuesday/Thursday/Saturday/Sunday  3 pumps Mondays/Wednesday/Fridays      - total testosterone level today     # Secondary hypothyroidism  - TSH, free T4 today    - resume LT4 100 mcg daily     # Follow-up plan  Patient request to send result by letter to his senior [place]      RTC with me in 1 year      Total time 30 minutes spent on the date of the encounter doing chart review, history and exam, reviewed serial brain MRI and documentation and further activities as noted above.    The longitudinal plan of care for the diagnosis(es)/condition(s) as documented were addressed during this visit. Due to the added complexity in care, I will continue to support Terrence in the subsequent management and with ongoing continuity of care.     Gris Santoro MD  Staff Physician  Endocrinology and Metabolism  [facility]  License: [place] [id number]  Pager: [phone]    Interval History as of 2/23/2024 : Patient has been doing well.. Medication compliance excellent  . New event includes no pertinent medical event noted .   Interval History as of 2/10/2023 : Patient has been doing well. Medication compliance: good  . New event includes :  he had family loss and moved to senior house but health wise doing well .  Interval History as of 2/23/2022 : Patient has been doing well. edication compliance excellent   . New event includes : no pertinent medical event noted, no changes visions .  Interval History as of 1/13/2021 : Patient has been doing well. Medication compliance excellent   . New event includes  : started to have double vision and has appointment of MRI and Dr. Bates next week. .  Interval History as of 9/18/2019 : Patient has been doing well.  Stable body weight, no headache has been doing well, compliant to testosterone gel and thyroid medication which he is taking in the middle of the night.  Currently he is busy taking care of his wife who has pulmonary hypertension .   interval History: Last seen in 2/2018.  Nonfunctioning microadenoma, transsphenoidal surgery done November 3, 2017, today no symptoms of headache nausea no visual disturbance.  He has been compliant to testosterone gel currently using 1 pump.  His energy level increased, currently he is working on diet and exercise with lifestyle , he intentionally reduced his body weight 23 pounds for the past 6 months.  He also cut to third over the amount of his alcohol to take.     Subjective: A 72 year old male with a past medical history of pituitary macroadenoma who presents post-operative follow-up after endoscopic, transphenoidal resection of pituitary adenoma on 11/3/17. The mass was found to be non-secretory on initial labs. He was initially found to have the tumor on MRI after he presented to his PCP with complaints of vision problems. The tumor was 3.2 x 2.5 x 2.0 cm in size and compressed the optic chiasm. Pathology consistent with benign adenoma. He was started on prophylactic hydrocortisone prior to discharge. Since d/c, he has been doing well. Energy level is improved. He is walking more. Has occasional headaches behind his eyes but seems to be getting better. Denies  increased urinary frequency or excessive thirst. Reports his libido is fine but is not currently sexually active with his wife due to medical issues that he has.  Denies nipple discharge. Feels his vision is about the same as before the surgery. Still has difficulty reading small print.     Interval history: Patient has been doing well.  Currently using the testosterone gel daily basis.  No muscle weakness, no fatigue.  He is still taking levothyroxine.  Hydrocortisone was tapered last visit and is not taking right now. He has good appetite and good sleep.  No headache, no dizziness.  No change vision issues after the surgery especially when he reads.  Today he came to follow-up postop MRI on 2/1/2-018  and came here for joint appointment with endocrine and neurosurgery.  He is concerned about price of testosterone gel, and also wondering whether he can wean off testosterone.      Current Problem List:   Patient Active Problem List   Diagnosis     Sleep apnea     Coronary artery disease involving native coronary artery of native heart without angina pectoris     Benign essential hypertension     Pure hypercholesterolemia     Non morbid obesity due to excess calories     Chest pain     MORENO (dyspnea on exertion)     Intracranial tumor (H)     Secondary male hypogonadism     Pituitary adenoma (H)     S/P appendectomy     S/P knee replacement     S/P shoulder surgery     HDL deficiency     Dizziness     Hyperlipidemia LDL goal <70     IFG (impaired fasting glucose)       Past Medical and Past Surgical History:  Past Medical History:   Diagnosis Date     Coronary artery disease     cardiac cath 2016: medical management; cath 2010: SUSANA x2 to LAD; cath 1993: PTCA to LAD; cath 1992: PTCA to LAD, 1992: atherectomy of LAD     Hearing problem      Hyperlipidaemia      Hypertension      Obese      Obstructive sleep apnea      Reduced vision      Sleep apnea     CPAP       Past Surgical History:   Procedure Laterality Date      APPENDECTOMY OPEN       ARTHROPLASTY KNEE Left      ARTHROPLASTY SHOULDER Right      ENT SURGERY       HEART CATH, ANGIOPLASTY  1992    LAD  atherectomy with a DVI device      HEART CATH, ANGIOPLASTY  4-28-10    PTCA and stent proximal and mid LAD (2) stents Xience     HEART CATH, ANGIOPLASTY  04/20/2016    no stenosis greater than 25%-rec. medical management     OPTICAL TRACKING SYSTEM ENDOSCOPIC RESECTION TUMOR CRANIAL N/A 11/3/2017    Procedure: OPTICAL TRACKING SYSTEM ENDOSCOPIC RESECTION TUMOR CRANIAL;  Stealth Guided Endoscopic Transnasal Resection of Pituitary Tumor;  Surgeon: Amanda Bates MD;  Location:  OR       Medications:   Current Outpatient Medications   Medication Sig Dispense Refill     alfuzosin ER (UROXATRAL) 10 MG 24 hr tablet Take 10 mg by mouth daily       amoxicillin (AMOXIL) 500 MG capsule Take before dental appointments       aspirin 81 MG tablet Take 1 tablet (81 mg) by mouth every evening 30 tablet 0     chlorthalidone (HYGROTON) 25 MG tablet Take 1 tablet (25 mg) by mouth daily 90 tablet 3     ezetimibe (ZETIA) 10 MG tablet Take 1 tablet (10 mg) by mouth daily 90 tablet 3     Glucosamine 500 MG CAPS Take 500 mg by mouth 2 times daily        ibuprofen (ADVIL/MOTRIN) 200 MG tablet Take 400 mg by mouth daily       levothyroxine (SYNTHROID/LEVOTHROID) 100 MCG tablet Take 1 tablet (100 mcg) by mouth daily (Patient taking differently: Take 100 mcg by mouth daily Patient currently has 75 mcg tablets, believes he should have 100 mcg. Will be seeing prescribing provider to sort out the issue.) 90 tablet 3     metoprolol tartrate (LOPRESSOR) 25 MG tablet Take 1 tablet (25 mg) by mouth 2 times daily 180 tablet 3     Multiple Vitamin (MULTI-VITAMIN) per tablet Take 1 tablet by mouth every morning        nitroGLYcerin (NITROSTAT) 0.4 MG sublingual tablet Place 1 tablet (0.4 mg) under the tongue every 5 minutes as needed for chest pain May repeat x2, if chest pain contines after 3rd  dose call 911 25 tablet 3     ramipril (ALTACE) 10 MG capsule Take 1 capsule (10 mg) by mouth daily 90 capsule 3     rosuvastatin (CRESTOR) 40 MG tablet Take 1 tablet (40 mg) by mouth daily 90 tablet 3     saw palmetto 450 MG CAPS capsule Take 450 mg by mouth daily       testosterone (ANDROGEL 1.62 % PUMP) 20.25 MG/ACT gel TAKE 2 PUMPS DAILY TO SKIN ON ///SUNDAYS, TAKE 3 PUMPS DAILY ON // 300 g 5       Allergies:   Allergies   Allergen Reactions     Cardizem [Diltiazem Hcl] Itching     Cats Hives     WATERY EYES, SNEEZE, AND COUGH     Diltiazem Hives and Rash       Social History:  Social History     Tobacco Use     Smoking status: Never     Smokeless tobacco: Never   Substance Use Topics     Alcohol use: Yes     Alcohol/week: 1.0 - 2.0 standard drink of alcohol     Types: 1 - 2 Standard drinks or equivalent per week     Comment: 2 cocktails per day         Family History:  Family History   Problem Relation Age of Onset     C.A.D. Father      Heart Disease Father          at age 44     Heart Disease Son 37        enlarged heart     Heart Disease Mother          at age 80 after heart surgery     Heart Surgery Mother         open heart, passed 10 days after     Diabetes Mother      Family History Negative Maternal Grandmother      Family History Negative Maternal Grandfather      Family History Negative Paternal Grandmother      Family History Negative Paternal Grandfather      Family History Negative Brother      Alzheimer Disease Brother      Family History Negative Sister      Family History Negative Daughter      Family History Negative Son      Family History Negative Daughter      Heart Disease Son          at age 37       Review of Systems:  A 10-point ROS is otherwise negative except as noted in HPI.     Physical Examination:  Blood pressure (!) 153/81, pulse 62, resp. rate 18, weight 102.9 kg (226 lb 14.4 oz), SpO2 97%.  Body mass index is 32.56  kg/m .  Wt Readings from Last 4 Encounters:   02/23/24 102.9 kg (226 lb 14.4 oz)   09/05/23 102.9 kg (226 lb 12.8 oz)   02/10/23 101.2 kg (223 lb)   01/11/23 102 kg (224 lb 14.4 oz)     GEN: Alert, no distress.   RESP:no acute distress  EXT: No peripheral edema.    SKIN:  no lesions.   NEURO: Non-focal deficit observed.       2/28/2018 00:00 6/4/2018 07:38 10/10/2018 08:47 1/30/2019 09:17   Testosterone Total 121 (A) 172 (L) 162 (L) 294   TSH 0.03 (A) 1.82 1.70 0.98   T4 Free 1.2  0.75 (L) 0.89       1/30/2019 MRI: I also personally reviewed images.   Brain/ Pituitary MRI without and with contrast     History: Benign neoplasm of pituitary gland and craniopharyngeal duct  (pouch) (H); Benign neoplasm of pituitary gland and craniopharyngeal  duct (pouch) (H).  ICD-10: Benign neoplasm of pituitary gland and craniopharyngeal duct  (pouch) (H); Benign neoplasm of pituitary gland and craniopharyngeal  duct (pouch) (H)     Comparison:  MRI  2/1/ 2018 and MRI study on 11/3/2017.      Technique: Axial diffusion and FLAIR images of the whole brain  obtained without intravenous contrast. Sagittal T1 and T2-weighted,  coronal T2-weighted, coronal T1-weighted images with focus on the  sella were obtained without intravenous contrast. Post intravenous  contrast using gadolinium coronal and sagittal T1-weighted images were  obtained focused on the sella. Dynamic postcontrast coronal  T1-weighted images were also obtained.     Contrast: 10ml  Gadavist     Findings:    Stable postoperative changes of transnasal endoscopic resection of  pituitary since 2/1/2018. Unchanged leftward deviation of the  pituitary stalk. Partial empty sella also appears unchanged.  Previously noted small round focus of hypoenhancement along the medial  aspect of the right cavernous sinus measuring up to 4 mm, unchanged  (series 100, image 85).     The optic chiasm is intact and nondisplaced.     The major cavernous carotid vascular flow-voids are patent.        FLAIR images through the whole brain demonstrate mild leukoaraiosis.  Mild generalized parenchymal volume loss. No mass effect, midline  shift, or hydrocephalus. Bilateral pseudophakia.                                                                      Impression:   1. Stable postoperative changes of transnasal endoscopic pituitary  adenoma resection.  2. Unchanged small focus of hypoenhancement in the right cavernous  since 2/2018 most likely reflecting postoperative change although  residual tumor cannot be entirely excluded.             Again, thank you for allowing me to participate in the care of your patient.        Sincerely,        Gris Santoro MD

## 2024-02-26 ENCOUNTER — TELEPHONE (OUTPATIENT)
Dept: ENDOCRINOLOGY | Facility: CLINIC | Age: 79
End: 2024-02-26
Payer: COMMERCIAL

## 2024-02-26 NOTE — TELEPHONE ENCOUNTER
Left Voicemail (1st Attempt) for the patient to call back and schedule the following:    Appointment type: return  Provider: dr. Santoro   Return date: 2/23/2025  Specialty phone number: [phone]   Additonal Notes:Return in about 1 year (around 2/23/2025).     M[facility]   Orthopedics, Podiatry, Sports Medicine, Ent ,Eye , Audiology, Adult Endocrine & Diabetes, Nutrition & Medication Therapy Management Specialties   [facility]

## 2024-02-27 LAB — TESTOST SERPL-MCNC: 238 NG/DL (ref 240–950)

## 2024-03-01 ENCOUNTER — NURSE TRIAGE (OUTPATIENT)
Dept: NURSING | Facility: CLINIC | Age: 79
End: 2024-03-01
Payer: COMMERCIAL

## 2024-03-01 NOTE — TELEPHONE ENCOUNTER
"  Nurse Triage SBAR    Is this a 2nd Level Triage? YES, LICENSED PRACTITIONER REVIEW IS REQUIRED    Situation: Patient calling to request appointment. States that for the last 2-3 weeks he has noticed more of a struggle with recreational reading. States that \"its like words swim in /out of focus\". Yesterday when he was looking at a street sign, he closed his Left eye and noticed that the vision in Right eye was Double. He believes it is is Right eye causing him problems at this time and would like to be evaluated.    Background: Hx of Pituitary tumor, visual field defect, optic atrophy. Last seen in office 11/15/22 with Dr. Herndon.    S/p Pituitary Macroadenoma resection 11/2017    Assessment: Endorses double vision in R eye.  Does not mention other symptoms.  Assessment terminated by patient d/t difficult hearing.    Protocol Recommended Disposition:   See in Office Today    Recommendation: Call patient to schedule appt. Please note, that patient is extremely [place] and he had difficulty during triage call understanding questions. He stated \"this isnt working, can you just get me an appointment?\" Assured him message would be sent to clinic to reach out to him.    Routed to provider/clinic    Does the patient meet one of the following criteria for ADS visit consideration? No    Monalisa CARPIO RN  P Central Nursing/Red Flag Triage & Med Refill Team      Reason for Disposition   Patient wants to be seen    Additional Information   Negative: Weakness of the face, arm or leg on one side of the body   Negative: Followed getting substance in the eye   Negative: Foreign body stuck in the eye   Negative: Followed an eye injury   Negative: Followed sun lamp or sun exposure (UV keratitis)   Negative: Yellow or green discharge (pus) in the eye   Negative: Pregnant   Negative: Complete loss of vision in one or both eyes   Negative: SEVERE eye pain   Negative: Flashes of light  (Exception: Brief from pressing on the eyeball.)   " "Negative: Many floaters in the eye (Exception: Floater(s) are a chronic symptom and this is unchanged from patient's baseline pattern.)   Negative: Eye pain and brief (now gone) blurred vision or visual changes   Negative: Taking digoxin (e.g., Lanoxin, Digitek, Cardoxin, Lanoxicaps; Toloxin in [place]) and blurred vision, yellow vision, or yellow-green halos   Negative: SEVERE headache   Negative: Double vision   Negative: Blurred vision or visual changes and present now and sudden onset or new (e.g., minutes, hours, days)  (Exception: Seeing floaters / black specks OR previously diagnosed migraine headaches with same symptoms.)   Negative: Patient sounds very sick or weak to the triager    Answer Assessment - Initial Assessment Questions  1. DESCRIPTION: \"How has your vision changed?\" (e.g., complete vision loss, blurred vision, double vision, floaters, etc.)      Double vision, focus not complete in right eye.    2. LOCATION: \"One or both eyes?\" If one, ask: \"Which eye?\"      Right eye is giving him problems right now    3. SEVERITY: \"Can you see anything?\" If Yes, ask: \"What can you see?\" (e.g., fine print)      Can read, but has been a struggle at times. Like \"words go in and out of focus\"    4. ONSET: \"When did this begin?\" \"Did it start suddenly or has this been gradual?\"      GERALD - from pt report began 2-3 weeks ago. Really noticed differene in Right eye yesterday    5. PATTERN: \"Does this come and go, or has it been constant since it started?\"      GERALD    6. PAIN: \"Is there any pain in your eye(s)?\"  (Scale 1-10; or mild, moderate, severe)    - NONE (0): No pain.    - MILD (1-3): Doesn't interfere with normal activities.    - MODERATE (4-7): Interferes with normal activities or awakens from sleep.     - SEVERE (8-10): Excruciating pain, unable to do any normal activities.      Doesn't report pain - GERALD    7. CONTACTS-GLASSES: \"Do you wear contacts or glasses?\"      glasses    8. CAUSE: \"What do you think is " "causing this visual problem?\"      Doesn't know    9. OTHER SYMPTOMS: \"Do you have any other symptoms?\" (e.g., confusion, headache, arm or leg weakness, speech problems)      Not reported     10. PREGNANCY: \"Is there any chance you are pregnant?\" \"When was your last menstrual period?\"        no    Protocols used: Vision Loss or Change-A-OH    "

## 2024-03-01 NOTE — TELEPHONE ENCOUNTER
[phone]     Spoke to pt at 1538    Right eye blurrier/double of letters while driving yesterday (when closed left eye had double of letter/number on sign) and more trouble reading.    No acute vision changes/loss    Scheduled overdue yearly exam with Dr. Herndon in April (pt aware Dr. Herndon out next week and pt out last 3 weeks of march)    Recommended starting preservative free artificial tears with increase frequency.    Reviewed if not having any improvement in vision or any worsening vision/symptoms to contact clinic for exam in acute eye clinic    Pt verbally demonstrated understanding and seemed comfortable with information/plan.    Note to neuro-ophthalmology resident for review/amendment if applicable.    Suresh Cross RN 3:14 PM 03/01/24

## 2024-03-07 ENCOUNTER — OFFICE VISIT (OUTPATIENT)
Dept: CARDIOLOGY | Facility: CLINIC | Age: 79
End: 2024-03-07
Attending: INTERNAL MEDICINE
Payer: COMMERCIAL

## 2024-03-07 VITALS
HEIGHT: 70 IN | OXYGEN SATURATION: 96 % | WEIGHT: 226 LBS | SYSTOLIC BLOOD PRESSURE: 100 MMHG | BODY MASS INDEX: 32.35 KG/M2 | DIASTOLIC BLOOD PRESSURE: 62 MMHG | HEART RATE: 63 BPM

## 2024-03-07 DIAGNOSIS — E78.00 PURE HYPERCHOLESTEROLEMIA: ICD-10-CM

## 2024-03-07 DIAGNOSIS — E66.09 NON MORBID OBESITY DUE TO EXCESS CALORIES: Chronic | ICD-10-CM

## 2024-03-07 DIAGNOSIS — E78.6 HDL DEFICIENCY: ICD-10-CM

## 2024-03-07 DIAGNOSIS — I25.10 CORONARY ARTERY DISEASE INVOLVING NATIVE CORONARY ARTERY OF NATIVE HEART WITHOUT ANGINA PECTORIS: Chronic | ICD-10-CM

## 2024-03-07 DIAGNOSIS — R73.01 IFG (IMPAIRED FASTING GLUCOSE): ICD-10-CM

## 2024-03-07 DIAGNOSIS — I10 BENIGN ESSENTIAL HYPERTENSION: ICD-10-CM

## 2024-03-07 PROCEDURE — 99214 OFFICE O/P EST MOD 30 MIN: CPT | Performed by: PHYSICIAN ASSISTANT

## 2024-03-07 RX ORDER — CHLORTHALIDONE 25 MG/1
12.5 TABLET ORAL DAILY
Qty: 45 TABLET | Refills: 3 | Status: SHIPPED | OUTPATIENT
Start: 2024-03-07

## 2024-03-07 NOTE — PATIENT INSTRUCTIONS
Decrease chlorthalidone to 12.5 mg (1/2 tablet) once daily.   See Dr. Jolley with labs in September.  Try to call in April to make this appt.

## 2024-03-07 NOTE — PROGRESS NOTES
CARDIOLOGY CLINIC PROGRESS NOTE    DOS: 2024      Terrence Murillo  : 1945, 78 year old  MRN: [id number]      History:   I am following up with Terrence Murillo today in the cardiology clinic. He is a patient of Dr. Jolley. He has been a patient of Dr. Jolley since .      Terrence Murillo is a very pleasant 78 year old man with history of CAD. He underwent coronary atherectomy in  with subsequent restenosis x2, treated by balloon angioplasty.  In , two Xience drug-eluting stents were placed in his proximal and mid left anterior descending artery. Also HTN, dyslipidemia, pituitary macroadenoma s/p resection in , sleep apnea, spinal stenosis, orthopedic issues, tremor (declines treatment).     His son, Doug,  suddenly in  from what sounds like a cardiac arrest.  His dad also  of an apparent heart attack at age 44.  His grandmother  suddenly at 21 for uncertain reasons.  His wife had pulmonary artery hypertension, liver for almost a decade after diagnosis, but passed away in 2022.      Unfortunately, Terrence has developed several orthopedic issues over the years, decreasing his ability to exercise, which is how he always controlled his weight.  He was never very disciplined about eating and drinking but was always good about exercise.       2021, he was having chest discomfort that did sound anginal. After some discussion, it was decided we would take a conservative approach, but if he were to have a recrudescence of symptoms, we would proceed to the Cardiac Catheterization Lab.     Interval History:   He presents today for follow up.   He is doing well from a cardiac perspective.   No new concerns.   BP is running 100.   Not LH.       ROS:  Skin:  not assessed     Eyes:  not assessed    ENT:  not assessed    Respiratory:  Positive for sleep apnea;CPAP  Cardiovascular:  Negative    Gastroenterology: not assessed    Genitourinary:  not assessed    Musculoskeletal:  not  assessed    Neurologic:  not assessed    Psychiatric:  not assessed    Heme/Lymph/Imm:  not assessed    Endocrine:  not assessed      PAST MEDICAL HISTORY:  Past Medical History:   Diagnosis Date    Coronary artery disease     cardiac cath 2016: medical management; cath : SUSANA x2 to LAD; cath : PTCA to LAD; cath : PTCA to LAD, : atherectomy of LAD    Hearing problem     Hyperlipidaemia     Hypertension     Obese     Obstructive sleep apnea     Reduced vision     Sleep apnea     CPAP       PAST SURGICAL HISTORY:  Past Surgical History:   Procedure Laterality Date    APPENDECTOMY OPEN      ARTHROPLASTY KNEE Left     ARTHROPLASTY SHOULDER Right     ENT SURGERY      HEART CATH, ANGIOPLASTY      LAD  atherectomy with a DVI device     HEART CATH, ANGIOPLASTY  4-28-10    PTCA and stent proximal and mid LAD (2) stents Xience    HEART CATH, ANGIOPLASTY  2016    no stenosis greater than 25%-rec. medical management    OPTICAL TRACKING SYSTEM ENDOSCOPIC RESECTION TUMOR CRANIAL N/A 11/3/2017    Procedure: OPTICAL TRACKING SYSTEM ENDOSCOPIC RESECTION TUMOR CRANIAL;  Stealth Guided Endoscopic Transnasal Resection of Pituitary Tumor;  Surgeon: Amanda Bates MD;  Location:  OR       SOCIAL HISTORY:  Social History     Socioeconomic History    Marital status:    Tobacco Use    Smoking status: Former     Packs/day: 0.20     Years: 5.00     Pack years: 1.00     Types: Cigarettes     Start date:      Quit date: 1960     Years since quittin.0    Smokeless tobacco: Never   Substance and Sexual Activity    Alcohol use: Yes     Alcohol/week: 1.0 - 2.0 standard drink     Types: 1 - 2 Standard drinks or equivalent per week     Comment: 2 cocktails per day    Drug use: No    Sexual activity: Not Currently     Partners: Female     Birth control/protection: Male Surgical   Other Topics Concern    Parent/sibling w/ CABG, MI or angioplasty before 65F 55M? No    Caffeine Concern Yes      Comment: coffee     Sleep Concern No    Stress Concern No    Weight Concern Yes     Comment: Weight decrease 10 lbs    Special Diet Yes     Comment: Mediterranean diet    Exercise Yes     Comment: Walking 5 days per week 35- 60 minutes    Seat Belt Yes       FAMILY HISTORY:  Family History   Problem Relation Age of Onset    C.A.D. Father     Heart Disease Father          at age 44    Heart Disease Son 37        enlarged heart    Heart Disease Mother          at age 80 after heart surgery    Heart Surgery Mother         open heart, passed 10 days after    Diabetes Mother     Family History Negative Maternal Grandmother     Family History Negative Maternal Grandfather     Family History Negative Paternal Grandmother     Family History Negative Paternal Grandfather     Family History Negative Brother     Alzheimer Disease Brother     Family History Negative Sister     Family History Negative Daughter     Family History Negative Son     Family History Negative Daughter     Heart Disease Son          at age 37       MEDS: alfuzosin ER (UROXATRAL) 10 MG 24 hr tablet, Take 10 mg by mouth daily  amoxicillin (AMOXIL) 500 MG capsule, Take before dental appointments  aspirin 81 MG tablet, Take 1 tablet (81 mg) by mouth every evening  ezetimibe (ZETIA) 10 MG tablet, Take 1 tablet (10 mg) by mouth daily  Glucosamine 500 MG CAPS, Take 500 mg by mouth 2 times daily   ibuprofen (ADVIL/MOTRIN) 200 MG tablet, Take 400 mg by mouth daily  levothyroxine (SYNTHROID/LEVOTHROID) 100 MCG tablet, Take 1 tablet (100 mcg) by mouth daily  metoprolol tartrate (LOPRESSOR) 25 MG tablet, Take 1 tablet (25 mg) by mouth 2 times daily  Multiple Vitamin (MULTI-VITAMIN) per tablet, Take 1 tablet by mouth every morning   nitroGLYcerin (NITROSTAT) 0.4 MG sublingual tablet, Place 1 tablet (0.4 mg) under the tongue every 5 minutes as needed for chest pain May repeat x2, if chest pain contines after 3rd dose call 911  ramipril (ALTACE) 10 MG  "capsule, Take 1 capsule (10 mg) by mouth daily  rosuvastatin (CRESTOR) 40 MG tablet, Take 1 tablet (40 mg) by mouth daily  saw palmetto 450 MG CAPS capsule, Take 450 mg by mouth 2 times daily  testosterone (ANDROGEL 1.62 % PUMP) 20.25 MG/ACT gel, TAKE 2 PUMPS DAILY TO SKIN ON TUESDAYS/THURSDAYS/SATURDAYS/SUNDAYS, TAKE 3 PUMPS DAILY ON MONDAYS/WEDNESDAYS/FRIDAYS    No current facility-administered medications on file prior to visit.      ALLERGIES:   Allergies   Allergen Reactions    Cardizem [Diltiazem Hcl] Itching    Cats Hives     WATERY EYES, SNEEZE, AND COUGH    Diltiazem Hives and Rash       PHYSICAL EXAM:  Vitals: /62 (BP Location: Right arm, Patient Position: Sitting, Cuff Size: Adult Large)   Pulse 63   Ht 1.778 m (5' 10\")   Wt 102.5 kg (226 lb)   SpO2 96%   BMI 32.43 kg/m    Constitutional:  cooperative, alert and oriented, well developed, well nourished, in no acute distress obese [place] hearing aids    Skin:  warm and dry to the touch, no apparent skin lesions or masses noted        Head:  normocephalic, no masses or lesions        Eyes:  pupils equal and round;conjunctivae and lids unremarkable;sclera white        ENT:  no pallor or cyanosis, dentition good        Neck:  no carotid bruit;JVP normal        Respiratory:  clear to auscultation;normal symmetry        Cardiac: regular rhythm, normal S1/S2, no S3 or S4, apical impulse not displaced, no murmurs, gallops or rubs                  GI:  abdomen soft obese      Vascular: pulses full and equal                                      Extremities and Musculoskeletal:  no spinal abnormalities noted;normal muscle strength and tone        Neurological:  no gross motor deficits;affect appropriate              LABS/DATA:  I reviewed the following:  Component      Latest Ref Rng 8/29/2023  8:04 AM   Sodium      136 - 145 mmol/L 138    Potassium      3.4 - 5.3 mmol/L 4.5    Chloride      98 - 107 mmol/L 102    Carbon Dioxide (CO2)      22 - 29 mmol/L 25  "   Anion Gap      7 - 15 mmol/L 11    Urea Nitrogen      8.0 - 23.0 mg/dL 25.2 (H)    Creatinine      0.67 - 1.17 mg/dL 1.07    Calcium      8.8 - 10.2 mg/dL 9.3    Glucose      70 - 99 mg/dL 106 (H)    GFR Estimate      >60 mL/min/1.73m2 71    Cholesterol      <200 mg/dL 106    Triglycerides      <150 mg/dL 62    HDL Cholesterol      >=40 mg/dL 43    LDL Cholesterol Calculated      <=100 mg/dL 51    Non HDL Cholesterol      <130 mg/dL 63    ALT      0 - 70 U/L 18    Testosterone Total      240 - 950 ng/dL       Legend:  (H) High        ASSESSMENT/PLAN:  CAD  - Last evaluation of his coronary anatomy was an angiogram in 2016.  He did have branch vessel disease and a jailed diagonal that we ultimately decided to treat medically.   - Currently no angina, if he has recurrent episodes of chest pain, Dr. Jolley would consider going to cath lab  - Continue ASA, BB, ACEi, statin, Zetia  - Continued efforts at healthy lifestyle.         HTN  - BP a little over controlled with  on ramipril 10 mg, metoprolol 25 mg BID, chlorthalidone 25 mg  - BMP shows BUN and creat are just mildly up from prior  - Decrease chlorthalidone to 12.5 mg once daily  - Working on weight loss        Dyslipidemia, controlled  - Fasting lipid profile 8/29/23: Total cholesterol is 106, HDL is 43, LDL is 51, triglycerides are 62 on Crestor 40 mg and Zetia 10 mg.             Follow up:  Dr. Jolley with FLP, ALT, BMP in Sept 2024        Anna Marie Valdivia PA-C

## 2024-03-07 NOTE — LETTER
3/7/2024    Alexy Hernandez MD  [address] [place] [address]    RE: Terrence Murillo       Dear Colleague,     I had the pleasure of seeing Terrence Murillo in the [facility].      CARDIOLOGY CLINIC PROGRESS NOTE    DOS: 2024      Terrence Murillo  : 1945, 78 year old  MRN: [id number]      History:   I am following up with Terrence Murillo today in the cardiology clinic. He is a patient of Dr. Jolley. He has been a patient of Dr. Jolley since .      Terrence Murillo is a very pleasant 78 year old man with history of CAD. He underwent coronary atherectomy in  with subsequent restenosis x2, treated by balloon angioplasty.  In , two Xience drug-eluting stents were placed in his proximal and mid left anterior descending artery. Also HTN, dyslipidemia, pituitary macroadenoma s/p resection in , sleep apnea, spinal stenosis, orthopedic issues, tremor (declines treatment).     His son, Doug,  suddenly in  from what sounds like a cardiac arrest.  His dad also  of an apparent heart attack at age 44.  His grandmother  suddenly at 21 for uncertain reasons.  His wife had pulmonary artery hypertension, liver for almost a decade after diagnosis, but passed away in 2022.      Unfortunately, Terrence has developed several orthopedic issues over the years, decreasing his ability to exercise, which is how he always controlled his weight.  He was never very disciplined about eating and drinking but was always good about exercise.       2021, he was having chest discomfort that did sound anginal. After some discussion, it was decided we would take a conservative approach, but if he were to have a recrudescence of symptoms, we would proceed to the Cardiac Catheterization Lab.     Interval History:   He presents today for follow up.   He is doing well from a cardiac perspective.   No new concerns.   BP is running 100.   Not LH.       ROS:  Skin:  not assessed      Eyes:  not assessed    ENT:  not assessed    Respiratory:  Positive for sleep apnea;CPAP  Cardiovascular:  Negative    Gastroenterology: not assessed    Genitourinary:  not assessed    Musculoskeletal:  not assessed    Neurologic:  not assessed    Psychiatric:  not assessed    Heme/Lymph/Imm:  not assessed    Endocrine:  not assessed      PAST MEDICAL HISTORY:  Past Medical History:   Diagnosis Date    Coronary artery disease     cardiac cath 2016: medical management; cath : SUSANA x2 to LAD; cath : PTCA to LAD; cath : PTCA to LAD, : atherectomy of LAD    Hearing problem     Hyperlipidaemia     Hypertension     Obese     Obstructive sleep apnea     Reduced vision     Sleep apnea     CPAP       PAST SURGICAL HISTORY:  Past Surgical History:   Procedure Laterality Date    APPENDECTOMY OPEN      ARTHROPLASTY KNEE Left     ARTHROPLASTY SHOULDER Right     ENT SURGERY      HEART CATH, ANGIOPLASTY      LAD  atherectomy with a DVI device     HEART CATH, ANGIOPLASTY  4-28-10    PTCA and stent proximal and mid LAD (2) stents Xience    HEART CATH, ANGIOPLASTY  2016    no stenosis greater than 25%-rec. medical management    OPTICAL TRACKING SYSTEM ENDOSCOPIC RESECTION TUMOR CRANIAL N/A 11/3/2017    Procedure: OPTICAL TRACKING SYSTEM ENDOSCOPIC RESECTION TUMOR CRANIAL;  Stealth Guided Endoscopic Transnasal Resection of Pituitary Tumor;  Surgeon: Amanda Bates MD;  Location:  OR       SOCIAL HISTORY:  Social History     Socioeconomic History    Marital status:    Tobacco Use    Smoking status: Former     Packs/day: 0.20     Years: 5.00     Pack years: 1.00     Types: Cigarettes     Start date:      Quit date: 1960     Years since quittin.0    Smokeless tobacco: Never   Substance and Sexual Activity    Alcohol use: Yes     Alcohol/week: 1.0 - 2.0 standard drink     Types: 1 - 2 Standard drinks or equivalent per week     Comment: 2 cocktails per day    Drug use: No     Sexual activity: Not Currently     Partners: Female     Birth control/protection: Male Surgical   Other Topics Concern    Parent/sibling w/ CABG, MI or angioplasty before 65F 55M? No    Caffeine Concern Yes     Comment: coffee     Sleep Concern No    Stress Concern No    Weight Concern Yes     Comment: Weight decrease 10 lbs    Special Diet Yes     Comment: Mediterranean diet    Exercise Yes     Comment: Walking 5 days per week 35- 60 minutes    Seat Belt Yes       FAMILY HISTORY:  Family History   Problem Relation Age of Onset    C.A.D. Father     Heart Disease Father          at age 44    Heart Disease Son 37        enlarged heart    Heart Disease Mother          at age 80 after heart surgery    Heart Surgery Mother         open heart, passed 10 days after    Diabetes Mother     Family History Negative Maternal Grandmother     Family History Negative Maternal Grandfather     Family History Negative Paternal Grandmother     Family History Negative Paternal Grandfather     Family History Negative Brother     Alzheimer Disease Brother     Family History Negative Sister     Family History Negative Daughter     Family History Negative Son     Family History Negative Daughter     Heart Disease Son          at age 37       MEDS: alfuzosin ER (UROXATRAL) 10 MG 24 hr tablet, Take 10 mg by mouth daily  amoxicillin (AMOXIL) 500 MG capsule, Take before dental appointments  aspirin 81 MG tablet, Take 1 tablet (81 mg) by mouth every evening  ezetimibe (ZETIA) 10 MG tablet, Take 1 tablet (10 mg) by mouth daily  Glucosamine 500 MG CAPS, Take 500 mg by mouth 2 times daily   ibuprofen (ADVIL/MOTRIN) 200 MG tablet, Take 400 mg by mouth daily  levothyroxine (SYNTHROID/LEVOTHROID) 100 MCG tablet, Take 1 tablet (100 mcg) by mouth daily  metoprolol tartrate (LOPRESSOR) 25 MG tablet, Take 1 tablet (25 mg) by mouth 2 times daily  Multiple Vitamin (MULTI-VITAMIN) per tablet, Take 1 tablet by mouth every morning   nitroGLYcerin  "(NITROSTAT) 0.4 MG sublingual tablet, Place 1 tablet (0.4 mg) under the tongue every 5 minutes as needed for chest pain May repeat x2, if chest pain contines after 3rd dose call 911  ramipril (ALTACE) 10 MG capsule, Take 1 capsule (10 mg) by mouth daily  rosuvastatin (CRESTOR) 40 MG tablet, Take 1 tablet (40 mg) by mouth daily  saw palmetto 450 MG CAPS capsule, Take 450 mg by mouth 2 times daily  testosterone (ANDROGEL 1.62 % PUMP) 20.25 MG/ACT gel, TAKE 2 PUMPS DAILY TO SKIN ON TUESDAYS/THURSDAYS/SATURDAYS/SUNDAYS, TAKE 3 PUMPS DAILY ON MONDAYS/WEDNESDAYS/FRIDAYS    No current facility-administered medications on file prior to visit.      ALLERGIES:   Allergies   Allergen Reactions    Cardizem [Diltiazem Hcl] Itching    Cats Hives     WATERY EYES, SNEEZE, AND COUGH    Diltiazem Hives and Rash       PHYSICAL EXAM:  Vitals: /62 (BP Location: Right arm, Patient Position: Sitting, Cuff Size: Adult Large)   Pulse 63   Ht 1.778 m (5' 10\")   Wt 102.5 kg (226 lb)   SpO2 96%   BMI 32.43 kg/m    Constitutional:  cooperative, alert and oriented, well developed, well nourished, in no acute distress obese [place] hearing aids    Skin:  warm and dry to the touch, no apparent skin lesions or masses noted        Head:  normocephalic, no masses or lesions        Eyes:  pupils equal and round;conjunctivae and lids unremarkable;sclera white        ENT:  no pallor or cyanosis, dentition good        Neck:  no carotid bruit;JVP normal        Respiratory:  clear to auscultation;normal symmetry        Cardiac: regular rhythm, normal S1/S2, no S3 or S4, apical impulse not displaced, no murmurs, gallops or rubs                  GI:  abdomen soft obese      Vascular: pulses full and equal                                      Extremities and Musculoskeletal:  no spinal abnormalities noted;normal muscle strength and tone        Neurological:  no gross motor deficits;affect appropriate              LABS/DATA:  I reviewed the " following:  Component      Latest Ref Rng 8/29/2023  8:04 AM   Sodium      136 - 145 mmol/L 138    Potassium      3.4 - 5.3 mmol/L 4.5    Chloride      98 - 107 mmol/L 102    Carbon Dioxide (CO2)      22 - 29 mmol/L 25    Anion Gap      7 - 15 mmol/L 11    Urea Nitrogen      8.0 - 23.0 mg/dL 25.2 (H)    Creatinine      0.67 - 1.17 mg/dL 1.07    Calcium      8.8 - 10.2 mg/dL 9.3    Glucose      70 - 99 mg/dL 106 (H)    GFR Estimate      >60 mL/min/1.73m2 71    Cholesterol      <200 mg/dL 106    Triglycerides      <150 mg/dL 62    HDL Cholesterol      >=40 mg/dL 43    LDL Cholesterol Calculated      <=100 mg/dL 51    Non HDL Cholesterol      <130 mg/dL 63    ALT      0 - 70 U/L 18    Testosterone Total      240 - 950 ng/dL       Legend:  (H) High        ASSESSMENT/PLAN:  CAD  - Last evaluation of his coronary anatomy was an angiogram in 2016.  He did have branch vessel disease and a jailed diagonal that we ultimately decided to treat medically.   - Currently no angina, if he has recurrent episodes of chest pain, Dr. Jolley would consider going to cath lab  - Continue ASA, BB, ACEi, statin, Zetia  - Continued efforts at healthy lifestyle.         HTN  - BP a little over controlled with  on ramipril 10 mg, metoprolol 25 mg BID, chlorthalidone 25 mg  - BMP shows BUN and creat are just mildly up from prior  - Decrease chlorthalidone to 12.5 mg once daily  - Working on weight loss        Dyslipidemia, controlled  - Fasting lipid profile 8/29/23: Total cholesterol is 106, HDL is 43, LDL is 51, triglycerides are 62 on Crestor 40 mg and Zetia 10 mg.             Follow up:  Dr. Jolley with FLP, ALT, BMP in Sept 2024        Anna Marie Valdivia PA-C    Thank you for allowing me to participate in the care of your patient.      Sincerely,     Anna Marie Valdivia PA-C     [facility]  cc:   Eagle Jolley MD  [address]  [place] [address]

## 2024-04-11 ENCOUNTER — OFFICE VISIT (OUTPATIENT)
Dept: OPHTHALMOLOGY | Facility: CLINIC | Age: 79
End: 2024-04-11
Attending: OPHTHALMOLOGY
Payer: COMMERCIAL

## 2024-04-11 DIAGNOSIS — H47.20 OPTIC ATROPHY: ICD-10-CM

## 2024-04-11 DIAGNOSIS — D49.7 PITUITARY TUMOR: Primary | ICD-10-CM

## 2024-04-11 DIAGNOSIS — H53.40 VISUAL FIELD DEFECT: Primary | ICD-10-CM

## 2024-04-11 PROCEDURE — 92083 EXTENDED VISUAL FIELD XM: CPT | Performed by: OPHTHALMOLOGY

## 2024-04-11 PROCEDURE — 92015 DETERMINE REFRACTIVE STATE: CPT

## 2024-04-11 PROCEDURE — 92014 COMPRE OPH EXAM EST PT 1/>: CPT | Performed by: OPHTHALMOLOGY

## 2024-04-11 PROCEDURE — 92133 CPTRZD OPH DX IMG PST SGM ON: CPT | Performed by: OPHTHALMOLOGY

## 2024-04-11 PROCEDURE — G0463 HOSPITAL OUTPT CLINIC VISIT: HCPCS | Performed by: OPHTHALMOLOGY

## 2024-04-11 ASSESSMENT — VISUAL ACUITY
OD_PH_CC+: -2
OD_CC+: -1
OD_PH_CC: 20/25
METHOD: SNELLEN - LINEAR
OD_CC: 20/40
OS_CC: 20/20
CORRECTION_TYPE: GLASSES

## 2024-04-11 ASSESSMENT — REFRACTION_WEARINGRX
OD_CYLINDER: +0.75
OD_AXIS: 143
SPECS_TYPE: PAL
OS_SPHERE: +0.25
OS_ADD: +2.50
OS_CYLINDER: +1.25
OS_AXIS: 032
OD_SPHERE: +0.50
OD_ADD: +2.50

## 2024-04-11 ASSESSMENT — REFRACTION
OS_CYLINDER: +0.25
OS_AXIS: 060
OD_ADD: +2.50
OD_SPHERE: -0.50
OS_SPHERE: PLANO
OD_CYLINDER: +0.75
OD_AXIS: 165
OS_ADD: +2.50

## 2024-04-11 ASSESSMENT — CONF VISUAL FIELD
OD_SUPERIOR_TEMPORAL_RESTRICTION: 0
OD_NORMAL: 1
OS_SUPERIOR_TEMPORAL_RESTRICTION: 0
OD_SUPERIOR_NASAL_RESTRICTION: 0
OD_INFERIOR_TEMPORAL_RESTRICTION: 0
OS_INFERIOR_TEMPORAL_RESTRICTION: 0
OD_INFERIOR_NASAL_RESTRICTION: 0
OS_INFERIOR_NASAL_RESTRICTION: 0
OS_NORMAL: 1
OS_SUPERIOR_NASAL_RESTRICTION: 0
METHOD: COUNTING FINGERS

## 2024-04-11 ASSESSMENT — TONOMETRY
IOP_METHOD: ICARE
OD_IOP_MMHG: 13
OS_IOP_MMHG: 16

## 2024-04-11 ASSESSMENT — CUP TO DISC RATIO
OS_RATIO: 0.2
OD_RATIO: 0.2

## 2024-04-11 ASSESSMENT — EXTERNAL EXAM - RIGHT EYE: OD_EXAM: NORMAL

## 2024-04-11 NOTE — NURSING NOTE
Chief Complaints and History of Present Illnesses   Patient presents with    Optic Atrophy Follow Up     Pituitary tumor  Optic atrophy      Chief Complaint(s) and History of Present Illness(es)       Optic Atrophy Follow Up              Laterality: both eyes    Associated symptoms: double vision.  Negative for eye pain, headache, photophobia, flashes and floaters    Treatments tried: artificial tears    Pain scale: 0/10    Comments: Pituitary tumor  Optic atrophy               Comments    Pt had double vision starting about 2 months ago RE that does not resolve when covering the LE. No pain.  No flashes/floaters.    AT's PRN.  No DM.    MARY Medina April 11, 2024 10:45 AM

## 2024-04-11 NOTE — PROGRESS NOTES
Assessment & Plan     Terrence Murillo is a 77 year old male with the following diagnoses:   1. Pituitary tumor    2. Optic atrophy         Terrence Murillo is a 77 year old male who presents for follow up of pituitary tumor. He was initially seen for bitemporal incongrous hemianopia and was found to have 3.2 x 2.5 a 2.0 cm enhancing sellar/suprasellar presumed pituitary macroadenoma. He underwent resection in 11/2017. Pathology report showed pituitary adenoma with multifocal fibrosis. His last MRI was January 2023 and showed stable findings.      Since last visit on 11/15/23, he reports that his vision is slightly worse or the same.      Corrected distance visual acuity was 20/25 -1 in the right eye and 20/20 -1 in the left eye. Intraocular pressure was 14 in the right eye and 16 in the left eye using [employer].  Color vision 14/14 right eye and 13/14 left eye.  Pupils isocoric without RAPD.  Anterior segment exam with PCIOL OU.  Fundus exam with stable optic atrophy.     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  We will follow annually.  His visual acuity is down RIGHT eye and he has monocular double vision in that eye.  His visual acuity can be improved to 20/25 RIGHT eye with a new prescription.            Attending Physician Attestation:  Complete documentation of historical and exam elements from today's encounter can be found in the full encounter summary report (not reduplicated in this progress note).  I personally obtained the chief complaint(s) and history of present illness.  I confirmed and edited as necessary the review of systems, past medical/surgical history, family history, social history, and examination findings as documented by others; and I examined the patient myself.  I personally reviewed the relevant tests, images, and reports as documented above.  I formulated and edited as necessary the assessment and plan and discussed the  findings and management plan with the patient and family. - Kendell Herndon MD

## 2024-04-30 DIAGNOSIS — I25.10 CORONARY ARTERY DISEASE INVOLVING NATIVE CORONARY ARTERY OF NATIVE HEART WITHOUT ANGINA PECTORIS: Chronic | ICD-10-CM

## 2024-04-30 RX ORDER — NITROGLYCERIN 0.4 MG/1
0.4 TABLET SUBLINGUAL EVERY 5 MIN PRN
Qty: 25 TABLET | Refills: 3 | Status: SHIPPED | OUTPATIENT
Start: 2024-04-30

## 2024-07-31 DIAGNOSIS — D35.2 PITUITARY MACROADENOMA (H): Primary | ICD-10-CM

## 2024-10-14 DIAGNOSIS — E78.5 HYPERLIPIDEMIA LDL GOAL <70: ICD-10-CM

## 2024-10-14 DIAGNOSIS — I10 BENIGN ESSENTIAL HYPERTENSION: ICD-10-CM

## 2024-10-14 DIAGNOSIS — I25.10 CORONARY ARTERY DISEASE INVOLVING NATIVE CORONARY ARTERY OF NATIVE HEART WITHOUT ANGINA PECTORIS: Chronic | ICD-10-CM

## 2024-10-14 RX ORDER — ROSUVASTATIN CALCIUM 40 MG/1
40 TABLET, COATED ORAL DAILY
Qty: 90 TABLET | Refills: 1 | Status: SHIPPED | OUTPATIENT
Start: 2024-10-14 | End: 2024-11-06

## 2024-10-14 RX ORDER — EZETIMIBE 10 MG/1
10 TABLET ORAL DAILY
Qty: 90 TABLET | Refills: 1 | Status: SHIPPED | OUTPATIENT
Start: 2024-10-14 | End: 2024-11-06

## 2024-11-04 ENCOUNTER — LAB (OUTPATIENT)
Dept: LAB | Facility: CLINIC | Age: 79
End: 2024-11-04
Payer: COMMERCIAL

## 2024-11-04 DIAGNOSIS — E78.5 HYPERLIPIDEMIA LDL GOAL <70: ICD-10-CM

## 2024-11-04 DIAGNOSIS — I25.10 CORONARY ARTERY DISEASE INVOLVING NATIVE CORONARY ARTERY OF NATIVE HEART WITHOUT ANGINA PECTORIS: ICD-10-CM

## 2024-11-04 DIAGNOSIS — I10 BENIGN ESSENTIAL HYPERTENSION: ICD-10-CM

## 2024-11-04 DIAGNOSIS — E78.5 HYPERLIPIDEMIA LDL GOAL <70: Primary | ICD-10-CM

## 2024-11-04 LAB
ALT SERPL W P-5'-P-CCNC: 17 U/L (ref 0–70)
ANION GAP SERPL CALCULATED.3IONS-SCNC: 11 MMOL/L (ref 7–15)
BUN SERPL-MCNC: 22.5 MG/DL (ref 8–23)
CALCIUM SERPL-MCNC: 9.2 MG/DL (ref 8.8–10.4)
CHLORIDE SERPL-SCNC: 104 MMOL/L (ref 98–107)
CHOLEST SERPL-MCNC: 106 MG/DL
CREAT SERPL-MCNC: 0.92 MG/DL (ref 0.67–1.17)
EGFRCR SERPLBLD CKD-EPI 2021: 84 ML/MIN/1.73M2
FASTING STATUS PATIENT QL REPORTED: YES
GLUCOSE SERPL-MCNC: 104 MG/DL (ref 70–99)
HCO3 SERPL-SCNC: 24 MMOL/L (ref 22–29)
HDLC SERPL-MCNC: 46 MG/DL
LDLC SERPL CALC-MCNC: 45 MG/DL
NONHDLC SERPL-MCNC: 60 MG/DL
POTASSIUM SERPL-SCNC: 4.9 MMOL/L (ref 3.4–5.3)
SODIUM SERPL-SCNC: 139 MMOL/L (ref 135–145)
TRIGL SERPL-MCNC: 75 MG/DL

## 2024-11-04 PROCEDURE — 84460 ALANINE AMINO (ALT) (SGPT): CPT | Performed by: INTERNAL MEDICINE

## 2024-11-04 PROCEDURE — 36415 COLL VENOUS BLD VENIPUNCTURE: CPT | Performed by: INTERNAL MEDICINE

## 2024-11-04 PROCEDURE — 80048 BASIC METABOLIC PNL TOTAL CA: CPT | Performed by: INTERNAL MEDICINE

## 2024-11-04 PROCEDURE — 80061 LIPID PANEL: CPT | Performed by: INTERNAL MEDICINE

## 2024-11-06 ENCOUNTER — OFFICE VISIT (OUTPATIENT)
Dept: CARDIOLOGY | Facility: CLINIC | Age: 79
End: 2024-11-06
Payer: COMMERCIAL

## 2024-11-06 VITALS
SYSTOLIC BLOOD PRESSURE: 132 MMHG | BODY MASS INDEX: 32.86 KG/M2 | HEART RATE: 54 BPM | DIASTOLIC BLOOD PRESSURE: 72 MMHG | WEIGHT: 229 LBS

## 2024-11-06 DIAGNOSIS — E78.5 HYPERLIPIDEMIA LDL GOAL <70: ICD-10-CM

## 2024-11-06 DIAGNOSIS — I10 BENIGN ESSENTIAL HYPERTENSION: ICD-10-CM

## 2024-11-06 DIAGNOSIS — E78.00 PURE HYPERCHOLESTEROLEMIA: ICD-10-CM

## 2024-11-06 DIAGNOSIS — E66.09 NON MORBID OBESITY DUE TO EXCESS CALORIES: Chronic | ICD-10-CM

## 2024-11-06 DIAGNOSIS — I25.10 CORONARY ARTERY DISEASE INVOLVING NATIVE CORONARY ARTERY OF NATIVE HEART WITHOUT ANGINA PECTORIS: Primary | Chronic | ICD-10-CM

## 2024-11-06 DIAGNOSIS — E78.6 HDL DEFICIENCY: ICD-10-CM

## 2024-11-06 PROCEDURE — 99214 OFFICE O/P EST MOD 30 MIN: CPT | Performed by: INTERNAL MEDICINE

## 2024-11-06 RX ORDER — METOPROLOL TARTRATE 25 MG/1
25 TABLET, FILM COATED ORAL 2 TIMES DAILY
Qty: 180 TABLET | Refills: 3 | Status: SHIPPED | OUTPATIENT
Start: 2024-11-06

## 2024-11-06 RX ORDER — EZETIMIBE 10 MG/1
10 TABLET ORAL DAILY
Qty: 90 TABLET | Refills: 3 | Status: SHIPPED | OUTPATIENT
Start: 2024-11-06

## 2024-11-06 RX ORDER — RAMIPRIL 10 MG/1
10 CAPSULE ORAL DAILY
Qty: 90 CAPSULE | Refills: 3 | Status: SHIPPED | OUTPATIENT
Start: 2024-11-06

## 2024-11-06 RX ORDER — CHLORTHALIDONE 25 MG/1
12.5 TABLET ORAL DAILY
Qty: 45 TABLET | Refills: 3 | Status: SHIPPED | OUTPATIENT
Start: 2024-11-06

## 2024-11-06 RX ORDER — ROSUVASTATIN CALCIUM 40 MG/1
40 TABLET, COATED ORAL DAILY
Qty: 90 TABLET | Refills: 3 | Status: SHIPPED | OUTPATIENT
Start: 2024-11-06

## 2024-11-06 NOTE — LETTER
2024    Yamilex Booker, DO  [facility] [address] [place] [address]    RE: Terrence Murillo       Dear Colleague,     I had the pleasure of seeing Terrence Murillo in the [facility].  HPI and Plan:    Terrence is a very nice 79-year-old gentleman with past medical history significant for directional coronary atherectomy in  with subsequent restenosis x2 for which we treated with balloon angioplasty.  In , 2 Xience drug-eluting stents were placed in his proximal and mid left anterior descending artery.  Terrence reminds me he is my longest standing patient.     Psychosocially, Terrence has had a tough go of it.  His son, Doug,  suddenly in  from what sounds like a myocardial infarction complicated by cardiac arrest.  Interestingly, his dad also  of an apparent heart attack at age 44.  His grandmother  suddenly at 21 for unclear reasons.  His wife  passed away 2022 in the [facility] system with her pulmonary hypertension.  She had lost a significant amount of weight, lived far longer than anybody anticipated but passed psychologically, this continues to haunt Terrence.  As we talk about it, he becomes tearful.  Has made changes including moving into senior living.  He is determined to try to make it healthy and is cooking his own meals.  Unfortunately it sounds like he may be having up to 3 drinks a day.       Unfortunately, Terrence has had several orthopedic issues over the years, decreasing his ability to exercise and this is how he always controlled his weight.  He was never very disciplined about eating and drinking but was always very good about his exercise.     Other issues include a pituitary adenoma which he had resected in 2018 and follows with Neurology.  Since I have seen him last he has been diagnosed with prostate cancer for which she had to stop his testosterone supplement which he states was terrible going through male menopause.  Other issues  in the last year include skin cancer removal and COVID for the first time in June.  The highlight of his year was a [place] fishing trip which he caught a bunch of walleye.     Terrence returns to clinic stating he thinks his heart is doing well.  He is not having any chest, arm, neck, jaw or shoulder discomfort.  He has no dyspnea on exertion, orthopnea or PND.  He has no palpitations, lightheadedness, dizziness, syncope or near syncope.      He states he just has the aches and pains typical of an 79-year-old.    He notes no side effects or problems with his current medical regiment    ASSESSMENT AND PLAN:  Terrence appears to be doing well from a cardiac standpoint without clinical evidence of ischemia, heart failure or significant arrhythmia.     Blood pressure is well-controlled previously we cut his chlorthalidone in half because of low blood pressures.   I have also told him the alcohol contributes to hypertension, lack of exercise, weight gain, salt in his diet and probably his ibuprofen.  He thinks he can cut his ibuprofen out and is planning on cutting back on his alcohol.  He is going to try to exercise more diligently.     Cholesterol is excellent with total cholesterol 106, HDL 46, LDL 45 and triglycerides of 75.  He has normal ALT     Creatinine is 0.92 with normal electrolytes.     We reviewed and I refilled all of his medications.  I will plan on seeing him back in 6 months.  If he should have any problems would be glad to see him sooner.     Thank you for allowing me to participate in his care.     Eagle Jolley MD, [facility]    Today's clinic visit entailed:  Review of the result(s) of each unique test - lab work  Ordering of each unique test  Prescription drug management  30 minutes spent by me on the date of the encounter doing chart review, history and exam, documentation and further activities per the note  Provider  Link to [facility] Help Grid           Orders Placed This Encounter   Procedures      Follow-Up with Cardiology       Orders Placed This Encounter   Medications     chlorthalidone (HYGROTON) 25 MG tablet     Sig: Take 0.5 tablets (12.5 mg) by mouth daily.     Dispense:  45 tablet     Refill:  3     ezetimibe (ZETIA) 10 MG tablet     Sig: Take 1 tablet (10 mg) by mouth daily.     Dispense:  90 tablet     Refill:  3     metoprolol tartrate (LOPRESSOR) 25 MG tablet     Sig: Take 1 tablet (25 mg) by mouth 2 times daily.     Dispense:  180 tablet     Refill:  3     ramipril (ALTACE) 10 MG capsule     Sig: Take 1 capsule (10 mg) by mouth daily.     Dispense:  90 capsule     Refill:  3     rosuvastatin (CRESTOR) 40 MG tablet     Sig: Take 1 tablet (40 mg) by mouth daily.     Dispense:  90 tablet     Refill:  3       Medications Discontinued During This Encounter   Medication Reason     metoprolol tartrate (LOPRESSOR) 25 MG tablet Reorder (No AVS)     ramipril (ALTACE) 10 MG capsule Reorder (No AVS)     chlorthalidone (HYGROTON) 25 MG tablet Reorder (No AVS)     ezetimibe (ZETIA) 10 MG tablet Reorder (No AVS)     rosuvastatin (CRESTOR) 40 MG tablet Reorder (No AVS)     testosterone (ANDROGEL 1.62 % PUMP) 20.25 MG/ACT gel          Encounter Diagnoses   Name Primary?     Benign essential hypertension      Hyperlipidemia LDL goal <70      Coronary artery disease involving native coronary artery of native heart without angina pectoris Yes     Pure hypercholesterolemia      Non morbid obesity due to excess calories      HDL deficiency        CURRENT MEDICATIONS:  Current Outpatient Medications   Medication Sig Dispense Refill     alfuzosin ER (UROXATRAL) 10 MG 24 hr tablet Take 10 mg by mouth daily       amoxicillin (AMOXIL) 500 MG capsule Take before dental appointments       aspirin 81 MG tablet Take 1 tablet (81 mg) by mouth every evening 30 tablet 0     chlorthalidone (HYGROTON) 25 MG tablet Take 0.5 tablets (12.5 mg) by mouth daily. 45 tablet 3     ezetimibe (ZETIA) 10 MG tablet Take 1 tablet (10 mg) by  mouth daily. 90 tablet 3     Glucosamine 500 MG CAPS Take 500 mg by mouth 2 times daily        ibuprofen (ADVIL/MOTRIN) 200 MG tablet Take 400 mg by mouth daily       levothyroxine (SYNTHROID/LEVOTHROID) 100 MCG tablet Take 1 tablet (100 mcg) by mouth daily 90 tablet 3     metoprolol tartrate (LOPRESSOR) 25 MG tablet Take 1 tablet (25 mg) by mouth 2 times daily. 180 tablet 3     Multiple Vitamin (MULTI-VITAMIN) per tablet Take 1 tablet by mouth every morning        nitroGLYcerin (NITROSTAT) 0.4 MG sublingual tablet Place 1 tablet (0.4 mg) under the tongue every 5 minutes as needed for chest pain May repeat x2, if chest pain contines after 3rd dose call 911 25 tablet 3     ramipril (ALTACE) 10 MG capsule Take 1 capsule (10 mg) by mouth daily. 90 capsule 3     rosuvastatin (CRESTOR) 40 MG tablet Take 1 tablet (40 mg) by mouth daily. 90 tablet 3     saw palmetto 450 MG CAPS capsule Take 450 mg by mouth 2 times daily         ALLERGIES     Allergies   Allergen Reactions     Cardizem [Diltiazem Hcl] Itching     Cats Hives     WATERY EYES, SNEEZE, AND COUGH     Diltiazem Hives and Rash       PAST MEDICAL HISTORY:  Past Medical History:   Diagnosis Date     Coronary artery disease     cardiac cath 2016: medical management; cath 2010: SUSANA x2 to LAD; cath 1993: PTCA to LAD; cath 1992: PTCA to LAD, 1992: atherectomy of LAD     Hearing problem      Hyperlipidaemia      Hypertension      Obese      Obstructive sleep apnea      Reduced vision      Sleep apnea     CPAP       PAST SURGICAL HISTORY:  Past Surgical History:   Procedure Laterality Date     APPENDECTOMY OPEN       ARTHROPLASTY KNEE Left      ARTHROPLASTY SHOULDER Right      ENT SURGERY       HEART CATH, ANGIOPLASTY  1992    LAD  atherectomy with a DVI device      HEART CATH, ANGIOPLASTY  4-28-10    PTCA and stent proximal and mid LAD (2) stents Xience     HEART CATH, ANGIOPLASTY  04/20/2016    no stenosis greater than 25%-rec. medical management     OPTICAL TRACKING  SYSTEM ENDOSCOPIC RESECTION TUMOR CRANIAL N/A 11/3/2017    Procedure: OPTICAL TRACKING SYSTEM ENDOSCOPIC RESECTION TUMOR CRANIAL;  Stealth Guided Endoscopic Transnasal Resection of Pituitary Tumor;  Surgeon: Amanda Bates MD;  Location:  OR       FAMILY HISTORY:  Family History   Problem Relation Age of Onset     C.A.D. Father      Heart Disease Father          at age 44     Heart Disease Son 37        enlarged heart     Heart Disease Mother          at age 80 after heart surgery     Heart Surgery Mother         open heart, passed 10 days after     Diabetes Mother      Family History Negative Maternal Grandmother      Family History Negative Maternal Grandfather      Family History Negative Paternal Grandmother      Family History Negative Paternal Grandfather      Family History Negative Brother      Alzheimer Disease Brother      Family History Negative Sister      Family History Negative Daughter      Family History Negative Son      Family History Negative Daughter      Heart Disease Son          at age 37       SOCIAL HISTORY:  Social History     Socioeconomic History     Marital status:      Spouse name: None     Number of children: None     Years of education: None     Highest education level: None   Tobacco Use     Smoking status: Never     Smokeless tobacco: Never   Substance and Sexual Activity     Alcohol use: Yes     Alcohol/week: 1.0 - 2.0 standard drink of alcohol     Types: 1 - 2 Standard drinks or equivalent per week     Comment: 1 cocktail and some wine per day     Drug use: No     Sexual activity: Not Currently     Partners: Female     Birth control/protection: Male Surgical   Other Topics Concern     Parent/sibling w/ CABG, MI or angioplasty before 65F 55M? No     Caffeine Concern Yes     Comment: coffee      Sleep Concern No     Stress Concern No     Weight Concern Yes     Comment: Weight decrease 10 lbs     Special Diet Yes     Comment: Mediterranean diet      Exercise Yes     Comment: Walking 5 days per week 35- 60 minutes     Seat Belt Yes     Social Drivers of Health     Interpersonal Safety: Unknown (8/26/2024)    Received from [facility]    Humiliation, Afraid, Rape, and Kick questionnaire      Emotionally Abused: No      Physically Abused: No      Sexually Abused: No       Review of Systems:  Skin:          Eyes:         ENT:         Respiratory:          Cardiovascular:         Gastroenterology:        Genitourinary:         Musculoskeletal:         Neurologic:         Psychiatric:         Heme/Lymph/Imm:         Endocrine:           Physical Exam:  Vitals: /72 (BP Location: Left arm, Patient Position: Sitting)   Pulse 54   Wt 103.9 kg (229 lb)   BMI 32.86 kg/m      Constitutional:  cooperative, alert and oriented, well developed, well nourished, in no acute distress obese [place] not wearing his hearing aids    Skin:  warm and dry to the touch, no apparent skin lesions or masses noted          Head:  normocephalic, no masses or lesions        Eyes:  pupils equal and round;conjunctivae and lids unremarkable;sclera white        Lymph:      ENT:  no pallor or cyanosis, dentition good        Neck:  no carotid bruit        Respiratory:  normal breath sounds, clear to auscultation, normal A-P diameter, normal symmetry, normal respiratory excursion, no use of accessory muscles         Cardiac: regular rhythm     no presence of murmur          pulses full and equal                                        GI:    obese      Extremities and Muscular Skeletal:  no spinal abnormalities noted;normal muscle strength and tone   bilateral LE edema;trace;telangiectasia;varicose vein          Neurological:  no gross motor deficits        Psych:  affect appropriate, oriented to time, person and place        CC  Eagle Jolley MD  [id number]  [place] [id number]                  Thank you for allowing me to participate in the care of your patient.      Sincerely,      Eagle Jolley MD     [facility]  cc:   Eagle Jolley MD  [address]  [place] [id number]

## 2024-11-06 NOTE — PROGRESS NOTES
HPI and Plan:    Terrence is a very nice 79-year-old gentleman with past medical history significant for directional coronary atherectomy in  with subsequent restenosis x2 for which we treated with balloon angioplasty.  In , 2 Xience drug-eluting stents were placed in his proximal and mid left anterior descending artery.  Terrence reminds me he is my longest standing patient.     Psychosocially, Terrence has had a tough go of it.  His son, Doug,  suddenly in  from what sounds like a myocardial infarction complicated by cardiac arrest.  Interestingly, his dad also  of an apparent heart attack at age 44.  His grandmother  suddenly at 21 for unclear reasons.  His wife  passed away 2022 in the [facility] system with her pulmonary hypertension.  She had lost a significant amount of weight, lived far longer than anybody anticipated but passed psychologically, this continues to haunt Terrence.  As we talk about it, he becomes tearful.  Has made changes including moving into senior living.  He is determined to try to make it healthy and is cooking his own meals.  Unfortunately it sounds like he may be having up to 3 drinks a day.       Unfortunately, Terrence has had several orthopedic issues over the years, decreasing his ability to exercise and this is how he always controlled his weight.  He was never very disciplined about eating and drinking but was always very good about his exercise.     Other issues include a pituitary adenoma which he had resected in 2018 and follows with Neurology.  Since I have seen him last he has been diagnosed with prostate cancer for which she had to stop his testosterone supplement which he states was terrible going through male menopause.  Other issues in the last year include skin cancer removal and COVID for the first time in .  The highlight of his year was a [place] fishing trip which he caught a bunch of walleye.     Terrence returns to clinic stating he thinks his  heart is doing well.  He is not having any chest, arm, neck, jaw or shoulder discomfort.  He has no dyspnea on exertion, orthopnea or PND.  He has no palpitations, lightheadedness, dizziness, syncope or near syncope.      He states he just has the aches and pains typical of an 79-year-old.    He notes no side effects or problems with his current medical regiment    ASSESSMENT AND PLAN:  Terrence appears to be doing well from a cardiac standpoint without clinical evidence of ischemia, heart failure or significant arrhythmia.     Blood pressure is well-controlled previously we cut his chlorthalidone in half because of low blood pressures.   I have also told him the alcohol contributes to hypertension, lack of exercise, weight gain, salt in his diet and probably his ibuprofen.  He thinks he can cut his ibuprofen out and is planning on cutting back on his alcohol.  He is going to try to exercise more diligently.     Cholesterol is excellent with total cholesterol 106, HDL 46, LDL 45 and triglycerides of 75.  He has normal ALT     Creatinine is 0.92 with normal electrolytes.     We reviewed and I refilled all of his medications.  I will plan on seeing him back in 6 months.  If he should have any problems would be glad to see him sooner.     Thank you for allowing me to participate in his care.     Eagle Jolley MD, [facility]    Today's clinic visit entailed:  Review of the result(s) of each unique test - lab work  Ordering of each unique test  Prescription drug management  30 minutes spent by me on the date of the encounter doing chart review, history and exam, documentation and further activities per the note  Provider  Link to [facility] Help Grid           Orders Placed This Encounter   Procedures    Follow-Up with Cardiology       Orders Placed This Encounter   Medications    chlorthalidone (HYGROTON) 25 MG tablet     Sig: Take 0.5 tablets (12.5 mg) by mouth daily.     Dispense:  45 tablet     Refill:  3    ezetimibe (ZETIA)  10 MG tablet     Sig: Take 1 tablet (10 mg) by mouth daily.     Dispense:  90 tablet     Refill:  3    metoprolol tartrate (LOPRESSOR) 25 MG tablet     Sig: Take 1 tablet (25 mg) by mouth 2 times daily.     Dispense:  180 tablet     Refill:  3    ramipril (ALTACE) 10 MG capsule     Sig: Take 1 capsule (10 mg) by mouth daily.     Dispense:  90 capsule     Refill:  3    rosuvastatin (CRESTOR) 40 MG tablet     Sig: Take 1 tablet (40 mg) by mouth daily.     Dispense:  90 tablet     Refill:  3       Medications Discontinued During This Encounter   Medication Reason    metoprolol tartrate (LOPRESSOR) 25 MG tablet Reorder (No AVS)    ramipril (ALTACE) 10 MG capsule Reorder (No AVS)    chlorthalidone (HYGROTON) 25 MG tablet Reorder (No AVS)    ezetimibe (ZETIA) 10 MG tablet Reorder (No AVS)    rosuvastatin (CRESTOR) 40 MG tablet Reorder (No AVS)    testosterone (ANDROGEL 1.62 % PUMP) 20.25 MG/ACT gel          Encounter Diagnoses   Name Primary?    Benign essential hypertension     Hyperlipidemia LDL goal <70     Coronary artery disease involving native coronary artery of native heart without angina pectoris Yes    Pure hypercholesterolemia     Non morbid obesity due to excess calories     HDL deficiency        CURRENT MEDICATIONS:  Current Outpatient Medications   Medication Sig Dispense Refill    alfuzosin ER (UROXATRAL) 10 MG 24 hr tablet Take 10 mg by mouth daily      amoxicillin (AMOXIL) 500 MG capsule Take before dental appointments      aspirin 81 MG tablet Take 1 tablet (81 mg) by mouth every evening 30 tablet 0    chlorthalidone (HYGROTON) 25 MG tablet Take 0.5 tablets (12.5 mg) by mouth daily. 45 tablet 3    ezetimibe (ZETIA) 10 MG tablet Take 1 tablet (10 mg) by mouth daily. 90 tablet 3    Glucosamine 500 MG CAPS Take 500 mg by mouth 2 times daily       ibuprofen (ADVIL/MOTRIN) 200 MG tablet Take 400 mg by mouth daily      levothyroxine (SYNTHROID/LEVOTHROID) 100 MCG tablet Take 1 tablet (100 mcg) by mouth daily  90 tablet 3    metoprolol tartrate (LOPRESSOR) 25 MG tablet Take 1 tablet (25 mg) by mouth 2 times daily. 180 tablet 3    Multiple Vitamin (MULTI-VITAMIN) per tablet Take 1 tablet by mouth every morning       nitroGLYcerin (NITROSTAT) 0.4 MG sublingual tablet Place 1 tablet (0.4 mg) under the tongue every 5 minutes as needed for chest pain May repeat x2, if chest pain contines after 3rd dose call 911 25 tablet 3    ramipril (ALTACE) 10 MG capsule Take 1 capsule (10 mg) by mouth daily. 90 capsule 3    rosuvastatin (CRESTOR) 40 MG tablet Take 1 tablet (40 mg) by mouth daily. 90 tablet 3    saw palmetto 450 MG CAPS capsule Take 450 mg by mouth 2 times daily         ALLERGIES     Allergies   Allergen Reactions    Cardizem [Diltiazem Hcl] Itching    Cats Hives     WATERY EYES, SNEEZE, AND COUGH    Diltiazem Hives and Rash       PAST MEDICAL HISTORY:  Past Medical History:   Diagnosis Date    Coronary artery disease     cardiac cath 2016: medical management; cath 2010: SUSANA x2 to LAD; cath 1993: PTCA to LAD; cath 1992: PTCA to LAD, 1992: atherectomy of LAD    Hearing problem     Hyperlipidaemia     Hypertension     Obese     Obstructive sleep apnea     Reduced vision     Sleep apnea     CPAP       PAST SURGICAL HISTORY:  Past Surgical History:   Procedure Laterality Date    APPENDECTOMY OPEN      ARTHROPLASTY KNEE Left     ARTHROPLASTY SHOULDER Right     ENT SURGERY      HEART CATH, ANGIOPLASTY  1992    LAD  atherectomy with a DVI device     HEART CATH, ANGIOPLASTY  4-28-10    PTCA and stent proximal and mid LAD (2) stents Xience    HEART CATH, ANGIOPLASTY  04/20/2016    no stenosis greater than 25%-rec. medical management    OPTICAL TRACKING SYSTEM ENDOSCOPIC RESECTION TUMOR CRANIAL N/A 11/3/2017    Procedure: OPTICAL TRACKING SYSTEM ENDOSCOPIC RESECTION TUMOR CRANIAL;  Stealth Guided Endoscopic Transnasal Resection of Pituitary Tumor;  Surgeon: Amanda Bates MD;  Location: [facility]       FAMILY  HISTORY:  Family History   Problem Relation Age of Onset    C.A.D. Father     Heart Disease Father          at age 44    Heart Disease Son 37        enlarged heart    Heart Disease Mother          at age 80 after heart surgery    Heart Surgery Mother         open heart, passed 10 days after    Diabetes Mother     Family History Negative Maternal Grandmother     Family History Negative Maternal Grandfather     Family History Negative Paternal Grandmother     Family History Negative Paternal Grandfather     Family History Negative Brother     Alzheimer Disease Brother     Family History Negative Sister     Family History Negative Daughter     Family History Negative Son     Family History Negative Daughter     Heart Disease Son          at age 37       SOCIAL HISTORY:  Social History     Socioeconomic History    Marital status:      Spouse name: None    Number of children: None    Years of education: None    Highest education level: None   Tobacco Use    Smoking status: Never    Smokeless tobacco: Never   Substance and Sexual Activity    Alcohol use: Yes     Alcohol/week: 1.0 - 2.0 standard drink of alcohol     Types: 1 - 2 Standard drinks or equivalent per week     Comment: 1 cocktail and some wine per day    Drug use: No    Sexual activity: Not Currently     Partners: Female     Birth control/protection: Male Surgical   Other Topics Concern    Parent/sibling w/ CABG, MI or angioplasty before 65F 55M? No    Caffeine Concern Yes     Comment: coffee     Sleep Concern No    Stress Concern No    Weight Concern Yes     Comment: Weight decrease 10 lbs    Special Diet Yes     Comment: Mediterranean diet    Exercise Yes     Comment: Walking 5 days per week 35- 60 minutes    Seat Belt Yes     Social Drivers of Health     Interpersonal Safety: Unknown (2024)    Received from [facility]    Humiliation, Afraid, Rape, and Kick questionnaire     Emotionally Abused: No     Physically Abused: No      Sexually Abused: No       Review of Systems:  Skin:          Eyes:         ENT:         Respiratory:          Cardiovascular:         Gastroenterology:        Genitourinary:         Musculoskeletal:         Neurologic:         Psychiatric:         Heme/Lymph/Imm:         Endocrine:           Physical Exam:  Vitals: /72 (BP Location: Left arm, Patient Position: Sitting)   Pulse 54   Wt 103.9 kg (229 lb)   BMI 32.86 kg/m      Constitutional:  cooperative, alert and oriented, well developed, well nourished, in no acute distress obese [place] not wearing his hearing aids    Skin:  warm and dry to the touch, no apparent skin lesions or masses noted          Head:  normocephalic, no masses or lesions        Eyes:  pupils equal and round;conjunctivae and lids unremarkable;sclera white        Lymph:      ENT:  no pallor or cyanosis, dentition good        Neck:  no carotid bruit        Respiratory:  normal breath sounds, clear to auscultation, normal A-P diameter, normal symmetry, normal respiratory excursion, no use of accessory muscles         Cardiac: regular rhythm     no presence of murmur          pulses full and equal                                        GI:    obese      Extremities and Muscular Skeletal:  no spinal abnormalities noted;normal muscle strength and tone   bilateral LE edema;trace;telangiectasia;varicose vein          Neurological:  no gross motor deficits        Psych:  affect appropriate, oriented to time, person and place        CC  Eagle Jolley MD  [id number]  [place] [id number]

## 2025-01-07 ENCOUNTER — APPOINTMENT (OUTPATIENT)
Dept: GENERAL RADIOLOGY | Facility: CLINIC | Age: 80
DRG: 324 | End: 2025-01-07
Attending: EMERGENCY MEDICINE
Payer: COMMERCIAL

## 2025-01-07 ENCOUNTER — APPOINTMENT (OUTPATIENT)
Dept: CARDIOLOGY | Facility: CLINIC | Age: 80
DRG: 324 | End: 2025-01-07
Attending: PHYSICIAN ASSISTANT
Payer: COMMERCIAL

## 2025-01-07 ENCOUNTER — HOSPITAL ENCOUNTER (INPATIENT)
Facility: CLINIC | Age: 80
LOS: 1 days | Discharge: HOME | End: 2025-01-09
Attending: EMERGENCY MEDICINE | Admitting: HOSPITALIST
Payer: COMMERCIAL

## 2025-01-07 DIAGNOSIS — R79.89 ELEVATED TROPONIN: ICD-10-CM

## 2025-01-07 DIAGNOSIS — I25.10 CORONARY ARTERY DISEASE INVOLVING NATIVE CORONARY ARTERY OF NATIVE HEART WITHOUT ANGINA PECTORIS: Chronic | ICD-10-CM

## 2025-01-07 DIAGNOSIS — I20.0 UNSTABLE ANGINA (H): Primary | ICD-10-CM

## 2025-01-07 DIAGNOSIS — I10 BENIGN ESSENTIAL HYPERTENSION: ICD-10-CM

## 2025-01-07 DIAGNOSIS — R07.89 BURNING CHEST PAIN: ICD-10-CM

## 2025-01-07 DIAGNOSIS — I21.4 NON-STEMI (NON-ST ELEVATED MYOCARDIAL INFARCTION) (H): ICD-10-CM

## 2025-01-07 LAB
ANION GAP SERPL CALCULATED.3IONS-SCNC: 10 MMOL/L (ref 7–15)
ATRIAL RATE - MUSE: 51 BPM
BASOPHILS # BLD AUTO: 0 10E3/UL (ref 0–0.2)
BASOPHILS NFR BLD AUTO: 0 %
BUN SERPL-MCNC: 18 MG/DL (ref 8–23)
CALCIUM SERPL-MCNC: 9.1 MG/DL (ref 8.8–10.4)
CHLORIDE SERPL-SCNC: 103 MMOL/L (ref 98–107)
CREAT SERPL-MCNC: 0.95 MG/DL (ref 0.67–1.17)
DIASTOLIC BLOOD PRESSURE - MUSE: NORMAL MMHG
EGFRCR SERPLBLD CKD-EPI 2021: 81 ML/MIN/1.73M2
EOSINOPHIL # BLD AUTO: 0.2 10E3/UL (ref 0–0.7)
EOSINOPHIL NFR BLD AUTO: 3 %
ERYTHROCYTE [DISTWIDTH] IN BLOOD BY AUTOMATED COUNT: 11.9 % (ref 10–15)
FLUAV RNA SPEC QL NAA+PROBE: NEGATIVE
FLUBV RNA RESP QL NAA+PROBE: NEGATIVE
GLUCOSE SERPL-MCNC: 101 MG/DL (ref 70–99)
HCO3 SERPL-SCNC: 24 MMOL/L (ref 22–29)
HCT VFR BLD AUTO: 33.9 % (ref 40–53)
HGB BLD-MCNC: 11.4 G/DL (ref 13.3–17.7)
HOLD SPECIMEN: NORMAL
HOLD SPECIMEN: NORMAL
IMM GRANULOCYTES # BLD: 0 10E3/UL
IMM GRANULOCYTES NFR BLD: 0 %
INTERPRETATION ECG - MUSE: NORMAL
LVEF ECHO: NORMAL
LYMPHOCYTES # BLD AUTO: 1.4 10E3/UL (ref 0.8–5.3)
LYMPHOCYTES NFR BLD AUTO: 27 %
MCH RBC QN AUTO: 31.6 PG (ref 26.5–33)
MCHC RBC AUTO-ENTMCNC: 33.6 G/DL (ref 31.5–36.5)
MCV RBC AUTO: 94 FL (ref 78–100)
MONOCYTES # BLD AUTO: 0.5 10E3/UL (ref 0–1.3)
MONOCYTES NFR BLD AUTO: 10 %
NEUTROPHILS # BLD AUTO: 3.1 10E3/UL (ref 1.6–8.3)
NEUTROPHILS NFR BLD AUTO: 60 %
NRBC # BLD AUTO: 0 10E3/UL
NRBC BLD AUTO-RTO: 0 /100
NT-PROBNP SERPL-MCNC: 214 PG/ML (ref 0–1800)
P AXIS - MUSE: NORMAL DEGREES
PLATELET # BLD AUTO: 266 10E3/UL (ref 150–450)
POTASSIUM SERPL-SCNC: 4.4 MMOL/L (ref 3.4–5.3)
PR INTERVAL - MUSE: 320 MS
QRS DURATION - MUSE: 104 MS
QT - MUSE: 434 MS
QTC - MUSE: 400 MS
R AXIS - MUSE: 118 DEGREES
RBC # BLD AUTO: 3.61 10E6/UL (ref 4.4–5.9)
RSV RNA SPEC NAA+PROBE: NEGATIVE
SARS-COV-2 RNA RESP QL NAA+PROBE: NEGATIVE
SODIUM SERPL-SCNC: 137 MMOL/L (ref 135–145)
SYSTOLIC BLOOD PRESSURE - MUSE: NORMAL MMHG
T AXIS - MUSE: 178 DEGREES
TROPONIN T SERPL HS-MCNC: 27 NG/L
TROPONIN T SERPL HS-MCNC: 31 NG/L
VENTRICULAR RATE- MUSE: 51 BPM
WBC # BLD AUTO: 5.2 10E3/UL (ref 4–11)

## 2025-01-07 PROCEDURE — C8929 TTE W OR WO FOL WCON,DOPPLER: HCPCS

## 2025-01-07 PROCEDURE — 82565 ASSAY OF CREATININE: CPT | Performed by: EMERGENCY MEDICINE

## 2025-01-07 PROCEDURE — 93005 ELECTROCARDIOGRAM TRACING: CPT

## 2025-01-07 PROCEDURE — 80048 BASIC METABOLIC PNL TOTAL CA: CPT | Performed by: EMERGENCY MEDICINE

## 2025-01-07 PROCEDURE — 36415 COLL VENOUS BLD VENIPUNCTURE: CPT | Performed by: PHYSICIAN ASSISTANT

## 2025-01-07 PROCEDURE — 84484 ASSAY OF TROPONIN QUANT: CPT | Performed by: EMERGENCY MEDICINE

## 2025-01-07 PROCEDURE — 999N000208 ECHOCARDIOGRAM COMPLETE

## 2025-01-07 PROCEDURE — G0378 HOSPITAL OBSERVATION PER HR: HCPCS

## 2025-01-07 PROCEDURE — 71046 X-RAY EXAM CHEST 2 VIEWS: CPT

## 2025-01-07 PROCEDURE — 82435 ASSAY OF BLOOD CHLORIDE: CPT | Performed by: EMERGENCY MEDICINE

## 2025-01-07 PROCEDURE — 93306 TTE W/DOPPLER COMPLETE: CPT | Mod: 26 | Performed by: INTERNAL MEDICINE

## 2025-01-07 PROCEDURE — 250N000013 HC RX MED GY IP 250 OP 250 PS 637: Performed by: PHYSICIAN ASSISTANT

## 2025-01-07 PROCEDURE — 99223 1ST HOSP IP/OBS HIGH 75: CPT | Performed by: PHYSICIAN ASSISTANT

## 2025-01-07 PROCEDURE — 255N000002 HC RX 255 OP 636: Performed by: PHYSICIAN ASSISTANT

## 2025-01-07 PROCEDURE — 36415 COLL VENOUS BLD VENIPUNCTURE: CPT | Performed by: EMERGENCY MEDICINE

## 2025-01-07 PROCEDURE — 99285 EMERGENCY DEPT VISIT HI MDM: CPT

## 2025-01-07 PROCEDURE — 85025 COMPLETE CBC W/AUTO DIFF WBC: CPT | Performed by: EMERGENCY MEDICINE

## 2025-01-07 PROCEDURE — 83880 ASSAY OF NATRIURETIC PEPTIDE: CPT | Performed by: EMERGENCY MEDICINE

## 2025-01-07 PROCEDURE — 87637 SARSCOV2&INF A&B&RSV AMP PRB: CPT | Performed by: EMERGENCY MEDICINE

## 2025-01-07 PROCEDURE — 84484 ASSAY OF TROPONIN QUANT: CPT | Performed by: PHYSICIAN ASSISTANT

## 2025-01-07 RX ORDER — ACETAMINOPHEN 500 MG
500 TABLET ORAL EVERY MORNING
COMMUNITY

## 2025-01-07 RX ORDER — ALFUZOSIN HYDROCHLORIDE 10 MG/1
10 TABLET, EXTENDED RELEASE ORAL DAILY
Status: DISCONTINUED | OUTPATIENT
Start: 2025-01-08 | End: 2025-01-09 | Stop reason: HOSPADM

## 2025-01-07 RX ORDER — LISINOPRIL 40 MG/1
40 TABLET ORAL DAILY
Status: DISCONTINUED | OUTPATIENT
Start: 2025-01-08 | End: 2025-01-09 | Stop reason: HOSPADM

## 2025-01-07 RX ORDER — LEVOTHYROXINE SODIUM 100 UG/1
100 TABLET ORAL DAILY
Status: DISCONTINUED | OUTPATIENT
Start: 2025-01-08 | End: 2025-01-09 | Stop reason: HOSPADM

## 2025-01-07 RX ORDER — EZETIMIBE 10 MG/1
10 TABLET ORAL DAILY
Status: DISCONTINUED | OUTPATIENT
Start: 2025-01-08 | End: 2025-01-09 | Stop reason: HOSPADM

## 2025-01-07 RX ORDER — ASPIRIN 81 MG/1
81 TABLET ORAL DAILY
Status: DISCONTINUED | OUTPATIENT
Start: 2025-01-07 | End: 2025-01-08

## 2025-01-07 RX ORDER — ACETAMINOPHEN 500 MG
500 TABLET ORAL EVERY MORNING
Status: DISCONTINUED | OUTPATIENT
Start: 2025-01-08 | End: 2025-01-09 | Stop reason: HOSPADM

## 2025-01-07 RX ORDER — ROSUVASTATIN CALCIUM 20 MG/1
40 TABLET, COATED ORAL EVERY EVENING
Status: DISCONTINUED | OUTPATIENT
Start: 2025-01-07 | End: 2025-01-09 | Stop reason: HOSPADM

## 2025-01-07 RX ORDER — MAGNESIUM HYDROXIDE/ALUMINUM HYDROXICE/SIMETHICONE 120; 1200; 1200 MG/30ML; MG/30ML; MG/30ML
30 SUSPENSION ORAL EVERY 4 HOURS PRN
Status: DISCONTINUED | OUTPATIENT
Start: 2025-01-07 | End: 2025-01-09 | Stop reason: HOSPADM

## 2025-01-07 RX ORDER — NITROGLYCERIN 0.4 MG/1
0.4 TABLET SUBLINGUAL EVERY 5 MIN PRN
Status: DISCONTINUED | OUTPATIENT
Start: 2025-01-07 | End: 2025-01-09 | Stop reason: HOSPADM

## 2025-01-07 RX ORDER — METOPROLOL TARTRATE 25 MG/1
25 TABLET, FILM COATED ORAL 2 TIMES DAILY
Status: DISCONTINUED | OUTPATIENT
Start: 2025-01-07 | End: 2025-01-09 | Stop reason: HOSPADM

## 2025-01-07 RX ADMIN — HUMAN ALBUMIN MICROSPHERES AND PERFLUTREN 4 ML: 10; .22 INJECTION, SOLUTION INTRAVENOUS at 15:45

## 2025-01-07 RX ADMIN — ROSUVASTATIN CALCIUM 40 MG: 20 TABLET, FILM COATED ORAL at 19:43

## 2025-01-07 RX ADMIN — ASPIRIN 81 MG: 81 TABLET, COATED ORAL at 14:47

## 2025-01-07 RX ADMIN — METOPROLOL TARTRATE 25 MG: 25 TABLET, FILM COATED ORAL at 19:43

## 2025-01-07 ASSESSMENT — ACTIVITIES OF DAILY LIVING (ADL)
ADLS_ACUITY_SCORE: 43
ADLS_ACUITY_SCORE: 42
ADLS_ACUITY_SCORE: 43
ADLS_ACUITY_SCORE: 42
ADLS_ACUITY_SCORE: 42
ADLS_ACUITY_SCORE: 43
ADLS_ACUITY_SCORE: 42
ADLS_ACUITY_SCORE: 43
ADLS_ACUITY_SCORE: 42
ADLS_ACUITY_SCORE: 43
ADLS_ACUITY_SCORE: 42

## 2025-01-07 ASSESSMENT — COLUMBIA-SUICIDE SEVERITY RATING SCALE - C-SSRS
2. HAVE YOU ACTUALLY HAD ANY THOUGHTS OF KILLING YOURSELF IN THE PAST MONTH?: NO
6. HAVE YOU EVER DONE ANYTHING, STARTED TO DO ANYTHING, OR PREPARED TO DO ANYTHING TO END YOUR LIFE?: NO
1. IN THE PAST MONTH, HAVE YOU WISHED YOU WERE DEAD OR WISHED YOU COULD GO TO SLEEP AND NOT WAKE UP?: NO

## 2025-01-07 NOTE — Clinical Note
The first balloon was inserted into the right coronary artery.Max pressure = 16 rd. Total duration = 30 seconds.     Max pressure = 18 rd. Total duration = 30 seconds.    Balloon reinflated a second time: Max pressure = 18 rd. Total duration = 30 seconds. Max pressure = 20 rd. Total duration = 30 seconds.  Balloon reinflated a fourth time: Max pressure = 20 rd. Total duration = 20 seconds.

## 2025-01-07 NOTE — H&P
[facility]    History and Physical - Hospitalist Service       Date of Admission:  2025    Assessment & Plan      80 yo hx of CAD s/p directional coronary atherectomy in  with subsequent restenosis x2 for which we treated with balloon angioplasty.  s/p 2 Xience drug-eluting stents in the proximal and mid left anterior descending artery. Last stress test in  and cath reveals normal cardiac perfusion in 3/2016 with angiogram showin. Scattered mild disease in a dominant right coronary artery. No  stenosis greater than 25%.  2. Previously placed LAD stents are widely patent. There is a jailed  diagonal that has a 30-50% ostial narrowing. There is a tiny branch of  this diagonal that has an 80-90% stenosis, this was present in .  3. 30-50% first obtuse marginal stenosis with an FFR of 0.92.  4. Tiny ramus intermedius branch with 80% stenosis with JADE grade 2  flow. This is new from .  5. Mild distal anterior wall hypokinesia. Overall ejection fraction  estimated to be 50-55%. Left ventricular end-diastolic pressure of 13  mmHg.     At that time it was felt there were small vessels too small to intervene so he was placed on aggressive risk factor modification. Last saw Dr. Jolley in Nov with no new recs as he was medically stable.   However, yesterday he started to have burning chest discomfort with activity with improved with rest and re-occurred with activity which is remincient to the type of discomfort he had when he had is previous stent in , his sxs ultimately brought him to ED.     Work up in the ED shows detectable troponin at 31, with unremarkable CBC, BMP and normal CXR. EKG shows sinus with 1 degree AVB but with e/o age indeterminate inferior infarct since last EKG in . His pain improved after nitroglycerin and he was recommended for admission for CP r/o.     #Substernal chest burning with exertion   - sxs reminicient of past angina where he was  "taken to cath and needed stent in 2010  - Hx seems concerning for angina   - Fortunately his TTE did not show any new WMA  - Initial trop detectable at 32 but dropped to 27 so no e/o acute MI  - Discussed possible proceeding to nuclear stress test but given hx of moderate atherosclerosis, known 80% rami stenosis, and exact presentation of his previous anginal sxs, will ask Cards to see pt and assess if he should go straight to Cath tomorrow vs nuclear stress test   (I will keep nuc stress order for now to save his slot)  - O/w cardiac diet, no caffeine, and npo after MN  - Cont ASA   - If he starts to have recurrent pain tonight, would start heparin gtt     #HTN  - Cont Lisinopril and Metoprolol, hold diuretic for now     #HLP  - Resume statin      Observation Goals: List all goals to be met before discharge home: , - Serial troponins and stress test complete., - Seen and cleared by consultant if applicable, - Adequate pain control on oral analgesia, - Vital signs normal or at patient baseline, - Safe disposition plan has been identified, - Nurse to notify provider when observation goals have been met and patient is ready for discharge.    Diet: NPO per Anesthesia Guidelines for Procedure/Surgery Except for: Meds, Ice Chips  Combination Diet Low Saturated Fat Na <2400mg Diet, No Caffeine Diet      DVT Prophylaxis: Low Risk/Ambulatory with no VTE prophylaxis indicated  Davidson Catheter: Not present  Lines: None     Cardiac Monitoring: ACTIVE order. Indication: Chest pain/ ACS rule out (24 hours)  Code Status: Full Code                 # Drug Induced Platelet Defect: home medication list includes an antiplatelet medication   # Hypertension: Noted on problem list      # Obesity: Estimated body mass index is 33.87 kg/m  as calculated from the following:    Height as of this encounter: 1.743 m (5' 8.62\").    Weight as of this encounter: 102.9 kg (226 lb 13.7 oz).              Disposition Plan     Medically Ready for " Discharge: Anticipated Tomorrow      Emely Mead PA-C  Hospitalist Service  [facility]  Securely message with [place] (more info)  Text page via [facility] Paging/Directory     ______________________________________________________________________    Chief Complaint   Chest pain     History is obtained from the patient    History of Present Illness     Terrence Murillo is a 79 year old male with hx of CAD. Pt is s/p directional coronary atherectomy in 1992 with subsequent restenosis x2 for which we treated with balloon angioplasty. 2010 s/p 2 Xience drug-eluting stents in the proximal and mid left anterior descending artery.    Had burning sensations in chest yesterday while doing some housework and had recurrent sxs overnight and today.  Took nitroglycerin x2 with relief.  No other SOB fever chills leg swelling abd pain NVD.  Burning is his ACS equivalent.      Past Medical History    Past Medical History:   Diagnosis Date    Coronary artery disease     cardiac cath 2016: medical management; cath 2010: SUSANA x2 to LAD; cath 1993: PTCA to LAD; cath 1992: PTCA to LAD, 1992: atherectomy of LAD    Hearing problem     Hyperlipidaemia     Hypertension     Obese     Obstructive sleep apnea     Reduced vision     Sleep apnea     CPAP       Past Surgical History   Past Surgical History:   Procedure Laterality Date    APPENDECTOMY OPEN      ARTHROPLASTY KNEE Left     ARTHROPLASTY SHOULDER Right     ENT SURGERY      HEART CATH, ANGIOPLASTY  1992    LAD  atherectomy with a DVI device     HEART CATH, ANGIOPLASTY  4-28-10    PTCA and stent proximal and mid LAD (2) stents Xience    HEART CATH, ANGIOPLASTY  04/20/2016    no stenosis greater than 25%-rec. 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 Taking?   Glucosamine 500 MG CAPS 1/7/2025 Morning  Yes Yes   Sig: Take 500 mg by mouth 2 times daily    Multiple Vitamin (MULTI-VITAMIN) per tablet 1/6/2025 Evening  Yes Yes   Sig: Take 1 tablet by mouth daily.   acetaminophen (TYLENOL) 500 MG tablet 1/7/2025 Morning  Yes Yes   Sig: Take 500 mg by mouth every morning.   alfuzosin ER (UROXATRAL) 10 MG 24 hr tablet 1/7/2025 Morning  Yes Yes   Sig: Take 10 mg by mouth daily   amoxicillin (AMOXIL) 500 MG capsule Unknown  Yes Yes   Sig: Take before dental appointments   aspirin 81 MG tablet 1/6/2025 Evening  No Yes   Sig: Take 1 tablet (81 mg) by mouth every evening   chlorthalidone (HYGROTON) 25 MG tablet 1/7/2025 Morning  No Yes   Sig: Take 0.5 tablets (12.5 mg) by mouth daily.   ezetimibe (ZETIA) 10 MG tablet 1/7/2025 Morning  No Yes   Sig: Take 1 tablet (10 mg) by mouth daily.   ibuprofen (ADVIL/MOTRIN) 200 MG tablet 1/6/2025 Evening  Yes Yes   Sig: Take 400 mg by mouth daily   levothyroxine (SYNTHROID/LEVOTHROID) 100 MCG tablet 1/7/2025 at  3:00 AM  No Yes   Sig: Take 1 tablet (100 mcg) by mouth daily   metoprolol tartrate (LOPRESSOR) 25 MG tablet 1/7/2025 Morning  No Yes   Sig: Take 1 tablet (25 mg) by mouth 2 times daily.   nitroGLYcerin (NITROSTAT) 0.4 MG sublingual tablet 1/7/2025 at  9:30 AM  No Yes   Sig: Place 1 tablet (0.4 mg) under the tongue every 5 minutes as needed for chest pain May repeat x2, if chest pain contines after 3rd dose call 911   ramipril (ALTACE) 10 MG capsule 1/7/2025 Morning  No Yes   Sig: Take 1 capsule (10 mg) by mouth daily.   rosuvastatin (CRESTOR) 40 MG tablet 1/6/2025 Evening  No Yes   Sig: Take 1 tablet (40 mg) by mouth daily.   saw palmetto 450 MG CAPS capsule 1/7/2025 Morning  Yes Yes   Sig: Take 450 mg by mouth 2 times daily      Facility-Administered Medications:  None        Review of Systems    The 10 point Review of Systems is negative other than noted in the HPI or here.      Physical Exam   Vital Signs: Temp: 97.7  F (36.5  C) Temp src: Oral BP: 126/62 Pulse: (!) 47   Resp: 18 SpO2: 98 % O2 Device: None (Room air)    Weight: 226 lbs 13.65 oz  GENERAL:  Comfortable.  PSYCH: pleasant, oriented, No acute distress.  HEENT:  PERRLA. Normal conjunctiva, normal hearing, nasal mucosa and Oropharynx are normal.  NECK:  Supple, no neck vein distention, adenopathy or bruits, normal thyroid.  HEART:  Normal S1, S2 with no murmur, no pericardial rub, gallops or S3 or S4.  LUNGS:  Clear to auscultation, normal Respiratory effort. No wheezing, rales or ronchi.  ABDOMEN:  Soft, no hepatosplenomegaly, normal bowel sounds. Non-tender, non distended.   EXTREMITIES:  No pedal edema, +2 pulses bilateral and equal.  SKIN:  Dry to touch, No rash, wound or ulcerations.  NEUROLOGIC:  CN 2-12 intact, BL 5/5 symmetric upper and lower extremity strength, sensation is intact with no focal deficits.       Medical Decision Making       75 MINUTES SPENT BY ME on the date of service doing chart review, history, exam, documentation & further activities per the note.      Data     I have personally reviewed the following data over the past 24 hrs:    5.2  \   11.4 (L)   / 266     137 103 18.0 /  101 (H)   4.4 24 0.95 \     Trop: 27 (H) BNP: 214       Imaging results reviewed over the past 24 hrs:   Recent Results (from the past 24 hours)   XR Chest 2 Views    Narrative    EXAM: XR CHEST 2 VIEWS  LOCATION: [facility]  DATE: 01/07/2025    INDICATION: Chest pain.  COMPARISON: Chest x-ray dated 12/15/2003.    FINDINGS: The cardiomediastinal silhouette and pulmonary vasculature are within normal limits. Coronary stent. Aortic calcification. Mild bibasilar platelike atelectasis/scarring. No acute infiltrate. No pleural effusion or pneumothorax. Partially   visualized right shoulder  arthroplasty.      Impression    IMPRESSION: No acute pulmonary abnormality.     Echocardiogram Complete   Result Value    LVEF  60%    Narrative    [id number]  [id number]  [id number]  [id number]^SELVIN^ALYCE^KARLA     [facility]  Echocardiography Laboratory  [address] [place] [address]     Name: DANIEL RIGGINS  MRN: [id number]  : 1945  Study Date: 2025 03:18 PM  Age: 79 yrs  Gender: Male  Patient [facility]  Reason For Study: Chest Pain, Chest Pressure, Chest Tightness  Ordering Physician: ALYCE MONTALVO  Referring Physician: Yamilex Booker  Performed By: Zoila Do     BSA: 2.2 m2  Height: 69 in  Weight: 231 lb  HR: 49  BP: 130/74 mmHg  ______________________________________________________________________________  Procedure  Echocardiogram with two-dimensional, color and spectral Doppler. Optison (NDC  #[id number]) given intravenously. Technically difficult study.Extremely  difficult acoustic windows despite the use of contrast for endcardial border  definition.  ______________________________________________________________________________  Interpretation Summary     1. The left ventricle is normal in structure, function and size. The visual  ejection fraction is estimated at 60%. Normal left ventricular wall motion  2. The right ventricle is normal in structure, function and size.  3. No valve disease.     No changes from stress echo in .  ______________________________________________________________________________  Left Ventricle  The left ventricle is normal in structure, function and size. There is normal  left ventricular wall thickness. The visual ejection fraction is estimated at  60%. Left ventricular diastolic function is normal. Normal left ventricular  wall motion.     Right Ventricle  The right ventricle is normal in structure, function and size.     Atria  Normal left atrial size. Right atrial size is normal. There is no atrial  shunt  seen.     Mitral Valve  The mitral valve is normal in structure and function.     Tricuspid Valve  No tricuspid regurgitation.     Aortic Valve  The aortic valve is normal in structure and function.     Pulmonic Valve  The pulmonic valve is normal in structure and function.     Vessels  Normal ascending, transverse (arch), and descending aorta. The inferior vena  cava was normal in size with preserved respiratory variability.     Pericardium  There is no pericardial effusion.     Rhythm  Sinus rhythm was noted.  ______________________________________________________________________________  MMode/2D Measurements & Calculations  IVC diam: 1.9 cm     Ao root diam: 3.7 cm  asc Aorta Diam: 3.6 cm  LVOT diam: 2.6 cm  LVOT area: 5.2 cm2  Ao root diam index Ht(cm/m): 2.1  Ao root diam index BSA (cm/m2): 1.7  Asc Ao diam index BSA (cm/m2): 1.6  Asc Ao diam index Ht(cm/m): 2.0  LA Volume (BP): 63.6 ml  LA Volume Index (BP): 28.9 ml/m2  TAPSE: 3.0 cm     Doppler Measurements & Calculations  MV E max jm: 67.6 cm/sec  MV A max jm: 73.9 cm/sec  MV E/A: 0.91  MV max PG: 3.8 mmHg  MV mean P.4 mmHg  MV V2 VTI: 35.9 cm  MVA(VTI): 2.8 cm2  MV dec slope: 296.5 cm/sec2  MV dec time: 0.23 sec  Ao V2 max: 162.8 cm/sec  Ao max P.0 mmHg  Ao V2 mean: 104.5 cm/sec  Ao mean P.1 mmHg  Ao V2 VTI: 36.2 cm  HOMERO(I,D): 2.8 cm2  HOMERO(V,D): 2.8 cm2  LV V1 max PG: 3.0 mmHg  LV V1 max: 86.6 cm/sec  LV V1 VTI: 19.6 cm  SV(LVOT): 102.2 ml  SI(LVOT): 46.6 ml/m2  PA V2 max: 112.3 cm/sec  PA max P.0 mmHg  PA acc time: 0.17 sec  AV Jm Ratio (DI): 0.53  HOMERO Index (cm2/m2): 1.3  E/E' av.4  Lateral E/e': 6.7  Medial E/e': 8.1  RV S Jm: 12.9 cm/sec     ______________________________________________________________________________  Report approved by: Benjamin Bourgeois MD on 2025 03:57 PM

## 2025-01-07 NOTE — Clinical Note
The first balloon was inserted into the right coronary artery.Max pressure = 20 rd. Total duration = 30 seconds.     Max pressure = 22 rd. Total duration = 30 seconds.

## 2025-01-07 NOTE — ED NOTES
"[facility]  ED Nurse Handoff Report    ED Chief complaint: Chest Pain  . ED Diagnosis:   Final diagnoses:   Burning chest pain   Elevated troponin       Allergies:   Allergies   Allergen Reactions    Cardizem [Diltiazem Hcl] Itching    Cats Hives     WATERY EYES, SNEEZE, AND COUGH    Diltiazem Hives and Rash       Code Status: Full Code    Activity level - Baseline/Home:  independent.  Activity Level - Current:   independent.   Lift room needed: No.   Bariatric: No   Needed: No   Isolation: No.   Infection: Not Applicable.     Respiratory status: Room air    Vital Signs (within 30 minutes):   Vitals:    01/07/25 1012   BP: (!) 137/104   Pulse: 57   Resp: 18   Temp: 97.2  F (36.2  C)   TempSrc: Temporal   SpO2: 97%   Weight: 105 kg (231 lb 7.7 oz)   Height: 1.753 m (5' 9\")       Cardiac Rhythm:  ,      Pain level:    Patient confused: No.   Patient Falls Risk: patient and family education and assistive device/personal items within reach.   Elimination Status:  Not yet in ED      Patient Report - Initial Complaint: Chest Pain.   Focused Assessment:  Had burning sensations in chest yesterday overnight and today.  Took nitroglycerin x2 with relief.  No other SOB fever chills leg swelling abd pain NVD      Abnormal Results:   Labs Ordered and Resulted from Time of ED Arrival to Time of ED Departure   TROPONIN T, HIGH SENSITIVITY - Abnormal       Result Value    Troponin T, High Sensitivity 31 (*)    BASIC METABOLIC PANEL - Abnormal    Sodium 137      Potassium 4.4      Chloride 103      Carbon Dioxide (CO2) 24      Anion Gap 10      Urea Nitrogen 18.0      Creatinine 0.95      GFR Estimate 81      Calcium 9.1      Glucose 101 (*)    CBC WITH PLATELETS AND DIFFERENTIAL - Abnormal    WBC Count 5.2      RBC Count 3.61 (*)     Hemoglobin 11.4 (*)     Hematocrit 33.9 (*)     MCV 94      MCH 31.6      MCHC 33.6      RDW 11.9      Platelet Count 266      % Neutrophils 60      % Lymphocytes 27   "    % Monocytes 10      % Eosinophils 3      % Basophils 0      % Immature Granulocytes 0      NRBCs per 100 WBC 0      Absolute Neutrophils 3.1      Absolute Lymphocytes 1.4      Absolute Monocytes 0.5      Absolute Eosinophils 0.2      Absolute Basophils 0.0      Absolute Immature Granulocytes 0.0      Absolute NRBCs 0.0     NT PROBNP INPATIENT - Normal    N terminal Pro BNP Inpatient 214     INFLUENZA A/B, RSV AND SARS-COV2 PCR        XR Chest 2 Views   Final Result   IMPRESSION: No acute pulmonary abnormality.             Treatments provided: Labs, imaging  Family Comments: Daughter at bedside  OBS brochure/video discussed/provided to patient:  Yes  ED Medications: Medications - No data to display    Drips infusing:  No  For the majority of the shift this patient was Green.   Interventions performed were N/A.    Sepsis treatment initiated: No    Cares/treatment/interventions/medications to be completed following ED care: Follow plan of care    ED Nurse Name: Jeana Mcdonnell RN  12:19 PM   RECEIVING UNIT ED HANDOFF REVIEW    Above ED Nurse Handoff Report was reviewed: Yes  Reviewed by: Mary Colorado RN on January 7, 2025 at 3:43 PM

## 2025-01-07 NOTE — Clinical Note
The first balloon was inserted into the right coronary artery.Max pressure = 18 rd. Total duration = 20 seconds.     Max pressure = 18 rd. Total duration = 20 seconds.    Balloon reinflated a second time: Max pressure = 18 rd. Total duration = 20 seconds.

## 2025-01-07 NOTE — PHARMACY-ADMISSION MEDICATION HISTORY
Pharmacy Intern Admission Medication History    Admission medication history is complete. The information provided in this note is only as accurate as the sources available at the time of the update.    Information Source(s): Patient and CareEverywhere/SureScripts via in-person    Pertinent Information: None     Changes made to PTA medication list:  Added: Tylenol   Deleted: None  Changed: None    Allergies reviewed with patient and updates made in EHR: yes    Medication History Completed By: Maryann Jolley RPH 1/7/2025 1:41 PM    PTA Med List   Medication Sig Last Dose/Taking    acetaminophen (TYLENOL) 500 MG tablet Take 500 mg by mouth every morning. 1/7/2025 Morning    alfuzosin ER (UROXATRAL) 10 MG 24 hr tablet Take 10 mg by mouth daily 1/7/2025 Morning    amoxicillin (AMOXIL) 500 MG capsule Take before dental appointments Unknown    aspirin 81 MG tablet Take 1 tablet (81 mg) by mouth every evening 1/6/2025 Evening    chlorthalidone (HYGROTON) 25 MG tablet Take 0.5 tablets (12.5 mg) by mouth daily. 1/7/2025 Morning    ezetimibe (ZETIA) 10 MG tablet Take 1 tablet (10 mg) by mouth daily. 1/7/2025 Morning    Glucosamine 500 MG CAPS Take 500 mg by mouth 2 times daily  1/7/2025 Morning    ibuprofen (ADVIL/MOTRIN) 200 MG tablet Take 400 mg by mouth daily 1/6/2025 Evening    levothyroxine (SYNTHROID/LEVOTHROID) 100 MCG tablet Take 1 tablet (100 mcg) by mouth daily 1/7/2025 at  3:00 AM    metoprolol tartrate (LOPRESSOR) 25 MG tablet Take 1 tablet (25 mg) by mouth 2 times daily. 1/7/2025 Morning    Multiple Vitamin (MULTI-VITAMIN) per tablet Take 1 tablet by mouth daily. 1/6/2025 Evening    nitroGLYcerin (NITROSTAT) 0.4 MG sublingual tablet Place 1 tablet (0.4 mg) under the tongue every 5 minutes as needed for chest pain May repeat x2, if chest pain contines after 3rd dose call 911 1/7/2025 at  9:30 AM    ramipril (ALTACE) 10 MG capsule Take 1 capsule (10 mg) by mouth daily. 1/7/2025 Morning    rosuvastatin (CRESTOR) 40  MG tablet Take 1 tablet (40 mg) by mouth daily. 1/6/2025 Evening    saw palmetto 450 MG CAPS capsule Take 450 mg by mouth 2 times daily 1/7/2025 Morning

## 2025-01-07 NOTE — Clinical Note
The first balloon was inserted into the right coronary artery.Max pressure = 6 rd. Total duration = 10 seconds.     Max pressure = 6 rd. Total duration = 10 seconds.    Balloon reinflated a second time: Max pressure = 6 rd. Total duration = 10 seconds.  Balloon reinflated a third time: Max pressure = 6 rd. Total duration = 10 seconds. Max pressure = 6 rd. Total duration = 10 seconds.

## 2025-01-07 NOTE — ED PROVIDER NOTES
History     Chief Complaint:  Chest Pain       HPI   Daniel Murillo is a 79 year old male with hx of CAD.  Had burning sensations staccato in chest yesterday overnight and today.  Took nitroglycerin x2 with relief.  No other SOB fever chills leg swelling abd pain NVD.  Burning is somewhat his ACS equivalent.        Independent Historian:    Family    Review of External Notes:  Last stress 2016  Result Text   GATED MYOCARDIAL PERFUSION SCINTIGRAPHY EXERCISE- ONE DAY STUDY      3/23/2016 11:15 AM  DANIEL MURILLO  70 years  Male  1945.     Indication/Clinical History: Chest tightness with history of coronary  artery disease     Impression  1.  Myocardial perfusion imaging using single isotope technique  demonstrated normal perfusion.   2. Gated images demonstrated normal LV cavity size and systolic  function.  The left ventricular systolic function is 64%.  3. Compared to the prior study from November 16, 2010, no clinically  important changes .     Procedure  The patient performed treadmill exercise using a Micah protocol,  completing 10 minutes and 16 seconds with an estimated workload of  11.6 METS.  The test was terminated due to fatigue. The heart rate was  64 beats per minute at baseline and increased to 134 beats at peak  exercise, which was 89% of the maximum predicted heart rate. The rest  blood pressure was 120/64 mm/Hg and peak blood pressure is 182/60mm/Hg  with rate pressure product of 24, 638. The patient experienced no  symptoms during the test. The patient was on a beta blocker.     Myocardial perfusion imaging was performed at rest, approximately 45  minutes after the injection intravenously of 6.17of Tc-99m Myoview. At  peak exercise, the patient was injected intravenously with 18.06 mCi  of  Tc-99m Myoview and exercise continued for approximately 1 minute.  Gated post-stress tomographic imaging was performed approximately 30  minutes after stress     EKG Findings  The resting EKG demonstrated  ectopic atrial rhythm with rightward axis  deviation and nonspecific T-wave abnormalities seen diffusely . The  stress EKG demonstrated 1 mm of horizontal and downsloping ST segment  depression in the inferior and anterolateral leads suggestive of  ischemia.     Tomographic Findings  Overall, the study quality is good . On the stress images, normal  perfusion is seen throughout the myocardium. On the rest images,normal  perfusion is again seen, there is no evidence for ischemia or  infarction on the study . Gated images demonstrated normal LV cavity  size with end-diastolic volumes measured at 92 and 95 mL in rest and  stress respectively. The LV systolic function appears normal. The left  ventricular ejection fraction was calculated to be 64%. TID was  absent.     CAMMIE GALVAN MD     Medications:    alfuzosin ER (UROXATRAL) 10 MG 24 hr tablet  amoxicillin (AMOXIL) 500 MG capsule  aspirin 81 MG tablet  chlorthalidone (HYGROTON) 25 MG tablet  ezetimibe (ZETIA) 10 MG tablet  Glucosamine 500 MG CAPS  ibuprofen (ADVIL/MOTRIN) 200 MG tablet  levothyroxine (SYNTHROID/LEVOTHROID) 100 MCG tablet  metoprolol tartrate (LOPRESSOR) 25 MG tablet  Multiple Vitamin (MULTI-VITAMIN) per tablet  nitroGLYcerin (NITROSTAT) 0.4 MG sublingual tablet  ramipril (ALTACE) 10 MG capsule  rosuvastatin (CRESTOR) 40 MG tablet  saw palmetto 450 MG CAPS capsule        Past Medical History:    Past Medical History:   Diagnosis Date    Coronary artery disease     Hearing problem     Hyperlipidaemia     Hypertension     Obese     Obstructive sleep apnea     Reduced vision     Sleep apnea        Past Surgical History:    Past Surgical History:   Procedure Laterality Date    APPENDECTOMY OPEN      ARTHROPLASTY KNEE Left     ARTHROPLASTY SHOULDER Right     ENT SURGERY      HEART CATH, ANGIOPLASTY  1992    LAD  atherectomy with a DVI device     HEART CATH, ANGIOPLASTY  4-28-10    PTCA and stent proximal and mid LAD (2) stents Xience    HEART CATH,  "ANGIOPLASTY  04/20/2016    no stenosis greater than 25%-rec. medical management    OPTICAL TRACKING SYSTEM ENDOSCOPIC RESECTION TUMOR CRANIAL N/A 11/3/2017    Procedure: OPTICAL TRACKING SYSTEM ENDOSCOPIC RESECTION TUMOR CRANIAL;  Stealth Guided Endoscopic Transnasal Resection of Pituitary Tumor;  Surgeon: Amanda Bates MD;  Location: [facility] OR          Physical Exam   Patient Vitals for the past 24 hrs:   BP Temp Temp src Pulse Resp SpO2 Height Weight   01/07/25 1012 (!) 137/104 97.2  F (36.2  C) Temporal 57 18 97 % 1.753 m (5' 9\") 105 kg (231 lb 7.7 oz)        Physical Exam  General: Patient is well appearing. No distress.  Head: Atraumatic.  Eyes: Conjunctivae and EOM are normal. No scleral icterus.  Neck: Normal range of motion. Neck supple.   Cardiovascular: Normal rate, regular rhythm, normal heart sounds and intact distal pulses.   Pulmonary/Chest: Breath sounds normal. No respiratory distress.  Abdominal: Soft. Bowel sounds are normal. No distension. No tenderness. No rebound or guarding.   Musculoskeletal: Normal range of motion.  Skin: Warm and dry. No rash noted. Not diaphoretic.      Emergency Department Course   ECG  Sinus pamela HR 51.  Nonspecific changes.  Grossly unchanged 2019.     Imaging:  XR Chest 2 Views   Preliminary Result   IMPRESSION: No acute pulmonary abnormality.             Laboratory:  Labs Ordered and Resulted from Time of ED Arrival to Time of ED Departure   TROPONIN T, HIGH SENSITIVITY - Abnormal       Result Value    Troponin T, High Sensitivity 31 (*)    BASIC METABOLIC PANEL - Abnormal    Sodium 137      Potassium 4.4      Chloride 103      Carbon Dioxide (CO2) 24      Anion Gap 10      Urea Nitrogen 18.0      Creatinine 0.95      GFR Estimate 81      Calcium 9.1      Glucose 101 (*)    CBC WITH PLATELETS AND DIFFERENTIAL - Abnormal    WBC Count 5.2      RBC Count 3.61 (*)     Hemoglobin 11.4 (*)     Hematocrit 33.9 (*)     MCV 94      MCH 31.6      MCHC 33.6      RDW " 11.9      Platelet Count 266      % Neutrophils 60      % Lymphocytes 27      % Monocytes 10      % Eosinophils 3      % Basophils 0      % Immature Granulocytes 0      NRBCs per 100 WBC 0      Absolute Neutrophils 3.1      Absolute Lymphocytes 1.4      Absolute Monocytes 0.5      Absolute Eosinophils 0.2      Absolute Basophils 0.0      Absolute Immature Granulocytes 0.0      Absolute NRBCs 0.0     NT PROBNP INPATIENT - Normal    N terminal Pro BNP Inpatient 214     INFLUENZA A/B, RSV AND SARS-COV2 PCR        Procedures       Emergency Department Course & Assessments:    Interventions:  Medications - No data to display     Assessments:      Independent Interpretation (X-rays, CTs, rhythm strip):  CXR no PTX effusions pneumonia.      Consultations/Discussion of Management or Tests:  Hospitalist       Social Drivers of Health affecting care:       Disposition:  Obs    Impression & Plan           Medical Decision Making:  Pt has staccato burning somewhat heart equivalent relieved by nitroglycerin with known substantial disease.  EKG not diagnostic at this time.  CP free.  Slight elev trop.  Will admit for further workup and mgmt.      Diagnosis:    ICD-10-CM    1. Burning chest pain  R07.89       2. Elevated troponin  R79.89            Discharge Medications:  New Prescriptions    No medications on file            1/7/2025   Bhanu Holliday MD Stevens, Andrew C, MD  01/07/25 1146

## 2025-01-07 NOTE — Clinical Note
Potential access sites were evaluated for patency using ultrasound.   The right radial artery was selected. Access was obtained under with Sonosite and Fluoroscopic guidance using a standard 18 guage needle with direct visualization of needle entry.

## 2025-01-07 NOTE — Clinical Note
The first balloon was inserted into the right coronary artery.Max pressure = 18 rd. Total duration = 40 seconds.     Max pressure = 18 rd. Total duration = 30 seconds.  Balloon reinflated a fourth time: Max pressure = 18 rd. Total duration = 30 seconds.

## 2025-01-07 NOTE — ED TRIAGE NOTES
"Pt presents with \"Burning sensation in his chest.\" Started yesterday. Resolves at rest and with nitroglycerin. Took nitroglycerin X2 this AM, last dose 45 minutes PTA. Cardiac HX: 90% coronary artery occulusion, LAD, 2 cardiac stents. Cardiologist is .        Triage Assessment (Adult)       Row Name 01/07/25 1013          Triage Assessment    Airway WDL WDL        Respiratory WDL    Respiratory WDL WDL        Skin Circulation/Temperature WDL    Skin Circulation/Temperature WDL WDL        Cardiac WDL    Cardiac WDL X;chest pain        Peripheral/Neurovascular WDL    Peripheral Neurovascular WDL WDL        Cognitive/Neuro/Behavioral WDL    Cognitive/Neuro/Behavioral WDL WDL                     "

## 2025-01-07 NOTE — PLAN OF CARE
ROOM # 204-1    Living Situation (if not independent, order SW consult):Apt senior housing  Facility name: [facility]  : Debra Zabala ()    Activity level at baseline: Independent    Activity level on admit: Independent     Who will be transporting you at discharge: Debra drove by self.    Patient registered to observation; given Patient Bill of Rights; given the opportunity to ask questions about observation status and their plan of care.  Patient has been oriented to the observation room, bathroom and call light is in place.    Discussed discharge goals and expectations with patient/family.

## 2025-01-07 NOTE — Clinical Note
The first balloon was inserted into the right coronary artery.Max pressure = 18 rd. Total duration = 30 seconds.

## 2025-01-07 NOTE — Clinical Note
The first balloon was inserted into the right coronary artery.Max pressure = 15 rd. Total duration = 30 seconds.     Max pressure = 15 rd. Total duration = 30 seconds.    Balloon reinflated a second time: Max pressure = 15 rd. Total duration = 30 seconds.

## 2025-01-07 NOTE — Clinical Note
The first balloon was inserted into the right coronary artery.Max pressure = 6 rd. Total duration = 10 seconds.     Max pressure = 6 rd. Total duration = 10 seconds.  Balloon reinflated a fourth time: Max pressure = 6 rd. Total duration = 10 seconds.

## 2025-01-08 ENCOUNTER — APPOINTMENT (OUTPATIENT)
Dept: NUCLEAR MEDICINE | Facility: CLINIC | Age: 80
DRG: 324 | End: 2025-01-08
Attending: PHYSICIAN ASSISTANT
Payer: COMMERCIAL

## 2025-01-08 ENCOUNTER — APPOINTMENT (OUTPATIENT)
Dept: CARDIOLOGY | Facility: CLINIC | Age: 80
DRG: 324 | End: 2025-01-08
Attending: PHYSICIAN ASSISTANT
Payer: COMMERCIAL

## 2025-01-08 PROBLEM — I20.0 UNSTABLE ANGINA (H): Status: ACTIVE | Noted: 2025-01-08

## 2025-01-08 LAB
ACT BLD: 267 SECONDS (ref 74–150)
ACT BLD: 284 SECONDS (ref 74–150)
ACT BLD: 296 SECONDS (ref 74–150)
ACT BLD: 308 SECONDS (ref 74–150)
ACT BLD: 324 SECONDS (ref 74–150)
ACT BLD: 349 SECONDS (ref 74–150)
ANION GAP SERPL CALCULATED.3IONS-SCNC: 12 MMOL/L (ref 7–15)
BUN SERPL-MCNC: 16.5 MG/DL (ref 8–23)
CALCIUM SERPL-MCNC: 9.4 MG/DL (ref 8.8–10.4)
CHLORIDE SERPL-SCNC: 100 MMOL/L (ref 98–107)
CREAT SERPL-MCNC: 0.95 MG/DL (ref 0.67–1.17)
CV STRESS MAX HR HE: 75
EGFRCR SERPLBLD CKD-EPI 2021: 81 ML/MIN/1.73M2
GLUCOSE SERPL-MCNC: 95 MG/DL (ref 70–99)
HCO3 SERPL-SCNC: 23 MMOL/L (ref 22–29)
NUC STRESS EJECTION FRACTION: 66 %
POTASSIUM SERPL-SCNC: 4.3 MMOL/L (ref 3.4–5.3)
RATE PRESSURE PRODUCT: 9150
SODIUM SERPL-SCNC: 135 MMOL/L (ref 135–145)
STRESS ECHO BASELINE DIASTOLIC HE: 67
STRESS ECHO BASELINE HR: 59 BPM
STRESS ECHO BASELINE SYSTOLIC BP: 137
STRESS ECHO CALCULATED PERCENT HR: 54 %
STRESS ECHO LAST STRESS DIASTOLIC BP: 60
STRESS ECHO LAST STRESS SYSTOLIC BP: 122
STRESS ECHO TARGET HR: 140

## 2025-01-08 PROCEDURE — G0378 HOSPITAL OBSERVATION PER HR: HCPCS

## 2025-01-08 PROCEDURE — C1887 CATHETER, GUIDING: HCPCS | Performed by: INTERNAL MEDICINE

## 2025-01-08 PROCEDURE — 93018 CV STRESS TEST I&R ONLY: CPT | Performed by: INTERNAL MEDICINE

## 2025-01-08 PROCEDURE — 250N000011 HC RX IP 250 OP 636: Performed by: INTERNAL MEDICINE

## 2025-01-08 PROCEDURE — B2111ZZ FLUOROSCOPY OF MULTIPLE CORONARY ARTERIES USING LOW OSMOLAR CONTRAST: ICD-10-PCS | Performed by: INTERNAL MEDICINE

## 2025-01-08 PROCEDURE — 78452 HT MUSCLE IMAGE SPECT MULT: CPT | Mod: 26 | Performed by: INTERNAL MEDICINE

## 2025-01-08 PROCEDURE — 92972 PERQ TRLUML CORONRY LITHOTRP: CPT | Performed by: INTERNAL MEDICINE

## 2025-01-08 PROCEDURE — 85347 COAGULATION TIME ACTIVATED: CPT

## 2025-01-08 PROCEDURE — 82435 ASSAY OF BLOOD CHLORIDE: CPT | Performed by: INTERNAL MEDICINE

## 2025-01-08 PROCEDURE — 99152 MOD SED SAME PHYS/QHP 5/>YRS: CPT | Performed by: INTERNAL MEDICINE

## 2025-01-08 PROCEDURE — C9600 PERC DRUG-EL COR STENT SING: HCPCS | Mod: RC | Performed by: INTERNAL MEDICINE

## 2025-01-08 PROCEDURE — 93458 L HRT ARTERY/VENTRICLE ANGIO: CPT | Performed by: INTERNAL MEDICINE

## 2025-01-08 PROCEDURE — C1874 STENT, COATED/COV W/DEL SYS: HCPCS | Performed by: INTERNAL MEDICINE

## 2025-01-08 PROCEDURE — 5A09357 ASSISTANCE WITH RESPIRATORY VENTILATION, LESS THAN 24 CONSECUTIVE HOURS, CONTINUOUS POSITIVE AIRWAY PRESSURE: ICD-10-PCS | Performed by: HOSPITALIST

## 2025-01-08 PROCEDURE — C1761 HC OR CATH, TRANS INTRA LITHO/CORONARY: HCPCS | Performed by: INTERNAL MEDICINE

## 2025-01-08 PROCEDURE — 84295 ASSAY OF SERUM SODIUM: CPT | Performed by: INTERNAL MEDICINE

## 2025-01-08 PROCEDURE — 258N000003 HC RX IP 258 OP 636: Performed by: INTERNAL MEDICINE

## 2025-01-08 PROCEDURE — 027034Z DILATION OF CORONARY ARTERY, ONE ARTERY WITH DRUG-ELUTING INTRALUMINAL DEVICE, PERCUTANEOUS APPROACH: ICD-10-PCS | Performed by: INTERNAL MEDICINE

## 2025-01-08 PROCEDURE — 93571 IV DOP VEL&/PRESS C FLO 1ST: CPT | Mod: 26 | Performed by: INTERNAL MEDICINE

## 2025-01-08 PROCEDURE — 92928 PRQ TCAT PLMT NTRAC ST 1 LES: CPT | Mod: RC | Performed by: INTERNAL MEDICINE

## 2025-01-08 PROCEDURE — 272N000001 HC OR GENERAL SUPPLY STERILE: Performed by: INTERNAL MEDICINE

## 2025-01-08 PROCEDURE — 4A0335C MEASUREMENT OF ARTERIAL FLOW, CORONARY, PERCUTANEOUS APPROACH: ICD-10-PCS | Performed by: INTERNAL MEDICINE

## 2025-01-08 PROCEDURE — 78452 HT MUSCLE IMAGE SPECT MULT: CPT

## 2025-01-08 PROCEDURE — 99233 SBSQ HOSP IP/OBS HIGH 50: CPT | Performed by: PHYSICIAN ASSISTANT

## 2025-01-08 PROCEDURE — 93458 L HRT ARTERY/VENTRICLE ANGIO: CPT | Mod: 26 | Performed by: INTERNAL MEDICINE

## 2025-01-08 PROCEDURE — 120N000001 HC R&B MED SURG/OB

## 2025-01-08 PROCEDURE — 250N000009 HC RX 250: Performed by: INTERNAL MEDICINE

## 2025-01-08 PROCEDURE — 99153 MOD SED SAME PHYS/QHP EA: CPT | Performed by: INTERNAL MEDICINE

## 2025-01-08 PROCEDURE — 250N000013 HC RX MED GY IP 250 OP 250 PS 637: Performed by: PHYSICIAN ASSISTANT

## 2025-01-08 PROCEDURE — 93017 CV STRESS TEST TRACING ONLY: CPT

## 2025-01-08 PROCEDURE — 36415 COLL VENOUS BLD VENIPUNCTURE: CPT | Performed by: INTERNAL MEDICINE

## 2025-01-08 PROCEDURE — 82565 ASSAY OF CREATININE: CPT | Performed by: INTERNAL MEDICINE

## 2025-01-08 PROCEDURE — C1760 CLOSURE DEV, VASC: HCPCS | Performed by: INTERNAL MEDICINE

## 2025-01-08 PROCEDURE — C1894 INTRO/SHEATH, NON-LASER: HCPCS | Performed by: INTERNAL MEDICINE

## 2025-01-08 PROCEDURE — 343N000001 HC RX 343 MED OP 636: Performed by: PHYSICIAN ASSISTANT

## 2025-01-08 PROCEDURE — 80048 BASIC METABOLIC PNL TOTAL CA: CPT | Performed by: INTERNAL MEDICINE

## 2025-01-08 PROCEDURE — B41B1ZZ FLUOROSCOPY OF OTHER INTRA-ABDOMINAL ARTERIES USING LOW OSMOLAR CONTRAST: ICD-10-PCS | Performed by: INTERNAL MEDICINE

## 2025-01-08 PROCEDURE — 99223 1ST HOSP IP/OBS HIGH 75: CPT | Mod: 25 | Performed by: INTERNAL MEDICINE

## 2025-01-08 PROCEDURE — 02F03ZZ FRAGMENTATION IN CORONARY ARTERY, ONE ARTERY, PERCUTANEOUS APPROACH: ICD-10-PCS | Performed by: INTERNAL MEDICINE

## 2025-01-08 PROCEDURE — A9500 TC99M SESTAMIBI: HCPCS | Performed by: PHYSICIAN ASSISTANT

## 2025-01-08 PROCEDURE — 4A023N7 MEASUREMENT OF CARDIAC SAMPLING AND PRESSURE, LEFT HEART, PERCUTANEOUS APPROACH: ICD-10-PCS | Performed by: INTERNAL MEDICINE

## 2025-01-08 PROCEDURE — 250N000013 HC RX MED GY IP 250 OP 250 PS 637: Performed by: INTERNAL MEDICINE

## 2025-01-08 PROCEDURE — 250N000011 HC RX IP 250 OP 636: Performed by: HOSPITALIST

## 2025-01-08 PROCEDURE — C1769 GUIDE WIRE: HCPCS | Performed by: INTERNAL MEDICINE

## 2025-01-08 PROCEDURE — C1725 CATH, TRANSLUMIN NON-LASER: HCPCS | Performed by: INTERNAL MEDICINE

## 2025-01-08 DEVICE — CLOSURE ANGIOSEAL 6FR 610130: Type: IMPLANTABLE DEVICE | Status: FUNCTIONAL

## 2025-01-08 DEVICE — STENT COR ONYX FRONTIER 30X3MM ONYXNG30030UX: Type: IMPLANTABLE DEVICE | Status: FUNCTIONAL

## 2025-01-08 RX ORDER — NALOXONE HYDROCHLORIDE 0.4 MG/ML
0.4 INJECTION, SOLUTION INTRAMUSCULAR; INTRAVENOUS; SUBCUTANEOUS
Status: ACTIVE | OUTPATIENT
Start: 2025-01-08 | End: 2025-01-09

## 2025-01-08 RX ORDER — NITROGLYCERIN 5 MG/ML
VIAL (ML) INTRAVENOUS
Status: DISCONTINUED | OUTPATIENT
Start: 2025-01-08 | End: 2025-01-08 | Stop reason: HOSPADM

## 2025-01-08 RX ORDER — ASPIRIN 81 MG/1
243 TABLET, CHEWABLE ORAL ONCE
Status: COMPLETED | OUTPATIENT
Start: 2025-01-08 | End: 2025-01-08

## 2025-01-08 RX ORDER — LORAZEPAM 2 MG/ML
0.5 INJECTION INTRAMUSCULAR
Status: DISCONTINUED | OUTPATIENT
Start: 2025-01-08 | End: 2025-01-08 | Stop reason: HOSPADM

## 2025-01-08 RX ORDER — HYDRALAZINE HYDROCHLORIDE 20 MG/ML
10 INJECTION INTRAMUSCULAR; INTRAVENOUS EVERY 4 HOURS PRN
Status: DISCONTINUED | OUTPATIENT
Start: 2025-01-08 | End: 2025-01-09 | Stop reason: HOSPADM

## 2025-01-08 RX ORDER — ACETAMINOPHEN 325 MG/1
650 TABLET ORAL EVERY 4 HOURS PRN
Status: DISCONTINUED | OUTPATIENT
Start: 2025-01-08 | End: 2025-01-09 | Stop reason: HOSPADM

## 2025-01-08 RX ORDER — ASPIRIN 81 MG/1
81 TABLET, CHEWABLE ORAL ONCE
Status: DISCONTINUED | OUTPATIENT
Start: 2025-01-08 | End: 2025-01-08

## 2025-01-08 RX ORDER — REGADENOSON 0.08 MG/ML
0.4 INJECTION, SOLUTION INTRAVENOUS ONCE
Status: DISCONTINUED | OUTPATIENT
Start: 2025-01-08 | End: 2025-01-08

## 2025-01-08 RX ORDER — AMINOPHYLLINE 25 MG/ML
50-100 INJECTION, SOLUTION INTRAVENOUS
Status: DISCONTINUED | OUTPATIENT
Start: 2025-01-08 | End: 2025-01-08

## 2025-01-08 RX ORDER — ASPIRIN 325 MG
325 TABLET ORAL ONCE
Status: COMPLETED | OUTPATIENT
Start: 2025-01-08 | End: 2025-01-08

## 2025-01-08 RX ORDER — LIDOCAINE 40 MG/G
CREAM TOPICAL
Status: DISCONTINUED | OUTPATIENT
Start: 2025-01-08 | End: 2025-01-08 | Stop reason: HOSPADM

## 2025-01-08 RX ORDER — VERAPAMIL HYDROCHLORIDE 2.5 MG/ML
INJECTION, SOLUTION INTRAVENOUS
Status: DISCONTINUED | OUTPATIENT
Start: 2025-01-08 | End: 2025-01-08 | Stop reason: HOSPADM

## 2025-01-08 RX ORDER — NITROGLYCERIN 0.4 MG/1
0.4 TABLET SUBLINGUAL EVERY 5 MIN PRN
Status: DISCONTINUED | OUTPATIENT
Start: 2025-01-08 | End: 2025-01-09 | Stop reason: HOSPADM

## 2025-01-08 RX ORDER — ONDANSETRON 4 MG/1
4 TABLET, ORALLY DISINTEGRATING ORAL EVERY 6 HOURS PRN
Status: DISCONTINUED | OUTPATIENT
Start: 2025-01-08 | End: 2025-01-09 | Stop reason: HOSPADM

## 2025-01-08 RX ORDER — ASPIRIN 81 MG/1
81 TABLET ORAL DAILY
Status: DISCONTINUED | OUTPATIENT
Start: 2025-01-09 | End: 2025-01-09 | Stop reason: HOSPADM

## 2025-01-08 RX ORDER — NITROGLYCERIN 0.4 MG/1
0.4 TABLET SUBLINGUAL EVERY 5 MIN PRN
Status: DISCONTINUED | OUTPATIENT
Start: 2025-01-08 | End: 2025-01-08

## 2025-01-08 RX ORDER — HEPARIN SODIUM 1000 [USP'U]/ML
INJECTION, SOLUTION INTRAVENOUS; SUBCUTANEOUS
Status: DISCONTINUED | OUTPATIENT
Start: 2025-01-08 | End: 2025-01-08 | Stop reason: HOSPADM

## 2025-01-08 RX ORDER — METOPROLOL TARTRATE 1 MG/ML
5 INJECTION, SOLUTION INTRAVENOUS
Status: DISCONTINUED | OUTPATIENT
Start: 2025-01-08 | End: 2025-01-09 | Stop reason: HOSPADM

## 2025-01-08 RX ORDER — LORAZEPAM 0.5 MG/1
0.5 TABLET ORAL
Status: DISCONTINUED | OUTPATIENT
Start: 2025-01-08 | End: 2025-01-08 | Stop reason: HOSPADM

## 2025-01-08 RX ORDER — FENTANYL CITRATE 50 UG/ML
25 INJECTION, SOLUTION INTRAMUSCULAR; INTRAVENOUS
Status: DISCONTINUED | OUTPATIENT
Start: 2025-01-08 | End: 2025-01-09 | Stop reason: HOSPADM

## 2025-01-08 RX ORDER — SODIUM CHLORIDE 9 MG/ML
INJECTION, SOLUTION INTRAVENOUS CONTINUOUS
Status: ACTIVE | OUTPATIENT
Start: 2025-01-08 | End: 2025-01-08

## 2025-01-08 RX ORDER — POTASSIUM CHLORIDE 1500 MG/1
20 TABLET, EXTENDED RELEASE ORAL
Status: DISCONTINUED | OUTPATIENT
Start: 2025-01-08 | End: 2025-01-08 | Stop reason: HOSPADM

## 2025-01-08 RX ORDER — OXYCODONE HYDROCHLORIDE 10 MG/1
10 TABLET ORAL EVERY 4 HOURS PRN
Status: DISCONTINUED | OUTPATIENT
Start: 2025-01-08 | End: 2025-01-09 | Stop reason: HOSPADM

## 2025-01-08 RX ORDER — ALBUTEROL SULFATE 90 UG/1
2 INHALANT RESPIRATORY (INHALATION) EVERY 5 MIN PRN
Status: DISCONTINUED | OUTPATIENT
Start: 2025-01-08 | End: 2025-01-08

## 2025-01-08 RX ORDER — OXYCODONE HYDROCHLORIDE 5 MG/1
5 TABLET ORAL EVERY 4 HOURS PRN
Status: DISCONTINUED | OUTPATIENT
Start: 2025-01-08 | End: 2025-01-09 | Stop reason: HOSPADM

## 2025-01-08 RX ORDER — NALOXONE HYDROCHLORIDE 0.4 MG/ML
0.2 INJECTION, SOLUTION INTRAMUSCULAR; INTRAVENOUS; SUBCUTANEOUS
Status: ACTIVE | OUTPATIENT
Start: 2025-01-08 | End: 2025-01-09

## 2025-01-08 RX ORDER — ATROPINE SULFATE 0.1 MG/ML
0.5 INJECTION INTRAVENOUS
Status: ACTIVE | OUTPATIENT
Start: 2025-01-08 | End: 2025-01-09

## 2025-01-08 RX ORDER — IOPAMIDOL 755 MG/ML
INJECTION, SOLUTION INTRAVASCULAR
Status: DISCONTINUED | OUTPATIENT
Start: 2025-01-08 | End: 2025-01-08 | Stop reason: HOSPADM

## 2025-01-08 RX ORDER — FENTANYL CITRATE 50 UG/ML
INJECTION, SOLUTION INTRAMUSCULAR; INTRAVENOUS
Status: DISCONTINUED | OUTPATIENT
Start: 2025-01-08 | End: 2025-01-08 | Stop reason: HOSPADM

## 2025-01-08 RX ORDER — REGADENOSON 0.08 MG/ML
INJECTION, SOLUTION INTRAVENOUS
Status: COMPLETED
Start: 2025-01-08 | End: 2025-01-08

## 2025-01-08 RX ORDER — FLUMAZENIL 0.1 MG/ML
0.2 INJECTION, SOLUTION INTRAVENOUS
Status: ACTIVE | OUTPATIENT
Start: 2025-01-08 | End: 2025-01-09

## 2025-01-08 RX ORDER — ONDANSETRON 2 MG/ML
4 INJECTION INTRAMUSCULAR; INTRAVENOUS EVERY 6 HOURS PRN
Status: DISCONTINUED | OUTPATIENT
Start: 2025-01-08 | End: 2025-01-09 | Stop reason: HOSPADM

## 2025-01-08 RX ORDER — ASPIRIN 81 MG/1
81 TABLET ORAL DAILY
Qty: 30 TABLET | Refills: 3 | Status: SHIPPED | OUTPATIENT
Start: 2025-01-09 | End: 2025-01-09

## 2025-01-08 RX ADMIN — LISINOPRIL 40 MG: 40 TABLET ORAL at 11:01

## 2025-01-08 RX ADMIN — Medication 33 MILLICURIE: at 09:18

## 2025-01-08 RX ADMIN — REGADENOSON 0.4 MG: 0.08 INJECTION, SOLUTION INTRAVENOUS at 09:10

## 2025-01-08 RX ADMIN — ASPIRIN 81 MG CHEWABLE TABLET 243 MG: 81 TABLET CHEWABLE at 14:08

## 2025-01-08 RX ADMIN — LEVOTHYROXINE SODIUM 100 MCG: 0.1 TABLET ORAL at 11:01

## 2025-01-08 RX ADMIN — SODIUM CHLORIDE: 9 INJECTION, SOLUTION INTRAVENOUS at 20:22

## 2025-01-08 RX ADMIN — ALFUZOSIN HYDROCHLORIDE 10 MG: 10 TABLET, EXTENDED RELEASE ORAL at 11:17

## 2025-01-08 RX ADMIN — ACETAMINOPHEN 500 MG: 500 TABLET, FILM COATED ORAL at 11:01

## 2025-01-08 RX ADMIN — ROSUVASTATIN CALCIUM 40 MG: 20 TABLET, FILM COATED ORAL at 20:46

## 2025-01-08 RX ADMIN — EZETIMIBE 10 MG: 10 TABLET ORAL at 11:01

## 2025-01-08 RX ADMIN — Medication 10.9 MILLICURIE: at 07:44

## 2025-01-08 RX ADMIN — ASPIRIN 81 MG: 81 TABLET, COATED ORAL at 11:01

## 2025-01-08 RX ADMIN — ACETAMINOPHEN 650 MG: 325 TABLET, FILM COATED ORAL at 20:49

## 2025-01-08 RX ADMIN — CHLORTHALIDONE 12.5 MG: 25 TABLET ORAL at 11:34

## 2025-01-08 ASSESSMENT — ACTIVITIES OF DAILY LIVING (ADL)
ADLS_ACUITY_SCORE: 43
ADLS_ACUITY_SCORE: 44
ADLS_ACUITY_SCORE: 43
ADLS_ACUITY_SCORE: 44
ADLS_ACUITY_SCORE: 43

## 2025-01-08 NOTE — PLAN OF CARE
Tele has called me twice with 2.1 pause read on DNA Response. Pt reading SR junctional 55 to 40's.

## 2025-01-08 NOTE — PLAN OF CARE
PRIMARY DIAGNOSIS: CHEST PAIN  OUTPATIENT/OBSERVATION GOALS TO BE MET BEFORE DISCHARGE:  1. Ruled out acute coronary syndrome (negative or stable Troponin):  Yes  2. Pain Status: Pain free.   3. Appropriate provocative testing performed: Nuclear stress test today   - Stress Test Procedure: Nuclear  - Interpretation of cardiac rhythm per telemetry tech: SR 60    4. Cleared by Consultants (if applicable):Yes  5. Return to near baseline physical activity: Yes  Discharge Planner Nurse   Safe discharge environment identified: No  Barriers to discharge: Yes       Please review provider order for any additional goals.   Nurse to notify provider when observation goals have been met and patient is ready for discharge.        Goal Outcome Evaluation:      Plan of Care Reviewed With: patient    Overall Patient Progress: no changeOverall Patient Progress: no change    Outcome Evaluation: Pt A&)x4pt deneis pain. Pt denies Lightheaded, dizziness, SOB, and chest pain. Lung clear and pt on tele:60. Pt NPO for stress test. IV R SL. Follow with Cardiology

## 2025-01-08 NOTE — PROGRESS NOTES
[facility]    Medicine Progress Note - Hospitalist Service    Date of Admission:  1/7/2025    Provider attestation: I agree with the information in Geneva Prabhakar's progress note.  Patient is here with exertional chest pain concerning for coronary etiology and will be having an angiogram today.  He is currently chest pain-free.  Likely will stay the night and discharge in the morning.  Diana Pierce PA-C    Assessment & Plan   Terrence Murillo is a 79 year old male with a history of CAD, coronary atherectomy with restenosis admitted on 1/7/2025 for burning sensation in his chest while doing housework improved with nitroglycerin.    In the ED he was afebrile with blood pressure 126/62, pulse 47 and breathing comfortably on room air without hypoxia.  Work showed normal BMP, glucose 101, , high-sensitivity troponin of 31 and CBC remarkable for WBC 5.2 and hemoglobin 11.4.  Second troponin done 6 hours after admission labs was 27.  COVID/influenza/RSV was negative, chest x-ray shows no pneumonia and EKG showed sinus bradycardia with first-degree AV block.  Aspirin was given in the emergency room and observation admission was requested.    Since admission patient has been chest pain-free.  Cardiology was consulted and Lexiscan stress test was ordered.  Lexiscan stress test is normal however patient's story is concerning enough that cardiology is recommending angiogram which we performed on 1/8.    # Exertional chest pain  # History of CAD with stent placement  - sxs reminicient of past angina where he was taken to cath and needed stent in 2010  - Initial trop detectable at 32 but dropped to 27 so no e/o acute MI  -Echocardiogram done on 1/7 showed EF of 60% without wall motion abnormality  -Monitored overnight on telemetry with evidence of bradycardia but asymptomatic  -Admitting provider ordered both cardiology consultation and Lexiscan stress test hoping that cardiology would see patient prior  "to stress testing case angiogram was recommended but unfortunately stress test started very early  -Rochelle scan stress test was normal  -Cardiology recommending angiogram given exertional symptoms which will be performed on 1/8  -Continue aspirin, beta-blocker and statin  -Patient will stay overnight after test    #HTN  -Continue ramipril (changed to lisinopril due to medicine availability), continue metoprolol 25 mg twice daily and holding chlorthalidone     #HLP  -Continue rosuvastatin and Zetia    # Hypothyroidism  -Continue levothyroxine    #Bladder incontinence  -Continue alfuzosin           Observation Goals: List all goals to be met before discharge home: , - Serial troponins and stress test complete., - Seen and cleared by consultant if applicable, - Adequate pain control on oral analgesia, - Vital signs normal or at patient baseline, - Safe disposition plan has been identified, - Nurse to notify provider when observation goals have been met and patient is ready for discharge.  Diet: NPO for Medical/Clinical Reasons Except for: Ice Chips, Meds  NPO for Medical/Clinical Reasons Except for: Meds    DVT Prophylaxis: Low Risk/Ambulatory with no VTE prophylaxis indicated  Davidson Catheter: Not present  Lines: None     Cardiac Monitoring: ACTIVE order. Indication: Chest pain/ ACS rule out (24 hours)  Code Status: Full Code      Clinically Significant Risk Factors Present on Admission                 # Drug Induced Platelet Defect: home medication list includes an antiplatelet medication   # Hypertension: Noted on problem list           # Obesity: Estimated body mass index is 33.87 kg/m  as calculated from the following:    Height as of this encounter: 1.743 m (5' 8.62\").    Weight as of this encounter: 102.9 kg (226 lb 13.7 oz).              Social Drivers of Health    Alcohol Use: Unknown (11/20/2020)    [facility]    AUDIT-C     Frequency of Alcohol Consumption: 4 or more times a " week     Average Number of Drinks: 1 or 2   Interpersonal Safety: Unknown (8/26/2024)    Received from [facility]    Humiliation, Afraid, Rape, and Kick questionnaire     Emotionally Abused: No     Physically Abused: No     Sexually Abused: No          Disposition Plan     Medically Ready for Discharge: Anticipated Tomorrow         The patient's care was discussed with the patient as well as Dr. Rosales of the cardiology department.    Geneva Prabhakar  Hospitalist Service  [facility]  Securely message with [employer] in [place] (more info)  Text page via [facility] Paging/Directory   ______________________________________________________________________    Interval History   Has been chest pain free here and denies shortness of breath/cough.  He has been urinating normally, but states that he has been constipated. His oxygen saturation has been between 93-98% on room air. He has been tolerating PO.     Physical Exam   Vital Signs: Temp: 97.8  F (36.6  C) Temp src: Oral BP: 128/53 Pulse: 53   Resp: 18 SpO2: 95 % O2 Device: None (Room air)    Weight: 226 lbs 13.65 oz    General Appearance: Patient laying on exam bed, in no acute distress, mildly tearful throughout the visit. Appears stated age  Respiratory: Clear to auscultation bilaterally. Adequate pitch and duration. No wheezing, crackles, rhonchi.   Cardiovascular: RRR. S1 and S2 sounds present. No murmurs, rubs, or gallops  GI: Soft and nondistended. No tenderness throughout.   Skin: Warm and dry. No scars or excoriations  Neurological: Alert and oriented x3.     Medical Decision Making       55 MINUTES SPENT BY ME on the date of service doing chart review, history, exam, documentation & further activities per the note.      Data   Imaging results reviewed over the past 24 hrs:   Recent Results (from the past 24 hours)   Echocardiogram Complete   Result Value    LVEF  60%    Narrative    [id number]  [id number]  [id number]  [id number]^MONTALVO^ALYCE^[facility]  [facility]  Echocardiography Laboratory  [address] [place] [address]     Name: DANIEL RIGGINS  MRN: [id number]  : 1945  Study Date: 2025 03:18 PM  Age: 79 yrs  Gender: Male  Patient Location: [facility]  Reason For Study: Chest Pain, Chest Pressure, Chest Tightness  Ordering Physician: ALYCE MONTALVO  Referring Physician: Yamilex Booker  Performed By: Zoila Do     BSA: 2.2 m2  Height: 69 in  Weight: 231 lb  HR: 49  BP: 130/74 mmHg  ______________________________________________________________________________  Procedure  Echocardiogram with two-dimensional, color and spectral Doppler. Optison (NDC  #[id number]) given intravenously. Technically difficult study.Extremely  difficult acoustic windows despite the use of contrast for endcardial border  definition.  ______________________________________________________________________________  Interpretation Summary     1. The left ventricle is normal in structure, function and size. The visual  ejection fraction is estimated at 60%. Normal left ventricular wall motion  2. The right ventricle is normal in structure, function and size.  3. No valve disease.     No changes from stress echo in .  ______________________________________________________________________________  Left Ventricle  The left ventricle is normal in structure, function and size. There is normal  left ventricular wall thickness. The visual ejection fraction is estimated at  60%. Left ventricular diastolic function is normal. Normal left ventricular  wall motion.     Right Ventricle  The right ventricle is normal in structure, function and size.     Atria  Normal left atrial size. Right atrial size is normal. There is no atrial shunt  seen.     Mitral Valve  The mitral valve is normal in structure and function.     Tricuspid Valve  No tricuspid regurgitation.     Aortic Valve  The aortic valve is normal in structure and function.     Pulmonic  Valve  The pulmonic valve is normal in structure and function.     Vessels  Normal ascending, transverse (arch), and descending aorta. The inferior vena  cava was normal in size with preserved respiratory variability.     Pericardium  There is no pericardial effusion.     Rhythm  Sinus rhythm was noted.  ______________________________________________________________________________  MMode/2D Measurements & Calculations  IVC diam: 1.9 cm     Ao root diam: 3.7 cm  asc Aorta Diam: 3.6 cm  LVOT diam: 2.6 cm  LVOT area: 5.2 cm2  Ao root diam index Ht(cm/m): 2.1  Ao root diam index BSA (cm/m2): 1.7  Asc Ao diam index BSA (cm/m2): 1.6  Asc Ao diam index Ht(cm/m): 2.0  LA Volume (BP): 63.6 ml  LA Volume Index (BP): 28.9 ml/m2  TAPSE: 3.0 cm     Doppler Measurements & Calculations  MV E max jm: 67.6 cm/sec  MV A max jm: 73.9 cm/sec  MV E/A: 0.91  MV max PG: 3.8 mmHg  MV mean P.4 mmHg  MV V2 VTI: 35.9 cm  MVA(VTI): 2.8 cm2  MV dec slope: 296.5 cm/sec2  MV dec time: 0.23 sec  Ao V2 max: 162.8 cm/sec  Ao max P.0 mmHg  Ao V2 mean: 104.5 cm/sec  Ao mean P.1 mmHg  Ao V2 VTI: 36.2 cm  HOMERO(I,D): 2.8 cm2  HOMERO(V,D): 2.8 cm2  LV V1 max PG: 3.0 mmHg  LV V1 max: 86.6 cm/sec  LV V1 VTI: 19.6 cm  SV(LVOT): 102.2 ml  SI(LVOT): 46.6 ml/m2  PA V2 max: 112.3 cm/sec  PA max P.0 mmHg  PA acc time: 0.17 sec  AV Jm Ratio (DI): 0.53  HOMERO Index (cm2/m2): 1.3  E/E' av.4  Lateral E/e': 6.7  Medial E/e': 8.1  RV S Jm: 12.9 cm/sec     ______________________________________________________________________________  Report approved by: Benjamin Bourgeois MD on 2025 03:57 PM         NM Lexiscan stress test (nuc card)   Result Value    Target     Baseline Systolic     Baseline Diastolic BP 67    Last Stress Systolic     Last Stress Diastolic BP 60    Baseline HR 59    Max HR  75    Max Predicted HR  54    Rate Pressure Product 9,150.0    Left Ventricular EF 66    Narrative      The nuclear stress test is  negative for inducible myocardial ischemia   or infarction.    Left ventricular function is normal.    The left ventricular ejection fraction at stress is 66%.    A prior study was conducted on 3/23/2016.  This study has no change   when compared with the prior study.

## 2025-01-08 NOTE — PLAN OF CARE
"PRIMARY DIAGNOSIS: CHEST PAIN  OUTPATIENT/OBSERVATION GOALS TO BE MET BEFORE DISCHARGE:  1. Ruled out acute coronary syndrome (negative or stable Troponin):  Yes  2. Pain Status: Pain free.  3. Appropriate provocative testing performed: Yes  - Stress Test Procedure: Nuclear tomorrow  - Interpretation of cardiac rhythm per telemetry tech: SB PVC's 55    4. Cleared by Consultants (if applicable):No  5. Return to near baseline physical activity: Yes  Discharge Planner Nurse   Safe discharge environment identified: Yes  Barriers to discharge: Yes    Please review provider order for any additional goals.   Nurse to notify provider when observation goals have been met and patient is ready for discharge.    Goal Outcome Evaluation:      Plan of Care Reviewed With: patient    Overall Patient Progress: improvingOverall Patient Progress: improving    Outcome Evaluation: Pt A&Ox4. VSS, RA. Denies pain/n/v/d/numbness/tingling/sob/chest pain/dizzy. WDL breath/heart sounds. Low Sat det, tolerating well, NPO at midnight. Independent, educated if dizzy to call. Saline locked, clean/dry/intact. Tele SB PVC's 55. Cardio consulted. Plan to have nuclear stress test tomorrow or possible cath lab when cardio evaluated.     /52   Pulse 60   Temp 97.9  F (36.6  C) (Oral)   Resp 18   Ht 1.743 m (5' 8.62\")   Wt 102.9 kg (226 lb 13.7 oz)   SpO2 97%   BMI 33.87 kg/m      "

## 2025-01-08 NOTE — H&P
78 yo hx of CAD s/p directional coronary atherectomy in 1992 with subsequent restenosis x2 for which we treated with balloon angioplasty.  In 2010, 2 Xience drug-eluting stents were placed in his proximal and mid left anterior descending artery. Last stress test in 2016 and cath reveals normal cardiac perfusion in 3/2016 with angiogram showing:  Conclusion:   1. Scattered mild disease in a dominant right coronary artery. No  stenosis greater than 25%.  2. Previously placed LAD stents are widely patent. There is a jailed  diagonal that has a 30-50% ostial narrowing. There is a tiny branch of  this diagonal that has an 80-90% stenosis, this was present in 2010.  3. 30-50% first obtuse marginal stenosis with an FFR of 0.92.  4. Tiny ramus intermedius branch with 80% stenosis with JADE grade 2  flow. This is new from 2010.  5. Mild distal anterior wall hypokinesia. Overall ejection fraction  estimated to be 50-55%. Left ventricular end-diastolic pressure of 13  mmHg.    At that time it was felt there were small vessels too small to intervene so he was placed on aggressive risk factor modification.       He has been asymptomatic till yesterday when he started to have burning chest discomfort with activity which is remincient to the type of discomfort he had when he had is previous stent in 2010.     Plan:   Now, pain free. Trops detectable at 32 but down to 27. Non ischemic EKG.   ECHO no WMA. Discussed possible proceeding to nuclear stress test but given the evidence of previous area of scattered moderate atherosclerosis and exact presentation of his burning sensation (his anginal equilivant) will ask Cards to see pt and assess if he should go straight to Cath tomorrow. I previously did order a nuclear stress test for tomorrow whilch I will keep for now to keep his testing slot, in case Dr. Small would prefer stress test first prior to angiogram.   O/w cardiac diet, no caffeine, and npo after MN  If he starts to have  recurrent pain tonight, would start heparin gtt

## 2025-01-08 NOTE — PLAN OF CARE
PRIMARY DIAGNOSIS: CHEST PAIN  OUTPATIENT/OBSERVATION GOALS TO BE MET BEFORE DISCHARGE:  1. Ruled out acute coronary syndrome (negative or stable Troponin):  Yes  2. Pain Status: Pain free.  3. Appropriate provocative testing performed: Yes  - Stress Test Procedure: Nuclear stress test today   - Interpretation of cardiac rhythm per telemetry tech: SR PVC's 55    4. Cleared by Consultants (if applicable):No  5. Return to near baseline physical activity: Yes  Discharge Planner Nurse   Safe discharge environment identified: Yes  Barriers to discharge: Yes       Entered by: Amy Tam RN 01/08/2025 4:27 AM  Patient is Aox4, VSS, up independent in the room, NPO for Nuclear stress test today and possible Angio depending on results of stress test, PIV in the right arm SL, on room air, CPAP on, no pain noted, Cardiology following, sleeping well, able to make needs known, will continue to monitor and provide cares.   Please review provider order for any additional goals.   Nurse to notify provider when observation goals have been met and patient is ready for discharge.

## 2025-01-08 NOTE — PLAN OF CARE
PRIMARY DIAGNOSIS: CHEST PAIN  OUTPATIENT/OBSERVATION GOALS TO BE MET BEFORE DISCHARGE:  1. Ruled out acute coronary syndrome (negative or stable Troponin):  Yes  2. Pain Status: Pain free.  3. Appropriate provocative testing performed: Yes  - Stress Test Procedure: Nuclear stress test today   - Interpretation of cardiac rhythm per telemetry tech: SR PVC's 53    4. Cleared by Consultants (if applicable):No  5. Return to near baseline physical activity: Yes  Discharge Planner Nurse   Safe discharge environment identified: Yes  Barriers to discharge: Yes       Entered by: Amy Tam RN 01/08/2025 4:24 AM  Patient is Aox4, VSS, up independent in the room, NPO for Nuclear stress test today and possible Angio depending on results of stress test, PIV in the right arm SL, on room air, no pain noted, Cardiology following, sleeping well, able to make needs known, will continue to monitor and provide cares.   Please review provider order for any additional goals.   Nurse to notify provider when observation goals have been met and patient is ready for discharge.

## 2025-01-08 NOTE — PLAN OF CARE
PRIMARY DIAGNOSIS: CHEST PAIN  OUTPATIENT/OBSERVATION GOALS TO BE MET BEFORE DISCHARGE:  1. Ruled out acute coronary syndrome (negative or stable Troponin):  Yes  2. Pain Status: Pain free.  3. Appropriate provocative testing performed: Yes  - Stress Test Procedure: Nuclear  - Interpretation of cardiac rhythm per telemetry tech: Pt is off unit at angiogram     4. Cleared by Consultants (if applicable):No  5. Return to near baseline physical activity: Yes  Discharge Planner Nurse   Safe discharge environment identified: No  Barriers to discharge: Yes       Please review provider order for any additional goals.   Nurse to notify provider when observation goals have been met and patient is ready for discharge.      Goal Outcome Evaluation:      Plan of Care Reviewed With: patient    Overall Patient Progress: no changeOverall Patient Progress: no change    Outcome Evaluation: Pt down for angiogram.

## 2025-01-08 NOTE — CONSULTS
Cardiology Consultation  Complex decision making re CAG versus continued medical therapy      Terrence Murillo MRN# [id number]   YOB: 1945 Age: 79 year old   Date of Admission: 1/7/2025     Reason for consult:            Assessment and Plan:     Chest pain: Although thus far there is no objective sign of ischemia or necrosis, the patient has documented coronary disease, describes progressive recurrent chest burning occurring with activity and now at rest.  The symptoms are  similar to those he noted prior to his last percutaneous coronary interventions.  Since his nuclear stress test was negative, I discussed the option of intensifying medical therapy, but the patient is uncomfortable with going home given his current symptoms.  After discussion of the risks and benefits, the patient requested diagnostic coronary angiography with possible revascularization.      Coronary Artery Disease : Presentation with unstable angina in 1990.  Status post rotational atherectomy and balloon angioplasty.  History of implantation of ever Kelby eluting stents in LAD 2010.  Last coronary angiography 2016 showing no significant coronary narrowing.  Nuclear stress test this admission showing no ischemia follow-up despite typical symptoms.      Essential Hypertension      4. Dyslipidemia    5. Hypothyroidism  6. Obesity        Plan  1) Diagnostic coronary angiography with possible intervention    I have examined the patient, reviewed the history, medications and pre procedural tests. I have explained to the patient the risks of death, MI, stroke, hematoma, possible urgent bypass surgery for failed PCI, use of stents, thienopyridine agents, possible peripheral vascular complications, arrhythmia, the use of FFR in clinical decision-making and alternative of medical therapy alone in regards to left heart catheterization, left ventriculography, coronary angiography, and possible percutaneous coronary intervention. The patient  voiced understanding and wishes to proceed. The patient has a good right radial pulse, normal ulnar pulse and a normal Guido's sign. He understands the interventional cardiologist may advise a femoral approach.           High complexity medical decision making  Chronic illness with severe exacerbation, progression:  Acute illness that poses a threat to life or bodily function:     High complexity data review  Unique tests ordered:   Unique results reviewed: Chest XR from  .Echo reported   Independent interpretation of a test performed by another physician: ECG from   External documents reviewed:              Chief Complaint:   Chest Pain           History of Present Illness:   Terrence Murillo, an 79-year-old man with a known history of coronary disease status post previous percutaneous coronary intervention, hypertension, dyslipidemia, obesity, and hypothyroidism was evaluated in consultation at the request of the internal medicine service for recurrent chest burning.    Mr. Murillo developed unstable angina characterized by recurrent substernal chest burning in 1990.  He subsequently underwent rotational atherectomy on 2 occasions, balloon angioplasty, then finally stent implantation in the proximal and mid LAD in 2010.  The patient has been very carefully followed by Dr. Jolley since then and most recently saw the patient in November 2024.  Beginning about 2 days before admission however the patient was troubled by recurrent subtotal chest burning, initially occurring with activity then occurring at rest and responsive to nitroglycerin.  He presented to the emergency room, and has ruled out for myocardial infarction.  His internist ordered a nuclear stress test that showed no ischemia with a preserved ejection fraction, but the patient's continue to have symptoms and is concerned about possible unstable angina in view of his past history.    PAST MEDICAL HISTORY  1) CAD sp rotational atherectomy x 2, PTCA,  "then stent implantation in proximal and mid LAD. Coronary angiogram 2016 demonstrating no new lesions. On medical therapy and follows with   2) HTN  3) Dyslpidemia  4) Obesity  5) Hypertension         Physical Exam:   Vitals were reviewed  Blood pressure 128/53, pulse 53, temperature 97.8  F (36.6  C), temperature source Oral, resp. rate 18, height 1.743 m (5' 8.62\"), weight 102.9 kg (226 lb 13.7 oz), SpO2 95%.  Temperatures:  Current - Temp: 97.8  F (36.6  C); Max - Temp  Av.1  F (36.7  C)  Min: 97.7  F (36.5  C)  Max: 98.9  F (37.2  C)  Respiration range: Resp  Av  Min: 18  Max: 18  Pulse range: Pulse  Av.9  Min: 47  Max: 68  Blood pressure range: Systolic (24hrs), Av , Min:112 , Max:145   ; Diastolic (24hrs), Av, Min:52, Max:69    Pulse oximetry range: SpO2  Av %  Min: 93 %  Max: 98 %  No intake or output data in the 24 hours ending 25 1334  Constitutional:   awake, alert, cooperative, no apparent distress, and appears stated age     Eyes:   Lids and lashes normal, pupils equal, round and reactive to light, extra ocular muscles intact, sclera clear, conjunctiva normal     Neck:   supple, symmetrical, trachea midline,      Back:   symmetric     Lungs:   Clear to P and A     Cardiovascular:   Normal S1 and S2 no murmur rub or click     Abdomen:   non-tender     Musculoskeletal:   motor strength is 5 out of 5 all extremities bilaterally     Neurologic:   Grossly nonfocal     Skin:   normal skin color, texture, turgor     Additional findings:                    Past Medical History:   I have reviewed this patient's past medical history  Past Medical History:   Diagnosis Date    Coronary artery disease     cardiac cath 2016: medical management; cath : SUSANA x2 to LAD; cath : PTCA to LAD; cath : PTCA to LAD, 1992: atherectomy of LAD    Hearing problem     Hyperlipidaemia     Hypertension     Obese     Obstructive sleep apnea     Reduced vision     Sleep apnea     " CPAP             Past Surgical History:   I have reviewed this patient's past surgical history    Past Surgical History:   Procedure Laterality Date    APPENDECTOMY OPEN      ARTHROPLASTY KNEE Left     ARTHROPLASTY SHOULDER Right     ENT SURGERY      HEART CATH, ANGIOPLASTY      LAD  atherectomy with a DVI device     HEART CATH, ANGIOPLASTY  4-28-10    PTCA and stent proximal and mid LAD (2) stents Xience    HEART CATH, ANGIOPLASTY  2016    no stenosis greater than 25%-rec. medical management    OPTICAL TRACKING SYSTEM ENDOSCOPIC RESECTION TUMOR CRANIAL N/A 11/3/2017    Procedure: OPTICAL TRACKING SYSTEM ENDOSCOPIC RESECTION TUMOR CRANIAL;  Stealth Guided Endoscopic Transnasal Resection of Pituitary Tumor;  Surgeon: Amanda Bates MD;  Location:  OR               Social History:   I have reviewed this patient's social history  after 56 year marriage, 4 kids but lost one son at age 37 due to SCD  Social History     Tobacco Use    Smoking status: Never    Smokeless tobacco: Never   Substance Use Topics    Alcohol use: Yes     Alcohol/week: 1.0 - 2.0 standard drink of alcohol     Types: 1 - 2 Standard drinks or equivalent per week     Comment: 1 cocktail and some wine per day             Family History:   I have reviewed this patient's family history  Family History   Problem Relation Age of Onset    C.A.D. Father     Heart Disease Father          at age 44    Heart Disease Son 37        enlarged heart    Heart Disease Mother          at age 80 after heart surgery    Heart Surgery Mother         open heart, passed 10 days after    Diabetes Mother     Family History Negative Maternal Grandmother     Family History Negative Maternal Grandfather     Family History Negative Paternal Grandmother     Family History Negative Paternal Grandfather     Family History Negative Brother     Alzheimer Disease Brother     Family History Negative Sister     Family History Negative Daughter      Family History Negative Son     Family History Negative Daughter     Heart Disease Son          at age 37             Allergies:     Allergies   Allergen Reactions    Cardizem [Diltiazem Hcl] Itching    Cats Hives     WATERY EYES, SNEEZE, AND COUGH    Diltiazem Hives and Rash             Medications:   I have reviewed this patient's current medications  Medications Prior to Admission   Medication Sig Dispense Refill Last Dose/Taking    acetaminophen (TYLENOL) 500 MG tablet Take 500 mg by mouth every morning.   2025 Morning    alfuzosin ER (UROXATRAL) 10 MG 24 hr tablet Take 10 mg by mouth daily   2025 Morning    amoxicillin (AMOXIL) 500 MG capsule Take before dental appointments   Unknown    aspirin 81 MG tablet Take 1 tablet (81 mg) by mouth every evening 30 tablet 0 2025 Evening    chlorthalidone (HYGROTON) 25 MG tablet Take 0.5 tablets (12.5 mg) by mouth daily. 45 tablet 3 2025 Morning    ezetimibe (ZETIA) 10 MG tablet Take 1 tablet (10 mg) by mouth daily. 90 tablet 3 2025 Morning    Glucosamine 500 MG CAPS Take 500 mg by mouth 2 times daily    2025 Morning    ibuprofen (ADVIL/MOTRIN) 200 MG tablet Take 400 mg by mouth daily   2025 Evening    levothyroxine (SYNTHROID/LEVOTHROID) 100 MCG tablet Take 1 tablet (100 mcg) by mouth daily 90 tablet 3 2025 at  3:00 AM    metoprolol tartrate (LOPRESSOR) 25 MG tablet Take 1 tablet (25 mg) by mouth 2 times daily. 180 tablet 3 2025 Morning    Multiple Vitamin (MULTI-VITAMIN) per tablet Take 1 tablet by mouth daily.   2025 Evening    nitroGLYcerin (NITROSTAT) 0.4 MG sublingual tablet Place 1 tablet (0.4 mg) under the tongue every 5 minutes as needed for chest pain May repeat x2, if chest pain contines after 3rd dose call 911 25 tablet 3 2025 at  9:30 AM    ramipril (ALTACE) 10 MG capsule Take 1 capsule (10 mg) by mouth daily. 90 capsule 3 2025 Morning    rosuvastatin (CRESTOR) 40 MG tablet Take 1 tablet (40 mg) by mouth  daily. 90 tablet 3 1/6/2025 Evening    saw palmetto 450 MG CAPS capsule Take 450 mg by mouth 2 times daily   1/7/2025 Morning     Current Facility-Administered Medications   Medication Dose Route Frequency Provider Last Rate Last Admin    acetaminophen (TYLENOL) tablet 500 mg  500 mg Oral QAM Emely Mead PA-C   500 mg at 01/08/25 1101    alfuzosin ER (UROXATRAL) 24 hr tablet 10 mg  10 mg Oral Daily MeadEmely PA-C   10 mg at 01/08/25 1117    alum & mag hydroxide-simethicone (MAALOX) suspension 30 mL  30 mL Oral Q4H PRN Emely Mead PA-C        aspirin EC tablet 81 mg  81 mg Oral Daily Emely Mead PA-C   81 mg at 01/08/25 1101    chlorthalidone (HYGROTON) half-tab 12.5 mg  12.5 mg Oral Daily MeadEmely durbin PA-C   12.5 mg at 01/08/25 1134    ezetimibe (ZETIA) tablet 10 mg  10 mg Oral Daily MeadEmely PA-C   10 mg at 01/08/25 1101    HOLD: Caffeine containing medications 12 hours prior to the procedure   Does not apply HOLD Emely Mead PA-C        HOLD: dipyridamole (PERSANTINE) or aspirin/dipyridamole (AGGRENOX) 48 hours prior to the procedure   Does not apply HOLD Emely Mead PA-C        HOLD: theophylline or aminophylline 12 hours prior to the procedure   Does not apply HOLD Emely Mead PA-C        IF patient diabetic - HOLD: ALL ORAL HYPOGLYCEMICS and include: glipizide, glyburide, glimepiride, gliclazide, metformin, any metformin containing medication, on day of the procedure   Does not apply HOLD Emely Mead PA-C        levothyroxine (SYNTHROID/LEVOTHROID) tablet 100 mcg  100 mcg Oral Daily MeadEmely durbin PA-C   100 mcg at 01/08/25 1101    lisinopril (ZESTRIL) tablet 40 mg  40 mg Oral Daily MeadEmely PA-C   40 mg at 01/08/25 1101    metoprolol tartrate (LOPRESSOR) tablet 25 mg  25 mg Oral BID Mead, Emely Espinoza PA-C   25 mg at 01/07/25 1943    nitroGLYcerin (NITROSTAT) sublingual tablet 0.4 mg  0.4 mg Sublingual Q5 Min  PRN Alyce Montalvo PA-C        rosuvastatin (CRESTOR) tablet 40 mg  40 mg Oral QPM Alyce Montalvo PA-C   40 mg at 25    sodium chloride (PF) 0.9% PF flush 1-10 mL  1-10 mL Intravenous Q10 Min PRN Alyce Montalvo PA-C                 Review of Systems:     The 10 point Review of Systems is negative other than noted in the HPI            Data:   All laboratory data reviewed  Results for orders placed or performed during the hospital encounter of 25 (from the past 24 hours)   Echocardiogram Complete   Result Value Ref Range    LVEF  60%     Narrative    [id number]  [id number]  [id number]  [id number]^SELVIN^ALYCE^KARLA     [facility]  Echocardiography Laboratory  [address] [place] [address]     Name: DANIEL RIGGINS  MRN: [id number]  : 1945  Study Date: 2025 03:18 PM  Age: 79 yrs  Gender: Male  Patient Location: [facility]  Reason For Study: Chest Pain, Chest Pressure, Chest Tightness  Ordering Physician: ALYCE MONTALVO  Referring Physician: Yamilex Booker  Performed By: Zoila Do     BSA: 2.2 m2  Height: 69 in  Weight: 231 lb  HR: 49  BP: 130/74 mmHg  ______________________________________________________________________________  Procedure  Echocardiogram with two-dimensional, color and spectral Doppler. Optison (NDC  #[id number]) given intravenously. Technically difficult study.Extremely  difficult acoustic windows despite the use of contrast for endcardial border  definition.  ______________________________________________________________________________  Interpretation Summary     1. The left ventricle is normal in structure, function and size. The visual  ejection fraction is estimated at 60%. Normal left ventricular wall motion  2. The right ventricle is normal in structure, function and size.  3. No valve disease.     No changes from stress echo in  .  ______________________________________________________________________________  Left Ventricle  The left ventricle is normal in structure, function and size. There is normal  left ventricular wall thickness. The visual ejection fraction is estimated at  60%. Left ventricular diastolic function is normal. Normal left ventricular  wall motion.     Right Ventricle  The right ventricle is normal in structure, function and size.     Atria  Normal left atrial size. Right atrial size is normal. There is no atrial shunt  seen.     Mitral Valve  The mitral valve is normal in structure and function.     Tricuspid Valve  No tricuspid regurgitation.     Aortic Valve  The aortic valve is normal in structure and function.     Pulmonic Valve  The pulmonic valve is normal in structure and function.     Vessels  Normal ascending, transverse (arch), and descending aorta. The inferior vena  cava was normal in size with preserved respiratory variability.     Pericardium  There is no pericardial effusion.     Rhythm  Sinus rhythm was noted.  ______________________________________________________________________________  MMode/2D Measurements & Calculations  IVC diam: 1.9 cm     Ao root diam: 3.7 cm  asc Aorta Diam: 3.6 cm  LVOT diam: 2.6 cm  LVOT area: 5.2 cm2  Ao root diam index Ht(cm/m): 2.1  Ao root diam index BSA (cm/m2): 1.7  Asc Ao diam index BSA (cm/m2): 1.6  Asc Ao diam index Ht(cm/m): 2.0  LA Volume (BP): 63.6 ml  LA Volume Index (BP): 28.9 ml/m2  TAPSE: 3.0 cm     Doppler Measurements & Calculations  MV E max aundrea: 67.6 cm/sec  MV A max aundrea: 73.9 cm/sec  MV E/A: 0.91  MV max PG: 3.8 mmHg  MV mean P.4 mmHg  MV V2 VTI: 35.9 cm  MVA(VTI): 2.8 cm2  MV dec slope: 296.5 cm/sec2  MV dec time: 0.23 sec  Ao V2 max: 162.8 cm/sec  Ao max P.0 mmHg  Ao V2 mean: 104.5 cm/sec  Ao mean P.1 mmHg  Ao V2 VTI: 36.2 cm  HOMERO(I,D): 2.8 cm2  HOMERO(V,D): 2.8 cm2  LV V1 max PG: 3.0 mmHg  LV V1 max: 86.6 cm/sec  LV V1 VTI: 19.6  cm  SV(LVOT): 102.2 ml  SI(LVOT): 46.6 ml/m2  PA V2 max: 112.3 cm/sec  PA max P.0 mmHg  PA acc time: 0.17 sec  AV Jm Ratio (DI): 0.53  HOMERO Index (cm2/m2): 1.3  E/E' av.4  Lateral E/e': 6.7  Medial E/e': 8.1  RV S Jm: 12.9 cm/sec     ______________________________________________________________________________  Report approved by: Benjamin Bourgeois MD on 2025 03:57 PM         Troponin T, High Sensitivity   Result Value Ref Range    Troponin T, High Sensitivity 27 (H) <=22 ng/L   NM Lexiscan stress test (nuc card)   Result Value Ref Range    Target      Baseline Systolic      Baseline Diastolic BP 67     Last Stress Systolic      Last Stress Diastolic BP 60     Baseline HR 59 bpm    Max HR  75     Max Predicted HR  54 %    Rate Pressure Product 9,150.0     Left Ventricular EF 66 %    Narrative      The nuclear stress test is negative for inducible myocardial ischemia   or infarction.    Left ventricular function is normal.    The left ventricular ejection fraction at stress is 66%.    A prior study was conducted on 3/23/2016.  This study has no change   when compared with the prior study.         Lab Results   Component Value Date    CHOL 106 2024    CHOL 110 10/07/2020     Lab Results   Component Value Date    HDL 46 2024    HDL 41 10/07/2020     Lab Results   Component Value Date    LDL 45 2024    LDL 49 10/07/2020     Lab Results   Component Value Date    TRIG 75 2024    TRIG 101 10/07/2020     Lab Results   Component Value Date    CHOLHDLRATIO 2.9 2015     TSH   Date Value Ref Range Status   2024 2.08 0.30 - 4.20 uIU/mL Final   02/10/2023 2.07 0.40 - 4.00 mU/L Final   2021 1.36 0.40 - 4.00 mU/L Final         EKG results: sinus bradycardia  PACs    Echocardiology: normal    Cardiac stress testing:normal no ischemia    Cardiac angiography: pending    Clinically Significant Risk Factors Present on Admission                 # Drug Induced  "Platelet Defect: home medication list includes an antiplatelet medication   # Hypertension: Noted on problem list           # Obesity: Estimated body mass index is 33.87 kg/m  as calculated from the following:    Height as of this encounter: 1.743 m (5' 8.62\").    Weight as of this encounter: 102.9 kg (226 lb 13.7 oz).                       Kendell Small MD on 1/8/2025 at 1:34 PM    "

## 2025-01-08 NOTE — PRE-PROCEDURE
GENERAL PRE-PROCEDURE:   Procedure:  Heart catheterization possible intervention  Date/Time:  1/8/2025 2:26 PM    Written consent obtained?: Yes    Risks and benefits: Risks, benefits and alternatives were discussed    Consent given by:  Patient  Patient states understanding of procedure being performed: Yes    Patient's understanding of procedure matches consent: Yes    Procedure consent matches procedure scheduled: Yes    Expected level of sedation:  Moderate  Appropriately NPO:  Yes  Lungs:  Lungs clear with good breath sounds bilaterally  Heart:  Normal heart sounds and rate  History & Physical reviewed:  History and physical reviewed and no updates needed  Statement of review:  I have reviewed the lab findings, diagnostic data, medications, and the plan for sedation  Risk benefit indication for cath poss interv discussed with pt and family  Mi death cva  renal or vasc damage poss dapt, emergent surgery  All questions answered and they wish to proceed

## 2025-01-08 NOTE — PLAN OF CARE
"PRIMARY DIAGNOSIS: CHEST PAIN  OUTPATIENT/OBSERVATION GOALS TO BE MET BEFORE DISCHARGE:  1. Ruled out acute coronary syndrome (negative or stable Troponin):  Yes  2. Pain Status: Pain free.  3. Appropriate provocative testing performed: Yes  - Stress Test Procedure: Echo  - Interpretation of cardiac rhythm per telemetry tech: Not on    4. Cleared by Consultants (if applicable):No  5. Return to near baseline physical activity: Yes  Discharge Planner Nurse   Safe discharge environment identified: Yes  Barriers to discharge: Yes    Please review provider order for any additional goals.   Nurse to notify provider when observation goals have been met and patient is ready for discharge.    Goal Outcome Evaluation:      Plan of Care Reviewed With: patient    Overall Patient Progress: improvingOverall Patient Progress: improving    Outcome Evaluation: Pt A&Ox4. VSS, RA. Denies pain/n/v/d/numbness/tingling/sob/chest pain/dizzy. WDL breath/heart sounds. NPO. Independent, educated if dizzy to call. Saline locked, clean/dry/intact. Discharge pending.    /62 (BP Location: Right arm)   Pulse (!) 47   Temp 97.7  F (36.5  C) (Oral)   Resp 18   Ht 1.743 m (5' 8.62\")   Wt 102.9 kg (226 lb 13.7 oz)   SpO2 98%   BMI 33.87 kg/m      "

## 2025-01-08 NOTE — PLAN OF CARE
PRIMARY DIAGNOSIS: CHEST PAIN  OUTPATIENT/OBSERVATION GOALS TO BE MET BEFORE DISCHARGE:  1. Ruled out acute coronary syndrome (negative or stable Troponin):  Yes  2. Pain Status: Pain free.  3. Appropriate provocative testing performed: Nuclear stress test   - Stress Test Procedure: Nuclear, awaiting for results   - Interpretation of cardiac rhythm per telemetry tech: Junctional Rhythm 58     4. Cleared by Consultants (if applicable):No  5. Return to near baseline physical activity: Yes  Discharge Planner Nurse   Safe discharge environment identified: No  Barriers to discharge: Yes          Please review provider order for any additional goals.   Nurse to notify provider when observation goals have been met and patient is ready for discharge.      Goal Outcome Evaluation:      Plan of Care Reviewed With: patient    Overall Patient Progress: improvingOverall Patient Progress: improving    Outcome Evaluation: Pt A&Ox4. pt denies pain. Pt came back from nuclear stress test. Awaiting results. Lungs clear, Tele; junctional rhythm 56. Pt denies chest pain. Pt Indpendent and up to the bathroom.

## 2025-01-09 ENCOUNTER — APPOINTMENT (OUTPATIENT)
Dept: PHYSICAL THERAPY | Facility: CLINIC | Age: 80
DRG: 324 | End: 2025-01-09
Attending: INTERNAL MEDICINE
Payer: COMMERCIAL

## 2025-01-09 VITALS
OXYGEN SATURATION: 93 % | BODY MASS INDEX: 33.6 KG/M2 | HEIGHT: 69 IN | RESPIRATION RATE: 16 BRPM | DIASTOLIC BLOOD PRESSURE: 54 MMHG | WEIGHT: 226.85 LBS | TEMPERATURE: 98 F | HEART RATE: 80 BPM | SYSTOLIC BLOOD PRESSURE: 96 MMHG

## 2025-01-09 LAB
ANION GAP SERPL CALCULATED.3IONS-SCNC: 11 MMOL/L (ref 7–15)
ATRIAL RATE - MUSE: 54 BPM
ATRIAL RATE - MUSE: 61 BPM
BUN SERPL-MCNC: 17.1 MG/DL (ref 8–23)
CALCIUM SERPL-MCNC: 9.2 MG/DL (ref 8.8–10.4)
CHLORIDE SERPL-SCNC: 104 MMOL/L (ref 98–107)
CREAT SERPL-MCNC: 0.97 MG/DL (ref 0.67–1.17)
DIASTOLIC BLOOD PRESSURE - MUSE: NORMAL MMHG
DIASTOLIC BLOOD PRESSURE - MUSE: NORMAL MMHG
EGFRCR SERPLBLD CKD-EPI 2021: 79 ML/MIN/1.73M2
GLUCOSE SERPL-MCNC: 104 MG/DL (ref 70–99)
HCO3 SERPL-SCNC: 23 MMOL/L (ref 22–29)
INTERPRETATION ECG - MUSE: NORMAL
INTERPRETATION ECG - MUSE: NORMAL
P AXIS - MUSE: 61 DEGREES
P AXIS - MUSE: NORMAL DEGREES
POTASSIUM SERPL-SCNC: 4.3 MMOL/L (ref 3.4–5.3)
PR INTERVAL - MUSE: 258 MS
PR INTERVAL - MUSE: 308 MS
QRS DURATION - MUSE: 100 MS
QRS DURATION - MUSE: 106 MS
QT - MUSE: 442 MS
QT - MUSE: 460 MS
QTC - MUSE: 436 MS
QTC - MUSE: 444 MS
R AXIS - MUSE: 75 DEGREES
R AXIS - MUSE: 77 DEGREES
SODIUM SERPL-SCNC: 138 MMOL/L (ref 135–145)
SYSTOLIC BLOOD PRESSURE - MUSE: NORMAL MMHG
SYSTOLIC BLOOD PRESSURE - MUSE: NORMAL MMHG
T AXIS - MUSE: 13 DEGREES
T AXIS - MUSE: 29 DEGREES
VENTRICULAR RATE- MUSE: 54 BPM
VENTRICULAR RATE- MUSE: 61 BPM

## 2025-01-09 PROCEDURE — 97110 THERAPEUTIC EXERCISES: CPT | Mod: GP | Performed by: PHYSICAL THERAPIST

## 2025-01-09 PROCEDURE — 250N000013 HC RX MED GY IP 250 OP 250 PS 637: Performed by: PHYSICIAN ASSISTANT

## 2025-01-09 PROCEDURE — 97161 PT EVAL LOW COMPLEX 20 MIN: CPT | Mod: GP | Performed by: PHYSICAL THERAPIST

## 2025-01-09 PROCEDURE — 97530 THERAPEUTIC ACTIVITIES: CPT | Mod: GP | Performed by: PHYSICAL THERAPIST

## 2025-01-09 PROCEDURE — 250N000013 HC RX MED GY IP 250 OP 250 PS 637

## 2025-01-09 PROCEDURE — 82310 ASSAY OF CALCIUM: CPT | Performed by: INTERNAL MEDICINE

## 2025-01-09 PROCEDURE — 80048 BASIC METABOLIC PNL TOTAL CA: CPT | Performed by: INTERNAL MEDICINE

## 2025-01-09 PROCEDURE — 36415 COLL VENOUS BLD VENIPUNCTURE: CPT | Performed by: INTERNAL MEDICINE

## 2025-01-09 PROCEDURE — 250N000013 HC RX MED GY IP 250 OP 250 PS 637: Performed by: INTERNAL MEDICINE

## 2025-01-09 PROCEDURE — 99232 SBSQ HOSP IP/OBS MODERATE 35: CPT | Performed by: INTERNAL MEDICINE

## 2025-01-09 PROCEDURE — 99239 HOSP IP/OBS DSCHRG MGMT >30: CPT | Performed by: PHYSICIAN ASSISTANT

## 2025-01-09 RX ORDER — POLYETHYLENE GLYCOL 3350 17 G/17G
17 POWDER, FOR SOLUTION ORAL DAILY
Status: DISCONTINUED | OUTPATIENT
Start: 2025-01-09 | End: 2025-01-09 | Stop reason: HOSPADM

## 2025-01-09 RX ORDER — RAMIPRIL 10 MG/1
10 CAPSULE ORAL DAILY
Status: SHIPPED
Start: 2025-01-10

## 2025-01-09 RX ORDER — SENNOSIDES 8.6 MG
2 TABLET ORAL ONCE
Status: COMPLETED | OUTPATIENT
Start: 2025-01-09 | End: 2025-01-09

## 2025-01-09 RX ORDER — CHLORTHALIDONE 25 MG/1
12.5 TABLET ORAL DAILY
Status: SHIPPED
Start: 2025-01-13 | End: 2025-01-15 | Stop reason: SINTOL

## 2025-01-09 RX ADMIN — ALFUZOSIN HYDROCHLORIDE 10 MG: 10 TABLET, EXTENDED RELEASE ORAL at 07:59

## 2025-01-09 RX ADMIN — TICAGRELOR 90 MG: 90 TABLET ORAL at 07:59

## 2025-01-09 RX ADMIN — LEVOTHYROXINE SODIUM 100 MCG: 0.1 TABLET ORAL at 07:59

## 2025-01-09 RX ADMIN — CHLORTHALIDONE 12.5 MG: 25 TABLET ORAL at 08:00

## 2025-01-09 RX ADMIN — ACETAMINOPHEN 500 MG: 500 TABLET, FILM COATED ORAL at 07:58

## 2025-01-09 RX ADMIN — METOPROLOL TARTRATE 25 MG: 25 TABLET, FILM COATED ORAL at 07:58

## 2025-01-09 RX ADMIN — EZETIMIBE 10 MG: 10 TABLET ORAL at 07:58

## 2025-01-09 RX ADMIN — SENNOSIDES 2 TABLET: 8.6 TABLET, FILM COATED ORAL at 10:58

## 2025-01-09 RX ADMIN — ASPIRIN 81 MG: 81 TABLET, COATED ORAL at 07:59

## 2025-01-09 RX ADMIN — POLYETHYLENE GLYCOL 3350 17 G: 17 POWDER, FOR SOLUTION ORAL at 10:58

## 2025-01-09 ASSESSMENT — ACTIVITIES OF DAILY LIVING (ADL)
FALL_HISTORY_WITHIN_LAST_SIX_MONTHS: NO
ADLS_ACUITY_SCORE: 27
CONCENTRATING,_REMEMBERING_OR_MAKING_DECISIONS_DIFFICULTY: NO
VISION_MANAGEMENT: GLASSES
TOILETING_ISSUES: NO
TOILETING_ISSUES: NO
ADLS_ACUITY_SCORE: 44
ADLS_ACUITY_SCORE: 44
ADLS_ACUITY_SCORE: 30
WEAR_GLASSES_OR_BLIND: YES
ADLS_ACUITY_SCORE: 44
VISION_MANAGEMENT: GLASSES
DIFFICULTY_EATING/SWALLOWING: NO
WALKING_OR_CLIMBING_STAIRS_DIFFICULTY: NO
ADLS_ACUITY_SCORE: 27
ADLS_ACUITY_SCORE: 30
ADLS_ACUITY_SCORE: 27
ADLS_ACUITY_SCORE: 44
DRESSING/BATHING_DIFFICULTY: NO
CHANGE_IN_FUNCTIONAL_STATUS_SINCE_ONSET_OF_CURRENT_ILLNESS/INJURY: NO
CONCENTRATING,_REMEMBERING_OR_MAKING_DECISIONS_DIFFICULTY: NO
DIFFICULTY_EATING/SWALLOWING: NO
DOING_ERRANDS_INDEPENDENTLY_DIFFICULTY: NO
ADLS_ACUITY_SCORE: 27
WEAR_GLASSES_OR_BLIND: YES
ADLS_ACUITY_SCORE: 30
DRESSING/BATHING_DIFFICULTY: NO
WALKING_OR_CLIMBING_STAIRS_DIFFICULTY: NO
ADLS_ACUITY_SCORE: 44

## 2025-01-09 NOTE — PLAN OF CARE
"INPATIENT CARE PLAN PROGRESS NOTE / Goal Outcome Evaluation 1900 - 0700:      Plan of Care Reviewed With: patient    Overall Patient Progress: no changeOverall Patient Progress: no change    Outcome Evaluation: A&Ox4, VSS on RA, bradycardic at times. Denies chest pain. R radial site with sm hematoma present, o/w CMS intact. R femoral site w/ angioseal in place, WDL. Up SBA. Education provided re: activity restrictions. C/o chronic R shoulder discomfort, PRN tylenol given and repositioning, pt reports decrease in pain level.  Cardiology following. Cardiac Rehab and Nutrition consults pending. Repeat labs and EKG this AM.    Problem: Adult Inpatient Plan of Care  Goal: Plan of Care Review  Description: The Plan of Care Review/Shift note should be completed every shift.  The Outcome Evaluation is a brief statement about your assessment that the patient is improving, declining, or no change.  This information will be displayed automatically on your shift  note.  Outcome: Progressing  Flowsheets (Taken 1/9/2025 0355)  Outcome Evaluation: A&Ox4, VSS on RA, bradycardic at times. Denies chest pain.  Plan of Care Reviewed With: patient  Overall Patient Progress: no change  Goal: Patient-Specific Goal (Individualized)  Description: You can add care plan individualizations to a care plan. Examples of Individualization might be:  \"Parent requests to be called daily at 9am for status\", \"I have a hard time hearing out of my right ear\", or \"Do not touch me to wake me up as it startles  me\".  Outcome: Progressing  Goal: Absence of Hospital-Acquired Illness or Injury  Outcome: Progressing  Intervention: Identify and Manage Fall Risk  Recent Flowsheet Documentation  Taken 1/8/2025 2049 by Michelle Contreras, RN  Safety Promotion/Fall Prevention:   activity supervised   nonskid shoes/slippers when out of bed   patient and family education  Intervention: Prevent Skin Injury  Recent Flowsheet Documentation  Taken 1/8/2025 2049 by Ben" JORDY Martinez  Body Position: supine  Intervention: Prevent Infection  Recent Flowsheet Documentation  Taken 1/8/2025 2049 by Michelle Contreras RN  Infection Prevention:   hand hygiene promoted   rest/sleep promoted  Goal: Optimal Comfort and Wellbeing  Outcome: Progressing  Intervention: Monitor Pain and Promote Comfort  Recent Flowsheet Documentation  Taken 1/8/2025 2049 by Michelle Contreras RN  Pain Management Interventions: medication (see MAR)  Goal: Readiness for Transition of Care  Outcome: Progressing

## 2025-01-09 NOTE — DISCHARGE SUMMARY
[facility]  Hospitalist Discharge Summary      Date of Admission:  1/7/2025  Date of Discharge:  1/9/25   Discharging Provider: Geneva GARIBAY   Discharge Service: Hospitalist Service    Provider attestation: I agree with the discharge summary of Geneva Prabhakar.  Patient presented with exertional chest pain reminiscent of previous chest pain prior to stent placement.  He had a normal echocardiogram and a normal stress test but cardiology was consulted due to concern of symptoms.  Angiogram did reveal a lesion in the distal RCA that was stented.  He will discharge home on Brilinta as well as aspirin, statin and beta-blocker.  Of note he did have bradycardia here but was asymptomatic.  We did not do any adjustments to his beta-blockers but this may have to be done in the future.  Diana Pierce PA-C       Discharge Diagnoses   Terrence Murillo is an 80 yo male with a history of CAD, coronary atherectomy with restenosis, hypertension, dyslipidemia, obesity, hypothyroidism, pituitary adenoma s/p resection in 2017, sleep apnea, spinal stenosis, orthopedic issues, and tremor, admitted on 1/7/25 for burning sensation in his chest while doing housework which improved with nitroglycerin.     Had burning sensations in chest yesterday while doing some housework and had recurrent sxs overnight and today.  Took nitroglycerin x2 with relief.  No other SOB fever chills leg swelling abd pain NVD.  Burning is his ACS equivalent.      In the ED he was afebrile with blood pressure 126/62, pulse 47 and breathing comfortably on room air without hypoxia. Work showed normal BMP, glucose 101, , high-sensitivity troponin of 31 and CBC remarkable for WBC 5.2 and hemoglobin 11.4. Second troponin done 6 hours after admission labs was 27. COVID/influenza/RSV was negative, chest x-ray shows no pneumonia and EKG showed sinus bradycardia with first-degree AV block. Aspirin was given in the emergency room and observation  "admission was requested.     Since admission, he has undergone a Lexiscan stress test as well cardiac angiography. Lexiscan stress test returned normal. Angiogram was significant for 15% stenosis of the distal LM lesion, 70% stenosis of the Proc Cx lesionProx, 70% stenosis of the Prox LAD lesion, 20% stenosis of the Mid LAD lesion, 45% stenosis of the Prox RCA to Mid RCA lesion,50% stenosis of the 1st RPL, 95% stenosis of the Dist RCA, 80% stenosis of the Dist R STEVE to Mid R EIA lesion, and left ventricular filling pressures mildly elevated.  Cardiology placed a drug-eluting stent to the distal RCA.  They report this was a very challenging procedure but they were able to do it here at [facility].  They would like the patient to be on Brilinta and baby aspirin at home.  He will continue his beta-blocker and statin.  Cardiac rehab evaluated patient and would like to see him as outpatient.    Of note patient did have asymptomatic bradycardia while here.  We did not adjust his beta-blocker while here and recommend that if he has dizziness/lightheadedness or presyncope that he talk with his cardiologist or primary care provider.  He can resume his chlorthalidone on 1/13 and resume his ramipril on 1/10.    Of note as I was doing the discharge summary I noted that the patient's blood pressure was 89/51.  This was not discussed with me prior to the patient's discharge.  He was not complaining of lightheadedness or dizziness and I did taper the beginning of his blood pressure medicines.  Suspect that he is dehydrated from not eating or drinking at all yesterday for stress test and angiogram.        Clinically Significant Risk Factors     # Obesity: Estimated body mass index is 33.87 kg/m  as calculated from the following:    Height as of this encounter: 1.743 m (5' 8.62\").    Weight as of this encounter: 102.9 kg (226 lb 13.7 oz).       Follow-ups Needed After Discharge   Follow-up Appointments       Follow-up and recommended " labs and tests       Follow up with primary care provider, Yamilex Booker, within 7 days for hospital follow- up.  No follow up labs or test are needed.  Patient now is on dual antiplatelet therapy and was taking NSAIDs but was told to stop and will take acetaminophen instead.  Also noted to have lower heart rates here so please readdress if he is having symptoms.     Follow-up with cardiology and follow-up with cardiac rehab                Unresulted Labs Ordered in the Past 30 Days of this Admission       No orders found from 12/8/2024 to 1/8/2025.        These results will be followed up by his PCP in one week.     Discharge Disposition   Discharged to home  Condition at discharge: Good    Hospital Course   Today, 1/9/25, he states he is feeling much better. His chest pain has subsided and he is not experienced any shortness of breath. He slept throughout the night. He has not had a regular bowel movement, but Miralax given today. Urinating normally. Eating and drinking well. Saturations have consistently been between 93-98%.    Consultations This Hospital Stay   CARDIOLOGY IP CONSULT  HOSPITALIST IP CONSULT  NUTRITION SERVICES ADULT IP CONSULT  CARDIAC REHAB IP CONSULT  SMOKING CESSATION PROGRAM IP CONSULT    Code Status   Full Code    Time Spent on this Encounter   IDiana PA-C, personally saw the patient today and spent greater than 30 minutes discharging this patient.       Geneva Prabhakar  [facility] OBSERVATION DEPT  [address] [place] [address]  Phone: [phone]  ______________________________________________________________________    Physical Exam   Vital Signs: Temp: 98  F (36.7  C) Temp src: Oral BP: (!) 89/51 (rn notified) Pulse: 80   Resp: 16 SpO2: 93 % O2 Device: None (Room air) Oxygen Delivery: 3 LPM  Weight: 226 lbs 13.65 oz  General Appearance: Alert and oriented. In no acute distress. Sitting comfortably on exam bed.   Respiratory: Adequate  pitch and duration. No wheezing, crackles, or rhonchi  Cardiovascular: RRR. S1 and S2 sounds present without S3, S4. No murmurs, rubs or gallops.   GI: Soft and non distended. Non tender.   Skin: Warm and dry. No LE edema.         Primary Care Physician   Yamilex Booker    Discharge Orders      Basic metabolic panel     CBC with platelets     Cardiac Rehab  Referral      Follow-Up with Cardiology LUISA      Reason Lipid Lowering Medications not prescribed from this order set    Already on it     Reason for your hospital stay    You were admitted for concerns of chest discomfort with exertion.  You had an echocardiogram which looked normal.  You had a stress test that looked normal but your symptoms were very concerning so we had cardiology see you who did an angiogram and placed a stent in your distal right coronary artery with drug-eluting stent.  All other coronary vessels did have some degree of coronary disease but did not need stenting.  We recommend baby aspirin daily along with Brilinta daily and continuation of your beta-blocker and ramipril.    You can resume your ramipril on 1/10 and resume your chlorthalidone on 1/13.  You can resume your chlorthalidone earlier if you notice leg swelling.    We did notice that your heart rate was low here and this is likely due to your metoprolol.  If you start feeling more lightheaded/dizzy or have any episodes of feeling like you are going to pass out please talk to your primary care doctor or cardiologist right away to see if this medicine should be reduced.    You will have follow-up with cardiology and cardiac rehab will call you to schedule appointment     Follow-up and recommended labs and tests     Follow up with primary care provider, Yamilex Booker, within 7 days for hospital follow- up.  No follow up labs or test are needed.  Patient now is on dual antiplatelet therapy and was taking NSAIDs but was told to stop and will take acetaminophen instead.   Also noted to have lower heart rates here so please readdress if he is having symptoms.     Follow-up with cardiology and follow-up with cardiac rehab     Activity    Your activity upon discharge: Light activity until cleared by cardiology     Diet    Follow this diet upon discharge: Regular       Significant Results and Procedures   Results for orders placed or performed during the hospital encounter of 25   XR Chest 2 Views    Narrative    EXAM: XR CHEST 2 VIEWS  LOCATION: [facility]  DATE: 2025    INDICATION: Chest pain.  COMPARISON: Chest x-ray dated 12/15/2003.    FINDINGS: The cardiomediastinal silhouette and pulmonary vasculature are within normal limits. Coronary stent. Aortic calcification. Mild bibasilar platelike atelectasis/scarring. No acute infiltrate. No pleural effusion or pneumothorax. Partially   visualized right shoulder arthroplasty.      Impression    IMPRESSION: No acute pulmonary abnormality.     Echocardiogram Complete     Value    LVEF  60%    Narrative    [id number]  [id number]  [id number]  [id number]^SELVIN^ALYCE^KARLA     [facility]  Echocardiography Laboratory  [address] [place] [address]     Name: DANIEL RIGGINS  MRN: [id number]  : 1945  Study Date: 2025 03:18 PM  Age: 79 yrs  Gender: Male  Patient Location: [facility]  Reason For Study: Chest Pain, Chest Pressure, Chest Tightness  Ordering Physician: ALYCE MONTALVO  Referring Physician: Yamilex Booker  Performed By: Zoila Do     BSA: 2.2 m2  Height: 69 in  Weight: 231 lb  HR: 49  BP: 130/74 mmHg  ______________________________________________________________________________  Procedure  Echocardiogram with two-dimensional, color and spectral Doppler. Optison (NDC  #[id number]) given intravenously. Technically difficult study.Extremely  difficult acoustic windows despite the use of contrast for endcardial  border  definition.  ______________________________________________________________________________  Interpretation Summary     1. The left ventricle is normal in structure, function and size. The visual  ejection fraction is estimated at 60%. Normal left ventricular wall motion  2. The right ventricle is normal in structure, function and size.  3. No valve disease.     No changes from stress echo in 2011.  ______________________________________________________________________________  Left Ventricle  The left ventricle is normal in structure, function and size. There is normal  left ventricular wall thickness. The visual ejection fraction is estimated at  60%. Left ventricular diastolic function is normal. Normal left ventricular  wall motion.     Right Ventricle  The right ventricle is normal in structure, function and size.     Atria  Normal left atrial size. Right atrial size is normal. There is no atrial shunt  seen.     Mitral Valve  The mitral valve is normal in structure and function.     Tricuspid Valve  No tricuspid regurgitation.     Aortic Valve  The aortic valve is normal in structure and function.     Pulmonic Valve  The pulmonic valve is normal in structure and function.     Vessels  Normal ascending, transverse (arch), and descending aorta. The inferior vena  cava was normal in size with preserved respiratory variability.     Pericardium  There is no pericardial effusion.     Rhythm  Sinus rhythm was noted.  ______________________________________________________________________________  MMode/2D Measurements & Calculations  IVC diam: 1.9 cm     Ao root diam: 3.7 cm  asc Aorta Diam: 3.6 cm  LVOT diam: 2.6 cm  LVOT area: 5.2 cm2  Ao root diam index Ht(cm/m): 2.1  Ao root diam index BSA (cm/m2): 1.7  Asc Ao diam index BSA (cm/m2): 1.6  Asc Ao diam index Ht(cm/m): 2.0  LA Volume (BP): 63.6 ml  LA Volume Index (BP): 28.9 ml/m2  TAPSE: 3.0 cm     Doppler Measurements & Calculations  MV E max aundrea: 67.6  cm/sec  MV A max jm: 73.9 cm/sec  MV E/A: 0.91  MV max PG: 3.8 mmHg  MV mean P.4 mmHg  MV V2 VTI: 35.9 cm  MVA(VTI): 2.8 cm2  MV dec slope: 296.5 cm/sec2  MV dec time: 0.23 sec  Ao V2 max: 162.8 cm/sec  Ao max P.0 mmHg  Ao V2 mean: 104.5 cm/sec  Ao mean P.1 mmHg  Ao V2 VTI: 36.2 cm  HOMERO(I,D): 2.8 cm2  HOMERO(V,D): 2.8 cm2  LV V1 max PG: 3.0 mmHg  LV V1 max: 86.6 cm/sec  LV V1 VTI: 19.6 cm  SV(LVOT): 102.2 ml  SI(LVOT): 46.6 ml/m2  PA V2 max: 112.3 cm/sec  PA max P.0 mmHg  PA acc time: 0.17 sec  AV Jm Ratio (DI): 0.53  HOMERO Index (cm2/m2): 1.3  E/E' av.4  Lateral E/e': 6.7  Medial E/e': 8.1  RV S Jm: 12.9 cm/sec     ______________________________________________________________________________  Report approved by: Benjamin Bourgeois MD on 2025 03:57 PM         Cardiac Catheterization    Narrative      Dist LM lesion is 15% stenosed.    Prox Cx lesion is 70% stenosed.    Prox LAD lesion is 70% stenosed.    Mid LAD lesion is 20% stenosed.    Prox RCA to Mid RCA lesion is 45% stenosed.    1st RPL lesion is 50% stenosed.    Dist RCA lesion is 95% stenosed.    Dist R STEVE to Mid R EIA lesion is 80% stenosed.    Left ventricular filling pressures are mildly elevated.    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 The RFA was accessed but wire would not easily pass,   therefore angiogram performed showing >75-80% lesion. We then switched to   RRA approach for PCI/cath  LVEDP 18mmHg  Highly complex intervention of RCA. Successful catheter was 6fr AL 0.75   for ostial RCA. We previously used JR4 and made side holes due to modest   ostial disease. No equipment would pass into the distal RCA system except   wire. The guideliner did not pass thru the JR4 due to the indent of   plastic catheter from making side holes on the table. We switched to AL   0.75 which gave better access to RCA and then we were able to pass   guideliner.  NC balloons, cutting balloons  would not cross the very severe   calcified RCAd lesion. Eventually with GL In place we passed takeru 2.0   balloon and serial dilated eventually getting NC and CB across and finally   2.5mm IVL/lithotripsy balloon 2.5mm (concern a 3.0 would not cross) then   3.0 x 30mm Medtronic SUSANA placed and post dilated with 3.25 NC balloon with   excellent result.  No complications     NM Lexiscan stress test (nuc card)     Value    Target     Baseline Systolic     Baseline Diastolic BP 67    Last Stress Systolic     Last Stress Diastolic BP 60    Baseline HR 59    Max HR  75    Max Predicted HR  54    Rate Pressure Product 9,150.0    Left Ventricular EF 66    Narrative      The nuclear stress test is negative for inducible myocardial ischemia   or infarction.    Left ventricular function is normal.    The left ventricular ejection fraction at stress is 66%.    A prior study was conducted on 3/23/2016.  This study has no change   when compared with the prior study.         Discharge Medications   Current Discharge Medication List        START taking these medications    Details   ticagrelor (BRILINTA) 90 MG tablet Take 1 tablet (90 mg) by mouth 2 times daily. Dose to start tomorrow morning.  Qty: 180 tablet, Refills: 3    Associated Diagnoses: Non-STEMI (non-ST elevated myocardial infarction) (H)           CONTINUE these medications which have CHANGED    Details   chlorthalidone (HYGROTON) 25 MG tablet Take 0.5 tablets (12.5 mg) by mouth daily.    Associated Diagnoses: Benign essential hypertension      ramipril (ALTACE) 10 MG capsule Take 1 capsule (10 mg) by mouth daily.    Associated Diagnoses: Coronary artery disease involving native coronary artery of native heart without angina pectoris           CONTINUE these medications which have NOT CHANGED    Details   acetaminophen (TYLENOL) 500 MG tablet Take 500 mg by mouth every morning.      alfuzosin ER (UROXATRAL) 10 MG 24 hr tablet Take 10 mg by mouth daily       amoxicillin (AMOXIL) 500 MG capsule Take before dental appointments      aspirin 81 MG tablet Take 1 tablet (81 mg) by mouth every evening  Qty: 30 tablet, Refills: 0    Associated Diagnoses: Coronary artery disease involving native coronary artery of native heart without angina pectoris      ezetimibe (ZETIA) 10 MG tablet Take 1 tablet (10 mg) by mouth daily.  Qty: 90 tablet, Refills: 3    Associated Diagnoses: Hyperlipidemia LDL goal <70; Coronary artery disease involving native coronary artery of native heart without angina pectoris      Glucosamine 500 MG CAPS Take 500 mg by mouth 2 times daily       levothyroxine (SYNTHROID/LEVOTHROID) 100 MCG tablet Take 1 tablet (100 mcg) by mouth daily  Qty: 90 tablet, Refills: 3    Associated Diagnoses: Secondary hypothyroidism      metoprolol tartrate (LOPRESSOR) 25 MG tablet Take 1 tablet (25 mg) by mouth 2 times daily.  Qty: 180 tablet, Refills: 3    Associated Diagnoses: Benign essential hypertension; Hyperlipidemia LDL goal <70; Coronary artery disease involving native coronary artery of native heart without angina pectoris      Multiple Vitamin (MULTI-VITAMIN) per tablet Take 1 tablet by mouth daily.      nitroGLYcerin (NITROSTAT) 0.4 MG sublingual tablet Place 1 tablet (0.4 mg) under the tongue every 5 minutes as needed for chest pain May repeat x2, if chest pain contines after 3rd dose call 911  Qty: 25 tablet, Refills: 3    Associated Diagnoses: Coronary artery disease involving native coronary artery of native heart without angina pectoris      rosuvastatin (CRESTOR) 40 MG tablet Take 1 tablet (40 mg) by mouth daily.  Qty: 90 tablet, Refills: 3    Associated Diagnoses: Benign essential hypertension; Hyperlipidemia LDL goal <70; Coronary artery disease involving native coronary artery of native heart without angina pectoris      saw palmetto 450 MG CAPS capsule Take 450 mg by mouth 2 times daily           STOP taking these medications       ibuprofen  (ADVIL/MOTRIN) 200 MG tablet Comments:   Reason for Stopping:             Allergies   Allergies   Allergen Reactions    Cardizem [Diltiazem Hcl] Itching    Cats Hives     WATERY EYES, SNEEZE, AND COUGH    Diltiazem Hives and Rash

## 2025-01-09 NOTE — PLAN OF CARE
RN giving report: Alexis SPENCE receiving report: Kelley MARTINEZ:  Procedure performed: Coronary Angiogram    B:  Why procedure needed: Chest pain    A:  Assessment of area: Intact    R:  Recommendations: Follow protocol for Bedrest and TR band removal      Family updated: Per MD

## 2025-01-09 NOTE — PLAN OF CARE
INPATIENT NOTE: CARES PROVIDED FROM [id number]  Diagnosis:    Vitals       Cardiac: Pt deneis any chest pain. Pt is on tele:   Edema: no edema   Resp: WKL   Neuro: A&Ox4  GI:A&Ax4, pt gave miralax and senna per pt request. Pt states he has not had a BM for a while   : up to bathroom to void   Skin: hematoma on radial site.   Activity: Independent   Diet: low na diet   Pain: pt denied pain   Lines: IVSL     Plan: Discharge home

## 2025-01-09 NOTE — PROGRESS NOTES
"   01/09/25 1000   Appointment Info   Signing Clinician's Name / Credentials (PT) Inez Herndon, PT   Living Environment   People in Home alone   Current Living Arrangements apartment   Home Accessibility no concerns   Transportation Anticipated family or friend will provide   Living Environment Comments  and living alone; friends and family check on him often   Self-Care   Usual Activity Tolerance good   Current Activity Tolerance moderate   Equipment Currently Used at Home none   Fall history within last six months no   Activity/Exercise/Self-Care Comment normally independent with mobility and cares; sometimes limited by back issues   General Information   Onset of Illness/Injury or Date of Surgery 01/07/25   Referring Physician Jeffry Alcantara MD   Patient/Family Therapy Goals Statement (PT) return home; open to OP CR   Pertinent History of Current Problem (include personal factors and/or comorbidities that impact the POC) per chart: \"Terrence Murillo is a 79 year old male with known history of coronary disease status post previous percutaneous coronary intervention, hypertension, dyslipidemia, obesity, hypothyroidism, pituitary macroadenoma s/p resection in 2017, sleep apnea, spinal stenosis, orthopedic issues, tremor (declines treatment).; Mr. Murillo developed unstable angina characterized by recurrent substernal chest burning in [id number].  He subsequently underwent rotational atherectomy on 2 occasions, balloon angioplasty, then finally stent implantation in the proximal and mid LAD in 2010. He presented to the emergency room 1/7/25, and has ruled out for myocardial infarction.  His internist ordered a nuclear stress test that showed no ischemia with a preserved ejection fraction, but the patient continued to have symptoms concerning for angina.Cor angio 1/8/25: multivessel disease with 70% pLAD (iFR 0.93), 70% pcirc, 45% prox RCA and 95% dRCA. S/p complex intervention of the dRCA with SUSANA \"; see medical " record for further information   Existing Precautions/Restrictions cardiac;lifting   Heart Disease Risk Factors Medical history   General Observations patient in bed; agreeable to session; [facility]   Cognition   Cognitive Status Comments appears intact during session   Pain Assessment   Patient Currently in Pain No   Integumentary/Edema   Integumentary/Edema Comments R radial and R groin sites; see nursing notes for further information   Posture    Posture Comments slight forward flexed posture during sitting/gait   Range of Motion (ROM)   ROM Comment trunk limited by lumbar issues; baseline R shoulder issues; others appear WFL   Strength (Manual Muscle Testing)   Strength Comments mild functional weakness; decreased activity tolerance   Bed Mobility   Comment, (Bed Mobility) independent   Transfers   Comment, (Transfers) SBA for sit<>stand; lightheaded   Gait/Stairs (Locomotion)   Comment, (Gait/Stairs) CGA/SBA; mildly lightheaded   Balance   Balance Comments mild unsteadiness; no gross LOB; near baseline per patient   Sensory Examination   Sensory Perception Comments denies numbness or tingling   Clinical Impression   Criteria for Skilled Therapeutic Intervention Yes, treatment indicated   PT Diagnosis (PT) impaired functional mobility; decreased activity tolerance   Influenced by the following impairments lightheaded; low BP; decreased activity tolerance; mild functional weakness   Functional limitations due to impairments impaired mobility s/p chest pain/stenting   Clinical Presentation (PT Evaluation Complexity) stable   Clinical Presentation Rationale clinical judgement; level of assist   Clinical Decision Making (Complexity) low complexity   Planned Therapy Interventions (PT) progressive activity/exercise;risk factor education   Risk & Benefits of therapy have been explained evaluation/treatment results reviewed;care plan/treatment goals reviewed;risks/benefits reviewed;current/potential barriers  reviewed;participants voiced agreement with care plan;participants included;patient   Clinical Impression Comments below recent baseline   PT Total Evaluation Time   PT Eval, Low Complexity Minutes (74434) 8   Physical Therapy Goals   PT Frequency One time eval and treatment only   PT Predicted Duration/Target Date for Goal Attainment 01/09/25   PT Goals Cardiac Phase 1   PT: Understanding of cardiac education to maximize quality of life, condition management, and health outcomes Patient;Verbalize   PT: Perform aerobic activity with stable cardiovascular response continuous;10 minutes   PT: Functional/aerobic ambulation tolerance with stable cardiovascular response in order to return to home and community environment Independent;Greater than 300 feet   PT: Navigation of stairs simulating home set up with stable cardiovascular response in order to return to home and community environment   (N/A; has no stairs)   PT Discharge Planning   PT Discharge Recommendation (DC Rec) home with outpatient cardiac rehab   PT Rationale for DC Rec Patient slightly below normal baseline; reports feeling weaker from not being on his feet since admit; mildly lightheaded; -104 systolic; has family and friends to check on him; agreeable to OP CR   PT Brief overview of current status up SBA to independent   PT Total Distance Amb During Session (feet) 400

## 2025-01-09 NOTE — PLAN OF CARE
HR 49, 46, 44,  Notified provided cross cover for cardiology. They said page again if symptomatic. Pt denies any pain, SOB, and chest pain,

## 2025-01-09 NOTE — PROGRESS NOTES
[facility]  Cardiology Progress Note    Outpatient cardiologist: Dr. Jolley     Date of Service (when I saw the patient): 01/09/2025        Summary: Terrence Murillo is a 79 year old male with known history of coronary disease status post previous percutaneous coronary intervention, hypertension, dyslipidemia, obesity, hypothyroidism, pituitary macroadenoma s/p resection in 2017, sleep apnea, spinal stenosis, orthopedic issues, tremor (declines treatment).      Admitted 1/7/25 with chest burning similar to his prior angina.     Mr. Murillo developed unstable angina characterized by recurrent substernal chest burning in [id number].  He subsequently underwent rotational atherectomy on 2 occasions, balloon angioplasty, then finally stent implantation in the proximal and mid LAD in 2010.      He presented to the emergency room 1/7/25, and has ruled out for myocardial infarction.  His internist ordered a nuclear stress test that showed no ischemia with a preserved ejection fraction, but the patient continued to have symptoms concerning for angina.   Cor angio 1/8/25: multivessel disease with 70% pLAD (iFR 0.93), 70% pcirc, 45% prox RCA and 95% dRCA. S/p complex intervention of the dRCA with SUSANA        Interval History   Tele:  SB/SR    No chest burning now.        Assessment & Plan   CAD  - Presentation with unstable angina in 1990s. Status post rotational atherectomy and balloon angioplasty.   - History of implantation of ever Kelby eluting stents in LAD 2010.   - Last coronary angiography 2016 showing no significant coronary narrowing.   - Now presents again with chest burning. Ruled out for MI.  Nuclear stress test this admission showing no ischemia follow-up despite typical symptoms.   Cor angio 1/8/25: multivessel disease with 70% pLAD (iFR 0.93), 70% pcirc, 45% prox RCA and 95% dRCA. S/p complex intervention of the dRCA with SUSANA  - Echo 1/7/25: EF 60%  - No angina at rest.  Patient will walk  today with cardiac rehab  - Continue ASA 81 mg, Brilinta x 1 year then monotherapy indefinitely  - Metoprolol 25 mg BID  - Crestor 40 mg, Zetia 10 mg and LDL controlled at 45 11/2024  - SL nitroglycerin PRN  - Outpatient cardiac rehab  - Lifestyle modification      HTN, controlled  - On chlorthalidone 12.5 mg, lisinopril 40 mg (at home is on ramipril 10 mg), metoprolol 25 mg BID      Dyslipidemia, controlled  - FLP 11/2024: LDL 45 on Zetia 10 mg, Crestor 40 mg      Right common iliac artery stenosis  - Noted during coronary angiogram access  - No claudication sxs  - Can refer to vascular in follow up  - Continue ASA, statin        DISPO: as long as no chest burning with ambulation, he can discharge today  He would like Rx filled here for Brilinta - 60 day supply please        Follow up:  1 month cardiology LUISA with BMP, CBC (ordered)  4/29/25 with Dr. Samreen Valdivia, PA[place]ston[place]    Cardiology staff attestation  I have personally examined the patient, reviewed his coronary angiogram and PCI procedure. The RCA had a subtotal calcified lesion successfully treated with IVL followed by stent implantation. There is moderate in stent restenosis in LAD with normal IFR. His lungs are clear. Heart tones regular. He will hold his hydrochlorothiazide as current blood pressure 96 systolic. His access site in right wrist looks fine. I am uncertain if PTA Right iliac warranted but we will obtain an opinion from vascular service. I agree with follow up plans and note per . Discharge today with follow up with .       Patient Active Problem List   Diagnosis    Sleep apnea    Coronary artery disease involving native coronary artery of native heart without angina pectoris    Benign essential hypertension    Pure hypercholesterolemia    Non morbid obesity due to excess calories    Chest pain    MORENO (dyspnea on exertion)    Intracranial tumor (H)    Secondary male hypogonadism    Pituitary adenoma  (H)    S/P appendectomy    S/P knee replacement    S/P shoulder surgery    HDL deficiency    Dizziness    Hyperlipidemia LDL goal <70    IFG (impaired fasting glucose)    Burning chest pain    Elevated troponin    Unstable angina (H)       Physical Exam   Temp: 98  F (36.7  C) Temp src: Oral BP: 110/60 Pulse: 69   Resp: 16 SpO2: 93 % O2 Device: None (Room air) Oxygen Delivery: 3 LPM  Vitals:    01/07/25 1012 01/07/25 1551   Weight: 105 kg (231 lb 7.7 oz) 102.9 kg (226 lb 13.7 oz)     Vital Signs with Ranges  Temp:  [97.4  F (36.3  C)-98.2  F (36.8  C)] 98  F (36.7  C)  Pulse:  [44-69] 69  Resp:  [9-18] 16  BP: (110-142)/(53-69) 110/60  SpO2:  [93 %-98 %] 93 %  No intake/output data recorded.    Constitutional: NAD.   Respiratory: CTAB.   Cardiovascular: RRR, s1s2, no sig murmur  GI: soft, BS+  Skin: warm, no rashes  Musculoskeletal: Moving all extremities. RRA site with 2+ radial pulse, RFA site soft, no bruit  Neurologic: Alert, oriented x 3  Neuropsychiatric: Normal affect       Data   Recent Labs   Lab 01/09/25  0703 01/08/25  1401 01/07/25  1041   WBC  --   --  5.2   HGB  --   --  11.4*   MCV  --   --  94   PLT  --   --  266    135 137   POTASSIUM 4.3 4.3 4.4   CHLORIDE 104 100 103   CO2 23 23 24   BUN 17.1 16.5 18.0   CR 0.97 0.95 0.95   ANIONGAP 11 12 10   ELPIDIO 9.2 9.4 9.1   * 95 101*       Recent Results (from the past 24 hours)   NM Lexiscan stress test (nuc card)   Result Value    Target     Baseline Systolic     Baseline Diastolic BP 67    Last Stress Systolic     Last Stress Diastolic BP 60    Baseline HR 59    Max HR  75    Max Predicted HR  54    Rate Pressure Product 9,150.0    Left Ventricular EF 66    Narrative      The nuclear stress test is negative for inducible myocardial ischemia   or infarction.    Left ventricular function is normal.    The left ventricular ejection fraction at stress is 66%.    A prior study was conducted on 3/23/2016.  This study has no change    when compared with the prior study.     Cardiac Catheterization    Narrative      Dist LM lesion is 15% stenosed.    Prox Cx lesion is 70% stenosed.    Prox LAD lesion is 70% stenosed.    Mid LAD lesion is 20% stenosed.    Prox RCA to Mid RCA lesion is 45% stenosed.    1st RPL lesion is 50% stenosed.    Dist RCA lesion is 95% stenosed.    Dist R STEVE to Mid R EIA lesion is 80% stenosed.    Left ventricular filling pressures are mildly elevated.    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 The RFA was accessed but wire would not easily pass,   therefore angiogram performed showing >75-80% lesion. We then switched to   RRA approach for PCI/cath  LVEDP 18mmHg  Highly complex intervention of RCA. Successful catheter was 6fr AL 0.75   for ostial RCA. We previously used JR4 and made side holes due to modest   ostial disease. No equipment would pass into the distal RCA system except   wire. The guideliner did not pass thru the JR4 due to the indent of   plastic catheter from making side holes on the table. We switched to AL   0.75 which gave better access to RCA and then we were able to pass   guideliner.  NC balloons, cutting balloons would not cross the very severe   calcified RCAd lesion. Eventually with GL In place we passed takeru 2.0   balloon and serial dilated eventually getting NC and CB across and finally   2.5mm IVL/lithotripsy balloon 2.5mm (concern a 3.0 would not cross) then   3.0 x 30mm Medtronic SUSANA placed and post dilated with 3.25 NC balloon with   excellent result.  No complications       Echo 1/7/25:  Interpretation Summary  1. The left ventricle is normal in structure, function and size. The visual  ejection fraction is estimated at 60%. Normal left ventricular wall motion  2. The right ventricle is normal in structure, function and size.  3. No valve disease.     No changes from stress echo in 2011.        [place]oc[place]le 1/8/25:  Result Text    The nuclear stress test  is negative for inducible myocardial ischemia or infarction.    Left ventricular function is normal.    The left ventricular ejection fraction at stress is 66%.    A prior study was conducted on 3/23/2016.  This study has no change when compared with the prior study.        Medications   Current Facility-Administered Medications   Medication Dose Route Frequency Provider Last Rate Last Admin    Continuing beta blocker from home medication list OR beta blocker order already placed during this visit   Does not apply DOES NOT GO TO Jeffry Osorio MD        Continuing statin from home medication list OR statin order already placed during this visit   Does not apply DOES NOT GO TO Jeffry Osorio MD        Percutaneous Coronary Intervention orders placed (this is information for BPA alerting)   Does not apply DOES NOT GO TO Jeffry Osorio MD         Current Facility-Administered Medications   Medication Dose Route Frequency Provider Last Rate Last Admin    acetaminophen (TYLENOL) tablet 500 mg  500 mg Oral QAM Mead, Emely Espinoza PA-C   500 mg at 01/09/25 0758    alfuzosin ER (UROXATRAL) 24 hr tablet 10 mg  10 mg Oral Daily Mead, Emely Espinoza PA-C   10 mg at 01/09/25 0759    aspirin EC tablet 81 mg  81 mg Oral Daily Jeffry Alcantara MD   81 mg at 01/09/25 0759    chlorthalidone (HYGROTON) half-tab 12.5 mg  12.5 mg Oral Daily Mead, Emely Espinoza PA-C   12.5 mg at 01/09/25 0800    ezetimibe (ZETIA) tablet 10 mg  10 mg Oral Daily Mead, Emely Espinoza PA-C   10 mg at 01/09/25 0758    levothyroxine (SYNTHROID/LEVOTHROID) tablet 100 mcg  100 mcg Oral Daily Mead, Emely Espinoza PA-C   100 mcg at 01/09/25 0759    lisinopril (ZESTRIL) tablet 40 mg  40 mg Oral Daily Mead, Emely Espinoza PA-C   40 mg at 01/08/25 1101    metoprolol tartrate (LOPRESSOR) tablet 25 mg  25 mg Oral BID Naomy Mccain PA-C   25 mg at 01/09/25 0758    rosuvastatin (CRESTOR) tablet 40 mg  40 mg Oral QPM Mead, Emely Espinoza PA-C   40  mg at 01/08/25 2046    ticagrelor (BRILINTA) tablet 90 mg  90 mg Oral BID Jeffry Alcantara MD   90 mg at 01/09/25 4323

## 2025-01-09 NOTE — PLAN OF CARE
INPATIENT NOTE: CARES PROVIDED FROM [id number]  Diagnosis:    Vital signs:  Temp: 98  F (36.7  C) Temp src: Oral BP: 96/54 Pulse: 80   Resp: 16 SpO2: 93 % O2 Device: None (Room air)          Cardiac: Pt deneis any chest pain. Pt is on tele: SR 68 junctional rhythm.  Pt did have some light head, when walking around. PT states is did go away when he sat down.   Edema: no edema   Resp: WKL, pt denied SOB   Neuro: A&Ox4,  GI:A&Ax4, pt gave miralax and senna per pt request. Pt states he has not had a BM for a while.   : up to bathroom to void   Skin: hematoma on radial site.   Activity: Independent   Diet: low na diet   Pain: pt denied pain   Lines: IV removed     Plan: Discharge home     Goal Outcome Evaluation:      Plan of Care Reviewed With: patient    Overall Patient Progress: improvingOverall Patient Progress: improving    Outcome Evaluation: A&Ox4, pt deneis pain. Pt ready to go home with daughter.

## 2025-01-09 NOTE — PLAN OF CARE
Patient's After Visit Summary was reviewed with patient and/or daughter .   Patient verbalized understanding of After Visit Summary, recommended follow up and was given an opportunity to ask questions.   Discharge medications sent home with patient/family: YES, pt sent home with Brilinta   Discharged with daughter    Pt reviewed all the belonging in the room and said he has everything. PT IV removed. Pt did not have another other follow up questions after discharge.     OBSERVATION patient END time: 8478

## 2025-01-09 NOTE — PLAN OF CARE
Cardiac Rehab Discharge Summary    Reason for therapy discharge:    Discharged to home with outpatient therapy.    Progress towards therapy goal(s). See goals on Care Plan in [facility] electronic health record for goal details.  Goals met    Therapy recommendation(s):    Continued therapy is recommended.  Rationale/Recommendations:  Phase II OP CR; assist from family and friends as needed.

## 2025-01-13 ENCOUNTER — TELEPHONE (OUTPATIENT)
Dept: CARDIOLOGY | Facility: CLINIC | Age: 80
End: 2025-01-13
Payer: COMMERCIAL

## 2025-01-13 NOTE — TELEPHONE ENCOUNTER
Patient was admitted to [facility] on 1/7/25 with chest burning.    PMH: coronary disease status post previous percutaneous coronary intervention, hypertension, dyslipidemia, obesity, hypothyroidism, pituitary macroadenoma s/p resection in 2017, sleep apnea, spinal stenosis, orthopedic issues, tremor (declines treatment).      1/7/25: Echo showed EF of 60%. Normal left ventricular wall motion. The right ventricle is normal in structure, function and size. No valve disease.    1/8/25: NM Lexiscan showed no ischemia with a preserved ejection fraction, but the patient continued to have symptoms concerning for angina.     1/8/25: Coronary angiogram via RFA and then RRA resulted in:       Dist LM lesion is 15% stenosed.    Prox Cx lesion is 70% stenosed.    Prox LAD lesion is 70% stenosed.    Mid LAD lesion is 20% stenosed.    Prox RCA to Mid RCA lesion is 45% stenosed.    1st RPL lesion is 50% stenosed.    Dist RCA lesion is 95% stenosed.    Dist R STEVE to Mid R EIA lesion is 80% stenosed.    Left ventricular filling pressures are mildly elevated.     Likely severe ostial right common iliac artery narrowing-no clinical claudication. The RFA was accessed but wire would not easily pass, therefore angiogram performed showing >75-80% lesion. We then switched to RRA approach for PCI/cath.  LVEDP 18mmHg.  Highly complex intervention of RCA. Successful catheter was 6fr AL 0.75 for ostial RCA. We previously used JR4 and made side holes due to modest ostial disease. No equipment would pass into the distal RCA system except wire. The guideliner did not pass thru the JR4 due to the indent of plastic catheter from making side holes on the table. We switched to AL 0.75 which gave better access to RCA and then we were able to pass guideliner.  NC balloons, cutting balloons would not cross the very severe calcified RCAd lesion. Eventually with GL In place we passed takeru 2.0 balloon and serial dilated eventually getting NC and CB across and  finally 2.5mm IVL/lithotripsy balloon 2.5mm (concern a 3.0 would not cross) then 3.0 x 30mm Medtronic SUSANA placed and post dilated with 3.25 NC balloon with excellent result.  No complications.    Pt was started on Brilinta. PTA ASA and NTG continued.    Pt needs to be scheduled for labs and cardiology LUISA OV as ordered.    Scheduled for cardiac rehab on 1/23/25 at 1400 in [place].    Writer attempted to call pt for a cardiology post discharge phone call, but no answer. VM left to return my call. Reminder left of need to schedule appt's as above. Scheduling and writer's phone numbers provided. VESNA Arizmendi RN.

## 2025-01-14 NOTE — TELEPHONE ENCOUNTER
attempted to call pt for a cardiology post discharge phone call, but no answer. VM left to return my call.    Aimer notes pt is now scheduled for labs on 2/25/25 at 1300, and an OV on 2/26/25 at 0840 with LUISA Eva August at our [place] Office. VESNA Arizmendi RN.

## 2025-01-15 ENCOUNTER — TELEPHONE (OUTPATIENT)
Dept: CARDIOLOGY | Facility: CLINIC | Age: 80
End: 2025-01-15
Payer: COMMERCIAL

## 2025-01-15 DIAGNOSIS — I21.4 NON-STEMI (NON-ST ELEVATED MYOCARDIAL INFARCTION) (H): ICD-10-CM

## 2025-01-15 NOTE — TELEPHONE ENCOUNTER
Called patient back, says he did discharge with a 1mo supply but is trying to be proactive. New Rx sent to pharmacy. LUISA follow up is scheduled for February.     He notes he has not been taking the chlorthalidone due to hypotension, was in the 90's at time of discharge 1/9. He tried taking his BP on the cuff but it was not working, kept getting an error message. He will try and get it to work and will call back with any readings. Cardiac rehab is scheduled to start 1/23, will also monitor BP then. Dr Jolley updated.

## 2025-01-15 NOTE — TELEPHONE ENCOUNTER
[facility] Call Center    Phone Message    May a detailed message be left on voicemail: yes     Reason for Call: Medication Refill Request    Has the patient contacted the pharmacy for the refill? Yes   Name of medication being requested: ticagrelor (BRILINTA) 90 MG tablet     Pharmacy: [id number] - [place], [place] - [address]    Date medication is needed: 01/15/2025   Patient requesting if refill orders can be sent to local pharmacy, since they received medication when they were in the hospital. Please review and send orders as needed. Thank you!    Action Taken: Other: Cardiology    Travel Screening: Not Applicable     Thank you!  [facility]

## 2025-01-15 NOTE — TELEPHONE ENCOUNTER
Eagle Jolley MD Hiljus, Audrey G, RN  Caller: Unspecified (Today,  9:25 AM)  Do not start chlorthalidone unless he has hypertension.      Left a detailed message for patient to stay off chlorthalidone unless SBP is consistently >140. Callback requested to confirm message received. Med list updated.       Addendum 1400: Patient left a message returning call saying he did get the message. He was able to get his cuff to work and his BP was 129/70. Pt will call the clinic with any elevated readings.

## 2025-01-16 NOTE — TELEPHONE ENCOUNTER
Writer returned pt's phone number.    Called patient to discuss any post hospital d/c questions, review medication changes, and confirm f/u appts. Patient denied any questions regarding new medications or changes to PTA medications.     RN confirmed with patient that he was d/c with an adequate supply of the antiplatelet Brilinta, and reminded of importance of taking without interruption.     Pt has an Rx for PRN SL Nitroglycerin.     Patient denied any SOB, chest pain or light headedness.    RFA and RRA cardiac cath site are without bleeding, swelling, redness or signs of infection.     RN confirmed with patient that he is scheduled for labs on 2/25/25 at 1300, and an OV on 2/26/25 at 0840 with SYD Eva August at our [place] Office.    Scheduled for cardiac rehab on 1/23/25 at 1400 in [place].     Patient advised to call clinic with any cardiac related questions or concerns prior to this syd't. Patient verbalized understanding and agreed with plan. VESNA Arizmendi RN.

## 2025-01-28 ENCOUNTER — HOSPITAL ENCOUNTER (OUTPATIENT)
Dept: CARDIAC REHAB | Facility: CLINIC | Age: 80
Discharge: HOME | End: 2025-01-28
Attending: INTERNAL MEDICINE
Payer: COMMERCIAL

## 2025-01-28 ENCOUNTER — TELEPHONE (OUTPATIENT)
Dept: CARDIAC REHAB | Facility: CLINIC | Age: 80
End: 2025-01-28
Payer: COMMERCIAL

## 2025-01-28 DIAGNOSIS — E03.8 SECONDARY HYPOTHYROIDISM: ICD-10-CM

## 2025-01-28 PROCEDURE — 93798 PHYS/QHP OP CAR RHAB W/ECG: CPT

## 2025-01-28 NOTE — TELEPHONE ENCOUNTER
Hello,    Our mutual patient, Terrence was seen today for his first exercise session in cardiac rehab. During review of his ECG we noticed a 4-beat run of VT likely while he was on the bike. He did not report any symptoms during the session. He is about 3 weeks post NSTEMI DESx1 and follows up with Eva Leon on 2/26. Please see session report in EPIC from today for reference. Feel free to reach out for any questions/concerns.    Best,  Lyle Crespo, MS GONCALVES  [facility]

## 2025-01-30 NOTE — TELEPHONE ENCOUNTER
Reply from Dr. Jolley:  Continue as is for now.  Continue to observe.  Call for any further issues.     1000 left patient a detailed message that we received a message from the cardiac rehab team of an arrhythmia on his EKG. Reviewed that Dr. Jolley has looked at the update. Reviewed that his recommendation is to continue the same meds and to have the rehab team continue to monitor his rhythm.   Patient to call Team 2 if any questions.      1050 spoke with patient to review Dr. Jolley's message.

## 2025-01-31 ENCOUNTER — HOSPITAL ENCOUNTER (OUTPATIENT)
Dept: MRI IMAGING | Facility: CLINIC | Age: 80
Discharge: HOME | End: 2025-01-31
Attending: NEUROLOGICAL SURGERY | Admitting: NEUROLOGICAL SURGERY
Payer: COMMERCIAL

## 2025-01-31 DIAGNOSIS — D35.2 PITUITARY MACROADENOMA (H): ICD-10-CM

## 2025-01-31 PROCEDURE — 70553 MRI BRAIN STEM W/O & W/DYE: CPT

## 2025-01-31 PROCEDURE — A9585 GADOBUTROL INJECTION: HCPCS | Performed by: NEUROLOGICAL SURGERY

## 2025-01-31 PROCEDURE — 255N000002 HC RX 255 OP 636: Performed by: NEUROLOGICAL SURGERY

## 2025-01-31 RX ORDER — GADOBUTROL 604.72 MG/ML
10 INJECTION INTRAVENOUS ONCE
Status: COMPLETED | OUTPATIENT
Start: 2025-01-31 | End: 2025-01-31

## 2025-01-31 RX ADMIN — GADOBUTROL 10 ML: 604.72 INJECTION INTRAVENOUS at 10:20

## 2025-02-03 ENCOUNTER — ANCILLARY PROCEDURE (OUTPATIENT)
Dept: GENERAL RADIOLOGY | Facility: CLINIC | Age: 80
End: 2025-02-03
Attending: PHYSICIAN ASSISTANT
Payer: COMMERCIAL

## 2025-02-03 ENCOUNTER — OFFICE VISIT (OUTPATIENT)
Dept: NEUROSURGERY | Facility: CLINIC | Age: 80
End: 2025-02-03
Attending: NEUROLOGICAL SURGERY
Payer: COMMERCIAL

## 2025-02-03 VITALS
OXYGEN SATURATION: 97 % | RESPIRATION RATE: 16 BRPM | SYSTOLIC BLOOD PRESSURE: 114 MMHG | HEART RATE: 64 BPM | DIASTOLIC BLOOD PRESSURE: 73 MMHG

## 2025-02-03 DIAGNOSIS — M48.062 LUMBAR STENOSIS WITH NEUROGENIC CLAUDICATION: Primary | ICD-10-CM

## 2025-02-03 DIAGNOSIS — D35.2 PITUITARY MACROADENOMA (H): ICD-10-CM

## 2025-02-03 DIAGNOSIS — M48.062 LUMBAR STENOSIS WITH NEUROGENIC CLAUDICATION: ICD-10-CM

## 2025-02-03 PROCEDURE — 99417 PROLNG OP E/M EACH 15 MIN: CPT | Performed by: PHYSICIAN ASSISTANT

## 2025-02-03 PROCEDURE — 72082 X-RAY EXAM ENTIRE SPI 2/3 VW: CPT | Performed by: STUDENT IN AN ORGANIZED HEALTH CARE EDUCATION/TRAINING PROGRAM

## 2025-02-03 PROCEDURE — 77073 BONE LENGTH STUDIES: CPT | Performed by: STUDENT IN AN ORGANIZED HEALTH CARE EDUCATION/TRAINING PROGRAM

## 2025-02-03 PROCEDURE — 99215 OFFICE O/P EST HI 40 MIN: CPT | Performed by: PHYSICIAN ASSISTANT

## 2025-02-03 RX ORDER — LEVOTHYROXINE SODIUM 100 UG/1
100 TABLET ORAL DAILY
Qty: 90 TABLET | Refills: 0 | Status: SHIPPED | OUTPATIENT
Start: 2025-02-03

## 2025-02-03 NOTE — PATIENT INSTRUCTIONS
EOS XR today if able.    Lumbar MRI ordered.  Okay to get this done at facility that is convenient for you.      Follow up with LUISA Estrem for lumbar spine imaging review via video or in person to discuss next steps.      Provider will send Dr. Bates a message regarding any follow up needed by him for your pituitary.

## 2025-02-03 NOTE — LETTER
2/3/2025       RE: Terrence Murillo  [address]  [place] [address]     Dear Colleague,    Thank you for referring your patient, Terrence Murillo, to the [facility] [place] at [facility]. Please see a copy of my visit note below.      [facility] [place]  [address] [place] [address]  3RD FLOOR  [facility] [address]  Phone: [phone]  Fax: [phone]      Patient:  Terrence Murillo, Date of birth 1945, 79 year old, male  Referring Provider Amanda Bates MD  [address] [place] [address]    ASSESSMENT & PLAN:    S/p pituitary resection (remote) - no change in symptomology, chronic bilateral temporal field cuts 30%   -follows with Dr. Bates, endocrine and ophthalmology  -No change on 1/31/25 brain MRI  -Sent message to Dr. Bates regarding future f/u needs.    2.   Lumbar stenosis with neurogenic claudication w/o radic  -progression of symptoms  -Discussed decompression surgery briefly with patient, but would need to get updated imaging to know if this was needed.    -needs updated imaging, last 4/2022 showing mod CCS L1-3  -obtain imaging and records from [facility]  -EOS XR today if able  -update lumbar MRI (patient would like at [facility])  -f/u 2-3 days after all imaging completed.  Video okay.        I spent 60 minutes spent in patient care, independent review and interpretation of medical records/imaging, reviewing old records.    History of Present Illness:    02/03/2025 Visit: - patient seen today for follow up after having brain MRI s/p pituitary resection 9+ years ago and lumbar stenosis.     Patient notes he has been following with Dr. Hinkle, endocrine and opthalmology since the pituitary resection.  He was told he has approximately 30% peripheral visual field deficits.  He denies any change in his vision and is not experiencing headaches.  His next endo visit is  in 2 months.    He brought up that he was seen at [facility] in the past for low back pain and was given an injection in 2018.  Surgery for decompression was discussed with him, but it was ultimately decided by the surgeon that he did not want to proceed because of the 10% risk of complication.  The patient has been experiencing fatigue in his legs and has not been able to ambulate more than 100 years if he is wearing any heavy gear--such as when he is hunting.  He used to walk more than 1 mile at a time, but now is unable to do that and he misses his walks.  He is currently in cardiac rehab s/p stent placement (he has a total of 3).  He feels it is getting harder and harder to walk longer distances and if he ashley to take a rest and then continues, sometimes the fatigue comes back more pronounced.  He would like to try and regain his ability to ambulate farther distances.  He does not have any significant back pain (1/10 aching at most in the low back) and denies radicular pain in his legs.  His last imaging was in 4/2022.        IMAGING   Imaging independently reviewed.    MR Brain w/o & w Contrast  Result Date: 1/31/2025  EXAM: MR BRAIN W/O and W CONTRAST LOCATION: [facility] DATE: 1/31/2025 INDICATION:  Pituitary macroadenoma (H) COMPARISON: January 24, 2023 CONTRAST: 10 mL Gadavist TECHNIQUE: Multiplanar multisequence head MRI without and with intravenous contrast including dedicated imaging of the sella. FINDINGS: SELLA: Dedicated imaging of the sella demonstrates postsurgical changes of previous transsphenoidal approach pituitary mass resection. Unchanged minimal residual heterogeneous tissue along the sellar floor. Leftward deviation of the infundibulum unremarkable suprasellar structures including the optic chiasm. Normal cavernous sinuses. INTRACRANIAL CONTENTS: No acute or subacute infarct. No mass, acute hemorrhage, or extra-axial fluid collections. Scattered nonspecific T2/FLAIR  hyperintensities within the cerebral white matter most consistent with mild chronic microvascular ischemic change. Mild to moderate generalized cerebral atrophy. No hydrocephalus. Normal position of the cerebellar tonsils. No pathologic contrast enhancement. OTHER: Mucosal thickening of the postoperative sphenoid sinus and ethmoid air cells is present. Single opacified posterior right ethmoid air cell.   IMPRESSION: 1.  Postsurgical changes of previous transsphenoidal mass resection. Unchanged minimal heterogeneous tissue along the sellar floor and leftward deviation of the infundibulum. 2.  Stable chronic changes as above.    Lumbar MRI 4/4/2022 -   FINDINGS: Five lumbar-type vertebral bodies. The conus medullaris terminates at the level of the T12-L1 disc space.  Normal cord signal.  Progression of type II endplate degeneration with endplate edema at L1-2 and L2-3. Multilevel disc degeneration with disc space narrowing and endplate degeneration at T12-L1 through L4-5.  Grade 1 anterolisthesis of L2 on L3 and L5 on S1. Grade 1 retrolisthesis of L3 on L4 and L4 on L5. Lumbar scoliosis convex to the left. The visualized paraspinal structures are unremarkable.   Axial:   T12-L1: Interval resolution of the previously identified right paracentral disc extrusion. Stable posterior osteophytic ridging with broad-based disc bulge. Mild spinal canal narrowing. Mild facet degeneration. Mild bilateral neural foraminal narrowing.   L1-2: Posterior disc osteophyte complex effaces the ventral thecal sac. Stable left paracentral disc extrusion mildly effaces the left lateral recess. Moderate spinal canal narrowing. Mild facet degeneration. Mild bilateral neural foraminal narrowing. Progression of endplate degeneration.   L2-3: Posterior osteophytic ridging. Broad-based disc bulge. Moderate facet degeneration. Progression of now moderate spinal canal narrowing. Slight progression of moderate right neural foraminal stenosis. Mild  left neural foraminal narrowing.   L3-4: Posterior osteophytic ridging. Effacement of ventral thecal sac. Mild facet degeneration. Mild spinal canal narrowing. Mild bilateral neural foraminal narrowing.   L4-5: Posterior osteophytic ridging. Effacement of ventral thecal sac. Mild bilateral neural foraminal narrowing. Mild facet degeneration.   L5-S1: Grade 1 anterolisthesis of L5 on S1. Broad-based disc bulge. Moderate facet degeneration. Mild right and moderate left neural foraminal narrowing. No substantial change.   IMPRESSION:    1. Comparison to examination of 11/24/2020.   2. Progression of now moderate spinal canal narrowing and moderate right neural foraminal stenosis at L2-3.   3. Progression of endplate degeneration at L1-2 and L2-3.   4. Interval resolution of previously identified right paracentral disc extrusion at T12-L1.   5. Additional degenerative changes are stable.     Physical Exam   /73 (BP Location: Right arm, Patient Position: Sitting)   Pulse 64   Resp 16   SpO2 97%     Constitutional: Appears well-developed and well-nourished. Cooperative. No acute distress.   HENT:   Head: Normocephalic and atraumatic.   Eyes: Conjunctivae are normal.  Neck: Neck supple. No tracheal deviation present.  Cardiovascular: Normal rate and regular rhythm.    Pulmonary/Chest: Effort normal and breath sounds normal.  Abdominal: Exhibits no obvious distension.   Musculoskeletal: Able to move all extremities.  Mild BUE tremors noted. No C/T/L spine tenderness to palpation.  Negative SLR, piriformis stretch, SALLIE.    Lumbar flexion/extension range of motion: good ROM w/o pain  Skin: Skin is warm, dry and intact.   Psychiatric: Normal mood and affect. Speech is normal and behavior is normal.    Neurological:  Alert, NAD, and oriented to person, place, and time.   Grossly intact visual testing, equal bilaterally.  No pronator drift, no dysmetria bilat w/ FTN, speech is clear and fluent.  [place].  EOMI, PERRL.    Gait: balanced tandem gait.  No assistive devices.      Strength (L/R)  5/5 BUE    5/5 Hip Flexion  5/5 Knee Extension  5/5 Ankle Dorsiflexion  5/5 Extensor Hallucis Longus  5/5 Plantar Flexion    Reflexes (L/R)  1+/1+ Bicep  1+/1+ Brachioradialis  0/0 Patellar  0/0 Ankle    No Mendez's   No ankle clonus    Sensation: SILT BUE/BLE    Review of Systems   See HPI     Past Medical History:   Diagnosis Date     Coronary artery disease     cardiac cath 2016: medical management; cath 2010: SUSANA x2 to LAD; cath 1993: PTCA to LAD; cath 1992: PTCA to LAD, 1992: atherectomy of LAD     Hearing problem      Hyperlipidaemia      Hypertension      Obese      Obstructive sleep apnea      Reduced vision      Sleep apnea     CPAP       Past Surgical History:   Procedure Laterality Date     APPENDECTOMY OPEN       ARTHROPLASTY KNEE Left      ARTHROPLASTY SHOULDER Right      CV CORONARY ANGIOGRAM N/A 1/8/2025    Procedure: Coronary Angiogram;  Surgeon: Jeffry Alcantara MD;  Location:  HEART CARDIAC CATH LAB     CV CORONARY LITHOTRIPSY PCI N/A 1/8/2025    Procedure: Percutaneous Coronary Intervention - Lithotripsy;  Surgeon: Jeffry Alcantara MD;  Location:  HEART CARDIAC CATH LAB     CV INSTANTANEOUS WAVE-FREE RATIO N/A 1/8/2025    Procedure: Instantaneous Wave-Free Ratio;  Surgeon: Jeffry Alcantara MD;  Location:  HEART CARDIAC CATH LAB     CV LEFT HEART CATH N/A 1/8/2025    Procedure: Left Heart Catheterization;  Surgeon: Jeffry Alcantara MD;  Location:  HEART CARDIAC CATH LAB     CV PCI N/A 1/8/2025    Procedure: Percutaneous Coronary Intervention;  Surgeon: Jeffry Alcantara MD;  Location:  HEART CARDIAC CATH LAB     ENT SURGERY       HEART CATH, ANGIOPLASTY  1992    LAD  atherectomy with a DVI device      HEART CATH, ANGIOPLASTY  4-28-10    PTCA and stent proximal and mid LAD (2) stents Xience     HEART CATH, ANGIOPLASTY  04/20/2016    no stenosis greater than 25%-rec. medical management     ILIAC ARTERY  ANGIOGRAM RIGHT Right 1/8/2025    Procedure: Iliac Artery Angiogram Right;  Surgeon: Jeffry Alcantara MD;  Location:  HEART CARDIAC CATH LAB     OPTICAL TRACKING SYSTEM ENDOSCOPIC RESECTION TUMOR CRANIAL N/A 11/3/2017    Procedure: OPTICAL TRACKING SYSTEM ENDOSCOPIC RESECTION TUMOR CRANIAL;  Stealth Guided Endoscopic Transnasal Resection of Pituitary Tumor;  Surgeon: Amanda Bates MD;  Location:  OR       Social History     Socioeconomic History     Marital status:    Tobacco Use     Smoking status: Never     Smokeless tobacco: Never   Substance and Sexual Activity     Alcohol use: Yes     Alcohol/week: 1.0 - 2.0 standard drink of alcohol     Types: 1 - 2 Standard drinks or equivalent per week     Comment: 1 cocktail and some wine per day     Drug use: No     Sexual activity: Not Currently     Partners: Female     Birth control/protection: Male Surgical   Other Topics Concern     Parent/sibling w/ CABG, MI or angioplasty before 65F 55M? No     Caffeine Concern Yes     Comment: coffee      Sleep Concern No     Stress Concern No     Weight Concern Yes     Comment: Weight decrease 10 lbs     Special Diet Yes     Comment: Mediterranean diet     Exercise Yes     Comment: Walking 5 days per week 35- 60 minutes     Seat Belt Yes     Social Drivers of Health     Financial Resource Strain: Low Risk  (1/7/2025)    Financial Resource Strain      Within the past 12 months, have you or your family members you live with been unable to get utilities (heat, electricity) when it was really needed?: No   Food Insecurity: Low Risk  (1/7/2025)    Food Insecurity      Within the past 12 months, did you worry that your food would run out before you got money to buy more?: No      Within the past 12 months, did the food you bought just not last and you didn t have money to get more?: No   Transportation Needs: Low Risk  (1/7/2025)    Transportation Needs      Within the past 12 months, has lack of  transportation kept you from medical appointments, getting your medicines, non-medical meetings or appointments, work, or from getting things that you need?: No   Interpersonal Safety: Low Risk  (1/9/2025)    Interpersonal Safety      Do you feel physically and emotionally safe where you currently live?: Yes      Within the past 12 months, have you been hit, slapped, kicked or otherwise physically hurt by someone?: No      Within the past 12 months, have you been humiliated or emotionally abused in other ways by your partner or ex-partner?: No   Housing Stability: Low Risk  (1/7/2025)    Housing Stability      Do you have housing? : Yes      Are you worried about losing your housing?: No       family history includes Alzheimer Disease in his brother; C.A.D. in his father; Diabetes in his mother; Family History Negative in his brother, daughter, daughter, maternal grandfather, maternal grandmother, paternal grandfather, paternal grandmother, sister, and son; Heart Disease in his father, mother, and son; Heart Disease (age of onset: 37) in his son; Heart Surgery in his mother.       Grecia Herring PA-C  Department of Neurosurgery  Office: [phone]        Again, thank you for allowing me to participate in the care of your patient.      Sincerely,    Grecia Herring PA-C

## 2025-02-03 NOTE — PROGRESS NOTES
[facility] NEUROSURGERY CLINIC [address]  [address] [facility] [address]  Phone: [phone]  Fax: [phone]      Patient:  Terrence Murillo, Date of birth 1945, 79 year old, male  Referring Provider Amanda Bates MD  [address] [place] [address]    ASSESSMENT & PLAN:    S/p pituitary resection (remote) - no change in symptomology, chronic bilateral temporal field cuts 30%   -follows with Dr. Bates, endocrine and ophthalmology  -No change on 1/31/25 brain MRI  -Sent message to Dr. Bates regarding future f/u needs.    2.   Lumbar stenosis with neurogenic claudication w/o radic  -progression of symptoms  -Discussed decompression surgery briefly with patient, but would need to get updated imaging to know if this was needed.    -needs updated imaging, last 4/2022 showing mod CCS L1-3  -obtain imaging and records from [facility]  -EOS XR today if able  -update lumbar MRI (patient would like at [facility])  -f/u 2-3 days after all imaging completed.  Video okay.        I spent 60 minutes spent in patient care, independent review and interpretation of medical records/imaging, reviewing old records.    History of Present Illness:    02/03/2025 Visit: - patient seen today for follow up after having brain MRI s/p pituitary resection 9+ years ago and lumbar stenosis.     Patient notes he has been following with Dr. Hinkle, endocrine and opthalmology since the pituitary resection.  He was told he has approximately 30% peripheral visual field deficits.  He denies any change in his vision and is not experiencing headaches.  His next endo visit is in 2 months.    He brought up that he was seen at [facility] in the past for low back pain and was given an injection in 2018.  Surgery for decompression was discussed with him, but it was ultimately decided by the surgeon that he did not want to proceed because of the 10% risk of complication.  The patient has been  experiencing fatigue in his legs and has not been able to ambulate more than 100 years if he is wearing any heavy gear--such as when he is hunting.  He used to walk more than 1 mile at a time, but now is unable to do that and he misses his walks.  He is currently in cardiac rehab s/p stent placement (he has a total of 3).  He feels it is getting harder and harder to walk longer distances and if he ashley to take a rest and then continues, sometimes the fatigue comes back more pronounced.  He would like to try and regain his ability to ambulate farther distances.  He does not have any significant back pain (1/10 aching at most in the low back) and denies radicular pain in his legs.  His last imaging was in 4/2022.        IMAGING   Imaging independently reviewed.    MR Brain w/o & w Contrast  Result Date: 1/31/2025  EXAM: MR BRAIN W/O and W CONTRAST LOCATION: [facility] DATE: 1/31/2025 INDICATION:  Pituitary macroadenoma (H) COMPARISON: January 24, 2023 CONTRAST: 10 mL Gadavist TECHNIQUE: Multiplanar multisequence head MRI without and with intravenous contrast including dedicated imaging of the sella. FINDINGS: SELLA: Dedicated imaging of the sella demonstrates postsurgical changes of previous transsphenoidal approach pituitary mass resection. Unchanged minimal residual heterogeneous tissue along the sellar floor. Leftward deviation of the infundibulum unremarkable suprasellar structures including the optic chiasm. Normal cavernous sinuses. INTRACRANIAL CONTENTS: No acute or subacute infarct. No mass, acute hemorrhage, or extra-axial fluid collections. Scattered nonspecific T2/FLAIR hyperintensities within the cerebral white matter most consistent with mild chronic microvascular ischemic change. Mild to moderate generalized cerebral atrophy. No hydrocephalus. Normal position of the cerebellar tonsils. No pathologic contrast enhancement. OTHER: Mucosal thickening of the postoperative sphenoid  sinus and ethmoid air cells is present. Single opacified posterior right ethmoid air cell.   IMPRESSION: 1.  Postsurgical changes of previous transsphenoidal mass resection. Unchanged minimal heterogeneous tissue along the sellar floor and leftward deviation of the infundibulum. 2.  Stable chronic changes as above.    Lumbar MRI 4/4/2022 -   FINDINGS: Five lumbar-type vertebral bodies. The conus medullaris terminates at the level of the T12-L1 disc space.  Normal cord signal.  Progression of type II endplate degeneration with endplate edema at L1-2 and L2-3. Multilevel disc degeneration with disc space narrowing and endplate degeneration at T12-L1 through L4-5.  Grade 1 anterolisthesis of L2 on L3 and L5 on S1. Grade 1 retrolisthesis of L3 on L4 and L4 on L5. Lumbar scoliosis convex to the left. The visualized paraspinal structures are unremarkable.   Axial:   T12-L1: Interval resolution of the previously identified right paracentral disc extrusion. Stable posterior osteophytic ridging with broad-based disc bulge. Mild spinal canal narrowing. Mild facet degeneration. Mild bilateral neural foraminal narrowing.   L1-2: Posterior disc osteophyte complex effaces the ventral thecal sac. Stable left paracentral disc extrusion mildly effaces the left lateral recess. Moderate spinal canal narrowing. Mild facet degeneration. Mild bilateral neural foraminal narrowing. Progression of endplate degeneration.   L2-3: Posterior osteophytic ridging. Broad-based disc bulge. Moderate facet degeneration. Progression of now moderate spinal canal narrowing. Slight progression of moderate right neural foraminal stenosis. Mild left neural foraminal narrowing.   L3-4: Posterior osteophytic ridging. Effacement of ventral thecal sac. Mild facet degeneration. Mild spinal canal narrowing. Mild bilateral neural foraminal narrowing.   L4-5: Posterior osteophytic ridging. Effacement of ventral thecal sac. Mild bilateral neural foraminal  narrowing. Mild facet degeneration.   L5-S1: Grade 1 anterolisthesis of L5 on S1. Broad-based disc bulge. Moderate facet degeneration. Mild right and moderate left neural foraminal narrowing. No substantial change.   IMPRESSION:    1. Comparison to examination of 11/24/2020.   2. Progression of now moderate spinal canal narrowing and moderate right neural foraminal stenosis at L2-3.   3. Progression of endplate degeneration at L1-2 and L2-3.   4. Interval resolution of previously identified right paracentral disc extrusion at T12-L1.   5. Additional degenerative changes are stable.     Physical Exam   /73 (BP Location: Right arm, Patient Position: Sitting)   Pulse 64   Resp 16   SpO2 97%     Constitutional: Appears well-developed and well-nourished. Cooperative. No acute distress.   HENT:   Head: Normocephalic and atraumatic.   Eyes: Conjunctivae are normal.  Neck: Neck supple. No tracheal deviation present.  Cardiovascular: Normal rate and regular rhythm.    Pulmonary/Chest: Effort normal and breath sounds normal.  Abdominal: Exhibits no obvious distension.   Musculoskeletal: Able to move all extremities.  Mild BUE tremors noted. No C/T/L spine tenderness to palpation.  Negative SLR, piriformis stretch, SALLIE.    Lumbar flexion/extension range of motion: good ROM w/o pain  Skin: Skin is warm, dry and intact.   Psychiatric: Normal mood and affect. Speech is normal and behavior is normal.    Neurological:  Alert, NAD, and oriented to person, place, and time.   Grossly intact visual testing, equal bilaterally.  No pronator drift, no dysmetria bilat w/ FTN, speech is clear and fluent.  [place].  EOMI, PERRL.   Gait: balanced tandem gait.  No assistive devices.      Strength (L/R)  5/5 BUE    5/5 Hip Flexion  5/5 Knee Extension  5/5 Ankle Dorsiflexion  5/5 Extensor Hallucis Longus  5/5 Plantar Flexion    Reflexes (L/R)  1+/1+ Bicep  1+/1+ Brachioradialis  0/0 Patellar  0/0 Ankle    No Mendez's   No ankle  clonus    Sensation: SILT BUE/BLE    Review of Systems   See HPI     Past Medical History:   Diagnosis Date    Coronary artery disease     cardiac cath 2016: medical management; cath 2010: SUSANA x2 to LAD; cath 1993: PTCA to LAD; cath 1992: PTCA to LAD, 1992: atherectomy of LAD    Hearing problem     Hyperlipidaemia     Hypertension     Obese     Obstructive sleep apnea     Reduced vision     Sleep apnea     CPAP       Past Surgical History:   Procedure Laterality Date    APPENDECTOMY OPEN      ARTHROPLASTY KNEE Left     ARTHROPLASTY SHOULDER Right     CV CORONARY ANGIOGRAM N/A 1/8/2025    Procedure: Coronary Angiogram;  Surgeon: Jeffry Alcantara MD;  Location:  HEART CARDIAC CATH LAB    CV CORONARY LITHOTRIPSY PCI N/A 1/8/2025    Procedure: Percutaneous Coronary Intervention - Lithotripsy;  Surgeon: Jeffry Alcantara MD;  Location:  HEART CARDIAC CATH LAB    CV INSTANTANEOUS WAVE-FREE RATIO N/A 1/8/2025    Procedure: Instantaneous Wave-Free Ratio;  Surgeon: Jeffry Alcantara MD;  Location:  HEART CARDIAC CATH LAB    CV LEFT HEART CATH N/A 1/8/2025    Procedure: Left Heart Catheterization;  Surgeon: Jeffry Alcantara MD;  Location:  HEART CARDIAC CATH LAB    CV PCI N/A 1/8/2025    Procedure: Percutaneous Coronary Intervention;  Surgeon: Jeffry Alcantara MD;  Location:  HEART CARDIAC CATH LAB    ENT SURGERY      HEART CATH, ANGIOPLASTY  1992    LAD  atherectomy with a DVI device     HEART CATH, ANGIOPLASTY  4-28-10    PTCA and stent proximal and mid LAD (2) stents Xience    HEART CATH, ANGIOPLASTY  04/20/2016    no stenosis greater than 25%-rec. medical management    ILIAC ARTERY ANGIOGRAM RIGHT Right 1/8/2025    Procedure: Iliac Artery Angiogram Right;  Surgeon: Jeffry Alcantara MD;  Location:  HEART CARDIAC CATH LAB    OPTICAL TRACKING SYSTEM ENDOSCOPIC RESECTION TUMOR CRANIAL N/A 11/3/2017    Procedure: OPTICAL TRACKING SYSTEM ENDOSCOPIC RESECTION TUMOR CRANIAL;  Stealth Guided Endoscopic  Transnasal Resection of Pituitary Tumor;  Surgeon: Amanda Bates MD;  Location:  OR       Social History     Socioeconomic History    Marital status:    Tobacco Use    Smoking status: Never    Smokeless tobacco: Never   Substance and Sexual Activity    Alcohol use: Yes     Alcohol/week: 1.0 - 2.0 standard drink of alcohol     Types: 1 - 2 Standard drinks or equivalent per week     Comment: 1 cocktail and some wine per day    Drug use: No    Sexual activity: Not Currently     Partners: Female     Birth control/protection: Male Surgical   Other Topics Concern    Parent/sibling w/ CABG, MI or angioplasty before 65F 55M? No    Caffeine Concern Yes     Comment: coffee     Sleep Concern No    Stress Concern No    Weight Concern Yes     Comment: Weight decrease 10 lbs    Special Diet Yes     Comment: Mediterranean diet    Exercise Yes     Comment: Walking 5 days per week 35- 60 minutes    Seat Belt Yes     Social Drivers of Health     Financial Resource Strain: Low Risk  (1/7/2025)    Financial Resource Strain     Within the past 12 months, have you or your family members you live with been unable to get utilities (heat, electricity) when it was really needed?: No   Food Insecurity: Low Risk  (1/7/2025)    Food Insecurity     Within the past 12 months, did you worry that your food would run out before you got money to buy more?: No     Within the past 12 months, did the food you bought just not last and you didn t have money to get more?: No   Transportation Needs: Low Risk  (1/7/2025)    Transportation Needs     Within the past 12 months, has lack of transportation kept you from medical appointments, getting your medicines, non-medical meetings or appointments, work, or from getting things that you need?: No   Interpersonal Safety: Low Risk  (1/9/2025)    Interpersonal Safety     Do you feel physically and emotionally safe where you currently live?: Yes     Within the past 12 months, have you been  hit, slapped, kicked or otherwise physically hurt by someone?: No     Within the past 12 months, have you been humiliated or emotionally abused in other ways by your partner or ex-partner?: No   Housing Stability: Low Risk  (1/7/2025)    Housing Stability     Do you have housing? : Yes     Are you worried about losing your housing?: No       family history includes Alzheimer Disease in his brother; C.A.D. in his father; Diabetes in his mother; Family History Negative in his brother, daughter, daughter, maternal grandfather, maternal grandmother, paternal grandfather, paternal grandmother, sister, and son; Heart Disease in his father, mother, and son; Heart Disease (age of onset: 37) in his son; Heart Surgery in his mother.       Grecia Herring PA-C  Department of Neurosurgery  Office: [phone]

## 2025-02-03 NOTE — TELEPHONE ENCOUNTER
Last Written Prescription:     levothyroxine (SYNTHROID/LEVOTHROID) 100 MCG tablet 90 tablet 3 2/23/2024 -- No   Sig - Route: Take 1 tablet (100 mcg) by mouth daily - Oral     ----------------------  Last Visit Date:  2/23/2024  [facility] [place]      TSH   Date Value Ref Range Status   02/23/2024 2.08 0.30 - 4.20 uIU/mL Final   02/10/2023 2.07 0.40 - 4.00 mU/L Final   01/19/2021 1.36 0.40 - 4.00 mU/L Final       Future Visit Date: 3/14/25  ----------------------      Refill decision: Medication refilled per  Medication Refill in Ambulatory Care  policy.     Request from pharmacy:  Requested Prescriptions   Pending Prescriptions Disp Refills    levothyroxine (SYNTHROID/LEVOTHROID) 100 MCG tablet [Pharmacy Med Name: LEVOTHYROXINE 100 MCG TABLET] 90 tablet 3     Sig: TAKE 1 TABLET BY MOUTH EVERY DAY       Thyroid Protocol Passed - 2/3/2025 11:42 AM

## 2025-02-04 ENCOUNTER — HOSPITAL ENCOUNTER (OUTPATIENT)
Dept: CARDIAC REHAB | Facility: CLINIC | Age: 80
Discharge: HOME | End: 2025-02-04
Attending: INTERNAL MEDICINE
Payer: COMMERCIAL

## 2025-02-04 PROCEDURE — 93798 PHYS/QHP OP CAR RHAB W/ECG: CPT

## 2025-02-11 ENCOUNTER — HOSPITAL ENCOUNTER (OUTPATIENT)
Dept: CARDIAC REHAB | Facility: CLINIC | Age: 80
Discharge: HOME | End: 2025-02-11
Attending: INTERNAL MEDICINE
Payer: COMMERCIAL

## 2025-02-11 PROCEDURE — 93798 PHYS/QHP OP CAR RHAB W/ECG: CPT

## 2025-02-12 ENCOUNTER — HOSPITAL ENCOUNTER (OUTPATIENT)
Dept: MRI IMAGING | Facility: CLINIC | Age: 80
Discharge: HOME | End: 2025-02-12
Attending: PHYSICIAN ASSISTANT
Payer: COMMERCIAL

## 2025-02-12 DIAGNOSIS — M48.062 LUMBAR STENOSIS WITH NEUROGENIC CLAUDICATION: ICD-10-CM

## 2025-02-12 PROCEDURE — 72148 MRI LUMBAR SPINE W/O DYE: CPT

## 2025-02-18 ENCOUNTER — HOSPITAL ENCOUNTER (OUTPATIENT)
Dept: CARDIAC REHAB | Facility: CLINIC | Age: 80
Discharge: HOME | End: 2025-02-18
Attending: INTERNAL MEDICINE
Payer: COMMERCIAL

## 2025-02-18 PROCEDURE — 93798 PHYS/QHP OP CAR RHAB W/ECG: CPT

## 2025-02-25 ENCOUNTER — LAB (OUTPATIENT)
Dept: LAB | Facility: CLINIC | Age: 80
End: 2025-02-25
Attending: NURSE PRACTITIONER
Payer: COMMERCIAL

## 2025-02-25 ENCOUNTER — HOSPITAL ENCOUNTER (OUTPATIENT)
Dept: CARDIAC REHAB | Facility: CLINIC | Age: 80
Discharge: HOME | End: 2025-02-25
Attending: INTERNAL MEDICINE
Payer: COMMERCIAL

## 2025-02-25 DIAGNOSIS — I21.4 NON-STEMI (NON-ST ELEVATED MYOCARDIAL INFARCTION) (H): ICD-10-CM

## 2025-02-25 LAB
ANION GAP SERPL CALCULATED.3IONS-SCNC: 5 MMOL/L (ref 7–15)
BUN SERPL-MCNC: 16.9 MG/DL (ref 8–23)
CALCIUM SERPL-MCNC: 8.8 MG/DL (ref 8.8–10.4)
CHLORIDE SERPL-SCNC: 104 MMOL/L (ref 98–107)
CREAT SERPL-MCNC: 0.89 MG/DL (ref 0.67–1.17)
EGFRCR SERPLBLD CKD-EPI 2021: 87 ML/MIN/1.73M2
ERYTHROCYTE [DISTWIDTH] IN BLOOD BY AUTOMATED COUNT: 12.7 % (ref 10–15)
GLUCOSE SERPL-MCNC: 101 MG/DL (ref 70–99)
HCO3 SERPL-SCNC: 24 MMOL/L (ref 22–29)
HCT VFR BLD AUTO: 31.1 % (ref 40–53)
HGB BLD-MCNC: 10.4 G/DL (ref 13.3–17.7)
MCH RBC QN AUTO: 32.5 PG (ref 26.5–33)
MCHC RBC AUTO-ENTMCNC: 33.4 G/DL (ref 31.5–36.5)
MCV RBC AUTO: 97 FL (ref 78–100)
PLATELET # BLD AUTO: 263 10E3/UL (ref 150–450)
POTASSIUM SERPL-SCNC: 4.5 MMOL/L (ref 3.4–5.3)
RBC # BLD AUTO: 3.2 10E6/UL (ref 4.4–5.9)
SODIUM SERPL-SCNC: 133 MMOL/L (ref 135–145)
WBC # BLD AUTO: 5.8 10E3/UL (ref 4–11)

## 2025-02-25 PROCEDURE — 93798 PHYS/QHP OP CAR RHAB W/ECG: CPT | Performed by: REHABILITATION PRACTITIONER

## 2025-02-25 PROCEDURE — 85027 COMPLETE CBC AUTOMATED: CPT

## 2025-02-25 PROCEDURE — 36415 COLL VENOUS BLD VENIPUNCTURE: CPT

## 2025-02-25 PROCEDURE — 80048 BASIC METABOLIC PNL TOTAL CA: CPT

## 2025-02-26 ENCOUNTER — TELEPHONE (OUTPATIENT)
Dept: CARDIOLOGY | Facility: CLINIC | Age: 80
End: 2025-02-26

## 2025-02-26 ENCOUNTER — OFFICE VISIT (OUTPATIENT)
Dept: CARDIOLOGY | Facility: CLINIC | Age: 80
End: 2025-02-26
Attending: PHYSICIAN ASSISTANT
Payer: COMMERCIAL

## 2025-02-26 VITALS
HEIGHT: 70 IN | DIASTOLIC BLOOD PRESSURE: 64 MMHG | WEIGHT: 225.7 LBS | BODY MASS INDEX: 32.31 KG/M2 | HEART RATE: 68 BPM | SYSTOLIC BLOOD PRESSURE: 124 MMHG

## 2025-02-26 DIAGNOSIS — I10 BENIGN ESSENTIAL HYPERTENSION: ICD-10-CM

## 2025-02-26 DIAGNOSIS — E78.5 HYPERLIPIDEMIA LDL GOAL <70: ICD-10-CM

## 2025-02-26 DIAGNOSIS — G47.33 OBSTRUCTIVE SLEEP APNEA SYNDROME: ICD-10-CM

## 2025-02-26 DIAGNOSIS — I25.10 CORONARY ARTERY DISEASE INVOLVING NATIVE CORONARY ARTERY OF NATIVE HEART WITHOUT ANGINA PECTORIS: Primary | ICD-10-CM

## 2025-02-26 DIAGNOSIS — I73.9 PAD (PERIPHERAL ARTERY DISEASE): ICD-10-CM

## 2025-02-26 NOTE — LETTER
2/26/2025    Yamilex Booekr DO [facility] [address] [facility] [place] [address]    RE: Terrence Murillo       Dear Colleague,     I had the pleasure of seeing Terrence Murillo in the [facility].  CARDIOLOGY CLINIC NOTE    PRIMARY CARDIOLOGIST  Dr. Jolley    PRIMARY CARE PHYSICIAN:  Yamilex Booker    HISTORY OF PRESENT ILLNESS:  Terrence Murillo is a very pleasant 79-year-old male with a past medical history significant for strong family history of premature CAD, CAD status post PCI in 1992 with subsequent restenosis, SUSANA x 2 to the proximal/mid LAD in 2010, SUSANA to the distal RCA (1/8/2025 ), hypertension, hyperlipidemia, obstructive sleep apnea, pituitary adenoma status postresection in 2018, prostate cancer, DANISH on CPAP orthopedic issues and obesity.    On 1/7/2025, he presented to the emergency room with chest pain, similar to previous angina..  His cardiac workup included an echocardiogram that showed a normal ejection fraction estimated at 60%, no wall motion abnormalities, normal RV size and function and no valvular disease.  Nuclear stress test was negative for inducible ischemia.  Due to persistent symptoms, he underwent a coronary angiogram that showed multivessel disease with a 70% proximal LAD (IFR 0.93), 70% proximal circumflex, 95% distal RCA and 45% proximal RCA.  He underwent complex intervention of the distal RCA using a 3.0 x 30 mm SUSANA.  Of note, he was found to have likely severe ostial right common iliac narrowing, no claudication.  He was initiated on Brilinta and aspirin.  During his hospitalization he was noted to be bradycardic although asymptomatic.  He was also hypotensive and chlorthalidone was stopped.     He returns to the office today for follow-up.  Overall, he does not endorse any cardiac symptoms including chest pain, heartburn (anginal equivalent), shortness of breath, palpitations, PND, orthopnea, presyncope, syncope or edema.  Upon exam, lungs are clear  bilaterally, heart rate and rhythm regular, no palpitations or evidence of fluid overload.  He does have a mild nosebleed.  He is still grieving the loss of his wife who passed away 2 years ago as well as a son that he lost in his late 30s.    Blood pressure is well-controlled at 124/64  Labs yesterday showed an unremarkable BMP with the exception of a mildly low sodium at 133  Hemoglobin is mildly low at 10.4, hematocrit 31.1, and platelet 263  Lipid panel is excellent with a total cholesterol 106, HDL 46, LDL 45 and triglycerides 75    He is currently enrolled in cardiac rehab  Faithful with CPAP  Compliant with all medications.    PAST MEDICAL HISTORY:  Past Medical History:   Diagnosis Date     Coronary artery disease     cardiac cath 2016: medical management; cath 2010: SUSANA x2 to LAD; cath 1993: PTCA to LAD; cath 1992: PTCA to LAD, 1992: atherectomy of LAD     Hearing problem      Hyperlipidaemia      Hypertension      Obese      Obstructive sleep apnea      Reduced vision      Sleep apnea     CPAP       MEDICATIONS:  Current Outpatient Medications   Medication Sig Dispense Refill     acetaminophen (TYLENOL) 500 MG tablet Take 500 mg by mouth every morning.       alfuzosin ER (UROXATRAL) 10 MG 24 hr tablet Take 10 mg by mouth daily       amoxicillin (AMOXIL) 500 MG capsule Take before dental appointments       aspirin 81 MG tablet Take 1 tablet (81 mg) by mouth every evening 30 tablet 0     ezetimibe (ZETIA) 10 MG tablet Take 1 tablet (10 mg) by mouth daily. 90 tablet 3     Glucosamine 500 MG CAPS Take 500 mg by mouth 2 times daily        levothyroxine (SYNTHROID/LEVOTHROID) 100 MCG tablet TAKE 1 TABLET BY MOUTH EVERY DAY 90 tablet 0     metoprolol tartrate (LOPRESSOR) 25 MG tablet Take 1 tablet (25 mg) by mouth 2 times daily. 180 tablet 3     Multiple Vitamin (MULTI-VITAMIN) per tablet Take 1 tablet by mouth daily.       nitroGLYcerin (NITROSTAT) 0.4 MG sublingual tablet Place 1 tablet (0.4 mg) under the  tongue every 5 minutes as needed for chest pain May repeat x2, if chest pain contines after 3rd dose call 911 25 tablet 3     ramipril (ALTACE) 10 MG capsule Take 1 capsule (10 mg) by mouth daily.       rosuvastatin (CRESTOR) 40 MG tablet Take 1 tablet (40 mg) by mouth daily. 90 tablet 3     saw palmetto 450 MG CAPS capsule Take 450 mg by mouth 2 times daily       ticagrelor (BRILINTA) 90 MG tablet Take 1 tablet (90 mg) by mouth 2 times daily. 180 tablet 3     No current facility-administered medications for this visit.       SOCIAL HISTORY:  I have reviewed this patient's social history and updated it with pertinent information if needed. Terrence Murillo  reports that he has never smoked. He has never used smokeless tobacco. He reports current alcohol use of about 1.0 - 2.0 standard drink of alcohol per week. He reports that he does not use drugs.    PHYSICAL EXAM:  Pulse:  [68] 68  BP: (124)/(64) 124/64  225 lbs 11.2 oz    Constitutional: alert, no distress  Respiratory: Good bilateral air entry  Cardiovascular: Regular rate and rhythm  GI: nondistended  Neuropsychiatric: appropriate affact    ASSESSMENT/PLAN:  Pertinent issues addressed/ reviewed during this cardiology visit  Coronary artery disease - CAD status post PCI in 1992 with subsequent restenosis, SUSANA x 2 to the proximal/mid LAD in 2010, SUSANA to the distal RCA (1/8/2025 ).  Continue Brilinta and aspirin uninterrupted x 12 months.  He will likely transition to clopidogrel as monotherapy.  Monitor for excessive epistaxis.  Continue metoprolol, ramipril and statin.  Courage continued cardiac rehab, exercise, weight loss and heart healthy diet.  PAD -angiogram showed iliac disease, no claudication reported.  Continue current medical therapy.  Hypertension-well-controlled  Hyperlipidemia -LDL at goal, continue ezetimibe and rosuvastatin  Obstructive sleep apnea -on CPAP    Follow-up in April with Dr. Jolley per patient request.  Follow-up in October with   Jasvir to establish care.    It was a pleasure seeing this patient in clinic today. Please do not hesitate to contact me with any future questions.     IAN Garduno, CNP  Cardiology - [facility]  02/26/2025       The level of medical decision making during this visit was of moderate complexity.    This note was completed in part using dictation via the Dragon voice recognition software. Some word and grammatical errors may occur and must be interpreted in the appropriate clinical context.  If there are any questions pertaining to this issue, please contact me for further clarification.      Thank you for allowing me to participate in the care of your patient.      Sincerely,     IAN Garduno CNP     [facility]  cc:   Anna Marie Valdivia PA-C  [address] [address]

## 2025-02-26 NOTE — PATIENT INSTRUCTIONS
Thanks for participating in a office visit with the [facility] clinic today.    Doing well on a cardiac standpoint  Reviewed results of coronary angiogram and stenting procedure   Continue Brilinta and aspirin uninterrupted x 12 months  Continue all other medications.   Encourage continuation of cardiac rehab, exercise, weight loss and heart healthy diet.     Follow up in April with Dr. Jolley as scheduled.   Follow up in October with Dr. Tay to establish care.     Please call my nurse at   [phone]. Call with any questions or concerns.    Scheduling phone number: [phone]  Reminder: Please bring in all current medications, over the counter supplements and vitamin bottles to your next appointment.

## 2025-02-26 NOTE — TELEPHONE ENCOUNTER
Left detailed message for patient to have labs prior to OV with Dr. Jolley on 4-29-25   Labs are ordered in Epic.  Will have scheduling reach out to patient to schedule labs prior to OV.  Soledad Mccarthy RN, [phone]

## 2025-02-26 NOTE — PROGRESS NOTES
CARDIOLOGY CLINIC NOTE    PRIMARY CARDIOLOGIST  Dr. Jolley    PRIMARY CARE PHYSICIAN:  Yamilex Booker    HISTORY OF PRESENT ILLNESS:  Terrence Murillo is a very pleasant 79-year-old male with a past medical history significant for strong family history of premature CAD, CAD status post PCI in 1992 with subsequent restenosis, SUSANA x 2 to the proximal/mid LAD in 2010, SUSANA to the distal RCA (1/8/2025 ), hypertension, hyperlipidemia, obstructive sleep apnea, pituitary adenoma status postresection in 2018, prostate cancer, DANISH on CPAP orthopedic issues and obesity.    On 1/7/2025, he presented to the emergency room with chest pain, similar to previous angina..  His cardiac workup included an echocardiogram that showed a normal ejection fraction estimated at 60%, no wall motion abnormalities, normal RV size and function and no valvular disease.  Nuclear stress test was negative for inducible ischemia.  Due to persistent symptoms, he underwent a coronary angiogram that showed multivessel disease with a 70% proximal LAD (IFR 0.93), 70% proximal circumflex, 95% distal RCA and 45% proximal RCA.  He underwent complex intervention of the distal RCA using a 3.0 x 30 mm SUSANA.  Of note, he was found to have likely severe ostial right common iliac narrowing, no claudication.  He was initiated on Brilinta and aspirin.  During his hospitalization he was noted to be bradycardic although asymptomatic.  He was also hypotensive and chlorthalidone was stopped.     He returns to the office today for follow-up.  Overall, he does not endorse any cardiac symptoms including chest pain, heartburn (anginal equivalent), shortness of breath, palpitations, PND, orthopnea, presyncope, syncope or edema.  Upon exam, lungs are clear bilaterally, heart rate and rhythm regular, no palpitations or evidence of fluid overload.  He does have a mild nosebleed.  He is still grieving the loss of his wife who passed away 2 years ago as well as a son that he lost in  his late 30s.    Blood pressure is well-controlled at 124/64  Labs yesterday showed an unremarkable BMP with the exception of a mildly low sodium at 133  Hemoglobin is mildly low at 10.4, hematocrit 31.1, and platelet 263  Lipid panel is excellent with a total cholesterol 106, HDL 46, LDL 45 and triglycerides 75    He is currently enrolled in cardiac rehab  Faithful with CPAP  Compliant with all medications.    PAST MEDICAL HISTORY:  Past Medical History:   Diagnosis Date    Coronary artery disease     cardiac cath 2016: medical management; cath 2010: SUSANA x2 to LAD; cath 1993: PTCA to LAD; cath 1992: PTCA to LAD, 1992: atherectomy of LAD    Hearing problem     Hyperlipidaemia     Hypertension     Obese     Obstructive sleep apnea     Reduced vision     Sleep apnea     CPAP       MEDICATIONS:  Current Outpatient Medications   Medication Sig Dispense Refill    acetaminophen (TYLENOL) 500 MG tablet Take 500 mg by mouth every morning.      alfuzosin ER (UROXATRAL) 10 MG 24 hr tablet Take 10 mg by mouth daily      amoxicillin (AMOXIL) 500 MG capsule Take before dental appointments      aspirin 81 MG tablet Take 1 tablet (81 mg) by mouth every evening 30 tablet 0    ezetimibe (ZETIA) 10 MG tablet Take 1 tablet (10 mg) by mouth daily. 90 tablet 3    Glucosamine 500 MG CAPS Take 500 mg by mouth 2 times daily       levothyroxine (SYNTHROID/LEVOTHROID) 100 MCG tablet TAKE 1 TABLET BY MOUTH EVERY DAY 90 tablet 0    metoprolol tartrate (LOPRESSOR) 25 MG tablet Take 1 tablet (25 mg) by mouth 2 times daily. 180 tablet 3    Multiple Vitamin (MULTI-VITAMIN) per tablet Take 1 tablet by mouth daily.      nitroGLYcerin (NITROSTAT) 0.4 MG sublingual tablet Place 1 tablet (0.4 mg) under the tongue every 5 minutes as needed for chest pain May repeat x2, if chest pain contines after 3rd dose call 911 25 tablet 3    ramipril (ALTACE) 10 MG capsule Take 1 capsule (10 mg) by mouth daily.      rosuvastatin (CRESTOR) 40 MG tablet Take 1 tablet  (40 mg) by mouth daily. 90 tablet 3    saw palmetto 450 MG CAPS capsule Take 450 mg by mouth 2 times daily      ticagrelor (BRILINTA) 90 MG tablet Take 1 tablet (90 mg) by mouth 2 times daily. 180 tablet 3     No current facility-administered medications for this visit.       SOCIAL HISTORY:  I have reviewed this patient's social history and updated it with pertinent information if needed. Terrence Murillo  reports that he has never smoked. He has never used smokeless tobacco. He reports current alcohol use of about 1.0 - 2.0 standard drink of alcohol per week. He reports that he does not use drugs.    PHYSICAL EXAM:  Pulse:  [68] 68  BP: (124)/(64) 124/64  225 lbs 11.2 oz    Constitutional: alert, no distress  Respiratory: Good bilateral air entry  Cardiovascular: Regular rate and rhythm  GI: nondistended  Neuropsychiatric: appropriate affact    ASSESSMENT/PLAN:  Pertinent issues addressed/ reviewed during this cardiology visit  Coronary artery disease - CAD status post PCI in 1992 with subsequent restenosis, SUSANA x 2 to the proximal/mid LAD in 2010, SUSANA to the distal RCA (1/8/2025 ).  Continue Brilinta and aspirin uninterrupted x 12 months.  He will likely transition to clopidogrel as monotherapy.  Monitor for excessive epistaxis.  Continue metoprolol, ramipril and statin.  Courage continued cardiac rehab, exercise, weight loss and heart healthy diet. Hgb level and sodium levels were mildly low. Recommend a repeat BMP and CBC prior to next office visit.   PAD -angiogram showed iliac disease, no claudication reported.  Continue current medical therapy.  Hypertension-well-controlled  Hyperlipidemia -LDL at goal, continue ezetimibe and rosuvastatin  Obstructive sleep apnea -on CPAP    Follow-up in April with Dr. Jolley per patient request.  Follow-up in October with Dr. Tay to establish care.    It was a pleasure seeing this patient in clinic today. Please do not hesitate to contact me with any future questions.      IAN Garduno, CNP  Cardiology - [facility]  02/26/2025       The level of medical decision making during this visit was of moderate complexity.    This note was completed in part using dictation via the Dragon voice recognition software. Some word and grammatical errors may occur and must be interpreted in the appropriate clinical context.  If there are any questions pertaining to this issue, please contact me for further clarification.

## 2025-02-26 NOTE — TELEPHONE ENCOUNTER
----- Message from Eva August sent at 2/26/2025  9:50 AM CST -----  Hello,  Patient was seen in clinic today and noted to have a mildly low hemoglobin as well as sodium level.  I recommend a repeat CBC and BMP at his office visit in April with Dr. Jolley.  Could we please notify patient that these have been added.  This was discussed at office visit.    Thanks  Eva

## 2025-03-10 ENCOUNTER — THERAPY VISIT (OUTPATIENT)
Dept: PHYSICAL THERAPY | Facility: CLINIC | Age: 80
End: 2025-03-10
Attending: PHYSICIAN ASSISTANT
Payer: COMMERCIAL

## 2025-03-10 DIAGNOSIS — M48.062 LUMBAR STENOSIS WITH NEUROGENIC CLAUDICATION: ICD-10-CM

## 2025-03-10 PROCEDURE — 97161 PT EVAL LOW COMPLEX 20 MIN: CPT | Mod: GP | Performed by: PHYSICAL THERAPIST

## 2025-03-10 PROCEDURE — 97110 THERAPEUTIC EXERCISES: CPT | Mod: GP | Performed by: PHYSICAL THERAPIST

## 2025-03-10 NOTE — PROGRESS NOTES
PHYSICAL THERAPY EVALUATION  Type of Visit: Evaluation       Fall Risk Screen:  Fall screen completed by: PT  Have you fallen 2 or more times in the past year?: No  Have you fallen and had an injury in the past year?: No  Is patient a fall risk?: No    Subjective         Presenting condition or subjective complaint: my lower back with things like walking any distance produces fatique and to some degree pain which causes me to stop  Date of onset: 12/10/24    Relevant medical history: Arthritis; Cancer; Hearing problems; Heart problems; Hepatitis; High blood pressure; Implanted device; Incontinence; Overweight; Sleep disorder like apnea; Thyroid problems; Vision problems   Dates & types of surgery: knee and shoulder replacement     brain tumor removed  cryology surgery on prostate  heart catheter procedures 6times starting in  and last in     Prior diagnostic imaging/testing results: MRI; CT scan; X-ray     Prior therapy history for the same diagnosis, illness or injury: Yes shots in lumbar and exercises like feng and mckensie      Living Environment  Social support: Alone   Type of home: Apartment/condo   Stairs to enter the home: No       Ramp: No   Stairs inside the home: No       Help at home: None  Equipment owned:       Employment: No    Hobbies/Interests: [employer] reading time with family    Patient goals for therapy: walk for exercise    Pain assessment: Pain present  Location: back and bilateral leg fatigue /Ratin/10 fatigue in legs after walking 100yds     Objective   LUMBAR:    Posture: rounded in sitting        AROM: (Major, Moderate, Minimal or Nil loss)  Movement Loss Marek Mod Min Nil Pain   Flexion  X   No pain   Extension X    No pain   Side Gliding L X    none   Side Gliding R X    R sided LBP         Repeated Motions Testing:   DKC supine; seated lumbar flexion and standing flexion : all to open up spine        Assessment & Plan   CLINICAL IMPRESSIONS  Medical  Diagnosis: Lumbar stenosis and neurogenic claudication    Treatment Diagnosis: Lumbar stensosis   Impression/Assessment: Patient is a 79 year old male with lumbar and bilateral leg complaints.  The following significant findings have been identified: Pain, Decreased ROM/flexibility, Decreased joint mobility, Decreased strength, Impaired balance, Decreased proprioception, Impaired sensation, Inflammation, Impaired gait, Impaired muscle performance, Decreased activity tolerance, Impaired posture, and Instability. These impairments interfere with their ability to perform self care tasks, recreational activities, household chores, household mobility, and community mobility as compared to previous level of function.     Clinical Decision Making (Complexity):  Clinical Presentation: Stable/Uncomplicated  Clinical Presentation Rationale: based on medical and personal factors listed in PT evaluation  Clinical Decision Making (Complexity): Low complexity    PLAN OF CARE  Treatment Interventions:  Interventions: Manual Therapy, Neuromuscular Re-education, Therapeutic Activity, Therapeutic Exercise    Long Term Goals     PT Goal 1  Goal Identifier: Goal 1  Goal Description: Pt will be able to sit for 30 min, painfree  Rationale: to maximize safety and independence with performance of ADLs and functional tasks  Target Date: 06/02/25  PT Goal 2  Goal Identifier: Goal 2  Goal Description: Pt will be able to sleep throughout the night without waking due to pain  Rationale: to maximize safety and independence with performance of ADLs and functional tasks  Target Date: 06/02/25      Frequency of Treatment: 1X/week  Duration of Treatment: 12 weeks      Education Assessment:   Learner/Method: Patient;Pictures/Video    Risks and benefits of evaluation/treatment have been explained.   Patient/Family/caregiver agrees with Plan of Care.     Evaluation Time:     PT Eval, Low Complexity Minutes (90344): 15  Evaluation Only     Signing  Clinician: Alexandra Robb, BRITTANY MESSER [facility]                                                                                   OUTPATIENT PHYSICAL THERAPY      PLAN OF TREATMENT FOR OUTPATIENT REHABILITATION   Patient's Last Name, First Name, Terrence Beltrán YOB: 1945   Provider's Name   [facility]   Medical Record No.  [id number]     Onset Date: 12/10/24  Start of Care Date: 03/10/25     Medical Diagnosis:  Lumbar stenosis and neurogenic claudication      PT Treatment Diagnosis:  Lumbar stensosis Plan of Treatment  Frequency/Duration: 1X/week/ 12 weeks    Certification date from 03/10/25 to 06/02/25         See note for plan of treatment details and functional goals     Alexandra Robb, PT                         I CERTIFY THE NEED FOR THESE SERVICES FURNISHED UNDER        THIS PLAN OF TREATMENT AND WHILE UNDER MY CARE     (Physician attestation of this document indicates review and certification of the therapy plan).              Referring Provider:  Grecia Herring    Initial Assessment  See Epic Evaluation- Start of Care Date: 03/10/25

## 2025-03-11 ENCOUNTER — HOSPITAL ENCOUNTER (OUTPATIENT)
Dept: CARDIAC REHAB | Facility: CLINIC | Age: 80
Discharge: HOME | End: 2025-03-11
Attending: INTERNAL MEDICINE
Payer: COMMERCIAL

## 2025-03-11 PROCEDURE — 93798 PHYS/QHP OP CAR RHAB W/ECG: CPT

## 2025-03-13 ENCOUNTER — HOSPITAL ENCOUNTER (OUTPATIENT)
Dept: CARDIAC REHAB | Facility: CLINIC | Age: 80
Discharge: HOME | End: 2025-03-13
Attending: INTERNAL MEDICINE
Payer: COMMERCIAL

## 2025-03-13 PROCEDURE — 93798 PHYS/QHP OP CAR RHAB W/ECG: CPT

## 2025-03-18 ENCOUNTER — HOSPITAL ENCOUNTER (OUTPATIENT)
Dept: CARDIAC REHAB | Facility: CLINIC | Age: 80
Discharge: HOME | End: 2025-03-18
Attending: INTERNAL MEDICINE
Payer: COMMERCIAL

## 2025-03-18 PROCEDURE — 93798 PHYS/QHP OP CAR RHAB W/ECG: CPT

## 2025-03-20 ENCOUNTER — HOSPITAL ENCOUNTER (OUTPATIENT)
Dept: CARDIAC REHAB | Facility: CLINIC | Age: 80
Discharge: HOME | End: 2025-03-20
Attending: INTERNAL MEDICINE
Payer: COMMERCIAL

## 2025-03-20 PROCEDURE — 93798 PHYS/QHP OP CAR RHAB W/ECG: CPT | Performed by: REHABILITATION PRACTITIONER

## 2025-03-25 ENCOUNTER — HOSPITAL ENCOUNTER (OUTPATIENT)
Dept: CARDIAC REHAB | Facility: CLINIC | Age: 80
Discharge: HOME | End: 2025-03-25
Attending: INTERNAL MEDICINE
Payer: COMMERCIAL

## 2025-03-25 PROCEDURE — 93798 PHYS/QHP OP CAR RHAB W/ECG: CPT

## 2025-03-27 ENCOUNTER — HOSPITAL ENCOUNTER (OUTPATIENT)
Dept: CARDIAC REHAB | Facility: CLINIC | Age: 80
Discharge: HOME | End: 2025-03-27
Attending: INTERNAL MEDICINE
Payer: COMMERCIAL

## 2025-03-27 PROCEDURE — 93798 PHYS/QHP OP CAR RHAB W/ECG: CPT

## 2025-04-01 ENCOUNTER — HOSPITAL ENCOUNTER (OUTPATIENT)
Dept: CARDIAC REHAB | Facility: CLINIC | Age: 80
Discharge: HOME | End: 2025-04-01
Attending: INTERNAL MEDICINE
Payer: COMMERCIAL

## 2025-04-01 PROCEDURE — 93798 PHYS/QHP OP CAR RHAB W/ECG: CPT

## 2025-04-15 ENCOUNTER — OFFICE VISIT (OUTPATIENT)
Dept: OPHTHALMOLOGY | Facility: CLINIC | Age: 80
End: 2025-04-15
Attending: OPHTHALMOLOGY
Payer: COMMERCIAL

## 2025-04-15 DIAGNOSIS — D49.7 PITUITARY TUMOR: ICD-10-CM

## 2025-04-15 DIAGNOSIS — H47.20 OPTIC ATROPHY: Primary | ICD-10-CM

## 2025-04-15 PROCEDURE — G0463 HOSPITAL OUTPT CLINIC VISIT: HCPCS | Performed by: OPHTHALMOLOGY

## 2025-04-15 PROCEDURE — 92133 CPTRZD OPH DX IMG PST SGM ON: CPT | Performed by: OPHTHALMOLOGY

## 2025-04-15 PROCEDURE — 92083 EXTENDED VISUAL FIELD XM: CPT | Performed by: OPHTHALMOLOGY

## 2025-04-15 ASSESSMENT — CONF VISUAL FIELD
OD_INFERIOR_TEMPORAL_RESTRICTION: 0
OS_INFERIOR_TEMPORAL_RESTRICTION: 0
OS_INFERIOR_NASAL_RESTRICTION: 0
OS_NORMAL: 1
OS_SUPERIOR_TEMPORAL_RESTRICTION: 0
METHOD: COUNTING FINGERS
OD_NORMAL: 1
OS_SUPERIOR_NASAL_RESTRICTION: 0
OD_SUPERIOR_NASAL_RESTRICTION: 0
OD_INFERIOR_NASAL_RESTRICTION: 0
OD_SUPERIOR_TEMPORAL_RESTRICTION: 0

## 2025-04-15 ASSESSMENT — REFRACTION_WEARINGRX
OD_AXIS: 153
OD_CYLINDER: +1.00
OD_SPHERE: -1.00
OD_ADD: +3.00
OS_CYLINDER: SPHERE
SPECS_TYPE: TRIFOCAL
OS_SPHERE: PLANO

## 2025-04-15 ASSESSMENT — TONOMETRY
OD_IOP_MMHG: 10
IOP_METHOD: ICARE
OS_IOP_MMHG: 13

## 2025-04-15 ASSESSMENT — CUP TO DISC RATIO
OD_RATIO: 0.2
OS_RATIO: 0.2

## 2025-04-15 ASSESSMENT — EXTERNAL EXAM - RIGHT EYE: OD_EXAM: NORMAL

## 2025-04-15 ASSESSMENT — VISUAL ACUITY
OS_CC: 20/20
OD_CC: 20/20
OS_CC+: -1
OD_CC+: -3
CORRECTION_TYPE: GLASSES
METHOD: SNELLEN - LINEAR

## 2025-04-15 NOTE — PROGRESS NOTES
Terrence Murillo is a 79 year old male with the following diagnoses:   1. Optic atrophy    2. Pituitary tumor         HPI  Patient follows for pituitary tumor.  Last visit was 4/11/24.  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  Today, patient reports that he is.      Exam:  Visual acuity 20/20 right eye 20/20 left eye.  Color vision is 11/11 both eyes.  Pupils: equal and minimally reactive with no APD.  He has a shallow bitemporal hemianopia.  There is obvious optic atrophy.      Tests ordered and interpreted today:    HVF 24-2 GHASSAN FAST OU          Performed by: heather   . Patient cooperation: Reliable   .   Good fix. Dilated after vf. Test with CTL in both eyes. Dilated after vf.     Right Eye  Reliability of the test: Good   . Findings: Bitemporal defect   . Interpretation: Abnormal   . Interval: Same   .     Left Eye  Reliability of the test: Good   . Findings: Bitemporal defect   . Interpretation: Abnormal   . Interval: Same   .          OCT Optic Nerve RNFL Spectralis OU (both eyes)          Performed by: PARK   .     Right Eye  Reliability of the test: Good   . Test Findings: Abnormal   . Interpretation: Diffuse loss   . Plan: Monitor   . Interval: Same   .     Left Eye  Reliability of the test: Good   . Interpretation: Diffuse loss   . Plan: Monitor   . Interval: Same   .              It is my impression that patient has optic atrophy and a bitemporal hemianopia secondary to a pituitary adenoma.  Overall, he is stable. I reviewed his MRI and the pituitary  gland is no where near the chiasm.           Attending Physician Attestation:  Complete documentation of historical and exam elements from today's encounter can be found in the full encounter summary report (not reduplicated in this progress note).  I personally obtained the chief complaint(s) and history of present illness.  I confirmed and edited as necessary the review of systems,  past medical/surgical history, family history, social history, and examination findings as documented by others; and I examined the patient myself.  I personally reviewed the relevant tests, images, and reports as documented above.  I formulated and edited as necessary the assessment and plan and discussed the findings and management plan with the patient and family. I personally reviewed the ophthalmic test(s) associated with this encounter, agree with the interpretation(s) as documented by the resident/fellow, and have edited the corresponding report(s) as necessary.  - Kendell Green MD

## 2025-04-21 ENCOUNTER — LAB (OUTPATIENT)
Dept: LAB | Facility: CLINIC | Age: 80
End: 2025-04-21
Payer: COMMERCIAL

## 2025-04-21 ENCOUNTER — THERAPY VISIT (OUTPATIENT)
Dept: PHYSICAL THERAPY | Facility: CLINIC | Age: 80
End: 2025-04-21
Payer: COMMERCIAL

## 2025-04-21 DIAGNOSIS — M48.062 LUMBAR STENOSIS WITH NEUROGENIC CLAUDICATION: Primary | ICD-10-CM

## 2025-04-21 DIAGNOSIS — I25.10 CORONARY ARTERY DISEASE INVOLVING NATIVE CORONARY ARTERY OF NATIVE HEART WITHOUT ANGINA PECTORIS: ICD-10-CM

## 2025-04-21 LAB
ANION GAP SERPL CALCULATED.3IONS-SCNC: 11 MMOL/L (ref 7–15)
BUN SERPL-MCNC: 23.3 MG/DL (ref 8–23)
CALCIUM SERPL-MCNC: 8.9 MG/DL (ref 8.8–10.4)
CHLORIDE SERPL-SCNC: 103 MMOL/L (ref 98–107)
CREAT SERPL-MCNC: 1.03 MG/DL (ref 0.67–1.17)
EGFRCR SERPLBLD CKD-EPI 2021: 73 ML/MIN/1.73M2
ERYTHROCYTE [DISTWIDTH] IN BLOOD BY AUTOMATED COUNT: 12.4 % (ref 10–15)
GLUCOSE SERPL-MCNC: 100 MG/DL (ref 70–99)
HCO3 SERPL-SCNC: 24 MMOL/L (ref 22–29)
HCT VFR BLD AUTO: 32.5 % (ref 40–53)
HGB BLD-MCNC: 11.1 G/DL (ref 13.3–17.7)
MCH RBC QN AUTO: 32.5 PG (ref 26.5–33)
MCHC RBC AUTO-ENTMCNC: 34.2 G/DL (ref 31.5–36.5)
MCV RBC AUTO: 95 FL (ref 78–100)
PLATELET # BLD AUTO: 271 10E3/UL (ref 150–450)
POTASSIUM SERPL-SCNC: 4.6 MMOL/L (ref 3.4–5.3)
RBC # BLD AUTO: 3.42 10E6/UL (ref 4.4–5.9)
SODIUM SERPL-SCNC: 138 MMOL/L (ref 135–145)
WBC # BLD AUTO: 6.2 10E3/UL (ref 4–11)

## 2025-04-21 PROCEDURE — 80048 BASIC METABOLIC PNL TOTAL CA: CPT | Performed by: NURSE PRACTITIONER

## 2025-04-21 PROCEDURE — 97112 NEUROMUSCULAR REEDUCATION: CPT | Mod: GP | Performed by: PHYSICAL THERAPIST

## 2025-04-21 PROCEDURE — 36415 COLL VENOUS BLD VENIPUNCTURE: CPT | Performed by: NURSE PRACTITIONER

## 2025-04-21 PROCEDURE — 85027 COMPLETE CBC AUTOMATED: CPT | Performed by: NURSE PRACTITIONER

## 2025-04-21 PROCEDURE — 97110 THERAPEUTIC EXERCISES: CPT | Mod: GP | Performed by: PHYSICAL THERAPIST

## 2025-04-29 ENCOUNTER — OFFICE VISIT (OUTPATIENT)
Dept: CARDIOLOGY | Facility: CLINIC | Age: 80
End: 2025-04-29
Attending: INTERNAL MEDICINE
Payer: COMMERCIAL

## 2025-04-29 VITALS
HEART RATE: 60 BPM | HEIGHT: 70 IN | DIASTOLIC BLOOD PRESSURE: 77 MMHG | WEIGHT: 225 LBS | BODY MASS INDEX: 32.21 KG/M2 | SYSTOLIC BLOOD PRESSURE: 134 MMHG

## 2025-04-29 DIAGNOSIS — E78.00 PURE HYPERCHOLESTEROLEMIA: Primary | ICD-10-CM

## 2025-04-29 DIAGNOSIS — I10 BENIGN ESSENTIAL HYPERTENSION: ICD-10-CM

## 2025-04-29 DIAGNOSIS — E78.6 HDL DEFICIENCY: ICD-10-CM

## 2025-04-29 DIAGNOSIS — E66.09 NON MORBID OBESITY DUE TO EXCESS CALORIES: Chronic | ICD-10-CM

## 2025-04-29 DIAGNOSIS — I25.10 CORONARY ARTERY DISEASE INVOLVING NATIVE CORONARY ARTERY OF NATIVE HEART WITHOUT ANGINA PECTORIS: Chronic | ICD-10-CM

## 2025-04-29 DIAGNOSIS — I73.9 PAD (PERIPHERAL ARTERY DISEASE): ICD-10-CM

## 2025-04-29 PROCEDURE — 99215 OFFICE O/P EST HI 40 MIN: CPT | Performed by: INTERNAL MEDICINE

## 2025-04-29 PROCEDURE — 3078F DIAST BP <80 MM HG: CPT | Performed by: INTERNAL MEDICINE

## 2025-04-29 PROCEDURE — 3075F SYST BP GE 130 - 139MM HG: CPT | Performed by: INTERNAL MEDICINE

## 2025-04-29 RX ORDER — CLOPIDOGREL BISULFATE 75 MG/1
75 TABLET ORAL DAILY
Qty: 90 TABLET | Refills: 3 | Status: SHIPPED | OUTPATIENT
Start: 2025-04-29

## 2025-04-29 RX ORDER — RAMIPRIL 10 MG/1
10 CAPSULE ORAL DAILY
Qty: 90 CAPSULE | Refills: 3 | Status: CANCELLED | OUTPATIENT
Start: 2025-04-29

## 2025-04-29 RX ORDER — NITROGLYCERIN 0.4 MG/1
0.4 TABLET SUBLINGUAL EVERY 5 MIN PRN
Qty: 25 TABLET | Refills: 3 | Status: SHIPPED | OUTPATIENT
Start: 2025-04-29

## 2025-04-29 NOTE — LETTER
2025    Yamilex Booker, DO  [facility] [address] [address] [place] [address]    RE: Terrence Murillo       Dear Colleague,     I had the pleasure of seeing Terrence Murillo in the [facility].  HPI and Plan:   Terrence is a very nice 79-year-old gentleman with past medical history significant for directional coronary atherectomy in  with subsequent restenosis x2 for which we treated with balloon angioplasty.  In , 2 Xience drug-eluting stents were placed in his proximal and mid left anterior descending artery.  Terrence reminds me he is my longest standing patient.     Psychologically, Terrence has had a tough go of it.  His son, Doug at 38 years of age,  suddenly in  from what sounds like a myocardial infarction complicated by cardiac arrest.  Interestingly, his dad also  of an apparent heart attack at age 44.  His grandmother  suddenly at 21 for unclear reasons.  His wife  passed away 2022 in the [facility] system with her pulmonary hypertension.  She had lost a significant amount of weight, lived far longer than anybody anticipated but passed,  psychologically, this continues to haunt Terrence.  As we talk about it, he becomes tearful.  Has made changes including moving into senior living.  He is determined to try to make it healthy and is cooking his own meals.  Unfortunately it sounds like he may be having up to 3 drinks a day.      This past January Terrence had a recrudescence of his typical angina was brought to the Cath Lab where he was found to have a 95% distal right coronary artery stenosis which was stented.  He had a proximal 70% LAD stenosis with an IFR of 0.93.  Dr. Diaz tried a right leg approach Terrence appeared to have a tight severe ostial right common iliac stenosis.  Dr. Diaz had to switch to an arm case.  He has no claudication symptoms.     Unfortunately, Terrence has had several orthopedic issues over the years, decreasing his ability to  exercise which is how he always controlled his weight.  He has never been very disciplined about eating and drinking but was always very good about his exercise.     Other issues include a pituitary adenoma which he had resected in 2018 and follows with Neurology.  He has  prostate cancer for which he had to stop his testosterone supplement which he states was terrible going through male menopause.  Other issues in the last year include skin cancer removal and COVID for the first time in June.  The highlight of his year was a [place] fishing trip which he caught a bunch of [place].     Terrence returns to clinic stating he thinks his heart is doing well.  He is not having any chest, arm, neck, jaw or shoulder discomfort.  He has no dyspnea on exertion, orthopnea or PND.  He has no palpitations, lightheadedness, dizziness, syncope or near syncope.  He has increased bruisability.  And as stated is having no claudication symptoms despite his apparent stenosis.     He states he just has the aches and pains typical of an 79-year-old.    I personally reviewed his angiogram and angioplasty results      ASSESSMENT AND PLAN:  Terrence appears to be doing well from a cardiac standpoint without clinical evidence of ischemia, heart failure or significant arrhythmia.  I will discontinue aspirin at this time.  I have told him that finish out his current bottle of Brilinta and will switch over to clopidogrel 75 mg daily.  I have advised him to take 4 pills the first day.  I will have him stay on clopidogrel indefinitely.     Blood pressure is well-controlled.   I have also told him the alcohol contributes to hypertension, lack of exercise, weight gain, and salt in his diet   He is cutting back on his alcohol.  He is participating and enjoying cardiac rehab.     Cholesterol is excellent with total cholesterol 106, HDL 46, LDL 45 and triglycerides of 75.  He has normal ALT     Creatinine is 1.03 with normal electrolytes.    He has anemia  but has had anemia for 15 years.     We reviewed and I refilled all of his medications.  I will have him establish with Dr. Tay in 6 months.  If he should have any problems we would be glad to see him sooner.     Thank you for allowing me to participate in his care.     Eagle Jolley MD, [facility]    Today's clinic visit entailed:  Review of the result(s) of each unique test - coronary angiogram, lab work.  The following tests were independently interpreted by me as noted in my documentation: Coronary angiogram  Ordering of each unique test  Prescription drug management  40 minutes spent by me on the date of the encounter doing chart review, history and exam, documentation and further activities per the note  Provider  Link to MDM Help Grid     The level of medical decision making during this visit was of moderate complexity.      Orders Placed This Encounter   Procedures     Basic metabolic panel     Lipid Profile     ALT     Follow-Up with Cardiology       Orders Placed This Encounter   Medications     nitroGLYcerin (NITROSTAT) 0.4 MG sublingual tablet     Sig: Place 1 tablet (0.4 mg) under the tongue every 5 minutes as needed for chest pain. May repeat x2, if chest pain contines after 3rd dose call 911     Dispense:  25 tablet     Refill:  3     clopidogrel (PLAVIX) 75 MG tablet     Sig: Take 1 tablet (75 mg) by mouth daily.     Dispense:  90 tablet     Refill:  3     Take 4 pills first day       Medications Discontinued During This Encounter   Medication Reason     aspirin 81 MG tablet      nitroGLYcerin (NITROSTAT) 0.4 MG sublingual tablet Reorder (No AVS)     ticagrelor (BRILINTA) 90 MG tablet          Encounter Diagnoses   Name Primary?     Benign essential hypertension      Coronary artery disease involving native coronary artery of native heart without angina pectoris      Pure hypercholesterolemia Yes     Non morbid obesity due to excess calories      HDL deficiency      PAD (peripheral artery  disease)        CURRENT MEDICATIONS:  Current Outpatient Medications   Medication Sig Dispense Refill     acetaminophen (TYLENOL) 500 MG tablet Take 500 mg by mouth every morning.       alfuzosin ER (UROXATRAL) 10 MG 24 hr tablet Take 10 mg by mouth daily       amoxicillin (AMOXIL) 500 MG capsule Take before dental appointments       clopidogrel (PLAVIX) 75 MG tablet Take 1 tablet (75 mg) by mouth daily. 90 tablet 3     ezetimibe (ZETIA) 10 MG tablet Take 1 tablet (10 mg) by mouth daily. 90 tablet 3     Glucosamine 500 MG CAPS Take 500 mg by mouth 2 times daily        levothyroxine (SYNTHROID/LEVOTHROID) 100 MCG tablet TAKE 1 TABLET BY MOUTH EVERY DAY 90 tablet 0     metoprolol tartrate (LOPRESSOR) 25 MG tablet Take 1 tablet (25 mg) by mouth 2 times daily. 180 tablet 3     Multiple Vitamin (MULTI-VITAMIN) per tablet Take 1 tablet by mouth daily.       nitroGLYcerin (NITROSTAT) 0.4 MG sublingual tablet Place 1 tablet (0.4 mg) under the tongue every 5 minutes as needed for chest pain. May repeat x2, if chest pain contines after 3rd dose call 911 25 tablet 3     ramipril (ALTACE) 10 MG capsule Take 1 capsule (10 mg) by mouth daily.       rosuvastatin (CRESTOR) 40 MG tablet Take 1 tablet (40 mg) by mouth daily. 90 tablet 3     saw palmetto 450 MG CAPS capsule Take 450 mg by mouth 2 times daily         ALLERGIES     Allergies   Allergen Reactions     Cardizem [Diltiazem Hcl] Itching     Cats Hives     WATERY EYES, SNEEZE, AND COUGH     Diltiazem Hives and Rash       PAST MEDICAL HISTORY:  Past Medical History:   Diagnosis Date     Coronary artery disease     cardiac cath 2016: medical management; cath 2010: SUSANA x2 to LAD; cath 1993: PTCA to LAD; cath 1992: PTCA to LAD, 1992: atherectomy of LAD     Hearing problem      Hyperlipidaemia      Hypertension      Obese      Obstructive sleep apnea      Reduced vision      Sleep apnea     CPAP       PAST SURGICAL HISTORY:  Past Surgical History:   Procedure Laterality Date      APPENDECTOMY OPEN       ARTHROPLASTY KNEE Left      ARTHROPLASTY SHOULDER Right      CV CORONARY ANGIOGRAM N/A 2025    Procedure: Coronary Angiogram;  Surgeon: Jeffry Alcantara MD;  Location:  HEART CARDIAC CATH LAB     CV CORONARY LITHOTRIPSY PCI N/A 2025    Procedure: Percutaneous Coronary Intervention - Lithotripsy;  Surgeon: Jeffry Alcantara MD;  Location:  HEART CARDIAC CATH LAB     CV INSTANTANEOUS WAVE-FREE RATIO N/A 2025    Procedure: Instantaneous Wave-Free Ratio;  Surgeon: Jeffry Alcantara MD;  Location:  HEART CARDIAC CATH LAB     CV LEFT HEART CATH N/A 2025    Procedure: Left Heart Catheterization;  Surgeon: Jeffry Alcantara MD;  Location:  HEART CARDIAC CATH LAB     CV PCI N/A 2025    Procedure: Percutaneous Coronary Intervention;  Surgeon: Jeffry Alcantara MD;  Location:  HEART CARDIAC CATH LAB     ENT SURGERY       HEART CATH, ANGIOPLASTY      LAD  atherectomy with a DVI device      HEART CATH, ANGIOPLASTY  2010    PTCA and stent proximal and mid LAD (2) stents Xience     HEART CATH, ANGIOPLASTY  2016    no stenosis greater than 25%-rec. medical management     ILIAC ARTERY ANGIOGRAM RIGHT Right 2025    Procedure: Iliac Artery Angiogram Right;  Surgeon: Jeffry Alcantara MD;  Location:  HEART CARDIAC CATH LAB     MOHS MICROGRAPHIC PROCEDURE       OPTICAL TRACKING SYSTEM ENDOSCOPIC RESECTION TUMOR CRANIAL N/A 2017    Procedure: OPTICAL TRACKING SYSTEM ENDOSCOPIC RESECTION TUMOR CRANIAL;  Stealth Guided Endoscopic Transnasal Resection of Pituitary Tumor;  Surgeon: Amanda Bates MD;  Location:  OR       FAMILY HISTORY:  Family History   Problem Relation Age of Onset     C.A.D. Father      Heart Disease Father          at age 44     Heart Disease Son 37        enlarged heart     Heart Disease Mother          at age 80 after heart surgery     Heart Surgery Mother         open heart, passed 10 days after      Diabetes Mother      Family History Negative Maternal Grandmother      Family History Negative Maternal Grandfather      Family History Negative Paternal Grandmother      Family History Negative Paternal Grandfather      Family History Negative Brother      Alzheimer Disease Brother      Family History Negative Sister      Family History Negative Daughter      Family History Negative Son      Family History Negative Daughter      Heart Disease Son          at age 37       SOCIAL HISTORY:  Social History     Socioeconomic History     Marital status:      Spouse name: None     Number of children: None     Years of education: None     Highest education level: None   Tobacco Use     Smoking status: Never     Smokeless tobacco: Never   Substance and Sexual Activity     Alcohol use: Yes     Alcohol/week: 1.0 - 2.0 standard drink of alcohol     Types: 1 - 2 Standard drinks or equivalent per week     Comment: 1 cocktail and some wine per day     Drug use: No     Sexual activity: Not Currently     Partners: Female     Birth control/protection: Male Surgical   Other Topics Concern     Parent/sibling w/ CABG, MI or angioplasty before 65F 55M? No     Caffeine Concern Yes     Comment: coffee      Sleep Concern No     Stress Concern No     Weight Concern Yes     Comment: Weight decrease 10 lbs     Special Diet Yes     Comment: Mediterranean diet     Exercise Yes     Comment: Walking 5 days per week 35- 60 minutes     Seat Belt Yes     Social Drivers of Health     Financial Resource Strain: Low Risk  (2025)    Financial Resource Strain      Within the past 12 months, have you or your family members you live with been unable to get utilities (heat, electricity) when it was really needed?: No   Food Insecurity: Low Risk  (2025)    Food Insecurity      Within the past 12 months, did you worry that your food would run out before you got money to buy more?: No      Within the past 12 months, did the food you  "bought just not last and you didn t have money to get more?: No   Transportation Needs: Low Risk  (1/7/2025)    Transportation Needs      Within the past 12 months, has lack of transportation kept you from medical appointments, getting your medicines, non-medical meetings or appointments, work, or from getting things that you need?: No   Interpersonal Safety: Low Risk  (3/10/2025)    Interpersonal Safety      Do you feel physically and emotionally safe where you currently live?: Yes      Within the past 12 months, have you been hit, slapped, kicked or otherwise physically hurt by someone?: No      Within the past 12 months, have you been humiliated or emotionally abused in other ways by your partner or ex-partner?: No   Housing Stability: Low Risk  (1/7/2025)    Housing Stability      Do you have housing? : Yes      Are you worried about losing your housing?: No       Review of Systems:  Skin:  Positive for bruising     Eyes:  Positive for glasses    ENT:  Negative      Respiratory:  Positive for sleep apnea, CPAP     Cardiovascular:  Negative, palpitations, chest pain, syncope or near-syncope, cyanosis, lightheadedness, dizziness, edema      Gastroenterology: Negative      Genitourinary:  Positive for urinary frequency    Musculoskeletal:  Positive for back pain    Neurologic:  Negative      Psychiatric:  Negative      Heme/Lymph/Imm:         Endocrine:           Physical Exam:  Vitals: /77   Pulse 60   Ht 1.765 m (5' 9.5\")   Wt 102.1 kg (225 lb)   BMI 32.75 kg/m      Constitutional:  cooperative, alert and oriented, well developed, well nourished, in no acute distress obese [place] not wearing his hearing aids    Skin:  warm and dry to the touch, no apparent skin lesions or masses noted          Head:  normocephalic, no masses or lesions        Eyes:  pupils equal and round, conjunctivae and lids unremarkable, sclera white        Lymph:      ENT:  no pallor or cyanosis, dentition good        Neck:  no " carotid bruit        Respiratory:  normal breath sounds, clear to auscultation, normal A-P diameter, normal symmetry, normal respiratory excursion, no use of accessory muscles         Cardiac: regular rhythm     no presence of murmur          pulses full and equal                                        GI:    obese      Extremities and Muscular Skeletal:  no spinal abnormalities noted, normal muscle strength and tone   bilateral LE edema, trace, telangiectasia, varicose vein          Neurological:  no gross motor deficits        Psych:  affect appropriate, oriented to time, person and place        CC  Eagle Jolley MD  [address]  [place] [id number]                  Thank you for allowing me to participate in the care of your patient.      Sincerely,     Eagle Jolley MD     [facility]  cc:   Eagle Jolley MD  [address]  [place] [id number]

## 2025-04-29 NOTE — PROGRESS NOTES
HPI and Plan:   Terrence is a very nice 79-year-old gentleman with past medical history significant for directional coronary atherectomy in  with subsequent restenosis x2 for which we treated with balloon angioplasty.  In , 2 Xience drug-eluting stents were placed in his proximal and mid left anterior descending artery.  Terrence reminds me he is my longest standing patient.     Psychologically, Terrence has had a tough go of it.  His son, Doug at 38 years of age,  suddenly in  from what sounds like a myocardial infarction complicated by cardiac arrest.  Interestingly, his dad also  of an apparent heart attack at age 44.  His grandmother  suddenly at 21 for unclear reasons.  His wife  passed away 2022 in the [facility] system with her pulmonary hypertension.  She had lost a significant amount of weight, lived far longer than anybody anticipated but passed,  psychologically, this continues to haunt Terrence.  As we talk about it, he becomes tearful.  Has made changes including moving into senior living.  He is determined to try to make it healthy and is cooking his own meals.  Unfortunately it sounds like he may be having up to 3 drinks a day.      This past January Terrence had a recrudescence of his typical angina was brought to the Cath Lab where he was found to have a 95% distal right coronary artery stenosis which was stented.  He had a proximal 70% LAD stenosis with an IFR of 0.93.  Dr. Diaz tried a right leg approach Terrence appeared to have a tight severe ostial right common iliac stenosis.  Dr. Diaz had to switch to an arm case.  He has no claudication symptoms.     Unfortunately, Terrence has had several orthopedic issues over the years, decreasing his ability to exercise which is how he always controlled his weight.  He has never been very disciplined about eating and drinking but was always very good about his exercise.     Other issues include a pituitary adenoma which he had resected in  2018 and follows with Neurology.  He has  prostate cancer for which he had to stop his testosterone supplement which he states was terrible going through male menopause.  Other issues in the last year include skin cancer removal and COVID for the first time in June.  The highlight of his year was a [place] fishing trip which he caught a bunch of walleye.     Terrence returns to clinic stating he thinks his heart is doing well.  He is not having any chest, arm, neck, jaw or shoulder discomfort.  He has no dyspnea on exertion, orthopnea or PND.  He has no palpitations, lightheadedness, dizziness, syncope or near syncope.  He has increased bruisability.  And as stated is having no claudication symptoms despite his apparent stenosis.     He states he just has the aches and pains typical of an 79-year-old.    I personally reviewed his angiogram and angioplasty results      ASSESSMENT AND PLAN:  Terrence appears to be doing well from a cardiac standpoint without clinical evidence of ischemia, heart failure or significant arrhythmia.  I will discontinue aspirin at this time.  I have told him that finish out his current bottle of Brilinta and will switch over to clopidogrel 75 mg daily.  I have advised him to take 4 pills the first day.  I will have him stay on clopidogrel indefinitely.     Blood pressure is well-controlled.   I have also told him the alcohol contributes to hypertension, lack of exercise, weight gain, and salt in his diet   He is cutting back on his alcohol.  He is participating and enjoying cardiac rehab.     Cholesterol is excellent with total cholesterol 106, HDL 46, LDL 45 and triglycerides of 75.  He has normal ALT     Creatinine is 1.03 with normal electrolytes.    He has anemia but has had anemia for 15 years.     We reviewed and I refilled all of his medications.  I will have him establish with Dr. Tay in 6 months.  If he should have any problems we would be glad to see him sooner.     Thank you for  allowing me to participate in his care.     Eagle Jolley MD, [facility]    Today's clinic visit entailed:  Review of the result(s) of each unique test - coronary angiogram, lab work.  The following tests were independently interpreted by me as noted in my documentation: Coronary angiogram  Ordering of each unique test  Prescription drug management  40 minutes spent by me on the date of the encounter doing chart review, history and exam, documentation and further activities per the note  Provider  Link to [facility] Help Grid     The level of medical decision making during this visit was of moderate complexity.      Orders Placed This Encounter   Procedures    Basic metabolic panel    Lipid Profile    ALT    Follow-Up with Cardiology       Orders Placed This Encounter   Medications    nitroGLYcerin (NITROSTAT) 0.4 MG sublingual tablet     Sig: Place 1 tablet (0.4 mg) under the tongue every 5 minutes as needed for chest pain. May repeat x2, if chest pain contines after 3rd dose call 911     Dispense:  25 tablet     Refill:  3    clopidogrel (PLAVIX) 75 MG tablet     Sig: Take 1 tablet (75 mg) by mouth daily.     Dispense:  90 tablet     Refill:  3     Take 4 pills first day       Medications Discontinued During This Encounter   Medication Reason    aspirin 81 MG tablet     nitroGLYcerin (NITROSTAT) 0.4 MG sublingual tablet Reorder (No AVS)    ticagrelor (BRILINTA) 90 MG tablet          Encounter Diagnoses   Name Primary?    Benign essential hypertension     Coronary artery disease involving native coronary artery of native heart without angina pectoris     Pure hypercholesterolemia Yes    Non morbid obesity due to excess calories     HDL deficiency     PAD (peripheral artery disease)        CURRENT MEDICATIONS:  Current Outpatient Medications   Medication Sig Dispense Refill    acetaminophen (TYLENOL) 500 MG tablet Take 500 mg by mouth every morning.      alfuzosin ER (UROXATRAL) 10 MG 24 hr tablet Take 10 mg by mouth  daily      amoxicillin (AMOXIL) 500 MG capsule Take before dental appointments      clopidogrel (PLAVIX) 75 MG tablet Take 1 tablet (75 mg) by mouth daily. 90 tablet 3    ezetimibe (ZETIA) 10 MG tablet Take 1 tablet (10 mg) by mouth daily. 90 tablet 3    Glucosamine 500 MG CAPS Take 500 mg by mouth 2 times daily       levothyroxine (SYNTHROID/LEVOTHROID) 100 MCG tablet TAKE 1 TABLET BY MOUTH EVERY DAY 90 tablet 0    metoprolol tartrate (LOPRESSOR) 25 MG tablet Take 1 tablet (25 mg) by mouth 2 times daily. 180 tablet 3    Multiple Vitamin (MULTI-VITAMIN) per tablet Take 1 tablet by mouth daily.      nitroGLYcerin (NITROSTAT) 0.4 MG sublingual tablet Place 1 tablet (0.4 mg) under the tongue every 5 minutes as needed for chest pain. May repeat x2, if chest pain contines after 3rd dose call 911 25 tablet 3    ramipril (ALTACE) 10 MG capsule Take 1 capsule (10 mg) by mouth daily.      rosuvastatin (CRESTOR) 40 MG tablet Take 1 tablet (40 mg) by mouth daily. 90 tablet 3    saw palmetto 450 MG CAPS capsule Take 450 mg by mouth 2 times daily         ALLERGIES     Allergies   Allergen Reactions    Cardizem [Diltiazem Hcl] Itching    Cats Hives     WATERY EYES, SNEEZE, AND COUGH    Diltiazem Hives and Rash       PAST MEDICAL HISTORY:  Past Medical History:   Diagnosis Date    Coronary artery disease     cardiac cath 2016: medical management; cath 2010: SUSANA x2 to LAD; cath 1993: PTCA to LAD; cath 1992: PTCA to LAD, 1992: atherectomy of LAD    Hearing problem     Hyperlipidaemia     Hypertension     Obese     Obstructive sleep apnea     Reduced vision     Sleep apnea     CPAP       PAST SURGICAL HISTORY:  Past Surgical History:   Procedure Laterality Date    APPENDECTOMY OPEN      ARTHROPLASTY KNEE Left     ARTHROPLASTY SHOULDER Right     CV CORONARY ANGIOGRAM N/A 01/08/2025    Procedure: Coronary Angiogram;  Surgeon: Jeffry Alcantara MD;  Location:  HEART CARDIAC CATH LAB    CV CORONARY LITHOTRIPSY PCI N/A 01/08/2025     Procedure: Percutaneous Coronary Intervention - Lithotripsy;  Surgeon: Jeffry Alcantara MD;  Location:  HEART CARDIAC CATH LAB    CV INSTANTANEOUS WAVE-FREE RATIO N/A 2025    Procedure: Instantaneous Wave-Free Ratio;  Surgeon: Jeffry Alcantara MD;  Location:  HEART CARDIAC CATH LAB    CV LEFT HEART CATH N/A 2025    Procedure: Left Heart Catheterization;  Surgeon: Jeffry Alcantara MD;  Location:  HEART CARDIAC CATH LAB    CV PCI N/A 2025    Procedure: Percutaneous Coronary Intervention;  Surgeon: Jeffry Alcantara MD;  Location:  HEART CARDIAC CATH LAB    ENT SURGERY      HEART CATH, ANGIOPLASTY      LAD  atherectomy with a DVI device     HEART CATH, ANGIOPLASTY  2010    PTCA and stent proximal and mid LAD (2) stents Xience    HEART CATH, ANGIOPLASTY  2016    no stenosis greater than 25%-rec. medical management    ILIAC ARTERY ANGIOGRAM RIGHT Right 2025    Procedure: Iliac Artery Angiogram Right;  Surgeon: Jeffry Alcantara MD;  Location:  HEART CARDIAC CATH LAB    MOHS MICROGRAPHIC PROCEDURE      OPTICAL TRACKING SYSTEM ENDOSCOPIC RESECTION TUMOR CRANIAL N/A 2017    Procedure: OPTICAL TRACKING SYSTEM ENDOSCOPIC RESECTION TUMOR CRANIAL;  Stealth Guided Endoscopic Transnasal Resection of Pituitary Tumor;  Surgeon: Amanda Bates MD;  Location:  OR       FAMILY HISTORY:  Family History   Problem Relation Age of Onset    C.A.D. Father     Heart Disease Father          at age 44    Heart Disease Son 37        enlarged heart    Heart Disease Mother          at age 80 after heart surgery    Heart Surgery Mother         open heart, passed 10 days after    Diabetes Mother     Family History Negative Maternal Grandmother     Family History Negative Maternal Grandfather     Family History Negative Paternal Grandmother     Family History Negative Paternal Grandfather     Family History Negative Brother     Alzheimer Disease Brother      Family History Negative Sister     Family History Negative Daughter     Family History Negative Son     Family History Negative Daughter     Heart Disease Son          at age 37       SOCIAL HISTORY:  Social History     Socioeconomic History    Marital status:      Spouse name: None    Number of children: None    Years of education: None    Highest education level: None   Tobacco Use    Smoking status: Never    Smokeless tobacco: Never   Substance and Sexual Activity    Alcohol use: Yes     Alcohol/week: 1.0 - 2.0 standard drink of alcohol     Types: 1 - 2 Standard drinks or equivalent per week     Comment: 1 cocktail and some wine per day    Drug use: No    Sexual activity: Not Currently     Partners: Female     Birth control/protection: Male Surgical   Other Topics Concern    Parent/sibling w/ CABG, MI or angioplasty before 65F 55M? No    Caffeine Concern Yes     Comment: coffee     Sleep Concern No    Stress Concern No    Weight Concern Yes     Comment: Weight decrease 10 lbs    Special Diet Yes     Comment: Mediterranean diet    Exercise Yes     Comment: Walking 5 days per week 35- 60 minutes    Seat Belt Yes     Social Drivers of Health     Financial Resource Strain: Low Risk  (2025)    Financial Resource Strain     Within the past 12 months, have you or your family members you live with been unable to get utilities (heat, electricity) when it was really needed?: No   Food Insecurity: Low Risk  (2025)    Food Insecurity     Within the past 12 months, did you worry that your food would run out before you got money to buy more?: No     Within the past 12 months, did the food you bought just not last and you didn t have money to get more?: No   Transportation Needs: Low Risk  (2025)    Transportation Needs     Within the past 12 months, has lack of transportation kept you from medical appointments, getting your medicines, non-medical meetings or appointments, work, or from getting things  "that you need?: No   Interpersonal Safety: Low Risk  (3/10/2025)    Interpersonal Safety     Do you feel physically and emotionally safe where you currently live?: Yes     Within the past 12 months, have you been hit, slapped, kicked or otherwise physically hurt by someone?: No     Within the past 12 months, have you been humiliated or emotionally abused in other ways by your partner or ex-partner?: No   Housing Stability: Low Risk  (1/7/2025)    Housing Stability     Do you have housing? : Yes     Are you worried about losing your housing?: No       Review of Systems:  Skin:  Positive for bruising     Eyes:  Positive for glasses    ENT:  Negative      Respiratory:  Positive for sleep apnea, CPAP     Cardiovascular:  Negative, palpitations, chest pain, syncope or near-syncope, cyanosis, lightheadedness, dizziness, edema      Gastroenterology: Negative      Genitourinary:  Positive for urinary frequency    Musculoskeletal:  Positive for back pain    Neurologic:  Negative      Psychiatric:  Negative      Heme/Lymph/Imm:         Endocrine:           Physical Exam:  Vitals: /77   Pulse 60   Ht 1.765 m (5' 9.5\")   Wt 102.1 kg (225 lb)   BMI 32.75 kg/m      Constitutional:  cooperative, alert and oriented, well developed, well nourished, in no acute distress obese [place] not wearing his hearing aids    Skin:  warm and dry to the touch, no apparent skin lesions or masses noted          Head:  normocephalic, no masses or lesions        Eyes:  pupils equal and round, conjunctivae and lids unremarkable, sclera white        Lymph:      ENT:  no pallor or cyanosis, dentition good        Neck:  no carotid bruit        Respiratory:  normal breath sounds, clear to auscultation, normal A-P diameter, normal symmetry, normal respiratory excursion, no use of accessory muscles         Cardiac: regular rhythm     no presence of murmur          pulses full and equal                                        GI:    obese  "     Extremities and Muscular Skeletal:  no spinal abnormalities noted, normal muscle strength and tone   bilateral LE edema, trace, telangiectasia, varicose vein          Neurological:  no gross motor deficits        Psych:  affect appropriate, oriented to time, person and place        CC  Eagle Jolley MD  [id number]  [place] [id number]

## 2025-05-22 ENCOUNTER — THERAPY VISIT (OUTPATIENT)
Dept: PHYSICAL THERAPY | Facility: CLINIC | Age: 80
End: 2025-05-22
Payer: COMMERCIAL

## 2025-05-22 DIAGNOSIS — M48.062 LUMBAR STENOSIS WITH NEUROGENIC CLAUDICATION: Primary | ICD-10-CM

## 2025-08-12 ENCOUNTER — LAB (OUTPATIENT)
Dept: LAB | Facility: CLINIC | Age: 80
End: 2025-08-12
Payer: COMMERCIAL

## 2025-08-12 DIAGNOSIS — I25.10 CORONARY ARTERY DISEASE INVOLVING NATIVE CORONARY ARTERY OF NATIVE HEART WITHOUT ANGINA PECTORIS: Chronic | ICD-10-CM

## 2025-08-12 LAB
ALT SERPL W P-5'-P-CCNC: 18 U/L (ref 0–70)
ANION GAP SERPL CALCULATED.3IONS-SCNC: 9 MMOL/L (ref 7–15)
BUN SERPL-MCNC: 21.8 MG/DL (ref 8–23)
CALCIUM SERPL-MCNC: 9.1 MG/DL (ref 8.8–10.4)
CHLORIDE SERPL-SCNC: 108 MMOL/L (ref 98–107)
CHOLEST SERPL-MCNC: 106 MG/DL
CREAT SERPL-MCNC: 1.15 MG/DL (ref 0.67–1.17)
EGFRCR SERPLBLD CKD-EPI 2021: 64 ML/MIN/1.73M2
FASTING STATUS PATIENT QL REPORTED: YES
GLUCOSE SERPL-MCNC: 102 MG/DL (ref 70–99)
HCO3 SERPL-SCNC: 25 MMOL/L (ref 22–29)
HDLC SERPL-MCNC: 46 MG/DL
LDLC SERPL CALC-MCNC: 49 MG/DL
NONHDLC SERPL-MCNC: 60 MG/DL
POTASSIUM SERPL-SCNC: 4.6 MMOL/L (ref 3.4–5.3)
SODIUM SERPL-SCNC: 142 MMOL/L (ref 135–145)
TRIGL SERPL-MCNC: 54 MG/DL

## 2025-08-19 ENCOUNTER — OFFICE VISIT (OUTPATIENT)
Dept: CARDIOLOGY | Facility: CLINIC | Age: 80
End: 2025-08-19
Attending: INTERNAL MEDICINE
Payer: COMMERCIAL

## 2025-08-19 VITALS
HEIGHT: 70 IN | HEART RATE: 55 BPM | SYSTOLIC BLOOD PRESSURE: 125 MMHG | WEIGHT: 227 LBS | BODY MASS INDEX: 32.5 KG/M2 | DIASTOLIC BLOOD PRESSURE: 65 MMHG | OXYGEN SATURATION: 97 %

## 2025-08-19 DIAGNOSIS — I10 BENIGN ESSENTIAL HYPERTENSION: Primary | ICD-10-CM

## 2025-08-19 DIAGNOSIS — F32.A DEPRESSION, UNSPECIFIED DEPRESSION TYPE: ICD-10-CM

## 2025-08-19 DIAGNOSIS — I25.10 CORONARY ARTERY DISEASE INVOLVING NATIVE CORONARY ARTERY OF NATIVE HEART WITHOUT ANGINA PECTORIS: Chronic | ICD-10-CM

## 2025-08-19 PROCEDURE — 3074F SYST BP LT 130 MM HG: CPT | Performed by: INTERNAL MEDICINE

## 2025-08-19 PROCEDURE — 3078F DIAST BP <80 MM HG: CPT | Performed by: INTERNAL MEDICINE

## 2025-08-19 PROCEDURE — 99417 PROLNG OP E/M EACH 15 MIN: CPT | Performed by: INTERNAL MEDICINE

## 2025-08-19 PROCEDURE — G2211 COMPLEX E/M VISIT ADD ON: HCPCS | Performed by: INTERNAL MEDICINE

## 2025-08-19 PROCEDURE — 99215 OFFICE O/P EST HI 40 MIN: CPT | Performed by: INTERNAL MEDICINE

## 2025-08-25 ENCOUNTER — TELEPHONE (OUTPATIENT)
Dept: CARDIOLOGY | Facility: CLINIC | Age: 80
End: 2025-08-25
Payer: COMMERCIAL

## 2025-09-04 DIAGNOSIS — I25.10 CORONARY ARTERY DISEASE INVOLVING NATIVE CORONARY ARTERY OF NATIVE HEART WITHOUT ANGINA PECTORIS: Chronic | ICD-10-CM

## 2025-09-04 DIAGNOSIS — E78.5 HYPERLIPIDEMIA LDL GOAL <70: ICD-10-CM

## 2025-09-04 RX ORDER — EZETIMIBE 10 MG/1
10 TABLET ORAL DAILY
Qty: 90 TABLET | Refills: 3 | Status: SHIPPED | OUTPATIENT
Start: 2025-09-04

## (undated) DEVICE — MANIFOLD KIT ANGIO AUTOMATED 014613

## (undated) DEVICE — KIT LG BORE TOUHY ACCESS PLUS MAP152

## (undated) DEVICE — SOL RINGERS LACTATED 1000ML BAG 07953-09

## (undated) DEVICE — SYR 30ML LL W/O NDL 302832

## (undated) DEVICE — CATH RX TAKERU PTCA BALLOON 2.00MM X 15MM

## (undated) DEVICE — CATH DIAG 4FR JL 4.5 538417

## (undated) DEVICE — GOWN XLG DISP 9545

## (undated) DEVICE — CUTTING BALLOON WOLVERINE 2.5X15MM

## (undated) DEVICE — INTRO SHEATH 4FRX10CM PINNACLE RSS402

## (undated) DEVICE — ESU GROUND PAD ADULT W/CORD E7507

## (undated) DEVICE — CATH GUIDELINER 6FR 5571

## (undated) DEVICE — APPLICATOR EXTENDED TIP DURASEAL 8CM 205108

## (undated) DEVICE — CATH ANGIO INFINITI 3DRC 4FRX100CM 538476

## (undated) DEVICE — APPLICATOR COTTON TIP 6"X2 STERILE LF 6012

## (undated) DEVICE — DRAPE POUCH INSTRUMENT 1018

## (undated) DEVICE — Device

## (undated) DEVICE — DEFIB PRO-PADZ LVP LQD GEL ADULT 8900-2105-01

## (undated) DEVICE — CATH GUIDING 6FR AL .75 LA6AL75

## (undated) DEVICE — GLOVE PROTEXIS MICRO 7.0  2D73PM70

## (undated) DEVICE — MARKER SPHERE PASSIVE 5 SPHERES/TRAY 8801002

## (undated) DEVICE — LINEN TOWEL PACK X30 5481

## (undated) DEVICE — INTRO SHEATH 6FRX10CM PINNACLE RSS602

## (undated) DEVICE — DRAPE EYE SHEET 8441

## (undated) DEVICE — SYR 10ML FINGER CONTROL W/O NDL 309695

## (undated) DEVICE — PACK GOWN 3/PK DISP XL SBA32GPFCB

## (undated) DEVICE — INTRO GLIDESHEATH SLENDER 6FR 10X45CM 60-1060

## (undated) DEVICE — DRSG GAUZE 4X4" TRAY 6939

## (undated) DEVICE — SPLINT NASAL DOYLE BREATHEASY 20-10500

## (undated) DEVICE — GRAFT DURAGEN 2X2" ID220: Type: IMPLANTABLE DEVICE | Site: BRAIN | Status: NON-FUNCTIONAL

## (undated) DEVICE — DRSG TEGADERM 2 3/8X2 3/4" 1624W

## (undated) DEVICE — BLADE KNIFE SURG 11 371111

## (undated) DEVICE — SPONGE SURGIFOAM 01GM POWDER 1978

## (undated) DEVICE — DRSG TELFA 3"X8" NON25720

## (undated) DEVICE — KIT HAND CONTROL ANGIOTOUCH ACIST 65CM AT-P65

## (undated) DEVICE — CATH GUIDING 100CM 6FR LNCHR COR

## (undated) DEVICE — WIRE GUIDE 0.035"X260CM SAFE-T-J EXCHANGE G00517

## (undated) DEVICE — CATH BALLOON SHOCKWAVE CV2+ IVL CATH 2.5X12MM C2PIVL2512

## (undated) DEVICE — SPONGE COTTONOID 1/2X3" 20-07S

## (undated) DEVICE — SU ETHILON 2-0 FS 18" 664H

## (undated) DEVICE — CATH BALLOON NC EMERGE 2.00X15MM H7493926715200

## (undated) DEVICE — GW SURG OMNIWIRE STRAIGHT TIP L185CM PRESSURE GU 89185

## (undated) DEVICE — CATH DIAGNOSTIC RADIAL 5FR TIG 4.0

## (undated) DEVICE — CATH BALLOON NC EMERGE 2.25X12MM H7493926712220

## (undated) DEVICE — CATH BALLOON NC EUPHORA 2.5X12MM NCEUP2512X

## (undated) DEVICE — RX BACITRACIN OINTMENT 0.9G 1/32OZ CUR001109

## (undated) DEVICE — DECANTER VIAL 2006S

## (undated) DEVICE — SPONGE SURGIFOAM 100 1974

## (undated) DEVICE — BUR BALL 3MM DIAMOND 15CM ANSPACH MA15-3SD

## (undated) DEVICE — GUIDEWIRE VASC 0.014"X190CM W/SLIP COAT 28CM AHW14R101S

## (undated) DEVICE — PIN SKULL MAYFIELD ADULT TITANIUM 3/PK A1120

## (undated) DEVICE — CATH GUIDING 100CM 6FR .070IN VSTBR

## (undated) DEVICE — DECANTER BAG 2002S

## (undated) DEVICE — CATH BALLOON NC EMERGE 3.25X12MM H7493926712320

## (undated) DEVICE — SLEEVE TR BAND RADIAL COMPRESSION DEVICE 24CM TRB24-REG

## (undated) DEVICE — CATH BALLOON NC EUPHORA 3.00X15MM NCEUP3015X

## (undated) DEVICE — GUIDEWIRE VASC 0.035INX150CM INQWIRE J TIP IQ35F150J3F/A

## (undated) DEVICE — SPONGE COTTONOID 1/2X1/2" 20-04S

## (undated) DEVICE — ESU ELEC NDL 6" COATED/INSULATED E1465-6

## (undated) DEVICE — GLOVE SENSICARE 7.0 MSG1070 LATEX FREE

## (undated) DEVICE — ESU MINI EPIDURAL VEIN SEALER AQUAMANTIS 3.4MM 23-314-1

## (undated) DEVICE — EYE PREP BETADINE 5% SOLUTION 30ML 0065-0411-30

## (undated) DEVICE — CATH TRAY FOLEY SURESTEP 16FR W/TMP PRB STLK LATEX A319416AM

## (undated) DEVICE — PACK NEURO MINOR UMMC SNE32MNMU4

## (undated) DEVICE — LINEN TOWEL PACK X6 WHITE 5487

## (undated) DEVICE — DRAPE WARMER 66X44" ORS-300

## (undated) DEVICE — CATH LAUNCHER 6FR 4.0 LA6JR40SH

## (undated) DEVICE — CATH LAUNCHER 6FR JR 4.0 LA6JR40

## (undated) DEVICE — PREP CHLORAPREP CLEAR 3ML 260400

## (undated) DEVICE — SUCTION MANIFOLD DORNOCH ULTRA CART UL-CL500

## (undated) DEVICE — COVER CAMERA VIDEO LASER 9X96" 04-CC227

## (undated) DEVICE — BUR BALL 4MM DIAMOND 15CM ANSPACH MA15-4D

## (undated) DEVICE — ESU SUCTION CAUTERY 10FR FOOT CONTROL E2505-10FR

## (undated) DEVICE — ENDO SHEATH STORZ SHARPSITE ENDOSCRUB 0DEG 4MM 1912000

## (undated) DEVICE — GW VASC OMNIWIRE J L185CM PRESSURE 89185J

## (undated) DEVICE — DRAPE SHEET MED 44X70" 9355

## (undated) DEVICE — SYR ANGIOGRAPHY MULTIUSE KIT ACIST 014612

## (undated) DEVICE — DRAPE MAYO STAND 23X54 8337

## (undated) DEVICE — RAD INFLATOR BASIC COMPAK  IN4130

## (undated) DEVICE — SOL WATER IRRIG 1000ML BOTTLE 2F7114

## (undated) RX ORDER — ROCURONIUM BROMIDE 50 MG/5 ML
SYRINGE (ML) INTRAVENOUS
Status: DISPENSED
Start: 2017-11-03

## (undated) RX ORDER — FENTANYL CITRATE 50 UG/ML
INJECTION, SOLUTION INTRAMUSCULAR; INTRAVENOUS
Status: DISPENSED
Start: 2017-11-03

## (undated) RX ORDER — HYDRALAZINE HYDROCHLORIDE 20 MG/ML
INJECTION INTRAMUSCULAR; INTRAVENOUS
Status: DISPENSED
Start: 2017-11-03

## (undated) RX ORDER — LIDOCAINE HYDROCHLORIDE 20 MG/ML
INJECTION, SOLUTION EPIDURAL; INFILTRATION; INTRACAUDAL; PERINEURAL
Status: DISPENSED
Start: 2017-11-03

## (undated) RX ORDER — ESMOLOL HYDROCHLORIDE 10 MG/ML
INJECTION INTRAVENOUS
Status: DISPENSED
Start: 2017-11-03

## (undated) RX ORDER — FENTANYL CITRATE 50 UG/ML
INJECTION, SOLUTION INTRAMUSCULAR; INTRAVENOUS
Status: DISPENSED
Start: 2025-01-08

## (undated) RX ORDER — BACITRACIN 50000 [IU]/1
INJECTION, POWDER, FOR SOLUTION INTRAMUSCULAR
Status: DISPENSED
Start: 2017-11-03

## (undated) RX ORDER — HYDROMORPHONE HYDROCHLORIDE 1 MG/ML
INJECTION, SOLUTION INTRAMUSCULAR; INTRAVENOUS; SUBCUTANEOUS
Status: DISPENSED
Start: 2017-11-03

## (undated) RX ORDER — LABETALOL HYDROCHLORIDE 5 MG/ML
INJECTION, SOLUTION INTRAVENOUS
Status: DISPENSED
Start: 2017-11-03

## (undated) RX ORDER — HEPARIN SODIUM 1000 [USP'U]/ML
INJECTION, SOLUTION INTRAVENOUS; SUBCUTANEOUS
Status: DISPENSED
Start: 2025-01-08

## (undated) RX ORDER — HYDROMORPHONE HCL/0.9% NACL/PF 0.2MG/0.2
SYRINGE (ML) INTRAVENOUS
Status: DISPENSED
Start: 2017-11-03

## (undated) RX ORDER — VERAPAMIL HYDROCHLORIDE 2.5 MG/ML
INJECTION, SOLUTION INTRAVENOUS
Status: DISPENSED
Start: 2025-01-08

## (undated) RX ORDER — SODIUM CHLORIDE 9 MG/ML
INJECTION, SOLUTION INTRAVENOUS
Status: DISPENSED
Start: 2017-11-03

## (undated) RX ORDER — PHENYLEPHRINE HCL IN 0.9% NACL 1 MG/10 ML
SYRINGE (ML) INTRAVENOUS
Status: DISPENSED
Start: 2017-11-03

## (undated) RX ORDER — ONDANSETRON 2 MG/ML
INJECTION INTRAMUSCULAR; INTRAVENOUS
Status: DISPENSED
Start: 2017-11-03

## (undated) RX ORDER — NITROGLYCERIN 5 MG/ML
VIAL (ML) INTRAVENOUS
Status: DISPENSED
Start: 2025-01-08

## (undated) RX ORDER — LIDOCAINE HYDROCHLORIDE 10 MG/ML
INJECTION, SOLUTION EPIDURAL; INFILTRATION; INTRACAUDAL; PERINEURAL
Status: DISPENSED
Start: 2025-01-08

## (undated) RX ORDER — SODIUM CHLORIDE, SODIUM LACTATE, POTASSIUM CHLORIDE, CALCIUM CHLORIDE 600; 310; 30; 20 MG/100ML; MG/100ML; MG/100ML; MG/100ML
INJECTION, SOLUTION INTRAVENOUS
Status: DISPENSED
Start: 2017-11-03

## (undated) RX ORDER — ASPIRIN 81 MG/1
TABLET, CHEWABLE ORAL
Status: DISPENSED
Start: 2025-01-08

## (undated) RX ORDER — EPHEDRINE SULFATE 50 MG/ML
INJECTION, SOLUTION INTRAMUSCULAR; INTRAVENOUS; SUBCUTANEOUS
Status: DISPENSED
Start: 2017-11-03

## (undated) RX ORDER — OXYMETAZOLINE HYDROCHLORIDE 0.05 G/100ML
SPRAY NASAL
Status: DISPENSED
Start: 2017-11-03

## (undated) RX ORDER — DEXAMETHASONE SODIUM PHOSPHATE 4 MG/ML
INJECTION, SOLUTION INTRA-ARTICULAR; INTRALESIONAL; INTRAMUSCULAR; INTRAVENOUS; SOFT TISSUE
Status: DISPENSED
Start: 2017-11-03

## (undated) RX ORDER — PROPOFOL 10 MG/ML
INJECTION, EMULSION INTRAVENOUS
Status: DISPENSED
Start: 2017-11-03